# Patient Record
Sex: MALE | Race: WHITE | NOT HISPANIC OR LATINO | Employment: OTHER | URBAN - METROPOLITAN AREA
[De-identification: names, ages, dates, MRNs, and addresses within clinical notes are randomized per-mention and may not be internally consistent; named-entity substitution may affect disease eponyms.]

---

## 2017-01-03 ENCOUNTER — INFUSION (OUTPATIENT)
Dept: ONCOLOGY | Facility: HOSPITAL | Age: 67
End: 2017-01-03

## 2017-01-03 VITALS
HEART RATE: 70 BPM | SYSTOLIC BLOOD PRESSURE: 120 MMHG | DIASTOLIC BLOOD PRESSURE: 72 MMHG | BODY MASS INDEX: 25.18 KG/M2 | RESPIRATION RATE: 18 BRPM | TEMPERATURE: 98.6 F | WEIGHT: 177 LBS

## 2017-01-03 DIAGNOSIS — C18.7 MALIGNANT NEOPLASM OF SIGMOID COLON (HCC): Primary | ICD-10-CM

## 2017-01-03 LAB
ALBUMIN SERPL-MCNC: 4 G/DL (ref 3.2–4.8)
ALBUMIN/GLOB SERPL: 1.8 G/DL (ref 1.5–2.5)
ALP SERPL-CCNC: 55 U/L (ref 25–100)
ALT SERPL W P-5'-P-CCNC: 15 U/L (ref 7–40)
ANION GAP SERPL CALCULATED.3IONS-SCNC: 10 MMOL/L (ref 3–11)
AST SERPL-CCNC: 21 U/L (ref 0–33)
BILIRUB SERPL-MCNC: 1.7 MG/DL (ref 0.3–1.2)
BILIRUB UR QL STRIP: NEGATIVE
BUN BLD-MCNC: 15 MG/DL (ref 9–23)
BUN/CREAT SERPL: 18.8 (ref 7–25)
CALCIUM SPEC-SCNC: 9.8 MG/DL (ref 8.7–10.4)
CHLORIDE SERPL-SCNC: 105 MMOL/L (ref 99–109)
CLARITY UR: CLEAR
CO2 SERPL-SCNC: 24 MMOL/L (ref 20–31)
COLOR UR: YELLOW
CREAT BLD-MCNC: 0.8 MG/DL (ref 0.6–1.3)
CREAT BLDA-MCNC: 0.8 MG/DL (ref 0.6–1.3)
CREATINE SERPL-MCNC: 0.8 MG/DL
GFR SERPL CREATININE-BSD FRML MDRD: 97 ML/MIN/1.73
GLOBULIN UR ELPH-MCNC: 2.2 GM/DL
GLUCOSE BLD-MCNC: 104 MG/DL (ref 70–100)
GLUCOSE UR STRIP-MCNC: NEGATIVE MG/DL
HGB UR QL STRIP.AUTO: NEGATIVE
KETONES UR QL STRIP: NEGATIVE
LEUKOCYTE ESTERASE UR QL STRIP.AUTO: NEGATIVE
NITRITE UR QL STRIP: NEGATIVE
PH UR STRIP.AUTO: 5.5 [PH] (ref 5–8)
POTASSIUM BLD-SCNC: 4.2 MMOL/L (ref 3.5–5.5)
PROT SERPL-MCNC: 6.2 G/DL (ref 5.7–8.2)
PROT UR QL STRIP: NEGATIVE
SODIUM BLD-SCNC: 139 MMOL/L (ref 132–146)
SP GR UR STRIP: 1.02 (ref 1–1.03)
UROBILINOGEN UR QL STRIP: NORMAL

## 2017-01-03 PROCEDURE — 96374 THER/PROPH/DIAG INJ IV PUSH: CPT

## 2017-01-03 PROCEDURE — 96375 TX/PRO/DX INJ NEW DRUG ADDON: CPT

## 2017-01-03 PROCEDURE — 25010000002 BEVACIZUMAB PER 10 MG: Performed by: NURSE PRACTITIONER

## 2017-01-03 PROCEDURE — 25010000002 HEPARIN FLUSH (PORCINE) 100 UNIT/ML SOLUTION: Performed by: INTERNAL MEDICINE

## 2017-01-03 PROCEDURE — 36415 COLL VENOUS BLD VENIPUNCTURE: CPT

## 2017-01-03 PROCEDURE — 96413 CHEMO IV INFUSION 1 HR: CPT

## 2017-01-03 PROCEDURE — 25010000002 DEXAMETHASONE PER 1 MG: Performed by: NURSE PRACTITIONER

## 2017-01-03 RX ORDER — SODIUM CHLORIDE 0.9 % (FLUSH) 0.9 %
10 SYRINGE (ML) INJECTION AS NEEDED
Status: CANCELLED | OUTPATIENT
Start: 2017-01-03

## 2017-01-03 RX ORDER — DEXTROSE MONOHYDRATE 50 MG/ML
250 INJECTION, SOLUTION INTRAVENOUS ONCE
Status: DISCONTINUED | OUTPATIENT
Start: 2017-01-03 | End: 2017-01-03 | Stop reason: HOSPADM

## 2017-01-03 RX ORDER — SODIUM CHLORIDE 0.9 % (FLUSH) 0.9 %
10 SYRINGE (ML) INJECTION AS NEEDED
Status: DISCONTINUED | OUTPATIENT
Start: 2017-01-03 | End: 2017-01-03 | Stop reason: HOSPADM

## 2017-01-03 RX ORDER — HEPARIN SODIUM (PORCINE) LOCK FLUSH IV SOLN 100 UNIT/ML 100 UNIT/ML
300 SOLUTION INTRAVENOUS ONCE
Status: CANCELLED | OUTPATIENT
Start: 2017-01-03

## 2017-01-03 RX ORDER — SODIUM CHLORIDE 9 MG/ML
250 INJECTION, SOLUTION INTRAVENOUS ONCE
Status: DISCONTINUED | OUTPATIENT
Start: 2017-01-03 | End: 2017-01-03 | Stop reason: HOSPADM

## 2017-01-03 RX ADMIN — Medication 10 ML: at 10:11

## 2017-01-03 RX ADMIN — HEPARIN 500 UNITS: 100 SYRINGE at 10:11

## 2017-01-03 RX ADMIN — DEXAMETHASONE SODIUM PHOSPHATE 12 MG: 4 INJECTION, SOLUTION INTRA-ARTICULAR; INTRALESIONAL; INTRAMUSCULAR; INTRAVENOUS; SOFT TISSUE at 09:18

## 2017-01-03 RX ADMIN — BEVACIZUMAB 590 MG: 400 INJECTION, SOLUTION INTRAVENOUS at 09:35

## 2017-01-19 ENCOUNTER — OFFICE VISIT (OUTPATIENT)
Dept: ONCOLOGY | Facility: CLINIC | Age: 67
End: 2017-01-19

## 2017-01-19 VITALS
HEART RATE: 80 BPM | DIASTOLIC BLOOD PRESSURE: 70 MMHG | BODY MASS INDEX: 24.61 KG/M2 | TEMPERATURE: 98.6 F | SYSTOLIC BLOOD PRESSURE: 120 MMHG | WEIGHT: 173 LBS | RESPIRATION RATE: 18 BRPM

## 2017-01-19 DIAGNOSIS — C18.7 MALIGNANT NEOPLASM OF SIGMOID COLON (HCC): Primary | ICD-10-CM

## 2017-01-19 DIAGNOSIS — T45.1X5A PERIPHERAL NEUROPATHY DUE TO CHEMOTHERAPY (HCC): ICD-10-CM

## 2017-01-19 DIAGNOSIS — G62.0 PERIPHERAL NEUROPATHY DUE TO CHEMOTHERAPY (HCC): ICD-10-CM

## 2017-01-19 PROCEDURE — 99214 OFFICE O/P EST MOD 30 MIN: CPT | Performed by: INTERNAL MEDICINE

## 2017-01-19 RX ORDER — SODIUM CHLORIDE 9 MG/ML
250 INJECTION, SOLUTION INTRAVENOUS ONCE
Status: CANCELLED | OUTPATIENT
Start: 2017-01-24

## 2017-01-19 RX ORDER — DEXTROSE MONOHYDRATE 50 MG/ML
250 INJECTION, SOLUTION INTRAVENOUS ONCE
Status: CANCELLED | OUTPATIENT
Start: 2017-01-24 | End: 2017-01-24

## 2017-01-19 NOTE — PROGRESS NOTES
CHIEF COMPLAINT: Neuropathy    Problem List:  Oncology/Hematology History    1. Stage CARLOS, [id number] colon cancer with 2 out of 14 pericolonic lymph nodes  involved and a left lobe liver biopsy positive for metastasis, presenting with  a 25 pound weight loss and diarrhea with some perirectal soreness and had  colonoscopy with Dr. Mcclain in January that found this lesion that was  circumferential high-grade invading into the pericolonic fat, status post  sigmoid colectomy of a poorly differentiated adenocarcinoma.   a) Baseline CEA postop of 0.8 with alkaline phosphatase 111, with normal liver  enzymes and creatinine of 0.8. Baseline white count 9820 with a hemoglobin  13.8, platelets 339,000, and CT with contrast showing a right lobe liver dome  lesion as well as an anastomotic change in the bowel.   b) Began CapeOx 03/15/2016.  c) Added in Avastin to CapeOx 04/05/2016, second cycle. (This was 8 weeks out  from surgery).  d) KRAS mutation is negative.  E.) CAT scan in August 2016 shows resolution of disease in the liver and colon and a stable lung nodule.  Hence Avastin, Xeloda, and oxaliplatin continued.  F) oxaliplatin discontinued October 2016 due to worsening peripheral neuropathy.    2.  Peripheral neuropathy, chemotherapy induced        Malignant neoplasm of sigmoid colon    5/20/2016 Initial Diagnosis    Malignant neoplasm of sigmoid colon       HISTORY OF PRESENT ILLNESS:  The patient is a 66 y.o. male, here for follow up on management of metastatic colon cancer.  Off oxaliplatin since October due to peripheral neuropathy which has not improved though not worsened.  He has tried some topical salves which have helped somewhat along with soaking his feet and hands.  He also has some dryness and flaking of the palms and soles of his feet but no desquamation and no tenderness and no inflammation.      Past Medical History   Diagnosis Date   • Malignant neoplasm of colon      Past Surgical History   Procedure  Laterality Date   • Portacath placement     • Colon surgery       Sigmoid       No Known Allergies    Family History and Social History reviewed and changed as necessary      REVIEW OF SYSTEM:   Review of Systems   Constitutional: Negative for appetite change, chills, diaphoresis, fatigue, fever and unexpected weight change.   HENT:   Negative for mouth sores, sore throat and trouble swallowing.    Eyes: Negative for icterus.   Respiratory: Negative for cough, hemoptysis and shortness of breath.    Cardiovascular: Negative for chest pain, leg swelling and palpitations.   Gastrointestinal: Negative for abdominal distention, abdominal pain, blood in stool, constipation, diarrhea, nausea and vomiting.   Endocrine: Negative for hot flashes.   Genitourinary: Negative for bladder incontinence, difficulty urinating, dysuria, frequency and hematuria.    Musculoskeletal: Negative for gait problem, neck pain and neck stiffness.   Skin: Negative for rash.   Neurological: Negative for dizziness, gait problem, headaches, light-headedness and numbness.   Hematological: Negative for adenopathy. Does not bruise/bleed easily.   Psychiatric/Behavioral: Negative for depression. The patient is not nervous/anxious.    All other systems reviewed and are negative.       PHYSICAL EXAM    Vitals:    01/19/17 0852   BP: 120/70   Pulse: 80   Resp: 18   Temp: 98.6 °F (37 °C)   TempSrc: Temporal Artery    Weight: 173 lb (78.5 kg)     Constitutional: Appears well-developed and well-nourished. No distress.   ECOG: (1) Restricted in physically strenuous activity, ambulatory and able to do work of light nature  HENT:   Head: Normocephalic.   Mouth/Throat: Oropharynx is clear and moist.   Eyes: Conjunctivae are normal. Pupils are equal, round, and reactive to light. No scleral icterus.   Neck: Neck supple. No JVD present. No thyromegaly present.   Cardiovascular: Normal rate, regular rhythm and normal heart sounds.    Pulmonary/Chest: Breath sounds  normal. No respiratory distress.   Abdominal: Soft. Exhibits no distension and no mass. There is no hepatosplenomegaly. There is no tenderness. There is no rebound and no guarding.   Musculoskeletal:Exhibits no edema, tenderness or deformity.   Neurological: Alert and oriented to person, place, and time. Exhibits normal muscle tone.   Skin: No ecchymosis, no petechiae and no rash noted. Not diaphoretic. No cyanosis. Nails show no clubbing.  Palms and soles are slightly flaky but noninflamed but certainly dry   Psychiatric: Normal mood and affect.   Vitals reviewed.      Infusion on 01/03/2017   Component Date Value Ref Range Status   • Glucose 01/03/2017 104* 70 - 100 mg/dL Final   • BUN 01/03/2017 15  9 - 23 mg/dL Final   • Creatinine 01/03/2017 0.80  0.60 - 1.30 mg/dL Final   • Sodium 01/03/2017 139  132 - 146 mmol/L Final   • Potassium 01/03/2017 4.2  3.5 - 5.5 mmol/L Final   • Chloride 01/03/2017 105  99 - 109 mmol/L Final   • CO2 01/03/2017 24.0  20.0 - 31.0 mmol/L Final   • Calcium 01/03/2017 9.8  8.7 - 10.4 mg/dL Final   • Total Protein 01/03/2017 6.2  5.7 - 8.2 g/dL Final   • Albumin 01/03/2017 4.00  3.20 - 4.80 g/dL Final   • ALT (SGPT) 01/03/2017 15  7 - 40 U/L Final   • AST (SGOT) 01/03/2017 21  0 - 33 U/L Final   • Alkaline Phosphatase 01/03/2017 55  25 - 100 U/L Final   • Total Bilirubin 01/03/2017 1.7* 0.3 - 1.2 mg/dL Final   • eGFR Non African Amer 01/03/2017 97  >60 mL/min/1.73 Final   • Globulin 01/03/2017 2.2  gm/dL Final   • A/G Ratio 01/03/2017 1.8  1.5 - 2.5 g/dL Final   • BUN/Creatinine Ratio 01/03/2017 18.8  7.0 - 25.0 Final   • Anion Gap 01/03/2017 10.0  3.0 - 11.0 mmol/L Final   • Color, UA 01/03/2017 Yellow  Yellow, Straw Final   • Appearance, UA 01/03/2017 Clear  Clear Final   • pH, UA 01/03/2017 5.5  5.0 - 8.0 Final   • Specific Gravity, UA 01/03/2017 1.020  1.005 - 1.030 Final   • Glucose, UA 01/03/2017 Negative  Negative Final   • Ketones, UA 01/03/2017 Negative  Negative Final   •  Bilirubin, UA 01/03/2017 Negative  Negative Final   • Blood, UA 01/03/2017 Negative  Negative Final   • Protein, UA 01/03/2017 Negative  Negative Final   • Leuk Esterase, UA 01/03/2017 Negative  Negative Final   • Nitrite, UA 01/03/2017 Negative  Negative Final   • Urobilinogen, UA 01/03/2017 0.2 E.U./dL  0.2 - 1.0 E.U./dL Final   • Creatine, Serum 01/03/2017 0.8   Preliminary   • Creatinine 01/03/2017 0.80  0.60 - 1.30 mg/dL Final    Serial Number: [id number]    : [id number]   Infusion on 12/13/2016   Component Date Value Ref Range Status   • Glucose 12/13/2016 83  70 - 100 mg/dL Final   • BUN 12/13/2016 15  9 - 23 mg/dL Final   • Creatinine 12/13/2016 0.70  0.60 - 1.30 mg/dL Final   • Sodium 12/13/2016 137  132 - 146 mmol/L Final   • Potassium 12/13/2016 4.2  3.5 - 5.5 mmol/L Final   • Chloride 12/13/2016 105  99 - 109 mmol/L Final   • CO2 12/13/2016 23.0  20.0 - 31.0 mmol/L Final   • Calcium 12/13/2016 9.9  8.7 - 10.4 mg/dL Final   • Total Protein 12/13/2016 6.4  5.7 - 8.2 g/dL Final   • Albumin 12/13/2016 4.00  3.20 - 4.80 g/dL Final   • ALT (SGPT) 12/13/2016 18  7 - 40 U/L Final   • AST (SGOT) 12/13/2016 26  0 - 33 U/L Final   • Alkaline Phosphatase 12/13/2016 58  25 - 100 U/L Final   • Total Bilirubin 12/13/2016 1.5* 0.3 - 1.2 mg/dL Final   • eGFR Non African Amer 12/13/2016 113  >60 mL/min/1.73 Final   • Globulin 12/13/2016 2.4  gm/dL Final   • A/G Ratio 12/13/2016 1.7  g/dL Final   • BUN/Creatinine Ratio 12/13/2016 21.4  7.0 - 25.0 Final   • Anion Gap 12/13/2016 9.0  3.0 - 11.0 mmol/L Final   • Color, UA 12/13/2016 Yellow  Yellow, Straw Final   • Appearance, UA 12/13/2016 Clear  Clear Final   • pH, UA 12/13/2016 5.5  5.0 - 8.0 Final   • Specific Gravity, UA 12/13/2016 1.015  1.005 - 1.030 Final   • Glucose, UA 12/13/2016 Negative  Negative Final   • Ketones, UA 12/13/2016 Negative  Negative Final   • Bilirubin, UA 12/13/2016 Negative  Negative Final   • Blood, UA 12/13/2016 Negative  Negative Final   •  Protein, UA 12/13/2016 Negative  Negative Final   • Leuk Esterase, UA 12/13/2016 Negative  Negative Final   • Nitrite, UA 12/13/2016 Negative  Negative Final   • Urobilinogen, UA 12/13/2016 0.2 E.U./dL  0.2 - 1.0 E.U./dL Final   • WBC 12/13/2016 2.90* 3.50 - 10.80 10*3/mm3 Final   • RBC 12/13/2016 3.42* 4.20 - 5.76 10*6/mm3 Final   • Hemoglobin 12/13/2016 13.7  13.1 - 17.5 g/dL Final   • Hematocrit 12/13/2016 37.5* 38.9 - 50.9 % Final   • MCV 12/13/2016 109.6* 80.0 - 99.0 fL Final   • MCH 12/13/2016 40.1* 27.0 - 31.0 pg Final   • MCHC 12/13/2016 36.5* 32.0 - 36.0 g/dL Final   • RDW 12/13/2016 17.9* 11.3 - 14.5 % Final   • MPV 12/13/2016 8.9  6.0 - 12.0 fL Final   • Platelets 12/13/2016 110* 150 - 450 10*3/mm3 Final   • Neutrophil % 12/13/2016 45.4  41.0 - 71.0 % Final   • Lymphocyte % 12/13/2016 37.0  24.0 - 44.0 % Final   • Auto Mixed Cells % 12/13/2016 17.6* 0.0 - 16.0 % Final   • Neutrophils, Absolute 12/13/2016 1.30* 1.50 - 8.30 10*3/mm3 Final   • Lymphocytes, Absolute 12/13/2016 1.10  0.60 - 4.80 10*3/mm3 Final   • Auto Mixed Cells # 12/13/2016 0.50  0.10 - 1.50 10*3/uL Final   • Creatinine 12/13/2016 0.8  mg/dL Preliminary   • Creatinine 12/13/2016 0.80  0.60 - 1.30 mg/dL Final    Serial Number: [id number]    : [id number]   • WBC 01/03/2017 3.80  3.50 - 10.80 10*3/mm3 Final   • RBC 01/03/2017 3.44* 4.20 - 5.76 10*6/mm3 Final   • Hemoglobin 01/03/2017 13.7  13.1 - 17.5 g/dL Final   • Hematocrit 01/03/2017 38.1* 38.9 - 50.9 % Final   • MCV 01/03/2017 110.8* 80.0 - 99.0 fL Final   • MCH 01/03/2017 39.8* 27.0 - 31.0 pg Final   • MCHC 01/03/2017 36.0  32.0 - 36.0 g/dL Final   • RDW 01/03/2017 17.9* 11.3 - 14.5 % Final   • MPV 01/03/2017 8.9  6.0 - 12.0 fL Final   • Platelets 01/03/2017 102* 150 - 450 10*3/mm3 Final   • Neutrophil % 01/03/2017 58.6  41.0 - 71.0 % Final   • Lymphocyte % 01/03/2017 24.2  24.0 - 44.0 % Final   • Auto Mixed Cells % 01/03/2017 17.2* 0.0 - 16.0 % Final   • Neutrophils, Absolute 01/03/2017  2.20  1.50 - 8.30 10*3/mm3 Final   • Lymphocytes, Absolute 01/03/2017 0.90  0.60 - 4.80 10*3/mm3 Final   • Auto Mixed Cells # 01/03/2017 0.70  0.10 - 1.50 10*3/uL Final       Assessment/Plan     1.  Metastatic colon cancer  2. Neuropathy    Discussion: He is going to try alpha lipoic acid, menthol lotion, and continue his gabapentin for his neuropathy.  We will repeat CTs with contrast prior to return in February.  His next treatment is due next week.  He has had no hypertension nor any proteinuria nor any evidence of vascular events.  We will continue Avastin and Xeloda without oxaliplatin until progression.       Errors in dictation may reflect use of voice recognition software and not all errors in transcription may have been detected prior to signing.    Dov Haines MD    01/19/2017

## 2017-01-19 NOTE — LETTER
January 19, 2017     Maryse Gamble MD  [address] [facility] [address]    Patient: Gordon Avila   YOB: 1950   Date of Visit: 1/19/2017       Dear Dr. Tye MD:    Gordon Avila was in my office today. Below is a copy of my note.    If you have questions, please do not hesitate to call me. I look forward to following Gordon along with you.         Sincerely,        Dov Haines MD        CC: Justus Mcclain MD    CHIEF COMPLAINT: Neuropathy    Problem List:  Oncology/Hematology History    1. Stage CARLOS, [id number] colon cancer with 2 out of 14 pericolonic lymph nodes  involved and a left lobe liver biopsy positive for metastasis, presenting with  a 25 pound weight loss and diarrhea with some perirectal soreness and had  colonoscopy with Dr. Mcclain in January that found this lesion that was  circumferential high-grade invading into the pericolonic fat, status post  sigmoid colectomy of a poorly differentiated adenocarcinoma.   a) Baseline CEA postop of 0.8 with alkaline phosphatase 111, with normal liver  enzymes and creatinine of 0.8. Baseline white count 9820 with a hemoglobin  13.8, platelets 339,000, and CT with contrast showing a right lobe liver dome  lesion as well as an anastomotic change in the bowel.   b) Began CapeOx 03/15/2016.  c) Added in Avastin to CapeOx 04/05/2016, second cycle. (This was 8 weeks out  from surgery).  d) KRAS mutation is negative.  E.) CAT scan in August 2016 shows resolution of disease in the liver and colon and a stable lung nodule.  Hence Avastin, Xeloda, and oxaliplatin continued.  F) oxaliplatin discontinued October 2016 due to worsening peripheral neuropathy.    2.  Peripheral neuropathy, chemotherapy induced        Malignant neoplasm of sigmoid colon    5/20/2016 Initial Diagnosis    Malignant neoplasm of sigmoid colon       HISTORY OF PRESENT ILLNESS:  The patient is a 66 y.o. male, here for follow up on management of metastatic colon cancer.  Off  oxaliplatin since October due to peripheral neuropathy which has not improved though not worsened.  He has tried some topical salves which have helped somewhat along with soaking his feet and hands.  He also has some dryness and flaking of the palms and soles of his feet but no desquamation and no tenderness and no inflammation.      Past Medical History   Diagnosis Date   • Malignant neoplasm of colon      Past Surgical History   Procedure Laterality Date   • Portacath placement     • Colon surgery       Sigmoid       No Known Allergies    Family History and Social History reviewed and changed as necessary      REVIEW OF SYSTEM:   Review of Systems   Constitutional: Negative for appetite change, chills, diaphoresis, fatigue, fever and unexpected weight change.   HENT:   Negative for mouth sores, sore throat and trouble swallowing.    Eyes: Negative for icterus.   Respiratory: Negative for cough, hemoptysis and shortness of breath.    Cardiovascular: Negative for chest pain, leg swelling and palpitations.   Gastrointestinal: Negative for abdominal distention, abdominal pain, blood in stool, constipation, diarrhea, nausea and vomiting.   Endocrine: Negative for hot flashes.   Genitourinary: Negative for bladder incontinence, difficulty urinating, dysuria, frequency and hematuria.    Musculoskeletal: Negative for gait problem, neck pain and neck stiffness.   Skin: Negative for rash.   Neurological: Negative for dizziness, gait problem, headaches, light-headedness and numbness.   Hematological: Negative for adenopathy. Does not bruise/bleed easily.   Psychiatric/Behavioral: Negative for depression. The patient is not nervous/anxious.    All other systems reviewed and are negative.       PHYSICAL EXAM    Vitals:    01/19/17 0852   BP: 120/70   Pulse: 80   Resp: 18   Temp: 98.6 °F (37 °C)   TempSrc: Temporal Artery    Weight: 173 lb (78.5 kg)     Constitutional: Appears well-developed and well-nourished. No distress.    ECOG: (1) Restricted in physically strenuous activity, ambulatory and able to do work of light nature  HENT:   Head: Normocephalic.   Mouth/Throat: Oropharynx is clear and moist.   Eyes: Conjunctivae are normal. Pupils are equal, round, and reactive to light. No scleral icterus.   Neck: Neck supple. No JVD present. No thyromegaly present.   Cardiovascular: Normal rate, regular rhythm and normal heart sounds.    Pulmonary/Chest: Breath sounds normal. No respiratory distress.   Abdominal: Soft. Exhibits no distension and no mass. There is no hepatosplenomegaly. There is no tenderness. There is no rebound and no guarding.   Musculoskeletal:Exhibits no edema, tenderness or deformity.   Neurological: Alert and oriented to person, place, and time. Exhibits normal muscle tone.   Skin: No ecchymosis, no petechiae and no rash noted. Not diaphoretic. No cyanosis. Nails show no clubbing.  Palms and soles are slightly flaky but noninflamed but certainly dry   Psychiatric: Normal mood and affect.   Vitals reviewed.      Infusion on 01/03/2017   Component Date Value Ref Range Status   • Glucose 01/03/2017 104* 70 - 100 mg/dL Final   • BUN 01/03/2017 15  9 - 23 mg/dL Final   • Creatinine 01/03/2017 0.80  0.60 - 1.30 mg/dL Final   • Sodium 01/03/2017 139  132 - 146 mmol/L Final   • Potassium 01/03/2017 4.2  3.5 - 5.5 mmol/L Final   • Chloride 01/03/2017 105  99 - 109 mmol/L Final   • CO2 01/03/2017 24.0  20.0 - 31.0 mmol/L Final   • Calcium 01/03/2017 9.8  8.7 - 10.4 mg/dL Final   • Total Protein 01/03/2017 6.2  5.7 - 8.2 g/dL Final   • Albumin 01/03/2017 4.00  3.20 - 4.80 g/dL Final   • ALT (SGPT) 01/03/2017 15  7 - 40 U/L Final   • AST (SGOT) 01/03/2017 21  0 - 33 U/L Final   • Alkaline Phosphatase 01/03/2017 55  25 - 100 U/L Final   • Total Bilirubin 01/03/2017 1.7* 0.3 - 1.2 mg/dL Final   • eGFR Non African Amer 01/03/2017 97  >60 mL/min/1.73 Final   • Globulin 01/03/2017 2.2  gm/dL Final   • A/G Ratio 01/03/2017 1.8  1.5 -  2.5 g/dL Final   • BUN/Creatinine Ratio 01/03/2017 18.8  7.0 - 25.0 Final   • Anion Gap 01/03/2017 10.0  3.0 - 11.0 mmol/L Final   • Color, UA 01/03/2017 Yellow  Yellow, Straw Final   • Appearance, UA 01/03/2017 Clear  Clear Final   • pH, UA 01/03/2017 5.5  5.0 - 8.0 Final   • Specific Gravity, UA 01/03/2017 1.020  1.005 - 1.030 Final   • Glucose, UA 01/03/2017 Negative  Negative Final   • Ketones, UA 01/03/2017 Negative  Negative Final   • Bilirubin, UA 01/03/2017 Negative  Negative Final   • Blood, UA 01/03/2017 Negative  Negative Final   • Protein, UA 01/03/2017 Negative  Negative Final   • Leuk Esterase, UA 01/03/2017 Negative  Negative Final   • Nitrite, UA 01/03/2017 Negative  Negative Final   • Urobilinogen, UA 01/03/2017 0.2 E.U./dL  0.2 - 1.0 E.U./dL Final   • Creatine, Serum 01/03/2017 0.8   Preliminary   • Creatinine 01/03/2017 0.80  0.60 - 1.30 mg/dL Final    Serial Number: [id number]    : [id number]   Infusion on 12/13/2016   Component Date Value Ref Range Status   • Glucose 12/13/2016 83  70 - 100 mg/dL Final   • BUN 12/13/2016 15  9 - 23 mg/dL Final   • Creatinine 12/13/2016 0.70  0.60 - 1.30 mg/dL Final   • Sodium 12/13/2016 137  132 - 146 mmol/L Final   • Potassium 12/13/2016 4.2  3.5 - 5.5 mmol/L Final   • Chloride 12/13/2016 105  99 - 109 mmol/L Final   • CO2 12/13/2016 23.0  20.0 - 31.0 mmol/L Final   • Calcium 12/13/2016 9.9  8.7 - 10.4 mg/dL Final   • Total Protein 12/13/2016 6.4  5.7 - 8.2 g/dL Final   • Albumin 12/13/2016 4.00  3.20 - 4.80 g/dL Final   • ALT (SGPT) 12/13/2016 18  7 - 40 U/L Final   • AST (SGOT) 12/13/2016 26  0 - 33 U/L Final   • Alkaline Phosphatase 12/13/2016 58  25 - 100 U/L Final   • Total Bilirubin 12/13/2016 1.5* 0.3 - 1.2 mg/dL Final   • eGFR Non African Amer 12/13/2016 113  >60 mL/min/1.73 Final   • Globulin 12/13/2016 2.4  gm/dL Final   • A/G Ratio 12/13/2016 1.7  g/dL Final   • BUN/Creatinine Ratio 12/13/2016 21.4  7.0 - 25.0 Final   • Anion Gap 12/13/2016 9.0  3.0  - 11.0 mmol/L Final   • Color, UA 12/13/2016 Yellow  Yellow, Straw Final   • Appearance, UA 12/13/2016 Clear  Clear Final   • pH, UA 12/13/2016 5.5  5.0 - 8.0 Final   • Specific Gravity, UA 12/13/2016 1.015  1.005 - 1.030 Final   • Glucose, UA 12/13/2016 Negative  Negative Final   • Ketones, UA 12/13/2016 Negative  Negative Final   • Bilirubin, UA 12/13/2016 Negative  Negative Final   • Blood, UA 12/13/2016 Negative  Negative Final   • Protein, UA 12/13/2016 Negative  Negative Final   • Leuk Esterase, UA 12/13/2016 Negative  Negative Final   • Nitrite, UA 12/13/2016 Negative  Negative Final   • Urobilinogen, UA 12/13/2016 0.2 E.U./dL  0.2 - 1.0 E.U./dL Final   • WBC 12/13/2016 2.90* 3.50 - 10.80 10*3/mm3 Final   • RBC 12/13/2016 3.42* 4.20 - 5.76 10*6/mm3 Final   • Hemoglobin 12/13/2016 13.7  13.1 - 17.5 g/dL Final   • Hematocrit 12/13/2016 37.5* 38.9 - 50.9 % Final   • MCV 12/13/2016 109.6* 80.0 - 99.0 fL Final   • MCH 12/13/2016 40.1* 27.0 - 31.0 pg Final   • MCHC 12/13/2016 36.5* 32.0 - 36.0 g/dL Final   • RDW 12/13/2016 17.9* 11.3 - 14.5 % Final   • MPV 12/13/2016 8.9  6.0 - 12.0 fL Final   • Platelets 12/13/2016 110* 150 - 450 10*3/mm3 Final   • Neutrophil % 12/13/2016 45.4  41.0 - 71.0 % Final   • Lymphocyte % 12/13/2016 37.0  24.0 - 44.0 % Final   • Auto Mixed Cells % 12/13/2016 17.6* 0.0 - 16.0 % Final   • Neutrophils, Absolute 12/13/2016 1.30* 1.50 - 8.30 10*3/mm3 Final   • Lymphocytes, Absolute 12/13/2016 1.10  0.60 - 4.80 10*3/mm3 Final   • Auto Mixed Cells # 12/13/2016 0.50  0.10 - 1.50 10*3/uL Final   • Creatinine 12/13/2016 0.8  mg/dL Preliminary   • Creatinine 12/13/2016 0.80  0.60 - 1.30 mg/dL Final    Serial Number: [id number]    : [id number]   • WBC 01/03/2017 3.80  3.50 - 10.80 10*3/mm3 Final   • RBC 01/03/2017 3.44* 4.20 - 5.76 10*6/mm3 Final   • Hemoglobin 01/03/2017 13.7  13.1 - 17.5 g/dL Final   • Hematocrit 01/03/2017 38.1* 38.9 - 50.9 % Final   • MCV 01/03/2017 110.8* 80.0 - 99.0 fL Final    • MCH 01/03/2017 39.8* 27.0 - 31.0 pg Final   • MCHC 01/03/2017 36.0  32.0 - 36.0 g/dL Final   • RDW 01/03/2017 17.9* 11.3 - 14.5 % Final   • MPV 01/03/2017 8.9  6.0 - 12.0 fL Final   • Platelets 01/03/2017 102* 150 - 450 10*3/mm3 Final   • Neutrophil % 01/03/2017 58.6  41.0 - 71.0 % Final   • Lymphocyte % 01/03/2017 24.2  24.0 - 44.0 % Final   • Auto Mixed Cells % 01/03/2017 17.2* 0.0 - 16.0 % Final   • Neutrophils, Absolute 01/03/2017 2.20  1.50 - 8.30 10*3/mm3 Final   • Lymphocytes, Absolute 01/03/2017 0.90  0.60 - 4.80 10*3/mm3 Final   • Auto Mixed Cells # 01/03/2017 0.70  0.10 - 1.50 10*3/uL Final       Assessment/Plan     1.  Metastatic colon cancer  2. Neuropathy    Discussion: He is going to try alpha lipoic acid, menthol lotion, and continue his gabapentin for his neuropathy.  We will repeat CTs with contrast prior to return in February.  His next treatment is due next week.  He has had no hypertension nor any proteinuria nor any evidence of vascular events.  We will continue Avastin and Xeloda without oxaliplatin until progression.       Errors in dictation may reflect use of voice recognition software and not all errors in transcription may have been detected prior to signing.    Dov Haines MD    01/19/2017

## 2017-01-20 DIAGNOSIS — C18.7 MALIGNANT NEOPLASM OF SIGMOID COLON (HCC): Primary | ICD-10-CM

## 2017-01-20 RX ORDER — CAPECITABINE 500 MG/1
1500 TABLET, FILM COATED ORAL 2 TIMES DAILY
Qty: 84 TABLET | Refills: 5 | Status: SHIPPED | OUTPATIENT
Start: 2017-01-20 | End: 2017-05-16 | Stop reason: SDUPTHER

## 2017-01-24 ENCOUNTER — INFUSION (OUTPATIENT)
Dept: ONCOLOGY | Facility: HOSPITAL | Age: 67
End: 2017-01-24

## 2017-01-24 DIAGNOSIS — C18.7 MALIGNANT NEOPLASM OF SIGMOID COLON (HCC): Primary | ICD-10-CM

## 2017-01-24 LAB
ALBUMIN SERPL-MCNC: 4 G/DL (ref 3.2–4.8)
ALBUMIN/GLOB SERPL: 1.8 G/DL (ref 1.5–2.5)
ALP SERPL-CCNC: 51 U/L (ref 25–100)
ALT SERPL W P-5'-P-CCNC: 19 U/L (ref 7–40)
ANION GAP SERPL CALCULATED.3IONS-SCNC: 11 MMOL/L (ref 3–11)
AST SERPL-CCNC: 23 U/L (ref 0–33)
AUTO MIXED CELLS #: 0.5 10*3/UL (ref 0.1–1.5)
AUTO MIXED CELLS %: 14.9 % (ref 0–16)
BILIRUB SERPL-MCNC: 1.7 MG/DL (ref 0.3–1.2)
BILIRUB UR QL STRIP: NEGATIVE
BUN BLD-MCNC: 15 MG/DL (ref 9–23)
BUN/CREAT SERPL: 18.8 (ref 7–25)
CALCIUM SPEC-SCNC: 9.7 MG/DL (ref 8.7–10.4)
CHLORIDE SERPL-SCNC: 107 MMOL/L (ref 99–109)
CLARITY UR: CLEAR
CO2 SERPL-SCNC: 24 MMOL/L (ref 20–31)
COLOR UR: YELLOW
CREAT BLD-MCNC: 0.8 MG/DL (ref 0.6–1.3)
CREAT BLDA-MCNC: 0.8 MG/DL (ref 0.6–1.3)
CREATINE SERPL-MCNC: 0.8 MG/DL
ERYTHROCYTE [DISTWIDTH] IN BLOOD BY AUTOMATED COUNT: 18.3 % (ref 11.3–14.5)
GFR SERPL CREATININE-BSD FRML MDRD: 97 ML/MIN/1.73
GLOBULIN UR ELPH-MCNC: 2.2 GM/DL
GLUCOSE BLD-MCNC: 118 MG/DL (ref 70–100)
GLUCOSE UR STRIP-MCNC: NEGATIVE MG/DL
HCT VFR BLD AUTO: 38 % (ref 38.9–50.9)
HGB BLD-MCNC: 13.8 G/DL (ref 13.1–17.5)
HGB UR QL STRIP.AUTO: NEGATIVE
KETONES UR QL STRIP: NEGATIVE
LEUKOCYTE ESTERASE UR QL STRIP.AUTO: NEGATIVE
LYMPHOCYTES # BLD AUTO: 0.8 10*3/MM3 (ref 0.6–4.8)
LYMPHOCYTES NFR BLD AUTO: 23.8 % (ref 24–44)
MCH RBC QN AUTO: 40.4 PG (ref 27–31)
MCHC RBC AUTO-ENTMCNC: 36.3 G/DL (ref 32–36)
MCV RBC AUTO: 111.1 FL (ref 80–99)
NEUTROPHILS # BLD AUTO: 2.2 10*3/MM3 (ref 1.5–8.3)
NEUTROPHILS NFR BLD AUTO: 61.3 % (ref 41–71)
NITRITE UR QL STRIP: NEGATIVE
PH UR STRIP.AUTO: 5.5 [PH] (ref 5–8)
PLATELET # BLD AUTO: 114 10*3/MM3 (ref 150–450)
PMV BLD AUTO: 9.1 FL (ref 6–12)
POTASSIUM BLD-SCNC: 4.3 MMOL/L (ref 3.5–5.5)
PROT SERPL-MCNC: 6.2 G/DL (ref 5.7–8.2)
PROT UR QL STRIP: NEGATIVE
RBC # BLD AUTO: 3.42 10*6/MM3 (ref 4.2–5.76)
SODIUM BLD-SCNC: 142 MMOL/L (ref 132–146)
SP GR UR STRIP: 1.01 (ref 1–1.03)
UROBILINOGEN UR QL STRIP: NORMAL
WBC NRBC COR # BLD: 3.5 10*3/MM3 (ref 3.5–10.8)

## 2017-01-24 PROCEDURE — 25010000002 HEPARIN FLUSH (PORCINE) 100 UNIT/ML SOLUTION: Performed by: INTERNAL MEDICINE

## 2017-01-24 PROCEDURE — 25010000002 BEVACIZUMAB PER 10 MG: Performed by: INTERNAL MEDICINE

## 2017-01-24 PROCEDURE — 96413 CHEMO IV INFUSION 1 HR: CPT

## 2017-01-24 PROCEDURE — 25010000002 DEXAMETHASONE PER 1 MG: Performed by: INTERNAL MEDICINE

## 2017-01-24 PROCEDURE — 96374 THER/PROPH/DIAG INJ IV PUSH: CPT

## 2017-01-24 PROCEDURE — 96375 TX/PRO/DX INJ NEW DRUG ADDON: CPT

## 2017-01-24 RX ORDER — SODIUM CHLORIDE 0.9 % (FLUSH) 0.9 %
10 SYRINGE (ML) INJECTION AS NEEDED
Status: DISCONTINUED | OUTPATIENT
Start: 2017-01-24 | End: 2017-01-24 | Stop reason: HOSPADM

## 2017-01-24 RX ORDER — HEPARIN SODIUM (PORCINE) LOCK FLUSH IV SOLN 100 UNIT/ML 100 UNIT/ML
300 SOLUTION INTRAVENOUS ONCE
Status: CANCELLED | OUTPATIENT
Start: 2017-01-24

## 2017-01-24 RX ORDER — SODIUM CHLORIDE 9 MG/ML
250 INJECTION, SOLUTION INTRAVENOUS ONCE
Status: DISCONTINUED | OUTPATIENT
Start: 2017-01-24 | End: 2017-01-24 | Stop reason: HOSPADM

## 2017-01-24 RX ORDER — SODIUM CHLORIDE 0.9 % (FLUSH) 0.9 %
10 SYRINGE (ML) INJECTION AS NEEDED
Status: CANCELLED | OUTPATIENT
Start: 2017-01-24

## 2017-01-24 RX ORDER — DEXTROSE MONOHYDRATE 50 MG/ML
250 INJECTION, SOLUTION INTRAVENOUS ONCE
Status: DISCONTINUED | OUTPATIENT
Start: 2017-01-24 | End: 2017-01-24 | Stop reason: HOSPADM

## 2017-01-24 RX ADMIN — Medication 10 ML: at 10:14

## 2017-01-24 RX ADMIN — BEVACIZUMAB 590 MG: 400 INJECTION, SOLUTION INTRAVENOUS at 09:40

## 2017-01-24 RX ADMIN — DEXAMETHASONE SODIUM PHOSPHATE 12 MG: 4 INJECTION, SOLUTION INTRA-ARTICULAR; INTRALESIONAL; INTRAMUSCULAR; INTRAVENOUS; SOFT TISSUE at 09:24

## 2017-01-24 RX ADMIN — HEPARIN 500 UNITS: 100 SYRINGE at 10:14

## 2017-02-06 ENCOUNTER — HOSPITAL ENCOUNTER (OUTPATIENT)
Dept: CT IMAGING | Facility: HOSPITAL | Age: 67
Discharge: HOME | End: 2017-02-06
Attending: INTERNAL MEDICINE | Admitting: INTERNAL MEDICINE

## 2017-02-06 ENCOUNTER — APPOINTMENT (OUTPATIENT)
Dept: CT IMAGING | Facility: HOSPITAL | Age: 67
End: 2017-02-06
Attending: INTERNAL MEDICINE

## 2017-02-06 DIAGNOSIS — T45.1X5A PERIPHERAL NEUROPATHY DUE TO CHEMOTHERAPY (HCC): ICD-10-CM

## 2017-02-06 DIAGNOSIS — G62.0 PERIPHERAL NEUROPATHY DUE TO CHEMOTHERAPY (HCC): ICD-10-CM

## 2017-02-06 DIAGNOSIS — C18.7 MALIGNANT NEOPLASM OF SIGMOID COLON (HCC): ICD-10-CM

## 2017-02-06 PROCEDURE — 0 IOPAMIDOL 61 % SOLUTION: Performed by: INTERNAL MEDICINE

## 2017-02-06 PROCEDURE — 82378 CARCINOEMBRYONIC ANTIGEN: CPT | Performed by: INTERNAL MEDICINE

## 2017-02-06 PROCEDURE — 36415 COLL VENOUS BLD VENIPUNCTURE: CPT | Performed by: INTERNAL MEDICINE

## 2017-02-06 PROCEDURE — 74177 CT ABD & PELVIS W/CONTRAST: CPT

## 2017-02-06 PROCEDURE — 71260 CT THORAX DX C+: CPT

## 2017-02-06 RX ADMIN — BARIUM SULFATE 450 ML: 21 SUSPENSION ORAL at 08:20

## 2017-02-06 RX ADMIN — IOPAMIDOL 95 ML: 612 INJECTION, SOLUTION INTRAVENOUS at 09:35

## 2017-02-09 ENCOUNTER — OFFICE VISIT (OUTPATIENT)
Dept: ONCOLOGY | Facility: CLINIC | Age: 67
End: 2017-02-09

## 2017-02-09 VITALS
HEART RATE: 78 BPM | OXYGEN SATURATION: 97 % | SYSTOLIC BLOOD PRESSURE: 111 MMHG | BODY MASS INDEX: 23.06 KG/M2 | WEIGHT: 174 LBS | DIASTOLIC BLOOD PRESSURE: 68 MMHG | RESPIRATION RATE: 16 BRPM | TEMPERATURE: 97 F | HEIGHT: 73 IN

## 2017-02-09 DIAGNOSIS — C18.7 MALIGNANT NEOPLASM OF SIGMOID COLON (HCC): Primary | ICD-10-CM

## 2017-02-09 PROCEDURE — 99214 OFFICE O/P EST MOD 30 MIN: CPT | Performed by: INTERNAL MEDICINE

## 2017-02-09 RX ORDER — DEXTROSE MONOHYDRATE 50 MG/ML
250 INJECTION, SOLUTION INTRAVENOUS ONCE
Status: CANCELLED | OUTPATIENT
Start: 2017-03-07 | End: 2017-03-07

## 2017-02-09 RX ORDER — SODIUM CHLORIDE 9 MG/ML
250 INJECTION, SOLUTION INTRAVENOUS ONCE
Status: CANCELLED | OUTPATIENT
Start: 2017-02-14

## 2017-02-09 RX ORDER — SODIUM CHLORIDE 9 MG/ML
250 INJECTION, SOLUTION INTRAVENOUS ONCE
Status: CANCELLED | OUTPATIENT
Start: 2017-03-07

## 2017-02-09 RX ORDER — DEXTROSE MONOHYDRATE 50 MG/ML
250 INJECTION, SOLUTION INTRAVENOUS ONCE
Status: CANCELLED | OUTPATIENT
Start: 2017-02-14 | End: 2017-02-14

## 2017-02-09 NOTE — PROGRESS NOTES
CHIEF COMPLAINT: Follow-up stage IV a colon cancer    Problem List:  Oncology/Hematology History    1. Stage CARLOS, [id number] colon cancer with 2 out of 14 pericolonic lymph nodes  involved and a left lobe liver biopsy positive for metastasis, presenting with  a 25 pound weight loss and diarrhea with some perirectal soreness and had  colonoscopy with Dr. Mcclain in January that found this lesion that was  circumferential high-grade invading into the pericolonic fat, status post  sigmoid colectomy of a poorly differentiated adenocarcinoma.   a) Baseline CEA postop of 0.8 with alkaline phosphatase 111, with normal liver  enzymes and creatinine of 0.8. Baseline white count 9820 with a hemoglobin  13.8, platelets 339,000, and CT with contrast showing a right lobe liver dome  lesion as well as an anastomotic change in the bowel.   b) Began CapeOx 03/15/2016.  c) Added in Avastin to [facility]Ox 04/05/2016, second cycle. (This was 8 weeks out  from surgery).  d) KRAS mutation is negative.  E.) CAT scan in August 2016 shows resolution of disease in the liver and colon and a stable lung nodule.  Hence Avastin, Xeloda, and oxaliplatin continued.  F) oxaliplatin discontinued October 2016 due to worsening peripheral neuropathy.  G.) CTs of February 2017 showed no evidence of metastasis and stable bibasilar nodular scar.  Persistent neuropathy not worsening.  CEA 1.4.  Continuing Avastin and Xeloda without oxaliplatin.  Having some problems with irritation of his eyes on Xeloda.  2.  Peripheral neuropathy, chemotherapy induced        Malignant neoplasm of sigmoid colon    5/20/2016 Initial Diagnosis    Malignant neoplasm of sigmoid colon       HISTORY OF PRESENT ILLNESS:  The patient is a 66 y.o. male, here for follow up on management of stage IV colon cancer.  Please see my oncology history above for details of recent scans and tests.  He does complain of irritation of his conjunctiva with mild discomfort and occasional drainage but not  "a major issue and not affecting his visual acuity.  His neuropathy is stable.  He did not try the alpha lipoic acid or menthol lotion.  He is taking gabapentin on occasion but he says he really doesn't have much pain from the neuropathy.      Past Medical History   Diagnosis Date   • Hypertension    • Liver disease      mets from colon cancer   • Malignant neoplasm of colon      Past Surgical History   Procedure Laterality Date   • Portacath placement     • Colon surgery       Sigmoid   • Liver surgery         No Known Allergies    Family History and Social History reviewed and changed as necessary      REVIEW OF SYSTEM:   Review of Systems   Constitutional: Negative for appetite change, chills, diaphoresis, fatigue, fever and unexpected weight change.   HENT:   Negative for mouth sores, sore throat and trouble swallowing.    Eyes: Negative for icterus.   Respiratory: Negative for cough, hemoptysis and shortness of breath.    Cardiovascular: Negative for chest pain, leg swelling and palpitations.   Gastrointestinal: Negative for abdominal distention, abdominal pain, blood in stool, constipation, diarrhea, nausea and vomiting.   Endocrine: Negative for hot flashes.   Genitourinary: Negative for bladder incontinence, difficulty urinating, dysuria, frequency and hematuria.    Musculoskeletal: Negative for gait problem, neck pain and neck stiffness.   Skin: Negative for rash.   Neurological: Negative for dizziness, gait problem, headaches, light-headedness and numbness.   Hematological: Negative for adenopathy. Does not bruise/bleed easily.   Psychiatric/Behavioral: Negative for depression. The patient is not nervous/anxious.    All other systems reviewed and are negative.       PHYSICAL EXAM    Vitals:    02/09/17 0840   BP: 111/68   Pulse: 78   Resp: 16   Temp: 97 °F (36.1 °C)   TempSrc: Temporal Artery    SpO2: 97%   Weight: 174 lb (78.9 kg)   Height: 73\" (185.4 cm)     Constitutional: Appears well-developed and " well-nourished. No distress.   ECOG: (0) Fully active, able to carry on all predisease performance without restriction  HENT:   Head: Normocephalic.   Mouth/Throat: Oropharynx is clear and moist.   Eyes: Conjunctivae are somewhat injected. Pupils are equal, round, and reactive to light. No scleral icterus but are irritated and reddish slightly.   Neck: Neck supple. No JVD present. No thyromegaly present.   Cardiovascular: Normal rate, regular rhythm and normal heart sounds.    Pulmonary/Chest: Breath sounds normal. No respiratory distress.   Abdominal: Soft. Exhibits no distension and no mass. There is no hepatosplenomegaly. There is no tenderness. There is no rebound and no guarding.   Musculoskeletal:Exhibits no edema, tenderness or deformity.   Neurological: Alert and oriented to person, place, and time. Exhibits normal muscle tone.   Skin: No ecchymosis, no petechiae and no rash noted. Not diaphoretic. No cyanosis. Nails show no clubbing.   Psychiatric: Normal mood and affect.   Vitals reviewed.      Infusion on 01/24/2017   Component Date Value Ref Range Status   • Glucose 01/24/2017 118* 70 - 100 mg/dL Final   • BUN 01/24/2017 15  9 - 23 mg/dL Final   • Creatinine 01/24/2017 0.80  0.60 - 1.30 mg/dL Final   • Sodium 01/24/2017 142  132 - 146 mmol/L Final   • Potassium 01/24/2017 4.3  3.5 - 5.5 mmol/L Final   • Chloride 01/24/2017 107  99 - 109 mmol/L Final   • CO2 01/24/2017 24.0  20.0 - 31.0 mmol/L Final   • Calcium 01/24/2017 9.7  8.7 - 10.4 mg/dL Final   • Total Protein 01/24/2017 6.2  5.7 - 8.2 g/dL Final   • Albumin 01/24/2017 4.00  3.20 - 4.80 g/dL Final   • ALT (SGPT) 01/24/2017 19  7 - 40 U/L Final   • AST (SGOT) 01/24/2017 23  0 - 33 U/L Final   • Alkaline Phosphatase 01/24/2017 51  25 - 100 U/L Final   • Total Bilirubin 01/24/2017 1.7* 0.3 - 1.2 mg/dL Final   • eGFR Non  Amer 01/24/2017 97  >60 mL/min/1.73 Final   • Globulin 01/24/2017 2.2  gm/dL Final   • A/G Ratio 01/24/2017 1.8  1.5 - 2.5  g/dL Final   • BUN/Creatinine Ratio 01/24/2017 18.8  7.0 - 25.0 Final   • Anion Gap 01/24/2017 11.0  3.0 - 11.0 mmol/L Final   • Color, UA 01/24/2017 Yellow  Yellow, Straw Final   • Appearance, UA 01/24/2017 Clear  Clear Final   • pH, UA 01/24/2017 5.5  5.0 - 8.0 Final   • Specific Gravity, UA 01/24/2017 1.015  1.005 - 1.030 Final   • Glucose, UA 01/24/2017 Negative  Negative Final   • Ketones, UA 01/24/2017 Negative  Negative Final   • Bilirubin, UA 01/24/2017 Negative  Negative Final   • Blood, UA 01/24/2017 Negative  Negative Final   • Protein, UA 01/24/2017 Negative  Negative Final   • Leuk Esterase, UA 01/24/2017 Negative  Negative Final   • Nitrite, UA 01/24/2017 Negative  Negative Final   • Urobilinogen, UA 01/24/2017 0.2 E.U./dL  0.2 - 1.0 E.U./dL Final   • WBC 01/24/2017 3.50  3.50 - 10.80 10*3/mm3 Final   • RBC 01/24/2017 3.42* 4.20 - 5.76 10*6/mm3 Final   • Hemoglobin 01/24/2017 13.8  13.1 - 17.5 g/dL Final   • Hematocrit 01/24/2017 38.0* 38.9 - 50.9 % Final   • MCV 01/24/2017 111.1* 80.0 - 99.0 fL Final   • MCH 01/24/2017 40.4* 27.0 - 31.0 pg Final   • MCHC 01/24/2017 36.3* 32.0 - 36.0 g/dL Final   • RDW 01/24/2017 18.3* 11.3 - 14.5 % Final   • MPV 01/24/2017 9.1  6.0 - 12.0 fL Final   • Platelets 01/24/2017 114* 150 - 450 10*3/mm3 Final   • Neutrophil % 01/24/2017 61.3  41.0 - 71.0 % Final   • Lymphocyte % 01/24/2017 23.8* 24.0 - 44.0 % Final   • Auto Mixed Cells % 01/24/2017 14.9  0.0 - 16.0 % Final   • Neutrophils, Absolute 01/24/2017 2.20  1.50 - 8.30 10*3/mm3 Final   • Lymphocytes, Absolute 01/24/2017 0.80  0.60 - 4.80 10*3/mm3 Final   • Auto Mixed Cells # 01/24/2017 0.50  0.10 - 1.50 10*3/uL Final   • Creatine, Serum 01/24/2017 0.8   Preliminary   • Creatinine 01/24/2017 0.80  0.60 - 1.30 mg/dL Final    Serial Number: [id number]    : [id number]   Infusion on 01/03/2017   Component Date Value Ref Range Status   • Glucose 01/03/2017 104* 70 - 100 mg/dL Final   • BUN 01/03/2017 15  9 - 23 mg/dL Final    • Creatinine 01/03/2017 0.80  0.60 - 1.30 mg/dL Final   • Sodium 01/03/2017 139  132 - 146 mmol/L Final   • Potassium 01/03/2017 4.2  3.5 - 5.5 mmol/L Final   • Chloride 01/03/2017 105  99 - 109 mmol/L Final   • CO2 01/03/2017 24.0  20.0 - 31.0 mmol/L Final   • Calcium 01/03/2017 9.8  8.7 - 10.4 mg/dL Final   • Total Protein 01/03/2017 6.2  5.7 - 8.2 g/dL Final   • Albumin 01/03/2017 4.00  3.20 - 4.80 g/dL Final   • ALT (SGPT) 01/03/2017 15  7 - 40 U/L Final   • AST (SGOT) 01/03/2017 21  0 - 33 U/L Final   • Alkaline Phosphatase 01/03/2017 55  25 - 100 U/L Final   • Total Bilirubin 01/03/2017 1.7* 0.3 - 1.2 mg/dL Final   • eGFR Non African Amer 01/03/2017 97  >60 mL/min/1.73 Final   • Globulin 01/03/2017 2.2  gm/dL Final   • A/G Ratio 01/03/2017 1.8  1.5 - 2.5 g/dL Final   • BUN/Creatinine Ratio 01/03/2017 18.8  7.0 - 25.0 Final   • Anion Gap 01/03/2017 10.0  3.0 - 11.0 mmol/L Final   • Color, UA 01/03/2017 Yellow  Yellow, Straw Final   • Appearance, UA 01/03/2017 Clear  Clear Final   • pH, UA 01/03/2017 5.5  5.0 - 8.0 Final   • Specific Gravity, UA 01/03/2017 1.020  1.005 - 1.030 Final   • Glucose, UA 01/03/2017 Negative  Negative Final   • Ketones, UA 01/03/2017 Negative  Negative Final   • Bilirubin, UA 01/03/2017 Negative  Negative Final   • Blood, UA 01/03/2017 Negative  Negative Final   • Protein, UA 01/03/2017 Negative  Negative Final   • Leuk Esterase, UA 01/03/2017 Negative  Negative Final   • Nitrite, UA 01/03/2017 Negative  Negative Final   • Urobilinogen, UA 01/03/2017 0.2 E.U./dL  0.2 - 1.0 E.U./dL Final   • Creatine, Serum 01/03/2017 0.8   Preliminary   • Creatinine 01/03/2017 0.80  0.60 - 1.30 mg/dL Final    Serial Number: [id number]    : [id number]       Assessment/Plan     1.  Stage IV colon cancer  2. Chemotherapy-induced neuropathy  3. Chemotherapy-induced conjunctival irritation    Discussion: His labs from 1/24/17 were essentially normal save for bilirubin of 1.7, macrocytosis likely from  chemotherapy, and on 2/6/17 is CEA was 1.4.  CT chest abdomen pelvis 2/6/17 showed no pathology.  I reviewed these images remotely on 2/8/17 and myself.  There was a small punctate nodular areata of thickening in the right lower and left lower lobes but again unchanged.  Will try the alpha lipoic acid and menthol lotion to see if that helps his neuropathy which persists.  We will continue Avastin and Xeloda without oxaliplatin until progression.  We'll rescan in 3 months.   Errors in dictation may reflect use of voice recognition software and not all errors in transcription may have been detected prior to signing.    Dov Haines MD    02/09/2017

## 2017-02-14 ENCOUNTER — INFUSION (OUTPATIENT)
Dept: ONCOLOGY | Facility: HOSPITAL | Age: 67
End: 2017-02-14

## 2017-02-14 VITALS
TEMPERATURE: 98.6 F | HEART RATE: 72 BPM | SYSTOLIC BLOOD PRESSURE: 113 MMHG | DIASTOLIC BLOOD PRESSURE: 69 MMHG | BODY MASS INDEX: 23.09 KG/M2 | RESPIRATION RATE: 18 BRPM | WEIGHT: 175 LBS

## 2017-02-14 DIAGNOSIS — C18.7 MALIGNANT NEOPLASM OF SIGMOID COLON (HCC): Primary | ICD-10-CM

## 2017-02-14 LAB
ALBUMIN SERPL-MCNC: 4.2 G/DL (ref 3.5–5)
ALBUMIN/GLOB SERPL: 1.6 G/DL (ref 1–2)
ALP SERPL-CCNC: 52 U/L (ref 38–126)
ALT SERPL W P-5'-P-CCNC: 22 U/L (ref 13–69)
ANION GAP SERPL CALCULATED.3IONS-SCNC: 12.3 MMOL/L
ANISOCYTOSIS BLD QL: NORMAL
AST SERPL-CCNC: 23 U/L (ref 15–46)
BASOPHILS # BLD AUTO: 0.01 10*3/MM3 (ref 0–0.2)
BASOPHILS NFR BLD AUTO: 0.2 % (ref 0–2.5)
BILIRUB SERPL-MCNC: 2 MG/DL (ref 0.2–1.3)
BILIRUB UR QL STRIP: NEGATIVE
BUN BLD-MCNC: 17 MG/DL (ref 7–20)
BUN/CREAT SERPL: 21.3 (ref 6.3–21.9)
CALCIUM SPEC-SCNC: 9.2 MG/DL (ref 8.4–10.2)
CHLORIDE SERPL-SCNC: 106 MMOL/L (ref 98–107)
CLARITY UR: CLEAR
CO2 SERPL-SCNC: 26 MMOL/L (ref 26–30)
COLOR UR: YELLOW
CREAT BLD-MCNC: 0.8 MG/DL (ref 0.6–1.3)
DEPRECATED RDW RBC AUTO: 71.7 FL (ref 37–54)
EOSINOPHIL # BLD AUTO: 0.12 10*3/MM3 (ref 0–0.7)
EOSINOPHIL NFR BLD AUTO: 2.9 % (ref 0–7)
ERYTHROCYTE [DISTWIDTH] IN BLOOD BY AUTOMATED COUNT: 17 % (ref 11.5–14.5)
GFR SERPL CREATININE-BSD FRML MDRD: 97 ML/MIN/1.73
GLOBULIN UR ELPH-MCNC: 2.6 GM/DL
GLUCOSE BLD-MCNC: 96 MG/DL (ref 74–98)
GLUCOSE UR STRIP-MCNC: NEGATIVE MG/DL
HCT VFR BLD AUTO: 39.2 % (ref 42–52)
HGB BLD-MCNC: 13.6 G/DL (ref 14–18)
HGB UR QL STRIP.AUTO: NEGATIVE
IMM GRANULOCYTES # BLD: 0.02 10*3/MM3 (ref 0–0.06)
IMM GRANULOCYTES NFR BLD: 0.5 % (ref 0–0.6)
KETONES UR QL STRIP: NEGATIVE
LEUKOCYTE ESTERASE UR QL STRIP.AUTO: NEGATIVE
LYMPHOCYTES # BLD AUTO: 0.83 10*3/MM3 (ref 0.6–3.4)
LYMPHOCYTES NFR BLD AUTO: 20 % (ref 10–50)
MACROCYTES BLD QL SMEAR: NORMAL
MCH RBC QN AUTO: 39.3 PG (ref 27–31)
MCHC RBC AUTO-ENTMCNC: 34.7 G/DL (ref 30–37)
MCV RBC AUTO: 113.3 FL (ref 80–94)
MONOCYTES # BLD AUTO: 0.55 10*3/MM3 (ref 0–0.9)
MONOCYTES NFR BLD AUTO: 13.2 % (ref 0–12)
NEUTROPHILS # BLD AUTO: 2.63 10*3/MM3 (ref 2–6.9)
NEUTROPHILS NFR BLD AUTO: 63.2 % (ref 37–80)
NITRITE UR QL STRIP: NEGATIVE
NRBC BLD MANUAL-RTO: 0 /100 WBC (ref 0–0)
PH UR STRIP.AUTO: <=5 [PH] (ref 5–8)
PLAT MORPH BLD: NORMAL
PLATELET # BLD AUTO: 112 10*3/MM3 (ref 130–400)
PMV BLD AUTO: 9.8 FL (ref 6–12)
POTASSIUM BLD-SCNC: 4.3 MMOL/L (ref 3.5–5.1)
PROT SERPL-MCNC: 6.8 G/DL (ref 6.3–8.2)
PROT UR QL STRIP: NEGATIVE
RBC # BLD AUTO: 3.46 10*6/MM3 (ref 4.7–6.1)
SODIUM BLD-SCNC: 140 MMOL/L (ref 137–145)
SP GR UR STRIP: 1.02 (ref 1–1.03)
UROBILINOGEN UR QL STRIP: NORMAL
WBC MORPH BLD: NORMAL
WBC NRBC COR # BLD: 4.16 10*3/MM3 (ref 4.8–10.8)

## 2017-02-14 PROCEDURE — 36415 COLL VENOUS BLD VENIPUNCTURE: CPT

## 2017-02-14 PROCEDURE — 96374 THER/PROPH/DIAG INJ IV PUSH: CPT

## 2017-02-14 PROCEDURE — 25010000002 DEXAMETHASONE PER 1 MG: Performed by: INTERNAL MEDICINE

## 2017-02-14 PROCEDURE — 25010000002 BEVACIZUMAB PER 10 MG: Performed by: INTERNAL MEDICINE

## 2017-02-14 PROCEDURE — 96375 TX/PRO/DX INJ NEW DRUG ADDON: CPT

## 2017-02-14 PROCEDURE — 96413 CHEMO IV INFUSION 1 HR: CPT

## 2017-02-14 RX ORDER — SODIUM CHLORIDE 0.9 % (FLUSH) 0.9 %
10 SYRINGE (ML) INJECTION AS NEEDED
Status: DISCONTINUED | OUTPATIENT
Start: 2017-02-14 | End: 2017-02-14 | Stop reason: HOSPADM

## 2017-02-14 RX ORDER — SODIUM CHLORIDE 9 MG/ML
250 INJECTION, SOLUTION INTRAVENOUS ONCE
Status: DISCONTINUED | OUTPATIENT
Start: 2017-02-14 | End: 2017-02-14 | Stop reason: HOSPADM

## 2017-02-14 RX ORDER — DEXTROSE MONOHYDRATE 50 MG/ML
250 INJECTION, SOLUTION INTRAVENOUS ONCE
Status: DISCONTINUED | OUTPATIENT
Start: 2017-02-14 | End: 2017-02-14 | Stop reason: HOSPADM

## 2017-02-14 RX ORDER — SODIUM CHLORIDE 0.9 % (FLUSH) 0.9 %
10 SYRINGE (ML) INJECTION AS NEEDED
Status: CANCELLED | OUTPATIENT
Start: 2017-02-14

## 2017-02-14 RX ORDER — HEPARIN SODIUM (PORCINE) LOCK FLUSH IV SOLN 100 UNIT/ML 100 UNIT/ML
300 SOLUTION INTRAVENOUS ONCE
Status: CANCELLED | OUTPATIENT
Start: 2017-02-14

## 2017-02-14 RX ADMIN — BEVACIZUMAB 590 MG: 400 INJECTION, SOLUTION INTRAVENOUS at 09:48

## 2017-02-14 RX ADMIN — DEXAMETHASONE SODIUM PHOSPHATE 12 MG: 4 INJECTION, SOLUTION INTRA-ARTICULAR; INTRALESIONAL; INTRAMUSCULAR; INTRAVENOUS; SOFT TISSUE at 09:32

## 2017-03-07 ENCOUNTER — INFUSION (OUTPATIENT)
Dept: ONCOLOGY | Facility: HOSPITAL | Age: 67
End: 2017-03-07

## 2017-03-07 VITALS
RESPIRATION RATE: 16 BRPM | TEMPERATURE: 97.8 F | BODY MASS INDEX: 23.75 KG/M2 | HEART RATE: 68 BPM | SYSTOLIC BLOOD PRESSURE: 121 MMHG | DIASTOLIC BLOOD PRESSURE: 77 MMHG | WEIGHT: 180 LBS

## 2017-03-07 DIAGNOSIS — C18.7 MALIGNANT NEOPLASM OF SIGMOID COLON (HCC): Primary | ICD-10-CM

## 2017-03-07 LAB
ALBUMIN SERPL-MCNC: 3.7 G/DL (ref 3.5–5)
ALBUMIN/GLOB SERPL: 1.4 G/DL (ref 1–2)
ALP SERPL-CCNC: 52 U/L (ref 38–126)
ALT SERPL W P-5'-P-CCNC: 24 U/L (ref 13–69)
ANION GAP SERPL CALCULATED.3IONS-SCNC: 12.2 MMOL/L
AST SERPL-CCNC: 23 U/L (ref 15–46)
BASOPHILS # BLD AUTO: 0.02 10*3/MM3 (ref 0–0.2)
BASOPHILS NFR BLD AUTO: 0.7 % (ref 0–2.5)
BILIRUB SERPL-MCNC: 1.8 MG/DL (ref 0.2–1.3)
BILIRUB UR QL STRIP: NEGATIVE
BUN BLD-MCNC: 17 MG/DL (ref 7–20)
BUN/CREAT SERPL: 21.3 (ref 6.3–21.9)
CALCIUM SPEC-SCNC: 8.9 MG/DL (ref 8.4–10.2)
CHLORIDE SERPL-SCNC: 108 MMOL/L (ref 98–107)
CLARITY UR: CLEAR
CO2 SERPL-SCNC: 25 MMOL/L (ref 26–30)
COLOR UR: YELLOW
CREAT BLD-MCNC: 0.8 MG/DL (ref 0.6–1.3)
DEPRECATED RDW RBC AUTO: 71.7 FL (ref 37–54)
EOSINOPHIL # BLD AUTO: 0.07 10*3/MM3 (ref 0–0.7)
EOSINOPHIL NFR BLD AUTO: 2.3 % (ref 0–7)
ERYTHROCYTE [DISTWIDTH] IN BLOOD BY AUTOMATED COUNT: 17 % (ref 11.5–14.5)
GFR SERPL CREATININE-BSD FRML MDRD: 97 ML/MIN/1.73
GLOBULIN UR ELPH-MCNC: 2.7 GM/DL
GLUCOSE BLD-MCNC: 115 MG/DL (ref 74–98)
GLUCOSE UR STRIP-MCNC: NEGATIVE MG/DL
HCT VFR BLD AUTO: 38.7 % (ref 42–52)
HGB BLD-MCNC: 13.4 G/DL (ref 14–18)
HGB UR QL STRIP.AUTO: NEGATIVE
IMM GRANULOCYTES # BLD: 0.03 10*3/MM3 (ref 0–0.06)
IMM GRANULOCYTES NFR BLD: 1 % (ref 0–0.6)
KETONES UR QL STRIP: NEGATIVE
LEUKOCYTE ESTERASE UR QL STRIP.AUTO: NEGATIVE
LYMPHOCYTES # BLD AUTO: 0.88 10*3/MM3 (ref 0.6–3.4)
LYMPHOCYTES NFR BLD AUTO: 28.9 % (ref 10–50)
MACROCYTES BLD QL SMEAR: NORMAL
MCH RBC QN AUTO: 39.3 PG (ref 27–31)
MCHC RBC AUTO-ENTMCNC: 34.6 G/DL (ref 30–37)
MCV RBC AUTO: 113.5 FL (ref 80–94)
MONOCYTES # BLD AUTO: 0.48 10*3/MM3 (ref 0–0.9)
MONOCYTES NFR BLD AUTO: 15.8 % (ref 0–12)
NEUTROPHILS # BLD AUTO: 1.56 10*3/MM3 (ref 2–6.9)
NEUTROPHILS NFR BLD AUTO: 51.3 % (ref 37–80)
NITRITE UR QL STRIP: NEGATIVE
NRBC BLD MANUAL-RTO: 0 /100 WBC (ref 0–0)
PH UR STRIP.AUTO: <=5 [PH] (ref 5–8)
PLAT MORPH BLD: NORMAL
PLATELET # BLD AUTO: 98 10*3/MM3 (ref 130–400)
PMV BLD AUTO: 9.8 FL (ref 6–12)
POTASSIUM BLD-SCNC: 4.2 MMOL/L (ref 3.5–5.1)
PROT SERPL-MCNC: 6.4 G/DL (ref 6.3–8.2)
PROT UR QL STRIP: NEGATIVE
RBC # BLD AUTO: 3.41 10*6/MM3 (ref 4.7–6.1)
SODIUM BLD-SCNC: 141 MMOL/L (ref 137–145)
SP GR UR STRIP: 1.02 (ref 1–1.03)
UROBILINOGEN UR QL STRIP: NORMAL
WBC MORPH BLD: NORMAL
WBC NRBC COR # BLD: 3.04 10*3/MM3 (ref 4.8–10.8)

## 2017-03-07 PROCEDURE — 36415 COLL VENOUS BLD VENIPUNCTURE: CPT

## 2017-03-07 PROCEDURE — 96367 TX/PROPH/DG ADDL SEQ IV INF: CPT

## 2017-03-07 PROCEDURE — 25010000002 HEPARIN FLUSH (PORCINE) 100 UNIT/ML SOLUTION: Performed by: INTERNAL MEDICINE

## 2017-03-07 PROCEDURE — 96413 CHEMO IV INFUSION 1 HR: CPT

## 2017-03-07 PROCEDURE — 96374 THER/PROPH/DIAG INJ IV PUSH: CPT

## 2017-03-07 PROCEDURE — 25010000002 DEXAMETHASONE PER 1 MG: Performed by: INTERNAL MEDICINE

## 2017-03-07 PROCEDURE — 25010000002 BEVACIZUMAB PER 10 MG: Performed by: INTERNAL MEDICINE

## 2017-03-07 RX ORDER — HEPARIN SODIUM (PORCINE) LOCK FLUSH IV SOLN 100 UNIT/ML 100 UNIT/ML
300 SOLUTION INTRAVENOUS ONCE
Status: CANCELLED | OUTPATIENT
Start: 2017-03-07

## 2017-03-07 RX ORDER — SODIUM CHLORIDE 0.9 % (FLUSH) 0.9 %
10 SYRINGE (ML) INJECTION AS NEEDED
Status: CANCELLED | OUTPATIENT
Start: 2017-03-07

## 2017-03-07 RX ORDER — DEXTROSE MONOHYDRATE 50 MG/ML
250 INJECTION, SOLUTION INTRAVENOUS ONCE
Status: DISCONTINUED | OUTPATIENT
Start: 2017-03-07 | End: 2017-03-07 | Stop reason: HOSPADM

## 2017-03-07 RX ORDER — SODIUM CHLORIDE 0.9 % (FLUSH) 0.9 %
10 SYRINGE (ML) INJECTION AS NEEDED
Status: DISCONTINUED | OUTPATIENT
Start: 2017-03-07 | End: 2017-03-07 | Stop reason: HOSPADM

## 2017-03-07 RX ORDER — SODIUM CHLORIDE 9 MG/ML
250 INJECTION, SOLUTION INTRAVENOUS ONCE
Status: DISCONTINUED | OUTPATIENT
Start: 2017-03-07 | End: 2017-03-07 | Stop reason: HOSPADM

## 2017-03-07 RX ADMIN — DEXAMETHASONE SODIUM PHOSPHATE 12 MG: 4 INJECTION, SOLUTION INTRA-ARTICULAR; INTRALESIONAL; INTRAMUSCULAR; INTRAVENOUS; SOFT TISSUE at 10:05

## 2017-03-07 RX ADMIN — SODIUM CHLORIDE, PRESERVATIVE FREE 500 UNITS: 5 INJECTION INTRAVENOUS at 11:00

## 2017-03-07 RX ADMIN — Medication 10 ML: at 11:00

## 2017-03-07 RX ADMIN — BEVACIZUMAB 590 MG: 400 INJECTION, SOLUTION INTRAVENOUS at 10:24

## 2017-03-23 ENCOUNTER — OFFICE VISIT (OUTPATIENT)
Dept: ONCOLOGY | Facility: CLINIC | Age: 67
End: 2017-03-23

## 2017-03-23 VITALS
TEMPERATURE: 98.1 F | DIASTOLIC BLOOD PRESSURE: 70 MMHG | WEIGHT: 179 LBS | BODY MASS INDEX: 23.62 KG/M2 | HEART RATE: 73 BPM | SYSTOLIC BLOOD PRESSURE: 120 MMHG | RESPIRATION RATE: 15 BRPM

## 2017-03-23 DIAGNOSIS — C18.7 MALIGNANT NEOPLASM OF SIGMOID COLON (HCC): Primary | ICD-10-CM

## 2017-03-23 DIAGNOSIS — T45.1X5A PERIPHERAL NEUROPATHY DUE TO CHEMOTHERAPY (HCC): ICD-10-CM

## 2017-03-23 DIAGNOSIS — C18.7 MALIGNANT NEOPLASM OF SIGMOID COLON (HCC): ICD-10-CM

## 2017-03-23 DIAGNOSIS — G62.0 PERIPHERAL NEUROPATHY DUE TO CHEMOTHERAPY (HCC): ICD-10-CM

## 2017-03-23 PROCEDURE — 99214 OFFICE O/P EST MOD 30 MIN: CPT | Performed by: INTERNAL MEDICINE

## 2017-03-23 RX ORDER — SODIUM CHLORIDE 9 MG/ML
250 INJECTION, SOLUTION INTRAVENOUS ONCE
Status: CANCELLED | OUTPATIENT
Start: 2017-04-18

## 2017-03-23 RX ORDER — DEXTROSE MONOHYDRATE 50 MG/ML
250 INJECTION, SOLUTION INTRAVENOUS ONCE
Status: CANCELLED | OUTPATIENT
Start: 2017-03-28 | End: 2017-03-28

## 2017-03-23 RX ORDER — DEXTROSE MONOHYDRATE 50 MG/ML
250 INJECTION, SOLUTION INTRAVENOUS ONCE
Status: CANCELLED | OUTPATIENT
Start: 2017-04-18 | End: 2017-04-18

## 2017-03-23 RX ORDER — SODIUM CHLORIDE 9 MG/ML
250 INJECTION, SOLUTION INTRAVENOUS ONCE
Status: CANCELLED | OUTPATIENT
Start: 2017-03-28

## 2017-03-23 NOTE — PROGRESS NOTES
CHIEF COMPLAINT: Follow-up treatment of metastatic colon cancer    Problem List:  Oncology/Hematology History    1. Stage CARLOS, [id number] colon cancer with 2 out of 14 pericolonic lymph nodes  involved and a left lobe liver biopsy positive for metastasis, presenting with  a 25 pound weight loss and diarrhea with some perirectal soreness and had  colonoscopy with Dr. Mcclain in January that found this lesion that was  circumferential high-grade invading into the pericolonic fat, status post  sigmoid colectomy of a poorly differentiated adenocarcinoma.   a) Baseline CEA postop of 0.8 with alkaline phosphatase 111, with normal liver  enzymes and creatinine of 0.8. Baseline white count 9820 with a hemoglobin  13.8, platelets 339,000, and CT with contrast showing a right lobe liver dome  lesion as well as an anastomotic change in the bowel.   b) Began CapeOx 03/15/2016.  c) Added in Avastin to CapeOx 04/05/2016, second cycle. (This was 8 weeks out  from surgery).  d) KRAS mutation is negative.  E.) CAT scan in August 2016 shows resolution of disease in the liver and colon and a stable lung nodule.  Hence Avastin, Xeloda, and oxaliplatin continued.  F) oxaliplatin discontinued October 2016 due to worsening peripheral neuropathy.  G.) CTs of February 2017 showed no evidence of metastasis and stable bibasilar nodular scar.  Persistent neuropathy not worsening.  CEA 1.4.  Continuing Avastin and Xeloda without oxaliplatin.  Having some problems with irritation of his eyes on Xeloda.  2.  Peripheral neuropathy, chemotherapy induced        Malignant neoplasm of sigmoid colon    5/20/2016 Initial Diagnosis    Malignant neoplasm of sigmoid colon       HISTORY OF PRESENT ILLNESS:  The patient is a 66 y.o. male, here for follow up on management of metastatic colon cancer.  Having some neuropathy and some thickening of the hands and soles but not major really disabling at this junction and overall feeling fairly fit.  CEA February 6 was  1.4.  He continues to have some irritation of his eyes as well on the Xeloda.      Past Medical History:   Diagnosis Date   • Hypertension    • Liver disease     mets from colon cancer   • Malignant neoplasm of colon      Past Surgical History:   Procedure Laterality Date   • COLON SURGERY      Sigmoid   • LIVER SURGERY     • PORTACATH PLACEMENT         No Known Allergies    Family History and Social History reviewed and changed as necessary      REVIEW OF SYSTEM:   Review of Systems   Constitutional: Negative for appetite change, chills, diaphoresis, fatigue, fever and unexpected weight change.   HENT:   Negative for mouth sores, sore throat and trouble swallowing.    Eyes: Negative for icterus.   Respiratory: Negative for cough, hemoptysis and shortness of breath.    Cardiovascular: Negative for chest pain, leg swelling and palpitations.   Gastrointestinal: Negative for abdominal distention, abdominal pain, blood in stool, constipation, diarrhea, nausea and vomiting.   Endocrine: Negative for hot flashes.   Genitourinary: Negative for bladder incontinence, difficulty urinating, dysuria, frequency and hematuria.    Musculoskeletal: Negative for gait problem, neck pain and neck stiffness.   Skin: Negative for rash.   Neurological: Negative for dizziness, gait problem, headaches, light-headedness and numbness.   Hematological: Negative for adenopathy. Does not bruise/bleed easily.   Psychiatric/Behavioral: Negative for depression. The patient is not nervous/anxious.    All other systems reviewed and are negative.       PHYSICAL EXAM    Vitals:    03/23/17 0859   BP: 120/70   Pulse: 73   Resp: 15   Temp: 98.1 °F (36.7 °C)   TempSrc: Temporal Artery    Weight: 179 lb (81.2 kg)     Constitutional: Appears well-developed and well-nourished. No distress.   ECOG: (1) Restricted in physically strenuous activity, ambulatory and able to do work of light nature  HENT:   Head: Normocephalic.   Mouth/Throat: Oropharynx is clear  and moist.   Eyes: Conjunctivae are normal. Pupils are equal, round, and reactive to light. No scleral icterus.   Neck: Neck supple. No JVD present. No thyromegaly present.   Cardiovascular: Normal rate, regular rhythm and normal heart sounds.    Pulmonary/Chest: Breath sounds normal. No respiratory distress.   Abdominal: Soft. Exhibits no distension and no mass. There is no hepatosplenomegaly. There is no tenderness. There is no rebound and no guarding.   Musculoskeletal:Exhibits no edema, tenderness or deformity.   Neurological: Alert and oriented to person, place, and time. Exhibits normal muscle tone.   Skin: No ecchymosis, no petechiae and no rash noted. Not diaphoretic. No cyanosis. Nails show no clubbing.   Psychiatric: Normal mood and affect.   Vitals reviewed.      Infusion on 03/07/2017   Component Date Value Ref Range Status   • Glucose 03/07/2017 115* 74 - 98 mg/dL Final   • BUN 03/07/2017 17  7 - 20 mg/dL Final   • Creatinine 03/07/2017 0.80  0.60 - 1.30 mg/dL Final   • Sodium 03/07/2017 141  137 - 145 mmol/L Final   • Potassium 03/07/2017 4.2  3.5 - 5.1 mmol/L Final   • Chloride 03/07/2017 108* 98 - 107 mmol/L Final   • CO2 03/07/2017 25.0* 26.0 - 30.0 mmol/L Final   • Calcium 03/07/2017 8.9  8.4 - 10.2 mg/dL Final   • Total Protein 03/07/2017 6.4  6.3 - 8.2 g/dL Final   • Albumin 03/07/2017 3.70  3.50 - 5.00 g/dL Final   • ALT (SGPT) 03/07/2017 24  13 - 69 U/L Final   • AST (SGOT) 03/07/2017 23  15 - 46 U/L Final   • Alkaline Phosphatase 03/07/2017 52  38 - 126 U/L Final   • Total Bilirubin 03/07/2017 1.8* 0.2 - 1.3 mg/dL Final   • eGFR Non African Amer 03/07/2017 97  >60 mL/min/1.73 Final   • Globulin 03/07/2017 2.7  gm/dL Final   • A/G Ratio 03/07/2017 1.4  1.0 - 2.0 g/dL Final   • BUN/Creatinine Ratio 03/07/2017 21.3  6.3 - 21.9 Final   • Anion Gap 03/07/2017 12.2  mmol/L Final   • Color, UA 03/07/2017 Yellow  Yellow, Straw Final   • Appearance, UA 03/07/2017 Clear  Clear Final   • pH, UA  03/07/2017 <=5.0  5.0 - 8.0 Final   • Specific Gravity, UA 03/07/2017 1.025  1.005 - 1.030 Final   • Glucose, UA 03/07/2017 Negative  Negative Final   • Ketones, UA 03/07/2017 Negative  Negative Final   • Bilirubin, UA 03/07/2017 Negative  Negative Final   • Blood, UA 03/07/2017 Negative  Negative Final   • Protein, UA 03/07/2017 Negative  Negative Final   • Leuk Esterase, UA 03/07/2017 Negative  Negative Final   • Nitrite, UA 03/07/2017 Negative  Negative Final   • Urobilinogen, UA 03/07/2017 0.2 E.U./dL  0.2 - 1.0 E.U./dL Final   • WBC 03/07/2017 3.04* 4.80 - 10.80 10*3/mm3 Final   • RBC 03/07/2017 3.41* 4.70 - 6.10 10*6/mm3 Final   • Hemoglobin 03/07/2017 13.4* 14.0 - 18.0 g/dL Final   • Hematocrit 03/07/2017 38.7* 42.0 - 52.0 % Final   • MCV 03/07/2017 113.5* 80.0 - 94.0 fL Final   • MCH 03/07/2017 39.3* 27.0 - 31.0 pg Final   • MCHC 03/07/2017 34.6  30.0 - 37.0 g/dL Final   • RDW 03/07/2017 17.0* 11.5 - 14.5 % Final   • RDW-SD 03/07/2017 71.7* 37.0 - 54.0 fl Final   • MPV 03/07/2017 9.8  6.0 - 12.0 fL Final   • Platelets 03/07/2017 98* 130 - 400 10*3/mm3 Final   • Neutrophil % 03/07/2017 51.3  37.0 - 80.0 % Final   • Lymphocyte % 03/07/2017 28.9  10.0 - 50.0 % Final   • Monocyte % 03/07/2017 15.8* 0.0 - 12.0 % Final   • Eosinophil % 03/07/2017 2.3  0.0 - 7.0 % Final   • Basophil % 03/07/2017 0.7  0.0 - 2.5 % Final   • Immature Grans % 03/07/2017 1.0* 0.0 - 0.6 % Final   • Neutrophils, Absolute 03/07/2017 1.56* 2.00 - 6.90 10*3/mm3 Final   • Lymphocytes, Absolute 03/07/2017 0.88  0.60 - 3.40 10*3/mm3 Final   • Monocytes, Absolute 03/07/2017 0.48  0.00 - 0.90 10*3/mm3 Final   • Eosinophils, Absolute 03/07/2017 0.07  0.00 - 0.70 10*3/mm3 Final   • Basophils, Absolute 03/07/2017 0.02  0.00 - 0.20 10*3/mm3 Final   • Immature Grans, Absolute 03/07/2017 0.03  0.00 - 0.06 10*3/mm3 Final   • nRBC 03/07/2017 0.0  0.0 - 0.0 /100 WBC Final   • Macrocytes 03/07/2017 Slight/1+  None Seen Final   • WBC Morphology 03/07/2017  Normal  Normal Final   • Platelet Morphology 03/07/2017 Normal  Normal Final   Infusion on 02/14/2017   Component Date Value Ref Range Status   • Glucose 02/14/2017 96  74 - 98 mg/dL Final   • BUN 02/14/2017 17  7 - 20 mg/dL Final   • Creatinine 02/14/2017 0.80  0.60 - 1.30 mg/dL Final   • Sodium 02/14/2017 140  137 - 145 mmol/L Final   • Potassium 02/14/2017 4.3  3.5 - 5.1 mmol/L Final   • Chloride 02/14/2017 106  98 - 107 mmol/L Final   • CO2 02/14/2017 26.0  26.0 - 30.0 mmol/L Final   • Calcium 02/14/2017 9.2  8.4 - 10.2 mg/dL Final   • Total Protein 02/14/2017 6.8  6.3 - 8.2 g/dL Final   • Albumin 02/14/2017 4.20  3.50 - 5.00 g/dL Final   • ALT (SGPT) 02/14/2017 22  13 - 69 U/L Final   • AST (SGOT) 02/14/2017 23  15 - 46 U/L Final   • Alkaline Phosphatase 02/14/2017 52  38 - 126 U/L Final   • Total Bilirubin 02/14/2017 2.0* 0.2 - 1.3 mg/dL Final   • eGFR Non African Amer 02/14/2017 97  >60 mL/min/1.73 Final   • Globulin 02/14/2017 2.6  gm/dL Final   • A/G Ratio 02/14/2017 1.6  1.0 - 2.0 g/dL Final   • BUN/Creatinine Ratio 02/14/2017 21.3  6.3 - 21.9 Final   • Anion Gap 02/14/2017 12.3  mmol/L Final   • Color, UA 02/14/2017 Yellow  Yellow, Straw Final   • Appearance, UA 02/14/2017 Clear  Clear Final   • pH, UA 02/14/2017 <=5.0  5.0 - 8.0 Final   • Specific Gravity, UA 02/14/2017 1.020  1.005 - 1.030 Final   • Glucose, UA 02/14/2017 Negative  Negative Final   • Ketones, UA 02/14/2017 Negative  Negative Final   • Bilirubin, UA 02/14/2017 Negative  Negative Final   • Blood, UA 02/14/2017 Negative  Negative Final   • Protein, UA 02/14/2017 Negative  Negative Final   • Leuk Esterase, UA 02/14/2017 Negative  Negative Final   • Nitrite, UA 02/14/2017 Negative  Negative Final   • Urobilinogen, UA 02/14/2017 0.2 E.U./dL  0.2 - 1.0 E.U./dL Final   • WBC 02/14/2017 4.16* 4.80 - 10.80 10*3/mm3 Final   • RBC 02/14/2017 3.46* 4.70 - 6.10 10*6/mm3 Final   • Hemoglobin 02/14/2017 13.6* 14.0 - 18.0 g/dL Final   • Hematocrit  02/14/2017 39.2* 42.0 - 52.0 % Final   • MCV 02/14/2017 113.3* 80.0 - 94.0 fL Final   • MCH 02/14/2017 39.3* 27.0 - 31.0 pg Final   • MCHC 02/14/2017 34.7  30.0 - 37.0 g/dL Final   • RDW 02/14/2017 17.0* 11.5 - 14.5 % Final   • RDW-SD 02/14/2017 71.7* 37.0 - 54.0 fl Final   • MPV 02/14/2017 9.8  6.0 - 12.0 fL Final   • Platelets 02/14/2017 112* 130 - 400 10*3/mm3 Final   • Neutrophil % 02/14/2017 63.2  37.0 - 80.0 % Final   • Lymphocyte % 02/14/2017 20.0  10.0 - 50.0 % Final   • Monocyte % 02/14/2017 13.2* 0.0 - 12.0 % Final   • Eosinophil % 02/14/2017 2.9  0.0 - 7.0 % Final   • Basophil % 02/14/2017 0.2  0.0 - 2.5 % Final   • Immature Grans % 02/14/2017 0.5  0.0 - 0.6 % Final   • Neutrophils, Absolute 02/14/2017 2.63  2.00 - 6.90 10*3/mm3 Final   • Lymphocytes, Absolute 02/14/2017 0.83  0.60 - 3.40 10*3/mm3 Final   • Monocytes, Absolute 02/14/2017 0.55  0.00 - 0.90 10*3/mm3 Final   • Eosinophils, Absolute 02/14/2017 0.12  0.00 - 0.70 10*3/mm3 Final   • Basophils, Absolute 02/14/2017 0.01  0.00 - 0.20 10*3/mm3 Final   • Immature Grans, Absolute 02/14/2017 0.02  0.00 - 0.06 10*3/mm3 Final   • nRBC 02/14/2017 0.0  0.0 - 0.0 /100 WBC Final   • Anisocytosis 02/14/2017 Slight/1+  None Seen Final   • Macrocytes 02/14/2017 Mod/2+  None Seen Final   • WBC Morphology 02/14/2017 Normal  Normal Final   • Platelet Morphology 02/14/2017 Normal  Normal Final       Assessment/Plan     1.  Stage IV colon cancer  2. Palmar and plantar thickening  3. Mild neuropathy    Discussion: We will continue on with Avastin and Xeloda without the oxaliplatin of course.  We'll give him this course in the next and see him back in May 9 with CAT scans prior to return.       Errors in dictation may reflect use of voice recognition software and not all errors in transcription may have been detected prior to signing.    Dov Haines MD    03/23/2017

## 2017-03-28 ENCOUNTER — INFUSION (OUTPATIENT)
Dept: ONCOLOGY | Facility: HOSPITAL | Age: 67
End: 2017-03-28

## 2017-03-28 VITALS
DIASTOLIC BLOOD PRESSURE: 74 MMHG | RESPIRATION RATE: 18 BRPM | BODY MASS INDEX: 23.35 KG/M2 | WEIGHT: 177 LBS | SYSTOLIC BLOOD PRESSURE: 115 MMHG | HEART RATE: 72 BPM | TEMPERATURE: 98.1 F

## 2017-03-28 DIAGNOSIS — C18.7 MALIGNANT NEOPLASM OF SIGMOID COLON (HCC): Primary | ICD-10-CM

## 2017-03-28 LAB
ALBUMIN SERPL-MCNC: 4 G/DL (ref 3.5–5)
ALBUMIN/GLOB SERPL: 1.3 G/DL (ref 1–2)
ALP SERPL-CCNC: 53 U/L (ref 38–126)
ALT SERPL W P-5'-P-CCNC: 28 U/L (ref 13–69)
ANION GAP SERPL CALCULATED.3IONS-SCNC: 18.1 MMOL/L
ANISOCYTOSIS BLD QL: NORMAL
AST SERPL-CCNC: 24 U/L (ref 15–46)
BASOPHILS # BLD AUTO: 0 10*3/MM3 (ref 0–0.2)
BASOPHILS NFR BLD AUTO: 0 % (ref 0–2.5)
BILIRUB SERPL-MCNC: 2.2 MG/DL (ref 0.2–1.3)
BILIRUB UR QL STRIP: NEGATIVE
BUN BLD-MCNC: 20 MG/DL (ref 7–20)
BUN/CREAT SERPL: 25 (ref 6.3–21.9)
CALCIUM SPEC-SCNC: 9.3 MG/DL (ref 8.4–10.2)
CHLORIDE SERPL-SCNC: 105 MMOL/L (ref 98–107)
CLARITY UR: CLEAR
CO2 SERPL-SCNC: 24 MMOL/L (ref 26–30)
COLOR UR: YELLOW
CREAT BLD-MCNC: 0.8 MG/DL (ref 0.6–1.3)
DACRYOCYTES BLD QL SMEAR: NORMAL
DEPRECATED RDW RBC AUTO: 67.8 FL (ref 37–54)
ELLIPTOCYTES BLD QL SMEAR: NORMAL
EOSINOPHIL # BLD AUTO: 0.04 10*3/MM3 (ref 0–0.7)
EOSINOPHIL NFR BLD AUTO: 1.4 % (ref 0–7)
ERYTHROCYTE [DISTWIDTH] IN BLOOD BY AUTOMATED COUNT: 16.4 % (ref 11.5–14.5)
GFR SERPL CREATININE-BSD FRML MDRD: 97 ML/MIN/1.73
GLOBULIN UR ELPH-MCNC: 3.1 GM/DL
GLUCOSE BLD-MCNC: 111 MG/DL (ref 74–98)
GLUCOSE UR STRIP-MCNC: NEGATIVE MG/DL
HCT VFR BLD AUTO: 39.9 % (ref 42–52)
HGB BLD-MCNC: 13.8 G/DL (ref 14–18)
HGB UR QL STRIP.AUTO: NEGATIVE
IMM GRANULOCYTES # BLD: 0.02 10*3/MM3 (ref 0–0.06)
IMM GRANULOCYTES NFR BLD: 0.7 % (ref 0–0.6)
KETONES UR QL STRIP: NEGATIVE
LEUKOCYTE ESTERASE UR QL STRIP.AUTO: NEGATIVE
LYMPHOCYTES # BLD AUTO: 0.92 10*3/MM3 (ref 0.6–3.4)
LYMPHOCYTES NFR BLD AUTO: 32.1 % (ref 10–50)
MACROCYTES BLD QL SMEAR: NORMAL
MCH RBC QN AUTO: 39.1 PG (ref 27–31)
MCHC RBC AUTO-ENTMCNC: 34.6 G/DL (ref 30–37)
MCV RBC AUTO: 113 FL (ref 80–94)
MONOCYTES # BLD AUTO: 0.46 10*3/MM3 (ref 0–0.9)
MONOCYTES NFR BLD AUTO: 16 % (ref 0–12)
NEUTROPHILS # BLD AUTO: 1.43 10*3/MM3 (ref 2–6.9)
NEUTROPHILS NFR BLD AUTO: 49.8 % (ref 37–80)
NITRITE UR QL STRIP: NEGATIVE
NRBC BLD MANUAL-RTO: 0 /100 WBC (ref 0–0)
PH UR STRIP.AUTO: 5.5 [PH] (ref 5–8)
PLATELET # BLD AUTO: 104 10*3/MM3 (ref 130–400)
PMV BLD AUTO: 10.4 FL (ref 6–12)
POIKILOCYTOSIS BLD QL SMEAR: NORMAL
POTASSIUM BLD-SCNC: 4.1 MMOL/L (ref 3.5–5.1)
PROT SERPL-MCNC: 7.1 G/DL (ref 6.3–8.2)
PROT UR QL STRIP: NEGATIVE
RBC # BLD AUTO: 3.53 10*6/MM3 (ref 4.7–6.1)
SMALL PLATELETS BLD QL SMEAR: NORMAL
SODIUM BLD-SCNC: 143 MMOL/L (ref 137–145)
SP GR UR STRIP: 1.02 (ref 1–1.03)
UROBILINOGEN UR QL STRIP: NORMAL
WBC MORPH BLD: NORMAL
WBC NRBC COR # BLD: 2.87 10*3/MM3 (ref 4.8–10.8)

## 2017-03-28 PROCEDURE — 96374 THER/PROPH/DIAG INJ IV PUSH: CPT

## 2017-03-28 PROCEDURE — 25010000002 BEVACIZUMAB PER 10 MG: Performed by: INTERNAL MEDICINE

## 2017-03-28 PROCEDURE — 36591 DRAW BLOOD OFF VENOUS DEVICE: CPT

## 2017-03-28 PROCEDURE — 36415 COLL VENOUS BLD VENIPUNCTURE: CPT

## 2017-03-28 PROCEDURE — 96367 TX/PROPH/DG ADDL SEQ IV INF: CPT

## 2017-03-28 PROCEDURE — 96413 CHEMO IV INFUSION 1 HR: CPT

## 2017-03-28 PROCEDURE — 25010000002 DEXAMETHASONE PER 1 MG: Performed by: INTERNAL MEDICINE

## 2017-03-28 RX ORDER — SODIUM CHLORIDE 9 MG/ML
250 INJECTION, SOLUTION INTRAVENOUS ONCE
Status: DISCONTINUED | OUTPATIENT
Start: 2017-03-28 | End: 2017-03-28 | Stop reason: HOSPADM

## 2017-03-28 RX ORDER — DEXTROSE MONOHYDRATE 50 MG/ML
250 INJECTION, SOLUTION INTRAVENOUS ONCE
Status: DISCONTINUED | OUTPATIENT
Start: 2017-03-28 | End: 2017-03-28 | Stop reason: HOSPADM

## 2017-03-28 RX ADMIN — BEVACIZUMAB 590 MG: 400 INJECTION, SOLUTION INTRAVENOUS at 10:38

## 2017-03-28 RX ADMIN — DEXAMETHASONE SODIUM PHOSPHATE 12 MG: 4 INJECTION, SOLUTION INTRA-ARTICULAR; INTRALESIONAL; INTRAMUSCULAR; INTRAVENOUS; SOFT TISSUE at 10:17

## 2017-04-17 ENCOUNTER — TELEPHONE (OUTPATIENT)
Dept: ONCOLOGY | Facility: CLINIC | Age: 67
End: 2017-04-17

## 2017-04-17 NOTE — TELEPHONE ENCOUNTER
----- Message from Celine Andujar sent at 4/17/2017 11:28 AM EDT -----  Regarding: RAMIREZ- SINUS DRAINAGE AND COUGH  Contact: [phone]  Laila patient's wife called and he has a lot of sinus drainage and coughing a lot what can he take due to being on chemo?

## 2017-04-17 NOTE — TELEPHONE ENCOUNTER
Denies fever and secretion are clear.  Ok given for any OTC allergy medication, cough medication, and sudafed. She is making him cough medication with local honey and lemon juice.

## 2017-04-18 ENCOUNTER — INFUSION (OUTPATIENT)
Dept: ONCOLOGY | Facility: HOSPITAL | Age: 67
End: 2017-04-18

## 2017-04-18 VITALS
HEART RATE: 77 BPM | TEMPERATURE: 98.7 F | WEIGHT: 171 LBS | BODY MASS INDEX: 22.56 KG/M2 | RESPIRATION RATE: 16 BRPM | SYSTOLIC BLOOD PRESSURE: 101 MMHG | DIASTOLIC BLOOD PRESSURE: 64 MMHG

## 2017-04-18 DIAGNOSIS — C18.7 MALIGNANT NEOPLASM OF SIGMOID COLON (HCC): Primary | ICD-10-CM

## 2017-04-18 LAB
ALBUMIN SERPL-MCNC: 3.9 G/DL (ref 3.5–5)
ALBUMIN/GLOB SERPL: 1.3 G/DL (ref 1–2)
ALP SERPL-CCNC: 57 U/L (ref 38–126)
ALT SERPL W P-5'-P-CCNC: 22 U/L (ref 13–69)
ANION GAP SERPL CALCULATED.3IONS-SCNC: 11.3 MMOL/L
AST SERPL-CCNC: 18 U/L (ref 15–46)
BASOPHILS # BLD AUTO: 0.01 10*3/MM3 (ref 0–0.2)
BASOPHILS NFR BLD AUTO: 0.1 % (ref 0–2.5)
BILIRUB SERPL-MCNC: 2.5 MG/DL (ref 0.2–1.3)
BILIRUB UR QL STRIP: ABNORMAL
BUN BLD-MCNC: 15 MG/DL (ref 7–20)
BUN/CREAT SERPL: 18.8 (ref 6.3–21.9)
CALCIUM SPEC-SCNC: 8.9 MG/DL (ref 8.4–10.2)
CHLORIDE SERPL-SCNC: 107 MMOL/L (ref 98–107)
CLARITY UR: CLEAR
CO2 SERPL-SCNC: 28 MMOL/L (ref 26–30)
COLOR UR: YELLOW
CREAT BLD-MCNC: 0.8 MG/DL (ref 0.6–1.3)
DEPRECATED RDW RBC AUTO: 69.2 FL (ref 37–54)
EOSINOPHIL # BLD AUTO: 0.08 10*3/MM3 (ref 0–0.7)
EOSINOPHIL NFR BLD AUTO: 1.1 % (ref 0–7)
ERYTHROCYTE [DISTWIDTH] IN BLOOD BY AUTOMATED COUNT: 16.1 % (ref 11.5–14.5)
GFR SERPL CREATININE-BSD FRML MDRD: 97 ML/MIN/1.73
GLOBULIN UR ELPH-MCNC: 3 GM/DL
GLUCOSE BLD-MCNC: 111 MG/DL (ref 74–98)
GLUCOSE UR STRIP-MCNC: NEGATIVE MG/DL
HCT VFR BLD AUTO: 40.1 % (ref 42–52)
HGB BLD-MCNC: 13.7 G/DL (ref 14–18)
HGB UR QL STRIP.AUTO: NEGATIVE
IMM GRANULOCYTES # BLD: 0.03 10*3/MM3 (ref 0–0.06)
IMM GRANULOCYTES NFR BLD: 0.4 % (ref 0–0.6)
KETONES UR QL STRIP: NEGATIVE
LEUKOCYTE ESTERASE UR QL STRIP.AUTO: NEGATIVE
LYMPHOCYTES # BLD AUTO: 1.13 10*3/MM3 (ref 0.6–3.4)
LYMPHOCYTES NFR BLD AUTO: 15.2 % (ref 10–50)
MACROCYTES BLD QL SMEAR: NORMAL
MCH RBC QN AUTO: 39.4 PG (ref 27–31)
MCHC RBC AUTO-ENTMCNC: 34.2 G/DL (ref 30–37)
MCV RBC AUTO: 115.2 FL (ref 80–94)
MONOCYTES # BLD AUTO: 1.14 10*3/MM3 (ref 0–0.9)
MONOCYTES NFR BLD AUTO: 15.3 % (ref 0–12)
NEUTROPHILS # BLD AUTO: 5.04 10*3/MM3 (ref 2–6.9)
NEUTROPHILS NFR BLD AUTO: 67.9 % (ref 37–80)
NITRITE UR QL STRIP: NEGATIVE
NRBC BLD MANUAL-RTO: 0 /100 WBC (ref 0–0)
PH UR STRIP.AUTO: 5.5 [PH] (ref 5–8)
PLAT MORPH BLD: NORMAL
PLATELET # BLD AUTO: 155 10*3/MM3 (ref 130–400)
PMV BLD AUTO: 10.1 FL (ref 6–12)
POTASSIUM BLD-SCNC: 4.3 MMOL/L (ref 3.5–5.1)
PROT SERPL-MCNC: 6.9 G/DL (ref 6.3–8.2)
PROT UR QL STRIP: ABNORMAL
RBC # BLD AUTO: 3.48 10*6/MM3 (ref 4.7–6.1)
SODIUM BLD-SCNC: 142 MMOL/L (ref 137–145)
SP GR UR STRIP: 1.01 (ref 1–1.03)
UROBILINOGEN UR QL STRIP: ABNORMAL
WBC MORPH BLD: NORMAL
WBC NRBC COR # BLD: 7.43 10*3/MM3 (ref 4.8–10.8)

## 2017-04-18 PROCEDURE — 96413 CHEMO IV INFUSION 1 HR: CPT

## 2017-04-18 PROCEDURE — 25010000002 BEVACIZUMAB PER 10 MG: Performed by: INTERNAL MEDICINE

## 2017-04-18 PROCEDURE — 25010000002 DEXAMETHASONE PER 1 MG: Performed by: INTERNAL MEDICINE

## 2017-04-18 PROCEDURE — 85025 COMPLETE CBC W/AUTO DIFF WBC: CPT

## 2017-04-18 PROCEDURE — 96375 TX/PRO/DX INJ NEW DRUG ADDON: CPT

## 2017-04-18 PROCEDURE — 96374 THER/PROPH/DIAG INJ IV PUSH: CPT

## 2017-04-18 PROCEDURE — 81003 URINALYSIS AUTO W/O SCOPE: CPT

## 2017-04-18 PROCEDURE — 80053 COMPREHEN METABOLIC PANEL: CPT

## 2017-04-18 PROCEDURE — 85007 BL SMEAR W/DIFF WBC COUNT: CPT

## 2017-04-18 PROCEDURE — 36415 COLL VENOUS BLD VENIPUNCTURE: CPT

## 2017-04-18 RX ORDER — HEPARIN SODIUM (PORCINE) LOCK FLUSH IV SOLN 100 UNIT/ML 100 UNIT/ML
300 SOLUTION INTRAVENOUS ONCE
Status: CANCELLED | OUTPATIENT
Start: 2017-04-18

## 2017-04-18 RX ORDER — DEXTROSE MONOHYDRATE 50 MG/ML
250 INJECTION, SOLUTION INTRAVENOUS ONCE
Status: DISCONTINUED | OUTPATIENT
Start: 2017-04-18 | End: 2017-04-18 | Stop reason: HOSPADM

## 2017-04-18 RX ORDER — SODIUM CHLORIDE 0.9 % (FLUSH) 0.9 %
10 SYRINGE (ML) INJECTION AS NEEDED
Status: CANCELLED | OUTPATIENT
Start: 2017-04-18

## 2017-04-18 RX ORDER — SODIUM CHLORIDE 0.9 % (FLUSH) 0.9 %
10 SYRINGE (ML) INJECTION AS NEEDED
Status: DISCONTINUED | OUTPATIENT
Start: 2017-04-18 | End: 2017-04-18 | Stop reason: HOSPADM

## 2017-04-18 RX ORDER — SODIUM CHLORIDE 9 MG/ML
250 INJECTION, SOLUTION INTRAVENOUS ONCE
Status: DISCONTINUED | OUTPATIENT
Start: 2017-04-18 | End: 2017-04-18 | Stop reason: HOSPADM

## 2017-04-18 RX ADMIN — DEXAMETHASONE SODIUM PHOSPHATE 12 MG: 4 INJECTION, SOLUTION INTRA-ARTICULAR; INTRALESIONAL; INTRAMUSCULAR; INTRAVENOUS; SOFT TISSUE at 10:15

## 2017-04-18 RX ADMIN — BEVACIZUMAB 590 MG: 400 INJECTION, SOLUTION INTRAVENOUS at 10:38

## 2017-05-02 ENCOUNTER — HOSPITAL ENCOUNTER (OUTPATIENT)
Dept: CT IMAGING | Facility: HOSPITAL | Age: 67
Discharge: HOME | End: 2017-05-02
Attending: INTERNAL MEDICINE | Admitting: INTERNAL MEDICINE

## 2017-05-02 DIAGNOSIS — T45.1X5A PERIPHERAL NEUROPATHY DUE TO CHEMOTHERAPY (HCC): ICD-10-CM

## 2017-05-02 DIAGNOSIS — C18.7 MALIGNANT NEOPLASM OF SIGMOID COLON (HCC): ICD-10-CM

## 2017-05-02 DIAGNOSIS — G62.0 PERIPHERAL NEUROPATHY DUE TO CHEMOTHERAPY (HCC): ICD-10-CM

## 2017-05-02 PROCEDURE — 74177 CT ABD & PELVIS W/CONTRAST: CPT

## 2017-05-02 PROCEDURE — 71260 CT THORAX DX C+: CPT

## 2017-05-02 PROCEDURE — 0 IOPAMIDOL 61 % SOLUTION: Performed by: INTERNAL MEDICINE

## 2017-05-02 RX ADMIN — BARIUM SULFATE 450 ML: 21 SUSPENSION ORAL at 09:00

## 2017-05-02 RX ADMIN — IOPAMIDOL 95 ML: 612 INJECTION, SOLUTION INTRAVENOUS at 10:10

## 2017-05-04 ENCOUNTER — OFFICE VISIT (OUTPATIENT)
Dept: ONCOLOGY | Facility: CLINIC | Age: 67
End: 2017-05-04

## 2017-05-04 VITALS
BODY MASS INDEX: 23.09 KG/M2 | SYSTOLIC BLOOD PRESSURE: 120 MMHG | RESPIRATION RATE: 16 BRPM | TEMPERATURE: 98.2 F | DIASTOLIC BLOOD PRESSURE: 72 MMHG | WEIGHT: 175 LBS | HEART RATE: 72 BPM

## 2017-05-04 DIAGNOSIS — C18.7 MALIGNANT NEOPLASM OF SIGMOID COLON (HCC): ICD-10-CM

## 2017-05-04 PROCEDURE — 99214 OFFICE O/P EST MOD 30 MIN: CPT | Performed by: NURSE PRACTITIONER

## 2017-05-04 RX ORDER — DEXTROSE MONOHYDRATE 50 MG/ML
250 INJECTION, SOLUTION INTRAVENOUS ONCE
Status: CANCELLED | OUTPATIENT
Start: 2017-06-20 | End: 2017-06-20

## 2017-05-04 RX ORDER — SODIUM CHLORIDE 9 MG/ML
250 INJECTION, SOLUTION INTRAVENOUS ONCE
Status: CANCELLED | OUTPATIENT
Start: 2017-05-30

## 2017-05-04 RX ORDER — DEXTROSE MONOHYDRATE 50 MG/ML
250 INJECTION, SOLUTION INTRAVENOUS ONCE
Status: CANCELLED | OUTPATIENT
Start: 2017-05-09 | End: 2017-05-09

## 2017-05-04 RX ORDER — DEXTROSE MONOHYDRATE 50 MG/ML
250 INJECTION, SOLUTION INTRAVENOUS ONCE
Status: CANCELLED | OUTPATIENT
Start: 2017-05-30 | End: 2017-05-30

## 2017-05-04 RX ORDER — SODIUM CHLORIDE 9 MG/ML
250 INJECTION, SOLUTION INTRAVENOUS ONCE
Status: CANCELLED | OUTPATIENT
Start: 2017-05-09

## 2017-05-04 RX ORDER — SODIUM CHLORIDE 9 MG/ML
250 INJECTION, SOLUTION INTRAVENOUS ONCE
Status: CANCELLED | OUTPATIENT
Start: 2017-06-20

## 2017-05-09 ENCOUNTER — INFUSION (OUTPATIENT)
Dept: ONCOLOGY | Facility: HOSPITAL | Age: 67
End: 2017-05-09

## 2017-05-09 VITALS
RESPIRATION RATE: 18 BRPM | DIASTOLIC BLOOD PRESSURE: 75 MMHG | WEIGHT: 176 LBS | SYSTOLIC BLOOD PRESSURE: 128 MMHG | BODY MASS INDEX: 23.22 KG/M2 | HEART RATE: 74 BPM | TEMPERATURE: 98.6 F

## 2017-05-09 DIAGNOSIS — C18.7 MALIGNANT NEOPLASM OF SIGMOID COLON (HCC): Primary | ICD-10-CM

## 2017-05-09 DIAGNOSIS — G62.0 PERIPHERAL NEUROPATHY DUE TO CHEMOTHERAPY (HCC): ICD-10-CM

## 2017-05-09 DIAGNOSIS — T45.1X5A PERIPHERAL NEUROPATHY DUE TO CHEMOTHERAPY (HCC): ICD-10-CM

## 2017-05-09 LAB
ALBUMIN SERPL-MCNC: 3.8 G/DL (ref 3.5–5)
ALBUMIN/GLOB SERPL: 1.2 G/DL (ref 1–2)
ALP SERPL-CCNC: 53 U/L (ref 38–126)
ALT SERPL W P-5'-P-CCNC: 23 U/L (ref 13–69)
ANION GAP SERPL CALCULATED.3IONS-SCNC: 15.6 MMOL/L
AST SERPL-CCNC: 24 U/L (ref 15–46)
BASOPHILS # BLD AUTO: 0.02 10*3/MM3 (ref 0–0.2)
BASOPHILS NFR BLD AUTO: 0.6 % (ref 0–2.5)
BILIRUB SERPL-MCNC: 1.5 MG/DL (ref 0.2–1.3)
BILIRUB UR QL STRIP: NEGATIVE
BUN BLD-MCNC: 20 MG/DL (ref 7–20)
BUN/CREAT SERPL: 25 (ref 6.3–21.9)
CALCIUM SPEC-SCNC: 8.8 MG/DL (ref 8.4–10.2)
CEA SERPL-MCNC: 2.21 NG/ML
CHLORIDE SERPL-SCNC: 107 MMOL/L (ref 98–107)
CLARITY UR: CLEAR
CO2 SERPL-SCNC: 23 MMOL/L (ref 26–30)
COLOR UR: YELLOW
CREAT BLD-MCNC: 0.8 MG/DL (ref 0.6–1.3)
DEPRECATED RDW RBC AUTO: 67.8 FL (ref 37–54)
EOSINOPHIL # BLD AUTO: 0.16 10*3/MM3 (ref 0–0.7)
EOSINOPHIL NFR BLD AUTO: 5 % (ref 0–7)
ERYTHROCYTE [DISTWIDTH] IN BLOOD BY AUTOMATED COUNT: 16.2 % (ref 11.5–14.5)
GFR SERPL CREATININE-BSD FRML MDRD: 97 ML/MIN/1.73
GLOBULIN UR ELPH-MCNC: 3.1 GM/DL
GLUCOSE BLD-MCNC: 102 MG/DL (ref 74–98)
GLUCOSE UR STRIP-MCNC: NEGATIVE MG/DL
HCT VFR BLD AUTO: 40 % (ref 42–52)
HGB BLD-MCNC: 13.7 G/DL (ref 14–18)
HGB UR QL STRIP.AUTO: NEGATIVE
IMM GRANULOCYTES # BLD: 0.02 10*3/MM3 (ref 0–0.06)
IMM GRANULOCYTES NFR BLD: 0.6 % (ref 0–0.6)
KETONES UR QL STRIP: NEGATIVE
LEUKOCYTE ESTERASE UR QL STRIP.AUTO: NEGATIVE
LYMPHOCYTES # BLD AUTO: 0.98 10*3/MM3 (ref 0.6–3.4)
LYMPHOCYTES NFR BLD AUTO: 30.3 % (ref 10–50)
MACROCYTES BLD QL SMEAR: NORMAL
MCH RBC QN AUTO: 38.7 PG (ref 27–31)
MCHC RBC AUTO-ENTMCNC: 34.3 G/DL (ref 30–37)
MCV RBC AUTO: 113 FL (ref 80–94)
MONOCYTES # BLD AUTO: 0.38 10*3/MM3 (ref 0–0.9)
MONOCYTES NFR BLD AUTO: 11.8 % (ref 0–12)
NEUTROPHILS # BLD AUTO: 1.67 10*3/MM3 (ref 2–6.9)
NEUTROPHILS NFR BLD AUTO: 51.7 % (ref 37–80)
NITRITE UR QL STRIP: NEGATIVE
NRBC BLD MANUAL-RTO: 0 /100 WBC (ref 0–0)
PH UR STRIP.AUTO: 5.5 [PH] (ref 5–8)
PLAT MORPH BLD: NORMAL
PLATELET # BLD AUTO: 99 10*3/MM3 (ref 130–400)
PMV BLD AUTO: 10.1 FL (ref 6–12)
POTASSIUM BLD-SCNC: 4.6 MMOL/L (ref 3.5–5.1)
PROT SERPL-MCNC: 6.9 G/DL (ref 6.3–8.2)
PROT UR QL STRIP: NEGATIVE
RBC # BLD AUTO: 3.54 10*6/MM3 (ref 4.7–6.1)
SODIUM BLD-SCNC: 141 MMOL/L (ref 137–145)
SP GR UR STRIP: 1.02 (ref 1–1.03)
UROBILINOGEN UR QL STRIP: NORMAL
WBC MORPH BLD: NORMAL
WBC NRBC COR # BLD: 3.23 10*3/MM3 (ref 4.8–10.8)

## 2017-05-09 PROCEDURE — 36415 COLL VENOUS BLD VENIPUNCTURE: CPT

## 2017-05-09 PROCEDURE — 85007 BL SMEAR W/DIFF WBC COUNT: CPT

## 2017-05-09 PROCEDURE — 96375 TX/PRO/DX INJ NEW DRUG ADDON: CPT

## 2017-05-09 PROCEDURE — 25010000002 DEXAMETHASONE PER 1 MG: Performed by: NURSE PRACTITIONER

## 2017-05-09 PROCEDURE — 96413 CHEMO IV INFUSION 1 HR: CPT

## 2017-05-09 PROCEDURE — 25010000002 BEVACIZUMAB PER 10 MG: Performed by: NURSE PRACTITIONER

## 2017-05-09 PROCEDURE — 82378 CARCINOEMBRYONIC ANTIGEN: CPT

## 2017-05-09 PROCEDURE — 81003 URINALYSIS AUTO W/O SCOPE: CPT

## 2017-05-09 PROCEDURE — 80053 COMPREHEN METABOLIC PANEL: CPT

## 2017-05-09 PROCEDURE — 96374 THER/PROPH/DIAG INJ IV PUSH: CPT

## 2017-05-09 PROCEDURE — 25010000002 HEPARIN FLUSH (PORCINE) 100 UNIT/ML SOLUTION: Performed by: INTERNAL MEDICINE

## 2017-05-09 PROCEDURE — 85025 COMPLETE CBC W/AUTO DIFF WBC: CPT

## 2017-05-09 RX ORDER — SODIUM CHLORIDE 9 MG/ML
250 INJECTION, SOLUTION INTRAVENOUS ONCE
Status: DISCONTINUED | OUTPATIENT
Start: 2017-05-09 | End: 2017-05-09 | Stop reason: HOSPADM

## 2017-05-09 RX ORDER — HEPARIN SODIUM (PORCINE) LOCK FLUSH IV SOLN 100 UNIT/ML 100 UNIT/ML
300 SOLUTION INTRAVENOUS ONCE
Status: CANCELLED | OUTPATIENT
Start: 2017-05-09

## 2017-05-09 RX ORDER — DEXTROSE MONOHYDRATE 50 MG/ML
250 INJECTION, SOLUTION INTRAVENOUS ONCE
Status: DISCONTINUED | OUTPATIENT
Start: 2017-05-09 | End: 2017-05-09 | Stop reason: HOSPADM

## 2017-05-09 RX ORDER — SODIUM CHLORIDE 0.9 % (FLUSH) 0.9 %
10 SYRINGE (ML) INJECTION AS NEEDED
Status: DISCONTINUED | OUTPATIENT
Start: 2017-05-09 | End: 2017-05-09 | Stop reason: HOSPADM

## 2017-05-09 RX ORDER — SODIUM CHLORIDE 0.9 % (FLUSH) 0.9 %
10 SYRINGE (ML) INJECTION AS NEEDED
Status: CANCELLED | OUTPATIENT
Start: 2017-05-09

## 2017-05-09 RX ADMIN — Medication 10 ML: at 11:51

## 2017-05-09 RX ADMIN — SODIUM CHLORIDE, PRESERVATIVE FREE 500 UNITS: 5 INJECTION INTRAVENOUS at 11:51

## 2017-05-09 RX ADMIN — DEXAMETHASONE SODIUM PHOSPHATE 12 MG: 4 INJECTION, SOLUTION INTRA-ARTICULAR; INTRALESIONAL; INTRAMUSCULAR; INTRAVENOUS; SOFT TISSUE at 10:45

## 2017-05-09 RX ADMIN — BEVACIZUMAB 590 MG: 400 INJECTION, SOLUTION INTRAVENOUS at 11:13

## 2017-05-16 DIAGNOSIS — C18.7 MALIGNANT NEOPLASM OF SIGMOID COLON (HCC): ICD-10-CM

## 2017-05-16 RX ORDER — CAPECITABINE 500 MG/1
1500 TABLET, FILM COATED ORAL 2 TIMES DAILY
Qty: 84 TABLET | Refills: 5 | Status: SHIPPED | OUTPATIENT
Start: 2017-05-16 | End: 2017-08-18 | Stop reason: SDUPTHER

## 2017-05-30 ENCOUNTER — INFUSION (OUTPATIENT)
Dept: ONCOLOGY | Facility: HOSPITAL | Age: 67
End: 2017-05-30

## 2017-05-30 VITALS
RESPIRATION RATE: 18 BRPM | HEART RATE: 60 BPM | TEMPERATURE: 98 F | SYSTOLIC BLOOD PRESSURE: 135 MMHG | WEIGHT: 178 LBS | BODY MASS INDEX: 23.48 KG/M2 | DIASTOLIC BLOOD PRESSURE: 80 MMHG

## 2017-05-30 DIAGNOSIS — C18.7 MALIGNANT NEOPLASM OF SIGMOID COLON (HCC): Primary | ICD-10-CM

## 2017-05-30 LAB
ALBUMIN SERPL-MCNC: 4 G/DL (ref 3.5–5)
ALBUMIN/GLOB SERPL: 1.6 G/DL (ref 1–2)
ALP SERPL-CCNC: 57 U/L (ref 38–126)
ALT SERPL W P-5'-P-CCNC: 26 U/L (ref 13–69)
ANION GAP SERPL CALCULATED.3IONS-SCNC: 15.4 MMOL/L
ANISOCYTOSIS BLD QL: ABNORMAL
AST SERPL-CCNC: 25 U/L (ref 15–46)
BILIRUB SERPL-MCNC: 1.8 MG/DL (ref 0.2–1.3)
BILIRUB UR QL STRIP: NEGATIVE
BUN BLD-MCNC: 18 MG/DL (ref 7–20)
BUN/CREAT SERPL: 22.5 (ref 6.3–21.9)
CALCIUM SPEC-SCNC: 9 MG/DL (ref 8.4–10.2)
CHLORIDE SERPL-SCNC: 104 MMOL/L (ref 98–107)
CLARITY UR: CLEAR
CO2 SERPL-SCNC: 24 MMOL/L (ref 26–30)
COLOR UR: YELLOW
CREAT BLD-MCNC: 0.8 MG/DL (ref 0.6–1.3)
DEPRECATED RDW RBC AUTO: 69.3 FL (ref 37–54)
ERYTHROCYTE [DISTWIDTH] IN BLOOD BY AUTOMATED COUNT: 16.6 % (ref 11.5–14.5)
GFR SERPL CREATININE-BSD FRML MDRD: 97 ML/MIN/1.73
GLOBULIN UR ELPH-MCNC: 2.5 GM/DL
GLUCOSE BLD-MCNC: 99 MG/DL (ref 74–98)
GLUCOSE UR STRIP-MCNC: NEGATIVE MG/DL
HCT VFR BLD AUTO: 39.4 % (ref 42–52)
HGB BLD-MCNC: 13.5 G/DL (ref 14–18)
HGB UR QL STRIP.AUTO: NEGATIVE
KETONES UR QL STRIP: NEGATIVE
LEUKOCYTE ESTERASE UR QL STRIP.AUTO: NEGATIVE
LYMPHOCYTES # BLD MANUAL: 0.89 10*3/MM3 (ref 0.6–3.4)
LYMPHOCYTES NFR BLD MANUAL: 11 % (ref 0–12)
LYMPHOCYTES NFR BLD MANUAL: 28 % (ref 10–50)
MACROCYTES BLD QL SMEAR: ABNORMAL
MCH RBC QN AUTO: 38.5 PG (ref 27–31)
MCHC RBC AUTO-ENTMCNC: 34.3 G/DL (ref 30–37)
MCV RBC AUTO: 112.3 FL (ref 80–94)
MONOCYTES # BLD AUTO: 0.35 10*3/MM3 (ref 0–0.9)
NEUTROPHILS # BLD AUTO: 1.93 10*3/MM3 (ref 2–6.9)
NEUTROPHILS NFR BLD MANUAL: 57 % (ref 37–80)
NEUTS BAND NFR BLD MANUAL: 4 % (ref 0–6)
NITRITE UR QL STRIP: NEGATIVE
PH UR STRIP.AUTO: <=5 [PH] (ref 5–8)
PLATELET # BLD AUTO: 96 10*3/MM3 (ref 130–400)
PMV BLD AUTO: 9.9 FL (ref 6–12)
POIKILOCYTOSIS BLD QL SMEAR: ABNORMAL
POTASSIUM BLD-SCNC: 4.4 MMOL/L (ref 3.5–5.1)
PROT SERPL-MCNC: 6.5 G/DL (ref 6.3–8.2)
PROT UR QL STRIP: NEGATIVE
RBC # BLD AUTO: 3.51 10*6/MM3 (ref 4.7–6.1)
SCAN SLIDE: NORMAL
SMALL PLATELETS BLD QL SMEAR: ABNORMAL
SODIUM BLD-SCNC: 139 MMOL/L (ref 137–145)
SP GR UR STRIP: 1.02 (ref 1–1.03)
UROBILINOGEN UR QL STRIP: NORMAL
WBC MORPH BLD: NORMAL
WBC NRBC COR # BLD: 3.17 10*3/MM3 (ref 4.8–10.8)

## 2017-05-30 PROCEDURE — 25010000002 DEXAMETHASONE PER 1 MG: Performed by: NURSE PRACTITIONER

## 2017-05-30 PROCEDURE — 96367 TX/PROPH/DG ADDL SEQ IV INF: CPT

## 2017-05-30 PROCEDURE — 85025 COMPLETE CBC W/AUTO DIFF WBC: CPT

## 2017-05-30 PROCEDURE — 96523 IRRIG DRUG DELIVERY DEVICE: CPT

## 2017-05-30 PROCEDURE — 85007 BL SMEAR W/DIFF WBC COUNT: CPT

## 2017-05-30 PROCEDURE — 96413 CHEMO IV INFUSION 1 HR: CPT

## 2017-05-30 PROCEDURE — 25010000002 HEPARIN FLUSH (PORCINE) 100 UNIT/ML SOLUTION: Performed by: INTERNAL MEDICINE

## 2017-05-30 PROCEDURE — 25010000002 BEVACIZUMAB PER 10 MG: Performed by: NURSE PRACTITIONER

## 2017-05-30 PROCEDURE — 36415 COLL VENOUS BLD VENIPUNCTURE: CPT

## 2017-05-30 PROCEDURE — 36591 DRAW BLOOD OFF VENOUS DEVICE: CPT

## 2017-05-30 PROCEDURE — 80053 COMPREHEN METABOLIC PANEL: CPT

## 2017-05-30 PROCEDURE — 81003 URINALYSIS AUTO W/O SCOPE: CPT

## 2017-05-30 RX ORDER — SODIUM CHLORIDE 0.9 % (FLUSH) 0.9 %
10 SYRINGE (ML) INJECTION AS NEEDED
Status: CANCELLED | OUTPATIENT
Start: 2017-05-30

## 2017-05-30 RX ORDER — SODIUM CHLORIDE 9 MG/ML
250 INJECTION, SOLUTION INTRAVENOUS ONCE
Status: DISCONTINUED | OUTPATIENT
Start: 2017-05-30 | End: 2017-05-30 | Stop reason: HOSPADM

## 2017-05-30 RX ORDER — HEPARIN SODIUM (PORCINE) LOCK FLUSH IV SOLN 100 UNIT/ML 100 UNIT/ML
300 SOLUTION INTRAVENOUS ONCE
Status: CANCELLED | OUTPATIENT
Start: 2017-05-30

## 2017-05-30 RX ORDER — SODIUM CHLORIDE 0.9 % (FLUSH) 0.9 %
10 SYRINGE (ML) INJECTION AS NEEDED
Status: DISCONTINUED | OUTPATIENT
Start: 2017-05-30 | End: 2017-05-30 | Stop reason: HOSPADM

## 2017-05-30 RX ORDER — LIDOCAINE AND PRILOCAINE 25; 25 MG/G; MG/G
CREAM TOPICAL AS NEEDED
Qty: 30 G | Refills: 3 | Status: SHIPPED | OUTPATIENT
Start: 2017-05-30 | End: 2019-07-08

## 2017-05-30 RX ADMIN — BEVACIZUMAB 590 MG: 400 INJECTION, SOLUTION INTRAVENOUS at 11:57

## 2017-05-30 RX ADMIN — DEXAMETHASONE SODIUM PHOSPHATE 12 MG: 4 INJECTION, SOLUTION INTRA-ARTICULAR; INTRALESIONAL; INTRAMUSCULAR; INTRAVENOUS; SOFT TISSUE at 11:34

## 2017-05-30 RX ADMIN — Medication 10 ML: at 12:29

## 2017-05-30 RX ADMIN — SODIUM CHLORIDE, PRESERVATIVE FREE 500 UNITS: 5 INJECTION INTRAVENOUS at 12:29

## 2017-06-20 ENCOUNTER — INFUSION (OUTPATIENT)
Dept: ONCOLOGY | Facility: HOSPITAL | Age: 67
End: 2017-06-20

## 2017-06-20 VITALS
HEART RATE: 58 BPM | BODY MASS INDEX: 24.01 KG/M2 | TEMPERATURE: 98.4 F | DIASTOLIC BLOOD PRESSURE: 87 MMHG | SYSTOLIC BLOOD PRESSURE: 151 MMHG | WEIGHT: 182 LBS

## 2017-06-20 DIAGNOSIS — C18.7 MALIGNANT NEOPLASM OF SIGMOID COLON (HCC): Primary | ICD-10-CM

## 2017-06-20 LAB
ALBUMIN SERPL-MCNC: 3.7 G/DL (ref 3.5–5)
ALBUMIN/GLOB SERPL: 1.5 G/DL (ref 1–2)
ALP SERPL-CCNC: 47 U/L (ref 38–126)
ALT SERPL W P-5'-P-CCNC: 23 U/L (ref 13–69)
ANION GAP SERPL CALCULATED.3IONS-SCNC: 13.4 MMOL/L
ANISOCYTOSIS BLD QL: NORMAL
AST SERPL-CCNC: 23 U/L (ref 15–46)
BASOPHILS # BLD AUTO: 0.01 10*3/MM3 (ref 0–0.2)
BASOPHILS NFR BLD AUTO: 0.3 % (ref 0–2.5)
BILIRUB SERPL-MCNC: 1.8 MG/DL (ref 0.2–1.3)
BILIRUB UR QL STRIP: NEGATIVE
BUN BLD-MCNC: 14 MG/DL (ref 7–20)
BUN/CREAT SERPL: 17.5 (ref 6.3–21.9)
CALCIUM SPEC-SCNC: 8.9 MG/DL (ref 8.4–10.2)
CHLORIDE SERPL-SCNC: 104 MMOL/L (ref 98–107)
CLARITY UR: CLEAR
CO2 SERPL-SCNC: 26 MMOL/L (ref 26–30)
COLOR UR: YELLOW
CREAT BLD-MCNC: 0.8 MG/DL (ref 0.6–1.3)
DEPRECATED RDW RBC AUTO: 67.8 FL (ref 37–54)
EOSINOPHIL # BLD AUTO: 0.06 10*3/MM3 (ref 0–0.7)
EOSINOPHIL NFR BLD AUTO: 1.8 % (ref 0–7)
ERYTHROCYTE [DISTWIDTH] IN BLOOD BY AUTOMATED COUNT: 16.5 % (ref 11.5–14.5)
GFR SERPL CREATININE-BSD FRML MDRD: 97 ML/MIN/1.73
GLOBULIN UR ELPH-MCNC: 2.5 GM/DL
GLUCOSE BLD-MCNC: 100 MG/DL (ref 74–98)
GLUCOSE UR STRIP-MCNC: NEGATIVE MG/DL
HCT VFR BLD AUTO: 37.9 % (ref 42–52)
HGB BLD-MCNC: 13.3 G/DL (ref 14–18)
HGB UR QL STRIP.AUTO: NEGATIVE
IMM GRANULOCYTES # BLD: 0.02 10*3/MM3 (ref 0–0.06)
IMM GRANULOCYTES NFR BLD: 0.6 % (ref 0–0.6)
KETONES UR QL STRIP: NEGATIVE
LEUKOCYTE ESTERASE UR QL STRIP.AUTO: NEGATIVE
LYMPHOCYTES # BLD AUTO: 0.98 10*3/MM3 (ref 0.6–3.4)
LYMPHOCYTES NFR BLD AUTO: 28.7 % (ref 10–50)
MACROCYTES BLD QL SMEAR: NORMAL
MCH RBC QN AUTO: 38.8 PG (ref 27–31)
MCHC RBC AUTO-ENTMCNC: 35.1 G/DL (ref 30–37)
MCV RBC AUTO: 110.5 FL (ref 80–94)
MONOCYTES # BLD AUTO: 0.5 10*3/MM3 (ref 0–0.9)
MONOCYTES NFR BLD AUTO: 14.7 % (ref 0–12)
NEUTROPHILS # BLD AUTO: 1.84 10*3/MM3 (ref 2–6.9)
NEUTROPHILS NFR BLD AUTO: 53.9 % (ref 37–80)
NITRITE UR QL STRIP: NEGATIVE
NRBC BLD MANUAL-RTO: 0 /100 WBC (ref 0–0)
PH UR STRIP.AUTO: 5.5 [PH] (ref 5–8)
PLATELET # BLD AUTO: 95 10*3/MM3 (ref 130–400)
PMV BLD AUTO: 9.9 FL (ref 6–12)
POIKILOCYTOSIS BLD QL SMEAR: NORMAL
POTASSIUM BLD-SCNC: 4.4 MMOL/L (ref 3.5–5.1)
PROT SERPL-MCNC: 6.2 G/DL (ref 6.3–8.2)
PROT UR QL STRIP: NEGATIVE
RBC # BLD AUTO: 3.43 10*6/MM3 (ref 4.7–6.1)
SMALL PLATELETS BLD QL SMEAR: NORMAL
SODIUM BLD-SCNC: 139 MMOL/L (ref 137–145)
SP GR UR STRIP: 1.01 (ref 1–1.03)
UROBILINOGEN UR QL STRIP: NORMAL
WBC MORPH BLD: NORMAL
WBC NRBC COR # BLD: 3.41 10*3/MM3 (ref 4.8–10.8)

## 2017-06-20 PROCEDURE — 96413 CHEMO IV INFUSION 1 HR: CPT

## 2017-06-20 PROCEDURE — 96374 THER/PROPH/DIAG INJ IV PUSH: CPT

## 2017-06-20 PROCEDURE — 25010000002 DEXAMETHASONE PER 1 MG: Performed by: NURSE PRACTITIONER

## 2017-06-20 PROCEDURE — 25010000002 HEPARIN FLUSH (PORCINE) 100 UNIT/ML SOLUTION: Performed by: INTERNAL MEDICINE

## 2017-06-20 PROCEDURE — 85007 BL SMEAR W/DIFF WBC COUNT: CPT

## 2017-06-20 PROCEDURE — 25010000002 BEVACIZUMAB PER 10 MG: Performed by: NURSE PRACTITIONER

## 2017-06-20 PROCEDURE — 96523 IRRIG DRUG DELIVERY DEVICE: CPT

## 2017-06-20 PROCEDURE — 36591 DRAW BLOOD OFF VENOUS DEVICE: CPT

## 2017-06-20 PROCEDURE — 36415 COLL VENOUS BLD VENIPUNCTURE: CPT

## 2017-06-20 PROCEDURE — 85025 COMPLETE CBC W/AUTO DIFF WBC: CPT

## 2017-06-20 PROCEDURE — 80053 COMPREHEN METABOLIC PANEL: CPT

## 2017-06-20 PROCEDURE — 81003 URINALYSIS AUTO W/O SCOPE: CPT

## 2017-06-20 PROCEDURE — 96375 TX/PRO/DX INJ NEW DRUG ADDON: CPT

## 2017-06-20 RX ORDER — SODIUM CHLORIDE 0.9 % (FLUSH) 0.9 %
10 SYRINGE (ML) INJECTION AS NEEDED
Status: DISCONTINUED | OUTPATIENT
Start: 2017-06-20 | End: 2017-06-20 | Stop reason: HOSPADM

## 2017-06-20 RX ORDER — DEXTROSE MONOHYDRATE 50 MG/ML
250 INJECTION, SOLUTION INTRAVENOUS ONCE
Status: DISCONTINUED | OUTPATIENT
Start: 2017-06-20 | End: 2017-06-20 | Stop reason: HOSPADM

## 2017-06-20 RX ORDER — SODIUM CHLORIDE 9 MG/ML
250 INJECTION, SOLUTION INTRAVENOUS ONCE
Status: DISCONTINUED | OUTPATIENT
Start: 2017-06-20 | End: 2017-06-20 | Stop reason: HOSPADM

## 2017-06-20 RX ORDER — HEPARIN SODIUM (PORCINE) LOCK FLUSH IV SOLN 100 UNIT/ML 100 UNIT/ML
300 SOLUTION INTRAVENOUS ONCE
Status: CANCELLED | OUTPATIENT
Start: 2017-06-20

## 2017-06-20 RX ORDER — SODIUM CHLORIDE 0.9 % (FLUSH) 0.9 %
10 SYRINGE (ML) INJECTION AS NEEDED
Status: CANCELLED | OUTPATIENT
Start: 2017-06-20

## 2017-06-20 RX ADMIN — SODIUM CHLORIDE, PRESERVATIVE FREE 500 UNITS: 5 INJECTION INTRAVENOUS at 14:06

## 2017-06-20 RX ADMIN — BEVACIZUMAB 590 MG: 400 INJECTION, SOLUTION INTRAVENOUS at 13:34

## 2017-06-20 RX ADMIN — DEXAMETHASONE SODIUM PHOSPHATE 12 MG: 4 INJECTION, SOLUTION INTRA-ARTICULAR; INTRALESIONAL; INTRAMUSCULAR; INTRAVENOUS; SOFT TISSUE at 13:06

## 2017-06-20 RX ADMIN — Medication 10 ML: at 14:06

## 2017-06-22 ENCOUNTER — OFFICE VISIT (OUTPATIENT)
Dept: ONCOLOGY | Facility: CLINIC | Age: 67
End: 2017-06-22

## 2017-06-22 VITALS
SYSTOLIC BLOOD PRESSURE: 130 MMHG | BODY MASS INDEX: 23.88 KG/M2 | HEART RATE: 61 BPM | DIASTOLIC BLOOD PRESSURE: 71 MMHG | WEIGHT: 181 LBS | RESPIRATION RATE: 15 BRPM | TEMPERATURE: 98.1 F

## 2017-06-22 DIAGNOSIS — G62.0 PERIPHERAL NEUROPATHY DUE TO CHEMOTHERAPY (HCC): ICD-10-CM

## 2017-06-22 DIAGNOSIS — T45.1X5A PERIPHERAL NEUROPATHY DUE TO CHEMOTHERAPY (HCC): ICD-10-CM

## 2017-06-22 DIAGNOSIS — C18.7 MALIGNANT NEOPLASM OF SIGMOID COLON (HCC): ICD-10-CM

## 2017-06-22 DIAGNOSIS — C18.7 MALIGNANT NEOPLASM OF SIGMOID COLON (HCC): Primary | ICD-10-CM

## 2017-06-22 PROCEDURE — 99214 OFFICE O/P EST MOD 30 MIN: CPT | Performed by: INTERNAL MEDICINE

## 2017-06-22 RX ORDER — SODIUM CHLORIDE 9 MG/ML
250 INJECTION, SOLUTION INTRAVENOUS ONCE
Status: CANCELLED | OUTPATIENT
Start: 2017-07-11

## 2017-06-22 RX ORDER — DEXTROSE MONOHYDRATE 50 MG/ML
250 INJECTION, SOLUTION INTRAVENOUS ONCE
Status: CANCELLED | OUTPATIENT
Start: 2017-07-11 | End: 2017-07-11

## 2017-06-22 NOTE — PROGRESS NOTES
CHIEF COMPLAINT: Management of metastatic colon cancer.    Problem List:  Oncology/Hematology History    1. Stage CARLOS, [id number] colon cancer with 2 out of 14 pericolonic lymph nodes  involved and a left lobe liver biopsy positive for metastasis, presenting with  a 25 pound weight loss and diarrhea with some perirectal soreness and had  colonoscopy with Dr. Mcclain in January that found this lesion that was  circumferential high-grade invading into the pericolonic fat, status post  sigmoid colectomy of a poorly differentiated adenocarcinoma.   a) Baseline CEA postop of 0.8 with alkaline phosphatase 111, with normal liver  enzymes and creatinine of 0.8. Baseline white count 9820 with a hemoglobin  13.8, platelets 339,000, and CT with contrast showing a right lobe liver dome  lesion as well as an anastomotic change in the bowel.   b) Began CapeOx 03/15/2016.  c) Added in Avastin to CapeOx 04/05/2016, second cycle. (This was 8 weeks out  from surgery).  d) KRAS mutation is negative.  E.) CAT scan in August 2016 shows resolution of disease in the liver and colon and a stable lung nodule.  Hence Avastin, Xeloda, and oxaliplatin continued.  F) oxaliplatin discontinued October 2016 due to worsening peripheral neuropathy.  G.) CTs of February 2017 showed no evidence of metastasis and stable bibasilar nodular scar.  Persistent neuropathy not worsening.  CEA 1.4.  Continuing Avastin and Xeloda without oxaliplatin.  Having some problems with irritation of his eyes on Xeloda.  2.  Peripheral neuropathy, chemotherapy induced        Malignant neoplasm of sigmoid colon    5/20/2016 Initial Diagnosis    Malignant neoplasm of sigmoid colon    5/2/2017 Imaging    CT chest, abdomen, pelvis IMPRESSION:  1. No disease recurrence.  2. Minimal areas of nodular thickening in the right lower lobe, stable.         HISTORY OF PRESENT ILLNESS:  The patient is a 66 y.o. male, here for follow up on management of Colon cancer metastasis.  His blood  pressure than running a little bit high with systolic in the 150s.  Creatinine running in the 90,000 with no bleeding problems.  Overall tolerating therapy.  Neuropathy from chemotherapy unchanged and is persistent nagging but not debilitated.      Past Medical History:   Diagnosis Date   • Hypertension    • Liver disease     mets from colon cancer   • Malignant neoplasm of colon      Past Surgical History:   Procedure Laterality Date   • COLON SURGERY      Sigmoid   • LIVER SURGERY     • PORTACATH PLACEMENT         No Known Allergies    Family History and Social History reviewed and changed as necessary      REVIEW OF SYSTEM:   Review of Systems   Constitutional: Negative for appetite change, chills, diaphoresis, fatigue, fever and unexpected weight change.   HENT:   Negative for mouth sores, sore throat and trouble swallowing.    Eyes: Negative for icterus.   Respiratory: Negative for cough, hemoptysis and shortness of breath.    Cardiovascular: Negative for chest pain, leg swelling and palpitations.   Gastrointestinal: Negative for abdominal distention, abdominal pain, blood in stool, constipation, diarrhea, nausea and vomiting.   Endocrine: Negative for hot flashes.   Genitourinary: Negative for bladder incontinence, difficulty urinating, dysuria, frequency and hematuria.    Musculoskeletal: Negative for gait problem, neck pain and neck stiffness.   Skin: Negative for rash.   Neurological: Negative for dizziness, gait problem, headaches, light-headedness and numbness.   Hematological: Negative for adenopathy. Does not bruise/bleed easily.   Psychiatric/Behavioral: Negative for depression. The patient is not nervous/anxious.    All other systems reviewed and are negative.       PHYSICAL EXAM    Vitals:    06/22/17 0957   BP: 130/71   Pulse: 61   Resp: 15   Temp: 98.1 °F (36.7 °C)   TempSrc: Temporal Artery    Weight: 181 lb (82.1 kg)     Constitutional: Appears well-developed and well-nourished. No distress.    ECOG: (0) Fully active, able to carry on all predisease performance without restriction  HENT:   Head: Normocephalic.   Mouth/Throat: Oropharynx is clear and moist.   Eyes: Conjunctivae are normal. Pupils are equal, round, and reactive to light. No scleral icterus.   Neck: Neck supple. No JVD present. No thyromegaly present.   Cardiovascular: Normal rate, regular rhythm and normal heart sounds.    Pulmonary/Chest: Breath sounds normal. No respiratory distress.   Abdominal: Soft. Exhibits no distension and no mass. There is no hepatosplenomegaly. There is no tenderness. There is no rebound and no guarding.   Musculoskeletal:Exhibits no edema, tenderness or deformity.   Neurological: Alert and oriented to person, place, and time. Exhibits normal muscle tone.   Skin: No ecchymosis, no petechiae and no rash noted. Not diaphoretic. No cyanosis. Nails show no clubbing.   Psychiatric: Normal mood and affect.   Vitals reviewed.      Infusion on 06/20/2017   Component Date Value Ref Range Status   • Glucose 06/20/2017 100* 74 - 98 mg/dL Final   • BUN 06/20/2017 14  7 - 20 mg/dL Final   • Creatinine 06/20/2017 0.80  0.60 - 1.30 mg/dL Final   • Sodium 06/20/2017 139  137 - 145 mmol/L Final   • Potassium 06/20/2017 4.4  3.5 - 5.1 mmol/L Final   • Chloride 06/20/2017 104  98 - 107 mmol/L Final   • CO2 06/20/2017 26.0  26.0 - 30.0 mmol/L Final   • Calcium 06/20/2017 8.9  8.4 - 10.2 mg/dL Final   • Total Protein 06/20/2017 6.2* 6.3 - 8.2 g/dL Final   • Albumin 06/20/2017 3.70  3.50 - 5.00 g/dL Final   • ALT (SGPT) 06/20/2017 23  13 - 69 U/L Final   • AST (SGOT) 06/20/2017 23  15 - 46 U/L Final   • Alkaline Phosphatase 06/20/2017 47  38 - 126 U/L Final   • Total Bilirubin 06/20/2017 1.8* 0.2 - 1.3 mg/dL Final   • eGFR Non  Amer 06/20/2017 97  >60 mL/min/1.73 Final   • Globulin 06/20/2017 2.5  gm/dL Final   • A/G Ratio 06/20/2017 1.5  1.0 - 2.0 g/dL Final   • BUN/Creatinine Ratio 06/20/2017 17.5  6.3 - 21.9 Final   • Anion  Gap 06/20/2017 13.4  mmol/L Final   • Color, UA 06/20/2017 Yellow  Yellow, Straw Final   • Appearance, UA 06/20/2017 Clear  Clear Final   • pH, UA 06/20/2017 5.5  5.0 - 8.0 Final   • Specific Gravity, UA 06/20/2017 1.010  1.005 - 1.030 Final   • Glucose, UA 06/20/2017 Negative  Negative Final   • Ketones, UA 06/20/2017 Negative  Negative Final   • Bilirubin, UA 06/20/2017 Negative  Negative Final   • Blood, UA 06/20/2017 Negative  Negative Final   • Protein, UA 06/20/2017 Negative  Negative Final   • Leuk Esterase, UA 06/20/2017 Negative  Negative Final   • Nitrite, UA 06/20/2017 Negative  Negative Final   • Urobilinogen, UA 06/20/2017 0.2 E.U./dL  0.2 - 1.0 E.U./dL Final   • WBC 06/20/2017 3.41* 4.80 - 10.80 10*3/mm3 Final   • RBC 06/20/2017 3.43* 4.70 - 6.10 10*6/mm3 Final   • Hemoglobin 06/20/2017 13.3* 14.0 - 18.0 g/dL Final   • Hematocrit 06/20/2017 37.9* 42.0 - 52.0 % Final   • MCV 06/20/2017 110.5* 80.0 - 94.0 fL Final   • MCH 06/20/2017 38.8* 27.0 - 31.0 pg Final   • MCHC 06/20/2017 35.1  30.0 - 37.0 g/dL Final   • RDW 06/20/2017 16.5* 11.5 - 14.5 % Final   • RDW-SD 06/20/2017 67.8* 37.0 - 54.0 fl Final   • MPV 06/20/2017 9.9  6.0 - 12.0 fL Final   • Platelets 06/20/2017 95* 130 - 400 10*3/mm3 Final   • Neutrophil % 06/20/2017 53.9  37.0 - 80.0 % Final   • Lymphocyte % 06/20/2017 28.7  10.0 - 50.0 % Final   • Monocyte % 06/20/2017 14.7* 0.0 - 12.0 % Final   • Eosinophil % 06/20/2017 1.8  0.0 - 7.0 % Final   • Basophil % 06/20/2017 0.3  0.0 - 2.5 % Final   • Immature Grans % 06/20/2017 0.6  0.0 - 0.6 % Final   • Neutrophils, Absolute 06/20/2017 1.84* 2.00 - 6.90 10*3/mm3 Final   • Lymphocytes, Absolute 06/20/2017 0.98  0.60 - 3.40 10*3/mm3 Final   • Monocytes, Absolute 06/20/2017 0.50  0.00 - 0.90 10*3/mm3 Final   • Eosinophils, Absolute 06/20/2017 0.06  0.00 - 0.70 10*3/mm3 Final   • Basophils, Absolute 06/20/2017 0.01  0.00 - 0.20 10*3/mm3 Final   • Immature Grans, Absolute 06/20/2017 0.02  0.00 - 0.06  10*3/mm3 Final   • nRBC 06/20/2017 0.0  0.0 - 0.0 /100 WBC Final   • Anisocytosis 06/20/2017 Slight/1+  None Seen Final   • Macrocytes 06/20/2017 Slight/1+  None Seen Final   • Poikilocytes 06/20/2017 Slight/1+  None Seen Final   • WBC Morphology 06/20/2017 Normal  Normal Final   • Platelet Estimate 06/20/2017 Decreased  Normal Final   Infusion on 05/30/2017   Component Date Value Ref Range Status   • Glucose 05/30/2017 99* 74 - 98 mg/dL Final   • BUN 05/30/2017 18  7 - 20 mg/dL Final   • Creatinine 05/30/2017 0.80  0.60 - 1.30 mg/dL Final   • Sodium 05/30/2017 139  137 - 145 mmol/L Final   • Potassium 05/30/2017 4.4  3.5 - 5.1 mmol/L Final   • Chloride 05/30/2017 104  98 - 107 mmol/L Final   • CO2 05/30/2017 24.0* 26.0 - 30.0 mmol/L Final   • Calcium 05/30/2017 9.0  8.4 - 10.2 mg/dL Final   • Total Protein 05/30/2017 6.5  6.3 - 8.2 g/dL Final   • Albumin 05/30/2017 4.00  3.50 - 5.00 g/dL Final   • ALT (SGPT) 05/30/2017 26  13 - 69 U/L Final   • AST (SGOT) 05/30/2017 25  15 - 46 U/L Final   • Alkaline Phosphatase 05/30/2017 57  38 - 126 U/L Final   • Total Bilirubin 05/30/2017 1.8* 0.2 - 1.3 mg/dL Final   • eGFR Non African Amer 05/30/2017 97  >60 mL/min/1.73 Final   • Globulin 05/30/2017 2.5  gm/dL Final   • A/G Ratio 05/30/2017 1.6  1.0 - 2.0 g/dL Final   • BUN/Creatinine Ratio 05/30/2017 22.5* 6.3 - 21.9 Final   • Anion Gap 05/30/2017 15.4  mmol/L Final   • Color, UA 05/30/2017 Yellow  Yellow, Straw Final   • Appearance, UA 05/30/2017 Clear  Clear Final   • pH, UA 05/30/2017 <=5.0  5.0 - 8.0 Final   • Specific Gravity, UA 05/30/2017 1.020  1.005 - 1.030 Final   • Glucose, UA 05/30/2017 Negative  Negative Final   • Ketones, UA 05/30/2017 Negative  Negative Final   • Bilirubin, UA 05/30/2017 Negative  Negative Final   • Blood, UA 05/30/2017 Negative  Negative Final   • Protein, UA 05/30/2017 Negative  Negative Final   • Leuk Esterase, UA 05/30/2017 Negative  Negative Final   • Nitrite, UA 05/30/2017 Negative   Negative Final   • Urobilinogen, UA 05/30/2017 0.2 E.U./dL  0.2 - 1.0 E.U./dL Final   • WBC 05/30/2017 3.17* 4.80 - 10.80 10*3/mm3 Final   • RBC 05/30/2017 3.51* 4.70 - 6.10 10*6/mm3 Final   • Hemoglobin 05/30/2017 13.5* 14.0 - 18.0 g/dL Final   • Hematocrit 05/30/2017 39.4* 42.0 - 52.0 % Final   • MCV 05/30/2017 112.3* 80.0 - 94.0 fL Final   • MCH 05/30/2017 38.5* 27.0 - 31.0 pg Final   • MCHC 05/30/2017 34.3  30.0 - 37.0 g/dL Final   • RDW 05/30/2017 16.6* 11.5 - 14.5 % Final   • RDW-SD 05/30/2017 69.3* 37.0 - 54.0 fl Final   • MPV 05/30/2017 9.9  6.0 - 12.0 fL Final   • Platelets 05/30/2017 96* 130 - 400 10*3/mm3 Final   • Scan Slide 05/30/2017    Final    See Manual Differential Results   • Neutrophil % 05/30/2017 57.0  37.0 - 80.0 % Final   • Lymphocyte % 05/30/2017 28.0  10.0 - 50.0 % Final   • Monocyte % 05/30/2017 11.0  0.0 - 12.0 % Final   • Bands %  05/30/2017 4.0  0.0 - 6.0 % Final   • Neutrophils Absolute 05/30/2017 1.93* 2.00 - 6.90 10*3/mm3 Final   • Lymphocytes Absolute 05/30/2017 0.89  0.60 - 3.40 10*3/mm3 Final   • Monocytes Absolute 05/30/2017 0.35  0.00 - 0.90 10*3/mm3 Final   • Anisocytosis 05/30/2017 Slight/1+  None Seen Final   • Macrocytes 05/30/2017 Mod/2+  None Seen Final   • Poikilocytes 05/30/2017 Slight/1+  None Seen Final   • WBC Morphology 05/30/2017 Normal  Normal Final   • Platelet Estimate 05/30/2017 Decreased  Normal Final       Assessment/Plan     1.  Metastatic colon cancer  2. Chemotherapy-induced neuropathy    Discussion: He's tolerating Avastin and Xeloda well.  We'll continue that Avastin now and again on July 11 and just before we see him back on August 1 we will repeat CT of chest abdomen pelvis with contrast to assess status of disease.  He has done excellently with his treatment.  He is off of oxaliplatin yet the neuropathy has not particularly recovered but it is not debilitating.  Will not reintroduce oxaliplatin unless we run out of other options in the  future.        Dov Haines MD    06/22/2017

## 2017-07-11 ENCOUNTER — INFUSION (OUTPATIENT)
Dept: ONCOLOGY | Facility: HOSPITAL | Age: 67
End: 2017-07-11

## 2017-07-11 VITALS
RESPIRATION RATE: 18 BRPM | HEART RATE: 60 BPM | TEMPERATURE: 98 F | BODY MASS INDEX: 23.88 KG/M2 | WEIGHT: 181 LBS | SYSTOLIC BLOOD PRESSURE: 143 MMHG | DIASTOLIC BLOOD PRESSURE: 84 MMHG

## 2017-07-11 DIAGNOSIS — C18.7 MALIGNANT NEOPLASM OF SIGMOID COLON (HCC): Primary | ICD-10-CM

## 2017-07-11 LAB
ALBUMIN SERPL-MCNC: 3.9 G/DL (ref 3.5–5)
ALBUMIN/GLOB SERPL: 1.5 G/DL (ref 1–2)
ALP SERPL-CCNC: 54 U/L (ref 38–126)
ALT SERPL W P-5'-P-CCNC: 25 U/L (ref 13–69)
ANION GAP SERPL CALCULATED.3IONS-SCNC: 14.1 MMOL/L
ANISOCYTOSIS BLD QL: NORMAL
AST SERPL-CCNC: 24 U/L (ref 15–46)
BASOPHILS # BLD AUTO: 0.01 10*3/MM3 (ref 0–0.2)
BASOPHILS NFR BLD AUTO: 0.3 % (ref 0–2.5)
BILIRUB SERPL-MCNC: 2.5 MG/DL (ref 0.2–1.3)
BILIRUB UR QL STRIP: NEGATIVE
BUN BLD-MCNC: 16 MG/DL (ref 7–20)
BUN/CREAT SERPL: 17.8 (ref 6.3–21.9)
CALCIUM SPEC-SCNC: 9.1 MG/DL (ref 8.4–10.2)
CEA SERPL-MCNC: 3.26 NG/ML
CHLORIDE SERPL-SCNC: 106 MMOL/L (ref 98–107)
CLARITY UR: CLEAR
CO2 SERPL-SCNC: 25 MMOL/L (ref 26–30)
COLOR UR: YELLOW
CREAT BLD-MCNC: 0.9 MG/DL (ref 0.6–1.3)
DEPRECATED RDW RBC AUTO: 69 FL (ref 37–54)
EOSINOPHIL # BLD AUTO: 0.06 10*3/MM3 (ref 0–0.7)
EOSINOPHIL NFR BLD AUTO: 1.8 % (ref 0–7)
ERYTHROCYTE [DISTWIDTH] IN BLOOD BY AUTOMATED COUNT: 16.7 % (ref 11.5–14.5)
GFR SERPL CREATININE-BSD FRML MDRD: 85 ML/MIN/1.73
GLOBULIN UR ELPH-MCNC: 2.6 GM/DL
GLUCOSE BLD-MCNC: 119 MG/DL (ref 74–98)
GLUCOSE UR STRIP-MCNC: NEGATIVE MG/DL
HCT VFR BLD AUTO: 40.6 % (ref 42–52)
HGB BLD-MCNC: 14.2 G/DL (ref 14–18)
HGB UR QL STRIP.AUTO: NEGATIVE
IMM GRANULOCYTES # BLD: 0.02 10*3/MM3 (ref 0–0.06)
IMM GRANULOCYTES NFR BLD: 0.6 % (ref 0–0.6)
KETONES UR QL STRIP: NEGATIVE
LEUKOCYTE ESTERASE UR QL STRIP.AUTO: NEGATIVE
LYMPHOCYTES # BLD AUTO: 1.02 10*3/MM3 (ref 0.6–3.4)
LYMPHOCYTES NFR BLD AUTO: 30.1 % (ref 10–50)
MACROCYTES BLD QL SMEAR: NORMAL
MCH RBC QN AUTO: 38.7 PG (ref 27–31)
MCHC RBC AUTO-ENTMCNC: 35 G/DL (ref 30–37)
MCV RBC AUTO: 110.6 FL (ref 80–94)
MONOCYTES # BLD AUTO: 0.5 10*3/MM3 (ref 0–0.9)
MONOCYTES NFR BLD AUTO: 14.7 % (ref 0–12)
NEUTROPHILS # BLD AUTO: 1.78 10*3/MM3 (ref 2–6.9)
NEUTROPHILS NFR BLD AUTO: 52.5 % (ref 37–80)
NITRITE UR QL STRIP: NEGATIVE
NRBC BLD MANUAL-RTO: 0 /100 WBC (ref 0–0)
PH UR STRIP.AUTO: <=5 [PH] (ref 5–8)
PLATELET # BLD AUTO: 101 10*3/MM3 (ref 130–400)
PMV BLD AUTO: 9.6 FL (ref 6–12)
POTASSIUM BLD-SCNC: 4.1 MMOL/L (ref 3.5–5.1)
PROT SERPL-MCNC: 6.5 G/DL (ref 6.3–8.2)
PROT UR QL STRIP: NEGATIVE
RBC # BLD AUTO: 3.67 10*6/MM3 (ref 4.7–6.1)
SMALL PLATELETS BLD QL SMEAR: NORMAL
SODIUM BLD-SCNC: 141 MMOL/L (ref 137–145)
SP GR UR STRIP: 1.02 (ref 1–1.03)
UROBILINOGEN UR QL STRIP: NORMAL
WBC MORPH BLD: NORMAL
WBC NRBC COR # BLD: 3.39 10*3/MM3 (ref 4.8–10.8)

## 2017-07-11 PROCEDURE — 36415 COLL VENOUS BLD VENIPUNCTURE: CPT

## 2017-07-11 PROCEDURE — 25010000002 HEPARIN FLUSH (PORCINE) 100 UNIT/ML SOLUTION: Performed by: INTERNAL MEDICINE

## 2017-07-11 PROCEDURE — 25010000002 BEVACIZUMAB PER 10 MG: Performed by: INTERNAL MEDICINE

## 2017-07-11 PROCEDURE — 96374 THER/PROPH/DIAG INJ IV PUSH: CPT

## 2017-07-11 PROCEDURE — 96375 TX/PRO/DX INJ NEW DRUG ADDON: CPT

## 2017-07-11 PROCEDURE — 25010000002 DEXAMETHASONE PER 1 MG: Performed by: INTERNAL MEDICINE

## 2017-07-11 PROCEDURE — 85025 COMPLETE CBC W/AUTO DIFF WBC: CPT

## 2017-07-11 PROCEDURE — 85007 BL SMEAR W/DIFF WBC COUNT: CPT

## 2017-07-11 PROCEDURE — 80053 COMPREHEN METABOLIC PANEL: CPT

## 2017-07-11 PROCEDURE — 82378 CARCINOEMBRYONIC ANTIGEN: CPT

## 2017-07-11 PROCEDURE — 81003 URINALYSIS AUTO W/O SCOPE: CPT

## 2017-07-11 PROCEDURE — 96413 CHEMO IV INFUSION 1 HR: CPT

## 2017-07-11 RX ORDER — SODIUM CHLORIDE 0.9 % (FLUSH) 0.9 %
10 SYRINGE (ML) INJECTION AS NEEDED
Status: CANCELLED | OUTPATIENT
Start: 2017-07-11

## 2017-07-11 RX ORDER — SODIUM CHLORIDE 9 MG/ML
250 INJECTION, SOLUTION INTRAVENOUS ONCE
Status: DISCONTINUED | OUTPATIENT
Start: 2017-07-11 | End: 2017-07-11 | Stop reason: HOSPADM

## 2017-07-11 RX ORDER — SODIUM CHLORIDE 0.9 % (FLUSH) 0.9 %
10 SYRINGE (ML) INJECTION AS NEEDED
Status: DISCONTINUED | OUTPATIENT
Start: 2017-07-11 | End: 2017-07-11 | Stop reason: HOSPADM

## 2017-07-11 RX ORDER — DEXTROSE MONOHYDRATE 50 MG/ML
250 INJECTION, SOLUTION INTRAVENOUS ONCE
Status: DISCONTINUED | OUTPATIENT
Start: 2017-07-11 | End: 2017-07-11 | Stop reason: HOSPADM

## 2017-07-11 RX ADMIN — SODIUM CHLORIDE, PRESERVATIVE FREE 500 UNITS: 5 INJECTION INTRAVENOUS at 10:45

## 2017-07-11 RX ADMIN — Medication 10 ML: at 10:45

## 2017-07-11 RX ADMIN — DEXAMETHASONE SODIUM PHOSPHATE 12 MG: 4 INJECTION, SOLUTION INTRA-ARTICULAR; INTRALESIONAL; INTRAMUSCULAR; INTRAVENOUS; SOFT TISSUE at 09:52

## 2017-07-11 RX ADMIN — BEVACIZUMAB 590 MG: 400 INJECTION, SOLUTION INTRAVENOUS at 10:07

## 2017-08-01 ENCOUNTER — APPOINTMENT (OUTPATIENT)
Dept: ONCOLOGY | Facility: HOSPITAL | Age: 67
End: 2017-08-01

## 2017-08-02 ENCOUNTER — APPOINTMENT (OUTPATIENT)
Dept: LAB | Facility: HOSPITAL | Age: 67
End: 2017-08-02

## 2017-08-02 ENCOUNTER — HOSPITAL ENCOUNTER (OUTPATIENT)
Dept: CT IMAGING | Facility: HOSPITAL | Age: 67
Discharge: HOME | End: 2017-08-02
Attending: INTERNAL MEDICINE | Admitting: INTERNAL MEDICINE

## 2017-08-02 DIAGNOSIS — G62.0 PERIPHERAL NEUROPATHY DUE TO CHEMOTHERAPY (HCC): ICD-10-CM

## 2017-08-02 DIAGNOSIS — T45.1X5A PERIPHERAL NEUROPATHY DUE TO CHEMOTHERAPY (HCC): ICD-10-CM

## 2017-08-02 DIAGNOSIS — C18.7 MALIGNANT NEOPLASM OF SIGMOID COLON (HCC): ICD-10-CM

## 2017-08-02 LAB — CREAT BLDA-MCNC: 0.8 MG/DL (ref 0.6–1.3)

## 2017-08-02 PROCEDURE — 71260 CT THORAX DX C+: CPT

## 2017-08-02 PROCEDURE — 0 IOPAMIDOL 61 % SOLUTION: Performed by: INTERNAL MEDICINE

## 2017-08-02 PROCEDURE — 82565 ASSAY OF CREATININE: CPT

## 2017-08-02 PROCEDURE — 74177 CT ABD & PELVIS W/CONTRAST: CPT

## 2017-08-02 RX ADMIN — IOPAMIDOL 95 ML: 612 INJECTION, SOLUTION INTRAVENOUS at 10:45

## 2017-08-02 RX ADMIN — BARIUM SULFATE 450 ML: 21 SUSPENSION ORAL at 09:30

## 2017-08-03 ENCOUNTER — OFFICE VISIT (OUTPATIENT)
Dept: ONCOLOGY | Facility: CLINIC | Age: 67
End: 2017-08-03

## 2017-08-03 VITALS
SYSTOLIC BLOOD PRESSURE: 137 MMHG | BODY MASS INDEX: 23.75 KG/M2 | DIASTOLIC BLOOD PRESSURE: 74 MMHG | TEMPERATURE: 98.7 F | RESPIRATION RATE: 19 BRPM | HEART RATE: 73 BPM | WEIGHT: 180 LBS

## 2017-08-03 DIAGNOSIS — L98.9 SKIN LESION OF SCALP: ICD-10-CM

## 2017-08-03 DIAGNOSIS — C18.7 MALIGNANT NEOPLASM OF SIGMOID COLON (HCC): ICD-10-CM

## 2017-08-03 PROCEDURE — 99214 OFFICE O/P EST MOD 30 MIN: CPT | Performed by: NURSE PRACTITIONER

## 2017-08-03 RX ORDER — SODIUM CHLORIDE 9 MG/ML
250 INJECTION, SOLUTION INTRAVENOUS ONCE
Status: CANCELLED | OUTPATIENT
Start: 2017-08-03

## 2017-08-03 RX ORDER — DEXTROSE MONOHYDRATE 50 MG/ML
250 INJECTION, SOLUTION INTRAVENOUS ONCE
Status: CANCELLED | OUTPATIENT
Start: 2017-08-03 | End: 2017-08-03

## 2017-08-03 RX ORDER — SODIUM CHLORIDE 9 MG/ML
250 INJECTION, SOLUTION INTRAVENOUS ONCE
Status: CANCELLED | OUTPATIENT
Start: 2017-10-31

## 2017-08-03 RX ORDER — SODIUM CHLORIDE 9 MG/ML
250 INJECTION, SOLUTION INTRAVENOUS ONCE
Status: CANCELLED | OUTPATIENT
Start: 2017-10-10

## 2017-08-03 RX ORDER — DEXTROSE MONOHYDRATE 50 MG/ML
250 INJECTION, SOLUTION INTRAVENOUS ONCE
Status: CANCELLED | OUTPATIENT
Start: 2017-10-10 | End: 2017-09-12

## 2017-08-03 RX ORDER — DEXTROSE MONOHYDRATE 50 MG/ML
250 INJECTION, SOLUTION INTRAVENOUS ONCE
Status: CANCELLED | OUTPATIENT
Start: 2017-09-19 | End: 2017-08-22

## 2017-08-03 RX ORDER — SODIUM CHLORIDE 9 MG/ML
250 INJECTION, SOLUTION INTRAVENOUS ONCE
Status: CANCELLED | OUTPATIENT
Start: 2017-09-19

## 2017-08-03 RX ORDER — DEXTROSE MONOHYDRATE 50 MG/ML
250 INJECTION, SOLUTION INTRAVENOUS ONCE
Status: CANCELLED | OUTPATIENT
Start: 2017-10-31 | End: 2017-10-03

## 2017-08-03 NOTE — PROGRESS NOTES
CHIEF COMPLAINT:    1.  Peeling of the skin on the hands and feet                                          2.  Peripheral neuropathy                                          3.  Management of metastatic colon cancer                                          4.  Sore place on his scalp    Problem List:  Oncology/Hematology History    1. Stage CARLOS, [id number] colon cancer with 2 out of 14 pericolonic lymph nodes  involved and a left lobe liver biopsy positive for metastasis, presenting with  a 25 pound weight loss and diarrhea with some perirectal soreness and had  colonoscopy with Dr. Mcclain in January that found this lesion that was  circumferential high-grade invading into the pericolonic fat, status post  sigmoid colectomy of a poorly differentiated adenocarcinoma.   a) Baseline CEA postop of 0.8 with alkaline phosphatase 111, with normal liver  enzymes and creatinine of 0.8. Baseline white count 9820 with a hemoglobin  13.8, platelets 339,000, and CT with contrast showing a right lobe liver dome  lesion as well as an anastomotic change in the bowel.   b) Began CapeOx 03/15/2016.  c) Added in Avastin to CapeOx 04/05/2016, second cycle. (This was 8 weeks out  from surgery).  d) KRAS mutation is negative.  E.) CAT scan in August 2016 shows resolution of disease in the liver and colon and a stable lung nodule.  Hence Avastin, Xeloda, and oxaliplatin continued.  F) oxaliplatin discontinued October 2016 due to worsening peripheral neuropathy.  G.) CTs of February 2017 showed no evidence of metastasis and stable bibasilar nodular scar.  Persistent neuropathy not worsening.  CEA 1.4.  Continuing Avastin and Xeloda without oxaliplatin.  Having some problems with irritation of his eyes on Xeloda.  2.  Peripheral neuropathy, chemotherapy induced        Malignant neoplasm of sigmoid colon    5/20/2016 Initial Diagnosis     Malignant neoplasm of sigmoid colon       5/2/2017 Imaging     CT chest, abdomen, pelvis IMPRESSION:  1. No  disease recurrence.  2. Minimal areas of nodular thickening in the right lower lobe, stable.         8/2/2017 Imaging     CT chest/abdomen/pelvis IMPRESSION:  Stable examination with no evidence of acute intrathoracic,  intra-abdominal or pelvic abnormality. There is no evidence of  progression of disease. No metastatic disease.             HISTORY OF PRESENT ILLNESS:  The patient is a 66 y.o. male, here for follow up on management of colon cancer metastasis.  He states that his peripheral neuropathy continues to be bothersome, he states that his feet in the morning or so tender that it is difficult to walk on anything but his heels.  He is also having peeling of the skin on his hands and feet, peeling of his feet last week was bad enough that he did have some bleeding associated with it.  Otherwise, continues to remain active.  He golfs quite a bit and is outdoors a lot in the sun.  He states that there is a lesion on the top of the scalp that has been sore lately.    Past Medical History:   Diagnosis Date   • Hypertension    • Liver disease     mets from colon cancer   • Malignant neoplasm of colon      Past Surgical History:   Procedure Laterality Date   • COLON SURGERY      Sigmoid   • LIVER SURGERY     • PORTACATH PLACEMENT         No Known Allergies    Family History and Social History reviewed and changed as necessary      REVIEW OF SYSTEM:   Review of Systems   Constitutional: Negative for appetite change, chills, diaphoresis, fatigue, fever and unexpected weight change.   HENT:   Negative for mouth sores, sore throat and trouble swallowing.    Eyes: Negative for icterus.   Respiratory: Negative for cough, hemoptysis and shortness of breath.    Cardiovascular: Negative for chest pain, leg swelling and palpitations.   Gastrointestinal: Negative for abdominal distention, abdominal pain, blood in stool, constipation, diarrhea, nausea and vomiting.   Endocrine: Negative for hot flashes.   Genitourinary: Negative  for bladder incontinence, difficulty urinating, dysuria, frequency and hematuria.    Musculoskeletal: Negative for gait problem, neck pain and neck stiffness.   Skin: Negative for rash.   Neurological: Negative for dizziness, gait problem, headaches, light-headedness and numbness.   Hematological: Negative for adenopathy. Does not bruise/bleed easily.   Psychiatric/Behavioral: Negative for depression. The patient is not nervous/anxious.    All other systems reviewed and are negative except as stated above in the history of present illness.       PHYSICAL EXAM    Vitals:    08/03/17 1125   BP: 137/74   Pulse: 73   Resp: 19   Temp: 98.7 °F (37.1 °C)   TempSrc: Temporal Artery    Weight: 180 lb (81.6 kg)     Constitutional: Appears well-developed and well-nourished. No distress.   ECOG: (0) Fully active, able to carry on all predisease performance without restriction  HENT:   Head: Normocephalic.   Mouth/Throat: Oropharynx is clear and moist.   Eyes: Conjunctivae are normal. Pupils are equal, round, and reactive to light. No scleral icterus.   Neck: Neck supple. No JVD present. No thyromegaly present.   Cardiovascular: Normal rate, regular rhythm and normal heart sounds.    Pulmonary/Chest: Breath sounds normal. No respiratory distress.   Abdominal: Soft. Exhibits no distension and no mass. There is no hepatosplenomegaly. There is no tenderness. There is no rebound and no guarding.   Musculoskeletal:Exhibits no edema, tenderness or deformity.   Neurological: Alert and oriented to person, place, and time. Exhibits normal muscle tone.   Skin: Skin on the hands is thickened and rough, there is some fissures in the inner PIP joint area.  Skin on the feet show erythema and some edema with mild peeling and one area of blistering on the sole of the left foot.  Scalp with areas of actinic keratosis and dry plaque areas.    Psychiatric: Normal mood and affect.   Vitals reviewed.      Hospital Outpatient Visit on 08/02/2017    Component Date Value Ref Range Status   • Creatinine 08/02/2017 0.80  0.60 - 1.30 mg/dL Final    Serial Number: [id number]:  [id number]   Infusion on 07/11/2017   Component Date Value Ref Range Status   • Glucose 07/11/2017 119* 74 - 98 mg/dL Final   • BUN 07/11/2017 16  7 - 20 mg/dL Final   • Creatinine 07/11/2017 0.90  0.60 - 1.30 mg/dL Final   • Sodium 07/11/2017 141  137 - 145 mmol/L Final   • Potassium 07/11/2017 4.1  3.5 - 5.1 mmol/L Final   • Chloride 07/11/2017 106  98 - 107 mmol/L Final   • CO2 07/11/2017 25.0* 26.0 - 30.0 mmol/L Final   • Calcium 07/11/2017 9.1  8.4 - 10.2 mg/dL Final   • Total Protein 07/11/2017 6.5  6.3 - 8.2 g/dL Final   • Albumin 07/11/2017 3.90  3.50 - 5.00 g/dL Final   • ALT (SGPT) 07/11/2017 25  13 - 69 U/L Final   • AST (SGOT) 07/11/2017 24  15 - 46 U/L Final   • Alkaline Phosphatase 07/11/2017 54  38 - 126 U/L Final   • Total Bilirubin 07/11/2017 2.5* 0.2 - 1.3 mg/dL Final   • eGFR Non African Amer 07/11/2017 85  >60 mL/min/1.73 Final   • Globulin 07/11/2017 2.6  gm/dL Final   • A/G Ratio 07/11/2017 1.5  1.0 - 2.0 g/dL Final   • BUN/Creatinine Ratio 07/11/2017 17.8  6.3 - 21.9 Final   • Anion Gap 07/11/2017 14.1  mmol/L Final   • Color, UA 07/11/2017 Yellow  Yellow, Straw Final   • Appearance, UA 07/11/2017 Clear  Clear Final   • pH, UA 07/11/2017 <=5.0  5.0 - 8.0 Final   • Specific Gravity, UA 07/11/2017 1.020  1.005 - 1.030 Final   • Glucose, UA 07/11/2017 Negative  Negative Final   • Ketones, UA 07/11/2017 Negative  Negative Final   • Bilirubin, UA 07/11/2017 Negative  Negative Final   • Blood, UA 07/11/2017 Negative  Negative Final   • Protein, UA 07/11/2017 Negative  Negative Final   • Leuk Esterase, UA 07/11/2017 Negative  Negative Final   • Nitrite, UA 07/11/2017 Negative  Negative Final   • Urobilinogen, UA 07/11/2017 0.2 E.U./dL  0.2 - 1.0 E.U./dL Final   • CEA 07/11/2017 3.26  ng/mL Final   • WBC 07/11/2017 3.39* 4.80 - 10.80 10*3/mm3 Final   • RBC 07/11/2017 3.67*  4.70 - 6.10 10*6/mm3 Final   • Hemoglobin 07/11/2017 14.2  14.0 - 18.0 g/dL Final   • Hematocrit 07/11/2017 40.6* 42.0 - 52.0 % Final   • MCV 07/11/2017 110.6* 80.0 - 94.0 fL Final   • MCH 07/11/2017 38.7* 27.0 - 31.0 pg Final   • MCHC 07/11/2017 35.0  30.0 - 37.0 g/dL Final   • RDW 07/11/2017 16.7* 11.5 - 14.5 % Final   • RDW-SD 07/11/2017 69.0* 37.0 - 54.0 fl Final   • MPV 07/11/2017 9.6  6.0 - 12.0 fL Final   • Platelets 07/11/2017 101* 130 - 400 10*3/mm3 Final   • Neutrophil % 07/11/2017 52.5  37.0 - 80.0 % Final   • Lymphocyte % 07/11/2017 30.1  10.0 - 50.0 % Final   • Monocyte % 07/11/2017 14.7* 0.0 - 12.0 % Final   • Eosinophil % 07/11/2017 1.8  0.0 - 7.0 % Final   • Basophil % 07/11/2017 0.3  0.0 - 2.5 % Final   • Immature Grans % 07/11/2017 0.6  0.0 - 0.6 % Final   • Neutrophils, Absolute 07/11/2017 1.78* 2.00 - 6.90 10*3/mm3 Final   • Lymphocytes, Absolute 07/11/2017 1.02  0.60 - 3.40 10*3/mm3 Final   • Monocytes, Absolute 07/11/2017 0.50  0.00 - 0.90 10*3/mm3 Final   • Eosinophils, Absolute 07/11/2017 0.06  0.00 - 0.70 10*3/mm3 Final   • Basophils, Absolute 07/11/2017 0.01  0.00 - 0.20 10*3/mm3 Final   • Immature Grans, Absolute 07/11/2017 0.02  0.00 - 0.06 10*3/mm3 Final   • nRBC 07/11/2017 0.0  0.0 - 0.0 /100 WBC Final   • Anisocytosis 07/11/2017 Slight/1+  None Seen Final   • Macrocytes 07/11/2017 Slight/1+  None Seen Final   • WBC Morphology 07/11/2017 Normal  Normal Final   • Platelet Estimate 07/11/2017 Decreased  Normal Final     Diagnostic data: All previous office notes, radiology reports as outlined above in the oncology history and laboratory data were reviewed at time of visit.  More recent labs on 8/2/2017 CEA was 0.90 (reference range 0.00-2.50), CBC with a WBC of 3680, hemoglobin 14.3, hematocrit 42.9%, platelets 96,000.  CMP was unremarkable and within normal limits other than for slightly elevated bilirubin of 2.0 with normal transaminases and a normal alkaline phosphatase.  Urinalysis  negative for proteinuria.    Assessment/Plan     1. Metastatic colon cancer  2. Chemotherapy-induced neuropathy  3. Desquamation of the skin on the hands and soles of feet.  4. Skin lesion on scalp    Discussion:  Due to the patient's significant neuropathy and desquamation of the skin on his hands and feet I will hold treatment for the next few weeks.  We will see him back in 3 weeks for follow-up and if he is feeling better at that time we can resume therapy but would consider dose reduction of his Xeloda to 2 tablets twice daily 14 out of 21 days (he is currently on 3 tablets twice daily 14 out of 21 days) along with Avastin.  He has had no proteinuria, no hypertension.  Current CT scans were negative with a normal CEA of 0.9.  Overall he has done remarkably well.  I will refer him to dermatology for evaluation of the scalp lesion.    Rosa Pradhan, DEISY    08/03/2017

## 2017-08-18 ENCOUNTER — TELEPHONE (OUTPATIENT)
Dept: ONCOLOGY | Facility: CLINIC | Age: 67
End: 2017-08-18

## 2017-08-18 DIAGNOSIS — C18.7 MALIGNANT NEOPLASM OF SIGMOID COLON (HCC): ICD-10-CM

## 2017-08-18 RX ORDER — CAPECITABINE 500 MG/1
1000 TABLET, FILM COATED ORAL 2 TIMES DAILY
Qty: 56 TABLET | Refills: 5 | Status: SHIPPED | OUTPATIENT
Start: 2017-08-18 | End: 2017-12-08 | Stop reason: SDUPTHER

## 2017-08-18 NOTE — TELEPHONE ENCOUNTER
I received a fax for clarification of xeloda dosing for Gordon Avila [birth year] from [facility].  Discussed with Dr. Haines.  We currently have him on hold until 8-29-17.   The plan will be to resume xeloda 500 mg tabs 2 tabs PO BID 14 days on/7 days off every 21 day cycle.  Rx sent.  Email sent to Tiffanie Awad with [facility] with updated plan.

## 2017-08-24 ENCOUNTER — OFFICE VISIT (OUTPATIENT)
Dept: ONCOLOGY | Facility: CLINIC | Age: 67
End: 2017-08-24

## 2017-08-24 VITALS
SYSTOLIC BLOOD PRESSURE: 152 MMHG | WEIGHT: 176 LBS | DIASTOLIC BLOOD PRESSURE: 83 MMHG | TEMPERATURE: 98.1 F | HEART RATE: 60 BPM | RESPIRATION RATE: 14 BRPM | BODY MASS INDEX: 23.22 KG/M2

## 2017-08-24 DIAGNOSIS — C18.7 MALIGNANT NEOPLASM OF SIGMOID COLON (HCC): Primary | ICD-10-CM

## 2017-08-24 PROCEDURE — 99214 OFFICE O/P EST MOD 30 MIN: CPT | Performed by: INTERNAL MEDICINE

## 2017-08-24 RX ORDER — DEXTROSE MONOHYDRATE 50 MG/ML
250 INJECTION, SOLUTION INTRAVENOUS ONCE
Status: CANCELLED | OUTPATIENT
Start: 2017-08-29 | End: 2017-08-29

## 2017-08-24 RX ORDER — SODIUM CHLORIDE 9 MG/ML
250 INJECTION, SOLUTION INTRAVENOUS ONCE
Status: CANCELLED | OUTPATIENT
Start: 2017-08-29

## 2017-08-24 NOTE — PROGRESS NOTES
CHIEF COMPLAINT: Management of stage IV colon cancer Problem List:  Oncology/Hematology History    1. Stage CARLOS, [id number] colon cancer with 2 out of 14 pericolonic lymph nodes  involved and a left lobe liver biopsy positive for metastasis, presenting with  a 25 pound weight loss and diarrhea with some perirectal soreness and had  colonoscopy with Dr. Mcclain in January that found this lesion that was  circumferential high-grade invading into the pericolonic fat, status post  sigmoid colectomy of a poorly differentiated adenocarcinoma.   a) Baseline CEA postop of 0.8 with alkaline phosphatase 111, with normal liver  enzymes and creatinine of 0.8. Baseline white count 9820 with a hemoglobin  13.8, platelets 339,000, and CT with contrast showing a right lobe liver dome  lesion as well as an anastomotic change in the bowel.   b) Began CapeOx 03/15/2016.  c) Added in Avastin to CapeOx 04/05/2016, second cycle. (This was 8 weeks out  from surgery).  d) KRAS mutation is negative.  E.) CAT scan in August 2016 shows resolution of disease in the liver and colon and a stable lung nodule.  Hence Avastin, Xeloda, and oxaliplatin continued.  F) oxaliplatin discontinued October 2016 due to worsening peripheral neuropathy.  G.) CTs of February 2017 showed no evidence of metastasis and stable bibasilar nodular scar.  Persistent neuropathy not worsening.  CEA 1.4.  Continuing Avastin and Xeloda without oxaliplatin.  Having some problems with irritation of his eyes on Xeloda.  2.  Peripheral neuropathy, chemotherapy induced        Malignant neoplasm of sigmoid colon    5/20/2016 Initial Diagnosis     Malignant neoplasm of sigmoid colon       5/2/2017 Imaging     CT chest, abdomen, pelvis IMPRESSION:  1. No disease recurrence.  2. Minimal areas of nodular thickening in the right lower lobe, stable.         8/2/2017 Imaging     CT chest/abdomen/pelvis IMPRESSION:  Stable examination with no evidence of acute  intrathoracic,  intra-abdominal or pelvic abnormality. There is no evidence of  progression of disease. No metastatic disease.             HISTORY OF PRESENT ILLNESS:  The patient is a 66 y.o. male, here for follow up on management of stage IV colon cancer.  He is an excellent response but with significant palmar and plantar desquamation and fatigue and irritated conjunctiva.  This is improved over the last few weeks off of therapy.  No new complaints      Past Medical History:   Diagnosis Date   • Hypertension    • Liver disease     mets from colon cancer   • Malignant neoplasm of colon      Past Surgical History:   Procedure Laterality Date   • COLON SURGERY      Sigmoid   • LIVER SURGERY     • PORTACATH PLACEMENT         No Known Allergies    Family History and Social History reviewed and changed as necessary      REVIEW OF SYSTEM:   Review of Systems   Constitutional: Negative for appetite change, chills, diaphoresis, fatigue, fever and unexpected weight change.   HENT:   Negative for mouth sores, sore throat and trouble swallowing.    Eyes: Negative for icterus.   Respiratory: Negative for cough, hemoptysis and shortness of breath.    Cardiovascular: Negative for chest pain, leg swelling and palpitations.   Gastrointestinal: Negative for abdominal distention, abdominal pain, blood in stool, constipation, diarrhea, nausea and vomiting.   Endocrine: Negative for hot flashes.   Genitourinary: Negative for bladder incontinence, difficulty urinating, dysuria, frequency and hematuria.    Musculoskeletal: Negative for gait problem, neck pain and neck stiffness.   Skin: Negative for rash.   Neurological: Negative for dizziness, gait problem, headaches, light-headedness and numbness.   Hematological: Negative for adenopathy. Does not bruise/bleed easily.   Psychiatric/Behavioral: Negative for depression. The patient is not nervous/anxious.    All other systems reviewed and are negative.       PHYSICAL EXAM    Vitals:     08/24/17 1357   BP: 152/83   Pulse: 60   Resp: 14   Temp: 98.1 °F (36.7 °C)   TempSrc: Temporal Artery    Weight: 176 lb (79.8 kg)     Constitutional: Appears well-developed and well-nourished. No distress.   ECOG: (1) Restricted in physically strenuous activity, ambulatory and able to do work of light nature  HENT:   Head: Normocephalic.   Mouth/Throat: Oropharynx is clear and moist.   Eyes: Conjunctivae are mildly injected. Pupils are equal, round, and reactive to light. No scleral icterus.   Neck: Neck supple. No JVD present. No thyromegaly present.   Cardiovascular: Normal rate, regular rhythm and normal heart sounds.    Pulmonary/Chest: Breath sounds normal. No respiratory distress.   Abdominal: Soft. Exhibits no distension and no mass. There is no hepatosplenomegaly. There is no tenderness. There is no rebound and no guarding.   Musculoskeletal:Exhibits no edema, tenderness or deformity.   Neurological: Alert and oriented to person, place, and time. Exhibits normal muscle tone.   Skin: No ecchymosis, no petechiae and no rash noted. Not diaphoretic. No cyanosis. Nails show no clubbing.  still some thickening of the palms and soles but no erythema.    Psychiatric: Normal mood and affect.   Vitals reviewed.      Hospital Outpatient Visit on 08/02/2017   Component Date Value Ref Range Status   • Creatinine 08/02/2017 0.80  0.60 - 1.30 mg/dL Final    Serial Number: [id number]:  [id number]       Assessment/Plan     1. Colon cancer metastases  2. Palmar and plantar desquamation due to treatment with Xeloda    Discussion: he's had an excellent response on his prior scans with no obvious discernible metastases.  However, I would not stop his chemotherapy indefinitely but am putting him on 2 tablets twice a day of Xeloda 14 out of 21 days along with the oxaliplatin and the Avastin.  We will get back on track with that lower dose of Xeloda starting on the 29th and we will see him back 3 weeks.  We will rescan in the  first to middle part of November with CT chest abdomen pelvis with contrast.      Dov Haines MD    08/24/2017

## 2017-08-29 ENCOUNTER — INFUSION (OUTPATIENT)
Dept: ONCOLOGY | Facility: HOSPITAL | Age: 67
End: 2017-08-29

## 2017-08-29 VITALS
TEMPERATURE: 98.9 F | HEART RATE: 74 BPM | RESPIRATION RATE: 18 BRPM | SYSTOLIC BLOOD PRESSURE: 121 MMHG | BODY MASS INDEX: 22.69 KG/M2 | DIASTOLIC BLOOD PRESSURE: 77 MMHG | WEIGHT: 172 LBS

## 2017-08-29 DIAGNOSIS — C18.7 MALIGNANT NEOPLASM OF SIGMOID COLON (HCC): Primary | ICD-10-CM

## 2017-08-29 LAB
ALBUMIN SERPL-MCNC: 4.4 G/DL (ref 3.5–5)
ALBUMIN/GLOB SERPL: 1.6 G/DL (ref 1–2)
ALP SERPL-CCNC: 59 U/L (ref 38–126)
ALT SERPL W P-5'-P-CCNC: 32 U/L (ref 13–69)
ANION GAP SERPL CALCULATED.3IONS-SCNC: 18.4 MMOL/L
ANISOCYTOSIS BLD QL: NORMAL
AST SERPL-CCNC: 32 U/L (ref 15–46)
BASOPHILS # BLD AUTO: 0.02 10*3/MM3 (ref 0–0.2)
BASOPHILS NFR BLD AUTO: 0.3 % (ref 0–2.5)
BILIRUB SERPL-MCNC: 1.2 MG/DL (ref 0.2–1.3)
BILIRUB UR QL STRIP: NEGATIVE
BUN BLD-MCNC: 18 MG/DL (ref 7–20)
BUN/CREAT SERPL: 22.5 (ref 6.3–21.9)
CALCIUM SPEC-SCNC: 9.3 MG/DL (ref 8.4–10.2)
CEA SERPL-MCNC: 1.82 NG/ML
CHLORIDE SERPL-SCNC: 104 MMOL/L (ref 98–107)
CLARITY UR: CLEAR
CO2 SERPL-SCNC: 24 MMOL/L (ref 26–30)
COLOR UR: YELLOW
CREAT BLD-MCNC: 0.8 MG/DL (ref 0.6–1.3)
DEPRECATED RDW RBC AUTO: 55.8 FL (ref 37–54)
EOSINOPHIL # BLD AUTO: 0.18 10*3/MM3 (ref 0–0.7)
EOSINOPHIL NFR BLD AUTO: 2.7 % (ref 0–7)
ERYTHROCYTE [DISTWIDTH] IN BLOOD BY AUTOMATED COUNT: 14.3 % (ref 11.5–14.5)
GFR SERPL CREATININE-BSD FRML MDRD: 97 ML/MIN/1.73
GLOBULIN UR ELPH-MCNC: 2.8 GM/DL
GLUCOSE BLD-MCNC: 98 MG/DL (ref 74–98)
GLUCOSE UR STRIP-MCNC: NEGATIVE MG/DL
HCT VFR BLD AUTO: 42.7 % (ref 42–52)
HGB BLD-MCNC: 14.7 G/DL (ref 14–18)
HGB UR QL STRIP.AUTO: NEGATIVE
IMM GRANULOCYTES # BLD: 0.03 10*3/MM3 (ref 0–0.06)
IMM GRANULOCYTES NFR BLD: 0.5 % (ref 0–0.6)
KETONES UR QL STRIP: NEGATIVE
LEUKOCYTE ESTERASE UR QL STRIP.AUTO: NEGATIVE
LYMPHOCYTES # BLD AUTO: 1.29 10*3/MM3 (ref 0.6–3.4)
LYMPHOCYTES NFR BLD AUTO: 19.7 % (ref 10–50)
MACROCYTES BLD QL SMEAR: NORMAL
MCH RBC QN AUTO: 36.5 PG (ref 27–31)
MCHC RBC AUTO-ENTMCNC: 34.4 G/DL (ref 30–37)
MCV RBC AUTO: 106 FL (ref 80–94)
MONOCYTES # BLD AUTO: 0.65 10*3/MM3 (ref 0–0.9)
MONOCYTES NFR BLD AUTO: 9.9 % (ref 0–12)
NEUTROPHILS # BLD AUTO: 4.39 10*3/MM3 (ref 2–6.9)
NEUTROPHILS NFR BLD AUTO: 66.9 % (ref 37–80)
NITRITE UR QL STRIP: NEGATIVE
NRBC BLD MANUAL-RTO: 0 /100 WBC (ref 0–0)
PH UR STRIP.AUTO: 5.5 [PH] (ref 5–8)
PLATELET # BLD AUTO: 110 10*3/MM3 (ref 130–400)
PMV BLD AUTO: 10.1 FL (ref 6–12)
POTASSIUM BLD-SCNC: 4.4 MMOL/L (ref 3.5–5.1)
PROT SERPL-MCNC: 7.2 G/DL (ref 6.3–8.2)
PROT UR QL STRIP: NEGATIVE
RBC # BLD AUTO: 4.03 10*6/MM3 (ref 4.7–6.1)
SMALL PLATELETS BLD QL SMEAR: ADEQUATE
SODIUM BLD-SCNC: 142 MMOL/L (ref 137–145)
SP GR UR STRIP: 1.01 (ref 1–1.03)
UROBILINOGEN UR QL STRIP: NORMAL
WBC MORPH BLD: NORMAL
WBC NRBC COR # BLD: 6.56 10*3/MM3 (ref 4.8–10.8)

## 2017-08-29 PROCEDURE — 82378 CARCINOEMBRYONIC ANTIGEN: CPT

## 2017-08-29 PROCEDURE — 36415 COLL VENOUS BLD VENIPUNCTURE: CPT

## 2017-08-29 PROCEDURE — 85025 COMPLETE CBC W/AUTO DIFF WBC: CPT

## 2017-08-29 PROCEDURE — 96413 CHEMO IV INFUSION 1 HR: CPT

## 2017-08-29 PROCEDURE — 96367 TX/PROPH/DG ADDL SEQ IV INF: CPT

## 2017-08-29 PROCEDURE — 25010000002 HEPARIN FLUSH (PORCINE) 100 UNIT/ML SOLUTION: Performed by: INTERNAL MEDICINE

## 2017-08-29 PROCEDURE — 81003 URINALYSIS AUTO W/O SCOPE: CPT

## 2017-08-29 PROCEDURE — 25010000002 DEXAMETHASONE PER 1 MG: Performed by: INTERNAL MEDICINE

## 2017-08-29 PROCEDURE — 80053 COMPREHEN METABOLIC PANEL: CPT

## 2017-08-29 PROCEDURE — 85007 BL SMEAR W/DIFF WBC COUNT: CPT

## 2017-08-29 PROCEDURE — 96374 THER/PROPH/DIAG INJ IV PUSH: CPT

## 2017-08-29 PROCEDURE — 25010000002 BEVACIZUMAB PER 10 MG: Performed by: INTERNAL MEDICINE

## 2017-08-29 RX ORDER — SODIUM CHLORIDE 0.9 % (FLUSH) 0.9 %
10 SYRINGE (ML) INJECTION AS NEEDED
Status: DISCONTINUED | OUTPATIENT
Start: 2017-08-29 | End: 2017-08-29 | Stop reason: HOSPADM

## 2017-08-29 RX ORDER — SODIUM CHLORIDE 0.9 % (FLUSH) 0.9 %
10 SYRINGE (ML) INJECTION AS NEEDED
Status: CANCELLED | OUTPATIENT
Start: 2017-08-29

## 2017-08-29 RX ORDER — SODIUM CHLORIDE 9 MG/ML
250 INJECTION, SOLUTION INTRAVENOUS ONCE
Status: DISCONTINUED | OUTPATIENT
Start: 2017-08-29 | End: 2017-08-29 | Stop reason: HOSPADM

## 2017-08-29 RX ORDER — DEXTROSE MONOHYDRATE 50 MG/ML
250 INJECTION, SOLUTION INTRAVENOUS ONCE
Status: DISCONTINUED | OUTPATIENT
Start: 2017-08-29 | End: 2017-08-29 | Stop reason: HOSPADM

## 2017-08-29 RX ADMIN — DEXAMETHASONE SODIUM PHOSPHATE 12 MG: 4 INJECTION, SOLUTION INTRA-ARTICULAR; INTRALESIONAL; INTRAMUSCULAR; INTRAVENOUS; SOFT TISSUE at 09:55

## 2017-08-29 RX ADMIN — SODIUM CHLORIDE, PRESERVATIVE FREE 500 UNITS: 5 INJECTION INTRAVENOUS at 10:59

## 2017-08-29 RX ADMIN — BEVACIZUMAB 590 MG: 400 INJECTION, SOLUTION INTRAVENOUS at 10:18

## 2017-08-29 RX ADMIN — Medication 10 ML: at 10:59

## 2017-09-14 ENCOUNTER — OFFICE VISIT (OUTPATIENT)
Dept: ONCOLOGY | Facility: CLINIC | Age: 67
End: 2017-09-14

## 2017-09-14 VITALS
TEMPERATURE: 97.8 F | DIASTOLIC BLOOD PRESSURE: 75 MMHG | WEIGHT: 179 LBS | SYSTOLIC BLOOD PRESSURE: 143 MMHG | HEART RATE: 62 BPM | BODY MASS INDEX: 23.62 KG/M2 | RESPIRATION RATE: 16 BRPM

## 2017-09-14 DIAGNOSIS — C18.7 MALIGNANT NEOPLASM OF SIGMOID COLON (HCC): Primary | ICD-10-CM

## 2017-09-14 PROCEDURE — 99214 OFFICE O/P EST MOD 30 MIN: CPT | Performed by: INTERNAL MEDICINE

## 2017-09-14 NOTE — PROGRESS NOTES
CHIEF COMPLAINT: Stage IV colon cancer management    Problem List:  Oncology/Hematology History    1. Stage CARLOS, [id number] colon cancer with 2 out of 14 pericolonic lymph nodes  involved and a left lobe liver biopsy positive for metastasis, presenting with  a 25 pound weight loss and diarrhea with some perirectal soreness and had  colonoscopy with Dr. Mcclain in January that found this lesion that was  circumferential high-grade invading into the pericolonic fat, status post  sigmoid colectomy of a poorly differentiated adenocarcinoma.   a) Baseline CEA postop of 0.8 with alkaline phosphatase 111, with normal liver  enzymes and creatinine of 0.8. Baseline white count 9820 with a hemoglobin  13.8, platelets 339,000, and CT with contrast showing a right lobe liver dome  lesion as well as an anastomotic change in the bowel.   b) Began CapeOx 03/15/2016.  c) Added in Avastin to CapeOx 04/05/2016, second cycle. (This was 8 weeks out  from surgery).  d) KRAS mutation is negative.  E.) CAT scan in August 2016 shows resolution of disease in the liver and colon and a stable lung nodule.  Hence Avastin, Xeloda, and oxaliplatin continued.  F) oxaliplatin discontinued October 2016 due to worsening peripheral neuropathy.  G.) CTs of February 2017 showed no evidence of metastasis and stable bibasilar nodular scar.  Persistent neuropathy not worsening.  CEA 1.4.  Continuing Avastin and Xeloda without oxaliplatin.  Having some problems with irritation of his eyes on Xeloda.  2.  Peripheral neuropathy, chemotherapy induced        Malignant neoplasm of sigmoid colon    5/20/2016 Initial Diagnosis     Malignant neoplasm of sigmoid colon       5/2/2017 Imaging     CT chest, abdomen, pelvis IMPRESSION:  1. No disease recurrence.  2. Minimal areas of nodular thickening in the right lower lobe, stable.         8/2/2017 Imaging     CT chest/abdomen/pelvis IMPRESSION:  Stable examination with no evidence of acute  intrathoracic,  intra-abdominal or pelvic abnormality. There is no evidence of  progression of disease. No metastatic disease.             HISTORY OF PRESENT ILLNESS:  The patient is a 66 y.o. male, here for follow up on management of stage IV colon cancer management.  His mucositis and palmar and plantar thickening and desquamation and dramatically improved on the lower doses of Xeloda 2 tablets twice a day 14 out of 21 days.  No proteinuria and his blood pressure is doing well.  No signs or symptoms of vascular events from Avastin.  No chest pain from the Xeloda.      Past Medical History:   Diagnosis Date   • Hypertension    • Liver disease     mets from colon cancer   • Malignant neoplasm of colon      Past Surgical History:   Procedure Laterality Date   • COLON SURGERY      Sigmoid   • LIVER SURGERY     • PORTACATH PLACEMENT         No Known Allergies    Family History and Social History reviewed and changed as necessary      REVIEW OF SYSTEM:   Review of Systems   Constitutional: Negative for appetite change, chills, diaphoresis, fatigue, fever and unexpected weight change.   HENT:   Negative for mouth sores, sore throat and trouble swallowing.    Eyes: Negative for icterus.   Respiratory: Negative for cough, hemoptysis and shortness of breath.    Cardiovascular: Negative for chest pain, leg swelling and palpitations.   Gastrointestinal: Negative for abdominal distention, abdominal pain, blood in stool, constipation, diarrhea, nausea and vomiting.   Endocrine: Negative for hot flashes.   Genitourinary: Negative for bladder incontinence, difficulty urinating, dysuria, frequency and hematuria.    Musculoskeletal: Negative for gait problem, neck pain and neck stiffness.   Skin: Negative for rash.   Neurological: Negative for dizziness, gait problem, headaches, light-headedness and numbness.   Hematological: Negative for adenopathy. Does not bruise/bleed easily.   Psychiatric/Behavioral: Negative for depression.  The patient is not nervous/anxious.    All other systems reviewed and are negative.       PHYSICAL EXAM    Vitals:    09/14/17 0928   BP: 143/75   Pulse: 62   Resp: 16   Temp: 97.8 °F (36.6 °C)   TempSrc: Temporal Artery    Weight: 179 lb (81.2 kg)     Constitutional: Appears well-developed and well-nourished. No distress.   ECOG: (0) Fully active, able to carry on all predisease performance without restriction  HENT:   Head: Normocephalic.   Mouth/Throat: Oropharynx is clear and moist.   Eyes: Conjunctivae are normal. Pupils are equal, round, and reactive to light. No scleral icterus.   Neck: Neck supple. No JVD present. No thyromegaly present.   Cardiovascular: Normal rate, regular rhythm and normal heart sounds.    Pulmonary/Chest: Breath sounds normal. No respiratory distress.   Abdominal: Soft. Exhibits no distension and no mass. There is no hepatosplenomegaly. There is no tenderness. There is no rebound and no guarding.   Musculoskeletal:Exhibits no edema, tenderness or deformity.   Neurological: Alert and oriented to person, place, and time. Exhibits normal muscle tone.   Skin: No ecchymosis, no petechiae and no rash noted. Not diaphoretic. No cyanosis. Nails show no clubbing.   Psychiatric: Normal mood and affect.   Vitals reviewed.      Infusion on 08/29/2017   Component Date Value Ref Range Status   • CEA 08/29/2017 1.82  ng/mL Final   • Glucose 08/29/2017 98  74 - 98 mg/dL Final   • BUN 08/29/2017 18  7 - 20 mg/dL Final   • Creatinine 08/29/2017 0.80  0.60 - 1.30 mg/dL Final   • Sodium 08/29/2017 142  137 - 145 mmol/L Final   • Potassium 08/29/2017 4.4  3.5 - 5.1 mmol/L Final   • Chloride 08/29/2017 104  98 - 107 mmol/L Final   • CO2 08/29/2017 24.0* 26.0 - 30.0 mmol/L Final   • Calcium 08/29/2017 9.3  8.4 - 10.2 mg/dL Final   • Total Protein 08/29/2017 7.2  6.3 - 8.2 g/dL Final   • Albumin 08/29/2017 4.40  3.50 - 5.00 g/dL Final   • ALT (SGPT) 08/29/2017 32  13 - 69 U/L Final   • AST (SGOT) 08/29/2017 32   15 - 46 U/L Final   • Alkaline Phosphatase 08/29/2017 59  38 - 126 U/L Final   • Total Bilirubin 08/29/2017 1.2  0.2 - 1.3 mg/dL Final   • eGFR Non African Amer 08/29/2017 97  >60 mL/min/1.73 Final   • Globulin 08/29/2017 2.8  gm/dL Final   • A/G Ratio 08/29/2017 1.6  1.0 - 2.0 g/dL Final   • BUN/Creatinine Ratio 08/29/2017 22.5* 6.3 - 21.9 Final   • Anion Gap 08/29/2017 18.4  mmol/L Final   • Color, UA 08/29/2017 Yellow  Yellow, Straw Final   • Appearance, UA 08/29/2017 Clear  Clear Final   • pH, UA 08/29/2017 5.5  5.0 - 8.0 Final   • Specific Gravity, UA 08/29/2017 1.015  1.005 - 1.030 Final   • Glucose, UA 08/29/2017 Negative  Negative Final   • Ketones, UA 08/29/2017 Negative  Negative Final   • Bilirubin, UA 08/29/2017 Negative  Negative Final   • Blood, UA 08/29/2017 Negative  Negative Final   • Protein, UA 08/29/2017 Negative  Negative Final   • Leuk Esterase, UA 08/29/2017 Negative  Negative Final   • Nitrite, UA 08/29/2017 Negative  Negative Final   • Urobilinogen, UA 08/29/2017 0.2 E.U./dL  0.2 - 1.0 E.U./dL Final   • WBC 08/29/2017 6.56  4.80 - 10.80 10*3/mm3 Final   • RBC 08/29/2017 4.03* 4.70 - 6.10 10*6/mm3 Final   • Hemoglobin 08/29/2017 14.7  14.0 - 18.0 g/dL Final   • Hematocrit 08/29/2017 42.7  42.0 - 52.0 % Final   • MCV 08/29/2017 106.0* 80.0 - 94.0 fL Final   • MCH 08/29/2017 36.5* 27.0 - 31.0 pg Final   • MCHC 08/29/2017 34.4  30.0 - 37.0 g/dL Final   • RDW 08/29/2017 14.3  11.5 - 14.5 % Final   • RDW-SD 08/29/2017 55.8* 37.0 - 54.0 fl Final   • MPV 08/29/2017 10.1  6.0 - 12.0 fL Final   • Platelets 08/29/2017 110* 130 - 400 10*3/mm3 Final   • Neutrophil % 08/29/2017 66.9  37.0 - 80.0 % Final   • Lymphocyte % 08/29/2017 19.7  10.0 - 50.0 % Final   • Monocyte % 08/29/2017 9.9  0.0 - 12.0 % Final   • Eosinophil % 08/29/2017 2.7  0.0 - 7.0 % Final   • Basophil % 08/29/2017 0.3  0.0 - 2.5 % Final   • Immature Grans % 08/29/2017 0.5  0.0 - 0.6 % Final   • Neutrophils, Absolute 08/29/2017 4.39  2.00  - 6.90 10*3/mm3 Final   • Lymphocytes, Absolute 08/29/2017 1.29  0.60 - 3.40 10*3/mm3 Final   • Monocytes, Absolute 08/29/2017 0.65  0.00 - 0.90 10*3/mm3 Final   • Eosinophils, Absolute 08/29/2017 0.18  0.00 - 0.70 10*3/mm3 Final   • Basophils, Absolute 08/29/2017 0.02  0.00 - 0.20 10*3/mm3 Final   • Immature Grans, Absolute 08/29/2017 0.03  0.00 - 0.06 10*3/mm3 Final   • nRBC 08/29/2017 0.0  0.0 - 0.0 /100 WBC Final   • Anisocytosis 08/29/2017 Slight/1+  None Seen Final   • Macrocytes 08/29/2017 Slight/1+  None Seen Final   • WBC Morphology 08/29/2017 Normal  Normal Final   • Platelet Estimate 08/29/2017 Adequate  Normal Final       Assessment/Plan     1.  Stage IV colon cancer  2. Palmar and plantar thickening  3. Mucositis    Discussion: his mucositis and palmar and plantar desquamation markedly improved with time on the lower doses of Xeloda.  He is tolerating the Avastin as well.  We will continue to monitor and adjust doses as he tolerates.  We'll see him back the end of October with plans for rescanning CT chest abdomen and pelvis with contrast in mid-November      Dov Haines MD    09/14/2017

## 2017-09-19 ENCOUNTER — INFUSION (OUTPATIENT)
Dept: ONCOLOGY | Facility: HOSPITAL | Age: 67
End: 2017-09-19

## 2017-09-19 VITALS
HEART RATE: 55 BPM | SYSTOLIC BLOOD PRESSURE: 139 MMHG | TEMPERATURE: 98 F | WEIGHT: 177 LBS | RESPIRATION RATE: 18 BRPM | DIASTOLIC BLOOD PRESSURE: 84 MMHG | BODY MASS INDEX: 23.35 KG/M2

## 2017-09-19 DIAGNOSIS — C18.7 MALIGNANT NEOPLASM OF SIGMOID COLON (HCC): Primary | ICD-10-CM

## 2017-09-19 LAB
ALBUMIN SERPL-MCNC: 4.1 G/DL (ref 3.5–5)
ALBUMIN/GLOB SERPL: 1.6 G/DL (ref 1–2)
ALP SERPL-CCNC: 56 U/L (ref 38–126)
ALT SERPL W P-5'-P-CCNC: 35 U/L (ref 13–69)
ANION GAP SERPL CALCULATED.3IONS-SCNC: 12.3 MMOL/L
AST SERPL-CCNC: 27 U/L (ref 15–46)
BASOPHILS # BLD AUTO: 0.01 10*3/MM3 (ref 0–0.2)
BASOPHILS NFR BLD AUTO: 0.2 % (ref 0–2.5)
BILIRUB SERPL-MCNC: 1.2 MG/DL (ref 0.2–1.3)
BILIRUB UR QL STRIP: NEGATIVE
BUN BLD-MCNC: 11 MG/DL (ref 7–20)
BUN/CREAT SERPL: 13.8 (ref 6.3–21.9)
CALCIUM SPEC-SCNC: 9.1 MG/DL (ref 8.4–10.2)
CHLORIDE SERPL-SCNC: 103 MMOL/L (ref 98–107)
CLARITY UR: CLEAR
CO2 SERPL-SCNC: 26 MMOL/L (ref 26–30)
COLOR UR: YELLOW
CREAT BLD-MCNC: 0.8 MG/DL (ref 0.6–1.3)
DEPRECATED RDW RBC AUTO: 58.4 FL (ref 37–54)
EOSINOPHIL # BLD AUTO: 0.09 10*3/MM3 (ref 0–0.7)
EOSINOPHIL NFR BLD AUTO: 2 % (ref 0–7)
ERYTHROCYTE [DISTWIDTH] IN BLOOD BY AUTOMATED COUNT: 15 % (ref 11.5–14.5)
GFR SERPL CREATININE-BSD FRML MDRD: 97 ML/MIN/1.73
GLOBULIN UR ELPH-MCNC: 2.5 GM/DL
GLUCOSE BLD-MCNC: 84 MG/DL (ref 74–98)
GLUCOSE UR STRIP-MCNC: NEGATIVE MG/DL
HCT VFR BLD AUTO: 41.4 % (ref 42–52)
HGB BLD-MCNC: 14 G/DL (ref 14–18)
HGB UR QL STRIP.AUTO: NEGATIVE
IMM GRANULOCYTES # BLD: 0.03 10*3/MM3 (ref 0–0.06)
IMM GRANULOCYTES NFR BLD: 0.7 % (ref 0–0.6)
KETONES UR QL STRIP: NEGATIVE
LEUKOCYTE ESTERASE UR QL STRIP.AUTO: NEGATIVE
LYMPHOCYTES # BLD AUTO: 1.38 10*3/MM3 (ref 0.6–3.4)
LYMPHOCYTES NFR BLD AUTO: 31.3 % (ref 10–50)
MACROCYTES BLD QL SMEAR: NORMAL
MCH RBC QN AUTO: 35.7 PG (ref 27–31)
MCHC RBC AUTO-ENTMCNC: 33.8 G/DL (ref 30–37)
MCV RBC AUTO: 105.6 FL (ref 80–94)
MONOCYTES # BLD AUTO: 0.56 10*3/MM3 (ref 0–0.9)
MONOCYTES NFR BLD AUTO: 12.7 % (ref 0–12)
NEUTROPHILS # BLD AUTO: 2.34 10*3/MM3 (ref 2–6.9)
NEUTROPHILS NFR BLD AUTO: 53.1 % (ref 37–80)
NITRITE UR QL STRIP: NEGATIVE
NRBC BLD MANUAL-RTO: 0 /100 WBC (ref 0–0)
PH UR STRIP.AUTO: 6.5 [PH] (ref 5–8)
PLAT MORPH BLD: NORMAL
PLATELET # BLD AUTO: 113 10*3/MM3 (ref 130–400)
PMV BLD AUTO: 10.1 FL (ref 6–12)
POTASSIUM BLD-SCNC: 4.3 MMOL/L (ref 3.5–5.1)
PROT SERPL-MCNC: 6.6 G/DL (ref 6.3–8.2)
PROT UR QL STRIP: NEGATIVE
RBC # BLD AUTO: 3.92 10*6/MM3 (ref 4.7–6.1)
SODIUM BLD-SCNC: 137 MMOL/L (ref 137–145)
SP GR UR STRIP: 1.01 (ref 1–1.03)
UROBILINOGEN UR QL STRIP: NORMAL
WBC MORPH BLD: NORMAL
WBC NRBC COR # BLD: 4.41 10*3/MM3 (ref 4.8–10.8)

## 2017-09-19 PROCEDURE — 96413 CHEMO IV INFUSION 1 HR: CPT

## 2017-09-19 PROCEDURE — 36415 COLL VENOUS BLD VENIPUNCTURE: CPT

## 2017-09-19 PROCEDURE — 85007 BL SMEAR W/DIFF WBC COUNT: CPT

## 2017-09-19 PROCEDURE — 80053 COMPREHEN METABOLIC PANEL: CPT

## 2017-09-19 PROCEDURE — 25010000002 BEVACIZUMAB PER 10 MG: Performed by: NURSE PRACTITIONER

## 2017-09-19 PROCEDURE — 85025 COMPLETE CBC W/AUTO DIFF WBC: CPT

## 2017-09-19 PROCEDURE — 81003 URINALYSIS AUTO W/O SCOPE: CPT

## 2017-09-19 PROCEDURE — 96374 THER/PROPH/DIAG INJ IV PUSH: CPT

## 2017-09-19 PROCEDURE — 25010000002 DEXAMETHASONE PER 1 MG: Performed by: NURSE PRACTITIONER

## 2017-09-19 PROCEDURE — 25010000002 HEPARIN FLUSH (PORCINE) 100 UNIT/ML SOLUTION: Performed by: INTERNAL MEDICINE

## 2017-09-19 PROCEDURE — 96375 TX/PRO/DX INJ NEW DRUG ADDON: CPT

## 2017-09-19 RX ORDER — DEXTROSE MONOHYDRATE 50 MG/ML
250 INJECTION, SOLUTION INTRAVENOUS ONCE
Status: DISCONTINUED | OUTPATIENT
Start: 2017-09-19 | End: 2017-09-19 | Stop reason: HOSPADM

## 2017-09-19 RX ORDER — SODIUM CHLORIDE 0.9 % (FLUSH) 0.9 %
10 SYRINGE (ML) INJECTION AS NEEDED
Status: CANCELLED | OUTPATIENT
Start: 2017-09-19

## 2017-09-19 RX ORDER — SODIUM CHLORIDE 0.9 % (FLUSH) 0.9 %
10 SYRINGE (ML) INJECTION AS NEEDED
Status: DISCONTINUED | OUTPATIENT
Start: 2017-09-19 | End: 2017-09-19 | Stop reason: HOSPADM

## 2017-09-19 RX ORDER — SODIUM CHLORIDE 9 MG/ML
250 INJECTION, SOLUTION INTRAVENOUS ONCE
Status: DISCONTINUED | OUTPATIENT
Start: 2017-09-19 | End: 2017-09-19 | Stop reason: HOSPADM

## 2017-09-19 RX ADMIN — DEXAMETHASONE SODIUM PHOSPHATE 12 MG: 4 INJECTION, SOLUTION INTRA-ARTICULAR; INTRALESIONAL; INTRAMUSCULAR; INTRAVENOUS; SOFT TISSUE at 14:43

## 2017-09-19 RX ADMIN — Medication 10 ML: at 15:31

## 2017-09-19 RX ADMIN — BEVACIZUMAB 590 MG: 400 INJECTION, SOLUTION INTRAVENOUS at 14:57

## 2017-09-19 RX ADMIN — SODIUM CHLORIDE, PRESERVATIVE FREE 500 UNITS: 5 INJECTION INTRAVENOUS at 15:31

## 2017-10-10 ENCOUNTER — INFUSION (OUTPATIENT)
Dept: ONCOLOGY | Facility: HOSPITAL | Age: 67
End: 2017-10-10

## 2017-10-10 VITALS
SYSTOLIC BLOOD PRESSURE: 140 MMHG | BODY MASS INDEX: 23.62 KG/M2 | WEIGHT: 179 LBS | HEART RATE: 66 BPM | RESPIRATION RATE: 18 BRPM | TEMPERATURE: 97.9 F | DIASTOLIC BLOOD PRESSURE: 80 MMHG

## 2017-10-10 DIAGNOSIS — C18.7 MALIGNANT NEOPLASM OF SIGMOID COLON (HCC): Primary | ICD-10-CM

## 2017-10-10 LAB
ALBUMIN SERPL-MCNC: 4 G/DL (ref 3.5–5)
ALBUMIN/GLOB SERPL: 1.5 G/DL (ref 1–2)
ALP SERPL-CCNC: 56 U/L (ref 38–126)
ALT SERPL W P-5'-P-CCNC: 32 U/L (ref 13–69)
ANION GAP SERPL CALCULATED.3IONS-SCNC: 14.3 MMOL/L
AST SERPL-CCNC: 26 U/L (ref 15–46)
BASOPHILS # BLD AUTO: 0.01 10*3/MM3 (ref 0–0.2)
BASOPHILS NFR BLD AUTO: 0.3 % (ref 0–2.5)
BILIRUB SERPL-MCNC: 1 MG/DL (ref 0.2–1.3)
BILIRUB UR QL STRIP: NEGATIVE
BUN BLD-MCNC: 17 MG/DL (ref 7–20)
BUN/CREAT SERPL: 18.9 (ref 6.3–21.9)
CALCIUM SPEC-SCNC: 9.2 MG/DL (ref 8.4–10.2)
CEA SERPL-MCNC: 3.05 NG/ML
CHLORIDE SERPL-SCNC: 109 MMOL/L (ref 98–107)
CLARITY UR: CLEAR
CO2 SERPL-SCNC: 25 MMOL/L (ref 26–30)
COLOR UR: YELLOW
CREAT BLD-MCNC: 0.9 MG/DL (ref 0.6–1.3)
DEPRECATED RDW RBC AUTO: 59.2 FL (ref 37–54)
EOSINOPHIL # BLD AUTO: 0.07 10*3/MM3 (ref 0–0.7)
EOSINOPHIL NFR BLD AUTO: 2.2 % (ref 0–7)
ERYTHROCYTE [DISTWIDTH] IN BLOOD BY AUTOMATED COUNT: 15.3 % (ref 11.5–14.5)
GFR SERPL CREATININE-BSD FRML MDRD: 85 ML/MIN/1.73
GLOBULIN UR ELPH-MCNC: 2.6 GM/DL
GLUCOSE BLD-MCNC: 129 MG/DL (ref 74–98)
GLUCOSE UR STRIP-MCNC: NEGATIVE MG/DL
HCT VFR BLD AUTO: 42.3 % (ref 42–52)
HGB BLD-MCNC: 14.3 G/DL (ref 14–18)
HGB UR QL STRIP.AUTO: NEGATIVE
IMM GRANULOCYTES # BLD: 0.01 10*3/MM3 (ref 0–0.06)
IMM GRANULOCYTES NFR BLD: 0.3 % (ref 0–0.6)
KETONES UR QL STRIP: NEGATIVE
LEUKOCYTE ESTERASE UR QL STRIP.AUTO: NEGATIVE
LYMPHOCYTES # BLD AUTO: 0.8 10*3/MM3 (ref 0.6–3.4)
LYMPHOCYTES NFR BLD AUTO: 25.6 % (ref 10–50)
MCH RBC QN AUTO: 35.4 PG (ref 27–31)
MCHC RBC AUTO-ENTMCNC: 33.8 G/DL (ref 30–37)
MCV RBC AUTO: 104.7 FL (ref 80–94)
MONOCYTES # BLD AUTO: 0.34 10*3/MM3 (ref 0–0.9)
MONOCYTES NFR BLD AUTO: 10.9 % (ref 0–12)
NEUTROPHILS # BLD AUTO: 1.9 10*3/MM3 (ref 2–6.9)
NEUTROPHILS NFR BLD AUTO: 60.7 % (ref 37–80)
NITRITE UR QL STRIP: NEGATIVE
NRBC BLD MANUAL-RTO: 0 /100 WBC (ref 0–0)
PH UR STRIP.AUTO: 5.5 [PH] (ref 5–8)
PLATELET # BLD AUTO: 121 10*3/MM3 (ref 130–400)
PMV BLD AUTO: 10 FL (ref 6–12)
POTASSIUM BLD-SCNC: 4.3 MMOL/L (ref 3.5–5.1)
PROT SERPL-MCNC: 6.6 G/DL (ref 6.3–8.2)
PROT UR QL STRIP: NEGATIVE
RBC # BLD AUTO: 4.04 10*6/MM3 (ref 4.7–6.1)
SODIUM BLD-SCNC: 144 MMOL/L (ref 137–145)
SP GR UR STRIP: 1.02 (ref 1–1.03)
UROBILINOGEN UR QL STRIP: NORMAL
WBC NRBC COR # BLD: 3.13 10*3/MM3 (ref 4.8–10.8)

## 2017-10-10 PROCEDURE — 81003 URINALYSIS AUTO W/O SCOPE: CPT

## 2017-10-10 PROCEDURE — 82378 CARCINOEMBRYONIC ANTIGEN: CPT

## 2017-10-10 PROCEDURE — 25010000002 HEPARIN FLUSH (PORCINE) 100 UNIT/ML SOLUTION: Performed by: INTERNAL MEDICINE

## 2017-10-10 PROCEDURE — 36415 COLL VENOUS BLD VENIPUNCTURE: CPT

## 2017-10-10 PROCEDURE — 25010000002 BEVACIZUMAB PER 10 MG: Performed by: NURSE PRACTITIONER

## 2017-10-10 PROCEDURE — 96374 THER/PROPH/DIAG INJ IV PUSH: CPT

## 2017-10-10 PROCEDURE — 85025 COMPLETE CBC W/AUTO DIFF WBC: CPT

## 2017-10-10 PROCEDURE — 80053 COMPREHEN METABOLIC PANEL: CPT

## 2017-10-10 PROCEDURE — 96413 CHEMO IV INFUSION 1 HR: CPT

## 2017-10-10 PROCEDURE — 25010000002 DEXAMETHASONE PER 1 MG: Performed by: NURSE PRACTITIONER

## 2017-10-10 PROCEDURE — 96375 TX/PRO/DX INJ NEW DRUG ADDON: CPT

## 2017-10-10 RX ORDER — DEXTROSE MONOHYDRATE 50 MG/ML
250 INJECTION, SOLUTION INTRAVENOUS ONCE
Status: DISCONTINUED | OUTPATIENT
Start: 2017-10-10 | End: 2017-10-10 | Stop reason: HOSPADM

## 2017-10-10 RX ORDER — SODIUM CHLORIDE 9 MG/ML
250 INJECTION, SOLUTION INTRAVENOUS ONCE
Status: DISCONTINUED | OUTPATIENT
Start: 2017-10-10 | End: 2017-10-10 | Stop reason: HOSPADM

## 2017-10-10 RX ORDER — SODIUM CHLORIDE 0.9 % (FLUSH) 0.9 %
10 SYRINGE (ML) INJECTION AS NEEDED
Status: CANCELLED | OUTPATIENT
Start: 2017-10-10

## 2017-10-10 RX ORDER — SODIUM CHLORIDE 0.9 % (FLUSH) 0.9 %
10 SYRINGE (ML) INJECTION AS NEEDED
Status: DISCONTINUED | OUTPATIENT
Start: 2017-10-10 | End: 2017-10-10 | Stop reason: HOSPADM

## 2017-10-10 RX ADMIN — BEVACIZUMAB 590 MG: 400 INJECTION, SOLUTION INTRAVENOUS at 10:03

## 2017-10-10 RX ADMIN — SODIUM CHLORIDE, PRESERVATIVE FREE 500 UNITS: 5 INJECTION INTRAVENOUS at 10:39

## 2017-10-10 RX ADMIN — Medication 10 ML: at 10:39

## 2017-10-10 RX ADMIN — DEXAMETHASONE SODIUM PHOSPHATE 12 MG: 4 INJECTION, SOLUTION INTRA-ARTICULAR; INTRALESIONAL; INTRAMUSCULAR; INTRAVENOUS; SOFT TISSUE at 09:46

## 2017-10-26 ENCOUNTER — OFFICE VISIT (OUTPATIENT)
Dept: ONCOLOGY | Facility: CLINIC | Age: 67
End: 2017-10-26

## 2017-10-26 VITALS
DIASTOLIC BLOOD PRESSURE: 77 MMHG | RESPIRATION RATE: 19 BRPM | WEIGHT: 181 LBS | BODY MASS INDEX: 23.88 KG/M2 | SYSTOLIC BLOOD PRESSURE: 143 MMHG | HEART RATE: 65 BPM | TEMPERATURE: 98.1 F

## 2017-10-26 DIAGNOSIS — C18.7 MALIGNANT NEOPLASM OF SIGMOID COLON (HCC): Primary | ICD-10-CM

## 2017-10-26 PROCEDURE — 99214 OFFICE O/P EST MOD 30 MIN: CPT | Performed by: INTERNAL MEDICINE

## 2017-10-26 RX ORDER — DEXTROSE MONOHYDRATE 50 MG/ML
250 INJECTION, SOLUTION INTRAVENOUS ONCE
Status: CANCELLED | OUTPATIENT
Start: 2017-11-21 | End: 2017-11-21

## 2017-10-26 RX ORDER — SODIUM CHLORIDE 9 MG/ML
250 INJECTION, SOLUTION INTRAVENOUS ONCE
Status: CANCELLED | OUTPATIENT
Start: 2017-11-21

## 2017-10-26 NOTE — PROGRESS NOTES
CHIEF COMPLAINT: Management of metastatic colon cancer    Problem List:  Oncology/Hematology History    1. Stage CARLOS, [id number] colon cancer with 2 out of 14 pericolonic lymph nodes  involved and a left lobe liver biopsy positive for metastasis, presenting with  a 25 pound weight loss and diarrhea with some perirectal soreness and had  colonoscopy with Dr. Mcclain in January that found this lesion that was  circumferential high-grade invading into the pericolonic fat, status post  sigmoid colectomy of a poorly differentiated adenocarcinoma.   a) Baseline CEA postop of 0.8 with alkaline phosphatase 111, with normal liver  enzymes and creatinine of 0.8. Baseline white count 9820 with a hemoglobin  13.8, platelets 339,000, and CT with contrast showing a right lobe liver dome  lesion as well as an anastomotic change in the bowel.   b) Began CapeOx 03/15/2016.  c) Added in Avastin to CapeOx 04/05/2016, second cycle. (This was 8 weeks out  from surgery).  d) KRAS mutation is negative.  E.) CAT scan in August 2016 shows resolution of disease in the liver and colon and a stable lung nodule.  Hence Avastin, Xeloda, and oxaliplatin continued.  F) oxaliplatin discontinued October 2016 due to worsening peripheral neuropathy.  G.) CTs of February 2017 showed no evidence of metastasis and stable bibasilar nodular scar.  Persistent neuropathy not worsening.  CEA 1.4.  Continuing Avastin and Xeloda without oxaliplatin.  Having some problems with irritation of his eyes on Xeloda.  2.  Peripheral neuropathy, chemotherapy induced        Malignant neoplasm of sigmoid colon    5/20/2016 Initial Diagnosis     Malignant neoplasm of sigmoid colon       5/2/2017 Imaging     CT chest, abdomen, pelvis IMPRESSION:  1. No disease recurrence.  2. Minimal areas of nodular thickening in the right lower lobe, stable.         8/2/2017 Imaging     CT chest/abdomen/pelvis IMPRESSION:  Stable examination with no evidence of acute  intrathoracic,  intra-abdominal or pelvic abnormality. There is no evidence of  progression of disease. No metastatic disease.             HISTORY OF PRESENT ILLNESS:  The patient is a 66 y.o. male, here for follow up on management of metastatic colon cancer.  No proteinuria.  CEA 3.  Creatinine 0.9.  White count 3130 with platelets 121,000 hemoglobin 14.3 on 10/10/17.  Some fatigue but not worsening over time.  Palmar and plantar thickening better with dose adjustments.      Past Medical History:   Diagnosis Date   • Hypertension    • Liver disease     mets from colon cancer   • Malignant neoplasm of colon      Past Surgical History:   Procedure Laterality Date   • COLON SURGERY      Sigmoid   • LIVER SURGERY     • PORTACATH PLACEMENT         No Known Allergies    Family History and Social History reviewed and changed as necessary      REVIEW OF SYSTEM:   Review of Systems   Constitutional: Negative for appetite change, chills, diaphoresis, fatigue, fever and unexpected weight change.   HENT:   Negative for mouth sores, sore throat and trouble swallowing.    Eyes: Negative for icterus.   Respiratory: Negative for cough, hemoptysis and shortness of breath.    Cardiovascular: Negative for chest pain, leg swelling and palpitations.   Gastrointestinal: Negative for abdominal distention, abdominal pain, blood in stool, constipation, diarrhea, nausea and vomiting.   Endocrine: Negative for hot flashes.   Genitourinary: Negative for bladder incontinence, difficulty urinating, dysuria, frequency and hematuria.    Musculoskeletal: Negative for gait problem, neck pain and neck stiffness.   Skin: Negative for rash.   Neurological: Negative for dizziness, gait problem, headaches, light-headedness and numbness.   Hematological: Negative for adenopathy. Does not bruise/bleed easily.   Psychiatric/Behavioral: Negative for depression. The patient is not nervous/anxious.    All other systems reviewed and are negative.        PHYSICAL EXAM    Vitals:    10/26/17 0957   BP: 143/77   Pulse: 65   Resp: 19   Temp: 98.1 °F (36.7 °C)   TempSrc: Temporal Artery    Weight: 181 lb (82.1 kg)     Constitutional: Appears well-developed and well-nourished. No distress.   ECOG: (0) Fully active, able to carry on all predisease performance without restriction  HENT:   Head: Normocephalic.   Mouth/Throat: Oropharynx is clear and moist.   Eyes: Conjunctivae are normal. Pupils are equal, round, and reactive to light. No scleral icterus.   Neck: Neck supple. No JVD present. No thyromegaly present.   Cardiovascular: Normal rate, regular rhythm and normal heart sounds.    Pulmonary/Chest: Breath sounds normal. No respiratory distress.   Abdominal: Soft. Exhibits no distension and no mass. There is no hepatosplenomegaly. There is no tenderness. There is no rebound and no guarding.   Musculoskeletal:Exhibits no edema, tenderness or deformity.   Neurological: Alert and oriented to person, place, and time. Exhibits normal muscle tone.   Skin: No ecchymosis, no petechiae and no rash noted. Not diaphoretic. No cyanosis. Nails show no clubbing.   Psychiatric: Normal mood and affect.   Vitals reviewed.      Infusion on 10/10/2017   Component Date Value Ref Range Status   • CEA 10/10/2017 3.05  ng/mL Final   • Glucose 10/10/2017 129* 74 - 98 mg/dL Final   • BUN 10/10/2017 17  7 - 20 mg/dL Final   • Creatinine 10/10/2017 0.90  0.60 - 1.30 mg/dL Final   • Sodium 10/10/2017 144  137 - 145 mmol/L Final   • Potassium 10/10/2017 4.3  3.5 - 5.1 mmol/L Final   • Chloride 10/10/2017 109* 98 - 107 mmol/L Final   • CO2 10/10/2017 25.0* 26.0 - 30.0 mmol/L Final   • Calcium 10/10/2017 9.2  8.4 - 10.2 mg/dL Final   • Total Protein 10/10/2017 6.6  6.3 - 8.2 g/dL Final   • Albumin 10/10/2017 4.00  3.50 - 5.00 g/dL Final   • ALT (SGPT) 10/10/2017 32  13 - 69 U/L Final   • AST (SGOT) 10/10/2017 26  15 - 46 U/L Final   • Alkaline Phosphatase 10/10/2017 56  38 - 126 U/L Final   •  Total Bilirubin 10/10/2017 1.0  0.2 - 1.3 mg/dL Final   • eGFR Non African Amer 10/10/2017 85  >60 mL/min/1.73 Final   • Globulin 10/10/2017 2.6  gm/dL Final   • A/G Ratio 10/10/2017 1.5  1.0 - 2.0 g/dL Final   • BUN/Creatinine Ratio 10/10/2017 18.9  6.3 - 21.9 Final   • Anion Gap 10/10/2017 14.3  mmol/L Final   • Color, UA 10/10/2017 Yellow  Yellow, Straw Final   • Appearance, UA 10/10/2017 Clear  Clear Final   • pH, UA 10/10/2017 5.5  5.0 - 8.0 Final   • Specific Gravity, UA 10/10/2017 1.025  1.005 - 1.030 Final   • Glucose, UA 10/10/2017 Negative  Negative Final   • Ketones, UA 10/10/2017 Negative  Negative Final   • Bilirubin, UA 10/10/2017 Negative  Negative Final   • Blood, UA 10/10/2017 Negative  Negative Final   • Protein, UA 10/10/2017 Negative  Negative Final   • Leuk Esterase, UA 10/10/2017 Negative  Negative Final   • Nitrite, UA 10/10/2017 Negative  Negative Final   • Urobilinogen, UA 10/10/2017 0.2 E.U./dL  0.2 - 1.0 E.U./dL Final   • WBC 10/10/2017 3.13* 4.80 - 10.80 10*3/mm3 Final   • RBC 10/10/2017 4.04* 4.70 - 6.10 10*6/mm3 Final   • Hemoglobin 10/10/2017 14.3  14.0 - 18.0 g/dL Final   • Hematocrit 10/10/2017 42.3  42.0 - 52.0 % Final   • MCV 10/10/2017 104.7* 80.0 - 94.0 fL Final   • MCH 10/10/2017 35.4* 27.0 - 31.0 pg Final   • MCHC 10/10/2017 33.8  30.0 - 37.0 g/dL Final   • RDW 10/10/2017 15.3* 11.5 - 14.5 % Final   • RDW-SD 10/10/2017 59.2* 37.0 - 54.0 fl Final   • MPV 10/10/2017 10.0  6.0 - 12.0 fL Final   • Platelets 10/10/2017 121* 130 - 400 10*3/mm3 Final   • Neutrophil % 10/10/2017 60.7  37.0 - 80.0 % Final   • Lymphocyte % 10/10/2017 25.6  10.0 - 50.0 % Final   • Monocyte % 10/10/2017 10.9  0.0 - 12.0 % Final   • Eosinophil % 10/10/2017 2.2  0.0 - 7.0 % Final   • Basophil % 10/10/2017 0.3  0.0 - 2.5 % Final   • Immature Grans % 10/10/2017 0.3  0.0 - 0.6 % Final   • Neutrophils, Absolute 10/10/2017 1.90* 2.00 - 6.90 10*3/mm3 Final   • Lymphocytes, Absolute 10/10/2017 0.80  0.60 - 3.40  10*3/mm3 Final   • Monocytes, Absolute 10/10/2017 0.34  0.00 - 0.90 10*3/mm3 Final   • Eosinophils, Absolute 10/10/2017 0.07  0.00 - 0.70 10*3/mm3 Final   • Basophils, Absolute 10/10/2017 0.01  0.00 - 0.20 10*3/mm3 Final   • Immature Grans, Absolute 10/10/2017 0.01  0.00 - 0.06 10*3/mm3 Final   • nRBC 10/10/2017 0.0  0.0 - 0.0 /100 WBC Final   Infusion on 09/19/2017   Component Date Value Ref Range Status   • Glucose 09/19/2017 84  74 - 98 mg/dL Final   • BUN 09/19/2017 11  7 - 20 mg/dL Final   • Creatinine 09/19/2017 0.80  0.60 - 1.30 mg/dL Final   • Sodium 09/19/2017 137  137 - 145 mmol/L Final   • Potassium 09/19/2017 4.3  3.5 - 5.1 mmol/L Final   • Chloride 09/19/2017 103  98 - 107 mmol/L Final   • CO2 09/19/2017 26.0  26.0 - 30.0 mmol/L Final   • Calcium 09/19/2017 9.1  8.4 - 10.2 mg/dL Final   • Total Protein 09/19/2017 6.6  6.3 - 8.2 g/dL Final   • Albumin 09/19/2017 4.10  3.50 - 5.00 g/dL Final   • ALT (SGPT) 09/19/2017 35  13 - 69 U/L Final   • AST (SGOT) 09/19/2017 27  15 - 46 U/L Final   • Alkaline Phosphatase 09/19/2017 56  38 - 126 U/L Final   • Total Bilirubin 09/19/2017 1.2  0.2 - 1.3 mg/dL Final   • eGFR Non African Amer 09/19/2017 97  >60 mL/min/1.73 Final   • Globulin 09/19/2017 2.5  gm/dL Final   • A/G Ratio 09/19/2017 1.6  1.0 - 2.0 g/dL Final   • BUN/Creatinine Ratio 09/19/2017 13.8  6.3 - 21.9 Final   • Anion Gap 09/19/2017 12.3  mmol/L Final   • Color, UA 09/19/2017 Yellow  Yellow, Straw Final   • Appearance, UA 09/19/2017 Clear  Clear Final   • pH, UA 09/19/2017 6.5  5.0 - 8.0 Final   • Specific Gravity, UA 09/19/2017 1.010  1.005 - 1.030 Final   • Glucose, UA 09/19/2017 Negative  Negative Final   • Ketones, UA 09/19/2017 Negative  Negative Final   • Bilirubin, UA 09/19/2017 Negative  Negative Final   • Blood, UA 09/19/2017 Negative  Negative Final   • Protein, UA 09/19/2017 Negative  Negative Final   • Leuk Esterase, UA 09/19/2017 Negative  Negative Final   • Nitrite, UA 09/19/2017 Negative   Negative Final   • Urobilinogen, UA 09/19/2017 0.2 E.U./dL  0.2 - 1.0 E.U./dL Final   • WBC 09/19/2017 4.41* 4.80 - 10.80 10*3/mm3 Final   • RBC 09/19/2017 3.92* 4.70 - 6.10 10*6/mm3 Final   • Hemoglobin 09/19/2017 14.0  14.0 - 18.0 g/dL Final   • Hematocrit 09/19/2017 41.4* 42.0 - 52.0 % Final   • MCV 09/19/2017 105.6* 80.0 - 94.0 fL Final   • MCH 09/19/2017 35.7* 27.0 - 31.0 pg Final   • MCHC 09/19/2017 33.8  30.0 - 37.0 g/dL Final   • RDW 09/19/2017 15.0* 11.5 - 14.5 % Final   • RDW-SD 09/19/2017 58.4* 37.0 - 54.0 fl Final   • MPV 09/19/2017 10.1  6.0 - 12.0 fL Final   • Platelets 09/19/2017 113* 130 - 400 10*3/mm3 Final   • Neutrophil % 09/19/2017 53.1  37.0 - 80.0 % Final   • Lymphocyte % 09/19/2017 31.3  10.0 - 50.0 % Final   • Monocyte % 09/19/2017 12.7* 0.0 - 12.0 % Final   • Eosinophil % 09/19/2017 2.0  0.0 - 7.0 % Final   • Basophil % 09/19/2017 0.2  0.0 - 2.5 % Final   • Immature Grans % 09/19/2017 0.7* 0.0 - 0.6 % Final   • Neutrophils, Absolute 09/19/2017 2.34  2.00 - 6.90 10*3/mm3 Final   • Lymphocytes, Absolute 09/19/2017 1.38  0.60 - 3.40 10*3/mm3 Final   • Monocytes, Absolute 09/19/2017 0.56  0.00 - 0.90 10*3/mm3 Final   • Eosinophils, Absolute 09/19/2017 0.09  0.00 - 0.70 10*3/mm3 Final   • Basophils, Absolute 09/19/2017 0.01  0.00 - 0.20 10*3/mm3 Final   • Immature Grans, Absolute 09/19/2017 0.03  0.00 - 0.06 10*3/mm3 Final   • nRBC 09/19/2017 0.0  0.0 - 0.0 /100 WBC Final   • Macrocytes 09/19/2017 Slight/1+  None Seen Final   • WBC Morphology 09/19/2017 Normal  Normal Final   • Platelet Morphology 09/19/2017 Normal  Normal Final       Assessment/Plan     1.  Metastatic colon cancer  2. Palmar plantar thickening on chemo.    Discussion: we'll not make any dose adjustments for now as he seems to be tolerating the current Avastin XELOX dosing.  I'll repeat his CAT scans prior to return on the 16th see my nurse practitioner for treatment of 11/21/17.  Would continue this treatment until progression.   After he sees my nurse practitioner back in a month we will turn his care over to Dr. Mercado.  He is at peace with that and agrees with transfer of care to Dr. Mercado starting in December as I'm turning my [place] practice over to him.      Dov Haines MD    10/26/2017

## 2017-10-31 ENCOUNTER — INFUSION (OUTPATIENT)
Dept: ONCOLOGY | Facility: HOSPITAL | Age: 67
End: 2017-10-31

## 2017-10-31 VITALS
HEART RATE: 61 BPM | BODY MASS INDEX: 23.75 KG/M2 | TEMPERATURE: 98.3 F | DIASTOLIC BLOOD PRESSURE: 78 MMHG | RESPIRATION RATE: 16 BRPM | SYSTOLIC BLOOD PRESSURE: 128 MMHG | WEIGHT: 180 LBS

## 2017-10-31 DIAGNOSIS — C18.7 MALIGNANT NEOPLASM OF SIGMOID COLON (HCC): Primary | ICD-10-CM

## 2017-10-31 LAB
ALBUMIN SERPL-MCNC: 4 G/DL (ref 3.5–5)
ALBUMIN/GLOB SERPL: 1.7 G/DL (ref 1–2)
ALP SERPL-CCNC: 47 U/L (ref 38–126)
ALT SERPL W P-5'-P-CCNC: 35 U/L (ref 13–69)
ANION GAP SERPL CALCULATED.3IONS-SCNC: 14.5 MMOL/L
AST SERPL-CCNC: 22 U/L (ref 15–46)
BASOPHILS # BLD AUTO: 0.01 10*3/MM3 (ref 0–0.2)
BASOPHILS NFR BLD AUTO: 0.3 % (ref 0–2.5)
BILIRUB SERPL-MCNC: 1.1 MG/DL (ref 0.2–1.3)
BILIRUB UR QL STRIP: NEGATIVE
BUN BLD-MCNC: 17 MG/DL (ref 7–20)
BUN/CREAT SERPL: 21.3 (ref 6.3–21.9)
CALCIUM SPEC-SCNC: 9.3 MG/DL (ref 8.4–10.2)
CHLORIDE SERPL-SCNC: 106 MMOL/L (ref 98–107)
CLARITY UR: CLEAR
CO2 SERPL-SCNC: 25 MMOL/L (ref 26–30)
COLOR UR: YELLOW
CREAT BLD-MCNC: 0.8 MG/DL (ref 0.6–1.3)
DEPRECATED RDW RBC AUTO: 59.5 FL (ref 37–54)
EOSINOPHIL # BLD AUTO: 0.11 10*3/MM3 (ref 0–0.7)
EOSINOPHIL NFR BLD AUTO: 2.9 % (ref 0–7)
ERYTHROCYTE [DISTWIDTH] IN BLOOD BY AUTOMATED COUNT: 15.7 % (ref 11.5–14.5)
GFR SERPL CREATININE-BSD FRML MDRD: 97 ML/MIN/1.73
GLOBULIN UR ELPH-MCNC: 2.4 GM/DL
GLUCOSE BLD-MCNC: 94 MG/DL (ref 74–98)
GLUCOSE UR STRIP-MCNC: NEGATIVE MG/DL
HCT VFR BLD AUTO: 41.3 % (ref 42–52)
HGB BLD-MCNC: 13.8 G/DL (ref 14–18)
HGB UR QL STRIP.AUTO: NEGATIVE
IMM GRANULOCYTES # BLD: 0.01 10*3/MM3 (ref 0–0.06)
IMM GRANULOCYTES NFR BLD: 0.3 % (ref 0–0.6)
KETONES UR QL STRIP: NEGATIVE
LEUKOCYTE ESTERASE UR QL STRIP.AUTO: NEGATIVE
LYMPHOCYTES # BLD AUTO: 0.87 10*3/MM3 (ref 0.6–3.4)
LYMPHOCYTES NFR BLD AUTO: 22.9 % (ref 10–50)
MCH RBC QN AUTO: 34.7 PG (ref 27–31)
MCHC RBC AUTO-ENTMCNC: 33.4 G/DL (ref 30–37)
MCV RBC AUTO: 103.8 FL (ref 80–94)
MONOCYTES # BLD AUTO: 0.47 10*3/MM3 (ref 0–0.9)
MONOCYTES NFR BLD AUTO: 12.4 % (ref 0–12)
NEUTROPHILS # BLD AUTO: 2.33 10*3/MM3 (ref 2–6.9)
NEUTROPHILS NFR BLD AUTO: 61.2 % (ref 37–80)
NITRITE UR QL STRIP: NEGATIVE
NRBC BLD MANUAL-RTO: 0 /100 WBC (ref 0–0)
PH UR STRIP.AUTO: <=5 [PH] (ref 5–8)
PLATELET # BLD AUTO: 110 10*3/MM3 (ref 130–400)
PMV BLD AUTO: 10.5 FL (ref 6–12)
POTASSIUM BLD-SCNC: 4.5 MMOL/L (ref 3.5–5.1)
PROT SERPL-MCNC: 6.4 G/DL (ref 6.3–8.2)
PROT UR QL STRIP: NEGATIVE
RBC # BLD AUTO: 3.98 10*6/MM3 (ref 4.7–6.1)
SODIUM BLD-SCNC: 141 MMOL/L (ref 137–145)
SP GR UR STRIP: 1.02 (ref 1–1.03)
UROBILINOGEN UR QL STRIP: NORMAL
WBC NRBC COR # BLD: 3.8 10*3/MM3 (ref 4.8–10.8)

## 2017-10-31 PROCEDURE — 81003 URINALYSIS AUTO W/O SCOPE: CPT

## 2017-10-31 PROCEDURE — 25010000002 BEVACIZUMAB PER 10 MG: Performed by: NURSE PRACTITIONER

## 2017-10-31 PROCEDURE — 96413 CHEMO IV INFUSION 1 HR: CPT

## 2017-10-31 PROCEDURE — 85025 COMPLETE CBC W/AUTO DIFF WBC: CPT

## 2017-10-31 PROCEDURE — 25010000002 HEPARIN FLUSH (PORCINE) 100 UNIT/ML SOLUTION: Performed by: INTERNAL MEDICINE

## 2017-10-31 PROCEDURE — 80053 COMPREHEN METABOLIC PANEL: CPT

## 2017-10-31 PROCEDURE — 96375 TX/PRO/DX INJ NEW DRUG ADDON: CPT

## 2017-10-31 PROCEDURE — 36415 COLL VENOUS BLD VENIPUNCTURE: CPT

## 2017-10-31 PROCEDURE — 25010000002 DEXAMETHASONE PER 1 MG: Performed by: NURSE PRACTITIONER

## 2017-10-31 RX ORDER — SODIUM CHLORIDE 9 MG/ML
250 INJECTION, SOLUTION INTRAVENOUS ONCE
Status: DISCONTINUED | OUTPATIENT
Start: 2017-10-31 | End: 2017-10-31 | Stop reason: HOSPADM

## 2017-10-31 RX ORDER — SODIUM CHLORIDE 0.9 % (FLUSH) 0.9 %
10 SYRINGE (ML) INJECTION AS NEEDED
Status: CANCELLED | OUTPATIENT
Start: 2017-10-31

## 2017-10-31 RX ORDER — DEXTROSE MONOHYDRATE 50 MG/ML
250 INJECTION, SOLUTION INTRAVENOUS ONCE
Status: DISCONTINUED | OUTPATIENT
Start: 2017-10-31 | End: 2017-10-31 | Stop reason: HOSPADM

## 2017-10-31 RX ORDER — SODIUM CHLORIDE 0.9 % (FLUSH) 0.9 %
10 SYRINGE (ML) INJECTION AS NEEDED
Status: DISCONTINUED | OUTPATIENT
Start: 2017-10-31 | End: 2017-10-31 | Stop reason: HOSPADM

## 2017-10-31 RX ADMIN — DEXAMETHASONE SODIUM PHOSPHATE 12 MG: 4 INJECTION, SOLUTION INTRA-ARTICULAR; INTRALESIONAL; INTRAMUSCULAR; INTRAVENOUS; SOFT TISSUE at 09:42

## 2017-10-31 RX ADMIN — BEVACIZUMAB 590 MG: 400 INJECTION, SOLUTION INTRAVENOUS at 09:58

## 2017-10-31 RX ADMIN — Medication 10 ML: at 10:40

## 2017-10-31 RX ADMIN — SODIUM CHLORIDE, PRESERVATIVE FREE 500 UNITS: 5 INJECTION INTRAVENOUS at 10:40

## 2017-11-13 ENCOUNTER — HOSPITAL ENCOUNTER (OUTPATIENT)
Dept: CT IMAGING | Facility: HOSPITAL | Age: 67
Discharge: HOME | End: 2017-11-13
Attending: INTERNAL MEDICINE | Admitting: INTERNAL MEDICINE

## 2017-11-13 ENCOUNTER — LAB (OUTPATIENT)
Dept: LAB | Facility: HOSPITAL | Age: 67
End: 2017-11-13

## 2017-11-13 DIAGNOSIS — C18.7 MALIGNANT NEOPLASM OF SIGMOID COLON (HCC): ICD-10-CM

## 2017-11-13 LAB
ALBUMIN SERPL-MCNC: 4.2 G/DL (ref 3.2–4.8)
ALBUMIN/GLOB SERPL: 1.8 G/DL (ref 1.5–2.5)
ALP SERPL-CCNC: 55 U/L (ref 25–100)
ALT SERPL W P-5'-P-CCNC: 20 U/L (ref 7–40)
ANION GAP SERPL CALCULATED.3IONS-SCNC: 5 MMOL/L (ref 3–11)
AST SERPL-CCNC: 26 U/L (ref 0–33)
BASOPHILS # BLD AUTO: 0.01 10*3/MM3 (ref 0–0.2)
BASOPHILS NFR BLD AUTO: 0.3 % (ref 0–1)
BILIRUB SERPL-MCNC: 1.2 MG/DL (ref 0.3–1.2)
BILIRUB UR QL STRIP: NEGATIVE
BUN BLD-MCNC: 12 MG/DL (ref 9–23)
BUN/CREAT SERPL: 13.3 (ref 7–25)
CALCIUM SPEC-SCNC: 9.3 MG/DL (ref 8.7–10.4)
CEA SERPL-MCNC: 1.3 NG/ML (ref 0–2.5)
CHLORIDE SERPL-SCNC: 107 MMOL/L (ref 99–109)
CLARITY UR: CLEAR
CO2 SERPL-SCNC: 30 MMOL/L (ref 20–31)
COLOR UR: YELLOW
CREAT BLD-MCNC: 0.9 MG/DL (ref 0.6–1.3)
DEPRECATED RDW RBC AUTO: 63.6 FL (ref 37–54)
EOSINOPHIL # BLD AUTO: 0.09 10*3/MM3 (ref 0–0.3)
EOSINOPHIL NFR BLD AUTO: 2.3 % (ref 0–3)
ERYTHROCYTE [DISTWIDTH] IN BLOOD BY AUTOMATED COUNT: 16.4 % (ref 11.3–14.5)
GFR SERPL CREATININE-BSD FRML MDRD: 85 ML/MIN/1.73
GLOBULIN UR ELPH-MCNC: 2.3 GM/DL
GLUCOSE BLD-MCNC: 89 MG/DL (ref 70–100)
GLUCOSE UR STRIP-MCNC: NEGATIVE MG/DL
HCT VFR BLD AUTO: 44.8 % (ref 38.9–50.9)
HGB BLD-MCNC: 14.6 G/DL (ref 13.1–17.5)
HGB UR QL STRIP.AUTO: NEGATIVE
IMM GRANULOCYTES # BLD: 0.01 10*3/MM3 (ref 0–0.03)
IMM GRANULOCYTES NFR BLD: 0.3 % (ref 0–0.6)
KETONES UR QL STRIP: NEGATIVE
LEUKOCYTE ESTERASE UR QL STRIP.AUTO: NEGATIVE
LYMPHOCYTES # BLD AUTO: 1.19 10*3/MM3 (ref 0.6–4.8)
LYMPHOCYTES NFR BLD AUTO: 30.4 % (ref 24–44)
MCH RBC QN AUTO: 34.4 PG (ref 27–31)
MCHC RBC AUTO-ENTMCNC: 32.6 G/DL (ref 32–36)
MCV RBC AUTO: 105.7 FL (ref 80–99)
MONOCYTES # BLD AUTO: 0.39 10*3/MM3 (ref 0–1)
MONOCYTES NFR BLD AUTO: 10 % (ref 0–12)
NEUTROPHILS # BLD AUTO: 2.22 10*3/MM3 (ref 1.5–8.3)
NEUTROPHILS NFR BLD AUTO: 56.7 % (ref 41–71)
NITRITE UR QL STRIP: NEGATIVE
PH UR STRIP.AUTO: 6 [PH] (ref 5–8)
PLATELET # BLD AUTO: 102 10*3/MM3 (ref 150–450)
PMV BLD AUTO: 10.9 FL (ref 6–12)
POTASSIUM BLD-SCNC: 4.7 MMOL/L (ref 3.5–5.5)
PROT SERPL-MCNC: 6.5 G/DL (ref 5.7–8.2)
PROT UR QL STRIP: NEGATIVE
RBC # BLD AUTO: 4.24 10*6/MM3 (ref 4.2–5.76)
SODIUM BLD-SCNC: 142 MMOL/L (ref 132–146)
SP GR UR STRIP: 1.01 (ref 1–1.03)
UROBILINOGEN UR QL STRIP: NORMAL
WBC NRBC COR # BLD: 3.91 10*3/MM3 (ref 3.5–10.8)

## 2017-11-13 PROCEDURE — 82378 CARCINOEMBRYONIC ANTIGEN: CPT | Performed by: INTERNAL MEDICINE

## 2017-11-13 PROCEDURE — 81003 URINALYSIS AUTO W/O SCOPE: CPT | Performed by: INTERNAL MEDICINE

## 2017-11-13 PROCEDURE — 71260 CT THORAX DX C+: CPT

## 2017-11-13 PROCEDURE — 74177 CT ABD & PELVIS W/CONTRAST: CPT

## 2017-11-13 PROCEDURE — 0 IOPAMIDOL 61 % SOLUTION: Performed by: INTERNAL MEDICINE

## 2017-11-13 PROCEDURE — 36415 COLL VENOUS BLD VENIPUNCTURE: CPT

## 2017-11-13 PROCEDURE — 80053 COMPREHEN METABOLIC PANEL: CPT | Performed by: INTERNAL MEDICINE

## 2017-11-13 PROCEDURE — 85025 COMPLETE CBC W/AUTO DIFF WBC: CPT | Performed by: INTERNAL MEDICINE

## 2017-11-13 RX ADMIN — BARIUM SULFATE 450 ML: 21 SUSPENSION ORAL at 09:30

## 2017-11-13 RX ADMIN — IOPAMIDOL 95 ML: 612 INJECTION, SOLUTION INTRAVENOUS at 10:55

## 2017-11-16 ENCOUNTER — OFFICE VISIT (OUTPATIENT)
Dept: ONCOLOGY | Facility: CLINIC | Age: 67
End: 2017-11-16

## 2017-11-16 VITALS
BODY MASS INDEX: 24.14 KG/M2 | WEIGHT: 183 LBS | TEMPERATURE: 97.8 F | DIASTOLIC BLOOD PRESSURE: 82 MMHG | RESPIRATION RATE: 18 BRPM | SYSTOLIC BLOOD PRESSURE: 164 MMHG | HEART RATE: 60 BPM

## 2017-11-16 DIAGNOSIS — G62.0 PERIPHERAL NEUROPATHY DUE TO CHEMOTHERAPY (HCC): ICD-10-CM

## 2017-11-16 DIAGNOSIS — Z85.89 HISTORY OF KNOWN METASTASIS TO LIVER: Primary | ICD-10-CM

## 2017-11-16 DIAGNOSIS — T45.1X5A PERIPHERAL NEUROPATHY DUE TO CHEMOTHERAPY (HCC): ICD-10-CM

## 2017-11-16 DIAGNOSIS — C18.7 MALIGNANT NEOPLASM OF SIGMOID COLON (HCC): ICD-10-CM

## 2017-11-16 PROCEDURE — 99213 OFFICE O/P EST LOW 20 MIN: CPT | Performed by: NURSE PRACTITIONER

## 2017-11-16 NOTE — PROGRESS NOTES
CHIEF COMPLAINT: 1.  Peripheral neuropathy of the hands and feet, worse in the feet                                       2.  Follow-up for colon cancer    Problem List:  Oncology/Hematology History    1. Stage CARLOS, [id number] colon cancer with 2 out of 14 pericolonic lymph nodes  involved and a left lobe liver biopsy positive for metastasis, presenting with  a 25 pound weight loss and diarrhea with some perirectal soreness and had  colonoscopy with Dr. Mcclain in January that found this lesion that was  circumferential high-grade invading into the pericolonic fat, status post  sigmoid colectomy of a poorly differentiated adenocarcinoma.   a) Baseline CEA postop of 0.8 with alkaline phosphatase 111, with normal liver  enzymes and creatinine of 0.8. Baseline white count 9820 with a hemoglobin  13.8, platelets 339,000, and CT with contrast showing a right lobe liver dome  lesion as well as an anastomotic change in the bowel.   b) Began CapeOx 03/15/2016.  c) Added in Avastin to CapeOx 04/05/2016, second cycle. (This was 8 weeks out  from surgery).  d) KRAS mutation is negative.  E.) CAT scan in August 2016 shows resolution of disease in the liver and colon and a stable lung nodule.  Hence Avastin, Xeloda, and oxaliplatin continued.  F) oxaliplatin discontinued October 2016 due to worsening peripheral neuropathy.  G.) CTs of February 2017 showed no evidence of metastasis and stable bibasilar nodular scar.  Persistent neuropathy not worsening.  CEA 1.4.  Continuing Avastin and Xeloda without oxaliplatin.  Having some problems with irritation of his eyes on Xeloda.  2.  Peripheral neuropathy, chemotherapy induced        Malignant neoplasm of sigmoid colon    5/20/2016 Initial Diagnosis     Malignant neoplasm of sigmoid colon       5/2/2017 Imaging     CT chest, abdomen, pelvis IMPRESSION:  1. No disease recurrence.  2. Minimal areas of nodular thickening in the right lower lobe, stable.         8/2/2017 Imaging     CT  chest/abdomen/pelvis IMPRESSION:  Stable examination with no evidence of acute intrathoracic,  intra-abdominal or pelvic abnormality. There is no evidence of  progression of disease. No metastatic disease.          11/13/2017 Imaging     CT chest/abdomen/pelvis IMPRESSION:  Stable examination without evidence of acute intrathoracic  intra-abdominal or intrapelvic abnormality. No evidence of progression  of disease.   CEA 1.3.            HISTORY OF PRESENT ILLNESS:  The patient is a 66 y.o. male, here for follow up on management of Stage IV a colon cancer.  The patient continues to do well and is tolerating therapy with Xeloda and Avastin without any unusual side effects.  He does have persistent neuropathy in his feet, he feels that the neuropathy in his hands may have improved slightly.  He has had no fevers or chills, he has not had any mouth sores.  Denies any diarrhea, hematochezia or melena.  Remains active.      Past Medical History:   Diagnosis Date   • Hypertension    • Liver disease     mets from colon cancer   • Malignant neoplasm of colon      Past Surgical History:   Procedure Laterality Date   • COLON SURGERY      Sigmoid   • LIVER SURGERY     • PORTACATH PLACEMENT         No Known Allergies    Family History and Social History reviewed and changed as necessary      REVIEW OF SYSTEM:   Review of Systems   Constitutional: Negative for appetite change, chills, diaphoresis, fatigue, fever and unexpected weight change.   HENT:   Negative for mouth sores, sore throat and trouble swallowing.    Eyes: Negative for icterus.   Respiratory: Negative for cough, hemoptysis and shortness of breath.    Cardiovascular: Negative for chest pain, leg swelling and palpitations.   Gastrointestinal: Negative for abdominal distention, abdominal pain, blood in stool, constipation, diarrhea, nausea and vomiting.   Endocrine: Negative for hot flashes.   Genitourinary: Negative for bladder incontinence, difficulty urinating,  dysuria, frequency and hematuria.    Musculoskeletal: Negative for gait problem, neck pain and neck stiffness.   Skin: Negative for rash.  Positive for dry skin.  Neurological: Positive for peripheral neuropathy in the hands and feet.  Hematological: Negative for adenopathy. Does not bruise/bleed easily.   Psychiatric/Behavioral: Negative for depression. The patient is not nervous/anxious.    All other systems reviewed and are negative.       PHYSICAL EXAM    Vitals:    11/16/17 0900   BP: 164/82   Pulse: 60   Resp: 18   Temp: 97.8 °F (36.6 °C)   TempSrc: Temporal Artery    Weight: 183 lb (83 kg)     Constitutional: Appears well-developed and well-nourished. No distress.   ECOG: (1) Restricted in physically strenuous activity, ambulatory and able to do work of light nature  HENT:   Head: Normocephalic.   Mouth/Throat: Oropharynx is clear and moist.   Eyes: Conjunctivae are normal. Pupils are equal, round, and reactive to light. No scleral icterus.   Neck: Neck supple. No JVD present. No thyromegaly present.   Cardiovascular: Normal rate, regular rhythm and normal heart sounds.    Pulmonary/Chest: Breath sounds normal. No respiratory distress.   Nodes: No cervical, supraclavicular or axillary nodes palpable on exam.  Abdominal: Soft. Exhibits no distension and no mass. There is no hepatosplenomegaly. There is no tenderness. There is no rebound and no guarding.   Musculoskeletal:Exhibits no edema, tenderness or deformity.   Neurological: Alert and oriented to person, place, and time. Exhibits normal muscle tone.   Skin: Dry.  No ecchymosis, no petechiae and no rash noted. Not diaphoretic. No cyanosis.   Psychiatric: Normal mood and affect.   Vitals reviewed.  Laboratory data reviewed along with CT chest abdomen and pelvis and images thereof as outlined above in the oncology history were reviewed at time of visit.    Recent Results (from the past 168 hour(s))   CEA    Collection Time: 11/13/17 12:00 PM   Result Value  Ref Range    CEA 1.30 0.00 - 2.50 ng/mL   Comprehensive metabolic panel    Collection Time: 11/13/17 12:00 PM   Result Value Ref Range    Glucose 89 70 - 100 mg/dL    BUN 12 9 - 23 mg/dL    Creatinine 0.90 0.60 - 1.30 mg/dL    Sodium 142 132 - 146 mmol/L    Potassium 4.7 3.5 - 5.5 mmol/L    Chloride 107 99 - 109 mmol/L    CO2 30.0 20.0 - 31.0 mmol/L    Calcium 9.3 8.7 - 10.4 mg/dL    Total Protein 6.5 5.7 - 8.2 g/dL    Albumin 4.20 3.20 - 4.80 g/dL    ALT (SGPT) 20 7 - 40 U/L    AST (SGOT) 26 0 - 33 U/L    Alkaline Phosphatase 55 25 - 100 U/L    Total Bilirubin 1.2 0.3 - 1.2 mg/dL    eGFR Non African Amer 85 >60 mL/min/1.73    Globulin 2.3 gm/dL    A/G Ratio 1.8 1.5 - 2.5 g/dL    BUN/Creatinine Ratio 13.3 7.0 - 25.0    Anion Gap 5.0 3.0 - 11.0 mmol/L   Urinalysis - Urine, Clean Catch    Collection Time: 11/13/17 12:00 PM   Result Value Ref Range    Color, UA Yellow Yellow, Straw    Appearance, UA Clear Clear    pH, UA 6.0 5.0 - 8.0    Specific Gravity, UA 1.013 1.001 - 1.030    Glucose, UA Negative Negative    Ketones, UA Negative Negative    Bilirubin, UA Negative Negative    Blood, UA Negative Negative    Protein, UA Negative Negative    Leuk Esterase, UA Negative Negative    Nitrite, UA Negative Negative    Urobilinogen, UA 0.2 E.U./dL 0.2 - 1.0 E.U./dL   CBC Auto Differential    Collection Time: 11/13/17 12:00 PM   Result Value Ref Range    WBC 3.91 3.50 - 10.80 10*3/mm3    RBC 4.24 4.20 - 5.76 10*6/mm3    Hemoglobin 14.6 13.1 - 17.5 g/dL    Hematocrit 44.8 38.9 - 50.9 %    .7 (H) 80.0 - 99.0 fL    MCH 34.4 (H) 27.0 - 31.0 pg    MCHC 32.6 32.0 - 36.0 g/dL    RDW 16.4 (H) 11.3 - 14.5 %    RDW-SD 63.6 (H) 37.0 - 54.0 fl    MPV 10.9 6.0 - 12.0 fL    Platelets 102 (L) 150 - 450 10*3/mm3    Neutrophil % 56.7 41.0 - 71.0 %    Lymphocyte % 30.4 24.0 - 44.0 %    Monocyte % 10.0 0.0 - 12.0 %    Eosinophil % 2.3 0.0 - 3.0 %    Basophil % 0.3 0.0 - 1.0 %    Immature Grans % 0.3 0.0 - 0.6 %    Neutrophils, Absolute  2.22 1.50 - 8.30 10*3/mm3    Lymphocytes, Absolute 1.19 0.60 - 4.80 10*3/mm3    Monocytes, Absolute 0.39 0.00 - 1.00 10*3/mm3    Eosinophils, Absolute 0.09 0.00 - 0.30 10*3/mm3    Basophils, Absolute 0.01 0.00 - 0.20 10*3/mm3    Immature Grans, Absolute 0.01 0.00 - 0.03 10*3/mm3         Assessment/Plan     1.  Metastatic colon cancer  2. Peripheral neuropathy secondary to chemotherapy    Discussion: Current CT scans whose images and reports I have reviewed are negative and show no evidence of progression of disease.  He feels great, he remains quite active and golfs several times a week.  He is tolerating therapy with Xeloda and Avastin, on reduced dose of Xeloda of 2 tablets twice daily 14 out of 21 days he is tolerating much better.  He has been off of oxaliplatin due to peripheral neuropathy since October 2016.  He has had no proteinuria on Avastin, no significant hypertension.  Neuropathy is stable, he states it may be slightly better and his hands but persistent in his feet, he continues on gabapentin.  We will continue on with therapy unchanged.  He will return in 1 month for follow-up at which time he will be seeing Dr. Mercado as Dr. Haines is no longer coming to [place].      Rosa Pradhan, APRN    11/16/2017

## 2017-11-20 ENCOUNTER — INFUSION (OUTPATIENT)
Dept: ONCOLOGY | Facility: HOSPITAL | Age: 67
End: 2017-11-20

## 2017-11-20 VITALS
HEART RATE: 67 BPM | SYSTOLIC BLOOD PRESSURE: 140 MMHG | BODY MASS INDEX: 23.88 KG/M2 | TEMPERATURE: 98.1 F | WEIGHT: 181 LBS | DIASTOLIC BLOOD PRESSURE: 79 MMHG | RESPIRATION RATE: 16 BRPM

## 2017-11-20 DIAGNOSIS — C18.7 MALIGNANT NEOPLASM OF SIGMOID COLON (HCC): Primary | ICD-10-CM

## 2017-11-20 PROCEDURE — 96375 TX/PRO/DX INJ NEW DRUG ADDON: CPT

## 2017-11-20 PROCEDURE — 96413 CHEMO IV INFUSION 1 HR: CPT

## 2017-11-20 PROCEDURE — 25010000002 BEVACIZUMAB PER 10 MG: Performed by: INTERNAL MEDICINE

## 2017-11-20 PROCEDURE — 25010000002 HEPARIN FLUSH (PORCINE) 100 UNIT/ML SOLUTION: Performed by: INTERNAL MEDICINE

## 2017-11-20 PROCEDURE — 25010000002 DEXAMETHASONE PER 1 MG: Performed by: INTERNAL MEDICINE

## 2017-11-20 RX ORDER — SODIUM CHLORIDE 0.9 % (FLUSH) 0.9 %
10 SYRINGE (ML) INJECTION AS NEEDED
Status: DISCONTINUED | OUTPATIENT
Start: 2017-11-20 | End: 2017-11-20 | Stop reason: HOSPADM

## 2017-11-20 RX ORDER — SODIUM CHLORIDE 0.9 % (FLUSH) 0.9 %
10 SYRINGE (ML) INJECTION AS NEEDED
Status: CANCELLED | OUTPATIENT
Start: 2017-11-20

## 2017-11-20 RX ORDER — DEXTROSE MONOHYDRATE 50 MG/ML
250 INJECTION, SOLUTION INTRAVENOUS ONCE
Status: DISCONTINUED | OUTPATIENT
Start: 2017-11-20 | End: 2017-11-20 | Stop reason: HOSPADM

## 2017-11-20 RX ORDER — SODIUM CHLORIDE 9 MG/ML
250 INJECTION, SOLUTION INTRAVENOUS ONCE
Status: DISCONTINUED | OUTPATIENT
Start: 2017-11-20 | End: 2017-11-20 | Stop reason: HOSPADM

## 2017-11-20 RX ADMIN — SODIUM CHLORIDE, PRESERVATIVE FREE 500 UNITS: 5 INJECTION INTRAVENOUS at 10:48

## 2017-11-20 RX ADMIN — BEVACIZUMAB 590 MG: 400 INJECTION, SOLUTION INTRAVENOUS at 10:14

## 2017-11-20 RX ADMIN — DEXAMETHASONE SODIUM PHOSPHATE 12 MG: 4 INJECTION, SOLUTION INTRA-ARTICULAR; INTRALESIONAL; INTRAMUSCULAR; INTRAVENOUS; SOFT TISSUE at 10:00

## 2017-11-20 RX ADMIN — Medication 10 ML: at 10:48

## 2017-12-08 DIAGNOSIS — C18.7 MALIGNANT NEOPLASM OF SIGMOID COLON (HCC): ICD-10-CM

## 2017-12-08 RX ORDER — CAPECITABINE 500 MG/1
TABLET, FILM COATED ORAL
Qty: 56 TABLET | Refills: 5 | Status: SHIPPED | OUTPATIENT
Start: 2017-12-08 | End: 2018-05-23 | Stop reason: SDUPTHER

## 2017-12-08 RX ORDER — DEXTROSE MONOHYDRATE 50 MG/ML
250 INJECTION, SOLUTION INTRAVENOUS ONCE
Status: CANCELLED | OUTPATIENT
Start: 2017-12-12 | End: 2017-12-12

## 2017-12-08 RX ORDER — SODIUM CHLORIDE 9 MG/ML
250 INJECTION, SOLUTION INTRAVENOUS ONCE
Status: CANCELLED | OUTPATIENT
Start: 2017-12-12

## 2017-12-12 ENCOUNTER — INFUSION (OUTPATIENT)
Dept: ONCOLOGY | Facility: HOSPITAL | Age: 67
End: 2017-12-12

## 2017-12-12 VITALS
WEIGHT: 178 LBS | HEART RATE: 55 BPM | TEMPERATURE: 98.5 F | RESPIRATION RATE: 18 BRPM | BODY MASS INDEX: 23.48 KG/M2 | SYSTOLIC BLOOD PRESSURE: 132 MMHG | DIASTOLIC BLOOD PRESSURE: 78 MMHG

## 2017-12-12 DIAGNOSIS — C18.7 MALIGNANT NEOPLASM OF SIGMOID COLON (HCC): Primary | ICD-10-CM

## 2017-12-12 LAB
ALBUMIN SERPL-MCNC: 4.1 G/DL (ref 3.5–5)
ALBUMIN/GLOB SERPL: 1.6 G/DL (ref 1–2)
ALP SERPL-CCNC: 53 U/L (ref 38–126)
ALT SERPL W P-5'-P-CCNC: 36 U/L (ref 13–69)
ANION GAP SERPL CALCULATED.3IONS-SCNC: 12.2 MMOL/L
AST SERPL-CCNC: 26 U/L (ref 15–46)
BASOPHILS # BLD AUTO: 0 10*3/MM3 (ref 0–0.2)
BASOPHILS NFR BLD AUTO: 0 % (ref 0–2.5)
BILIRUB SERPL-MCNC: 1 MG/DL (ref 0.2–1.3)
BILIRUB UR QL STRIP: NEGATIVE
BUN BLD-MCNC: 14 MG/DL (ref 7–20)
BUN/CREAT SERPL: 20 (ref 6.3–21.9)
CALCIUM SPEC-SCNC: 9.1 MG/DL (ref 8.4–10.2)
CHLORIDE SERPL-SCNC: 105 MMOL/L (ref 98–107)
CLARITY UR: CLEAR
CO2 SERPL-SCNC: 27 MMOL/L (ref 26–30)
COLOR UR: YELLOW
CREAT BLD-MCNC: 0.7 MG/DL (ref 0.6–1.3)
DEPRECATED RDW RBC AUTO: 62.6 FL (ref 37–54)
EOSINOPHIL # BLD AUTO: 0.06 10*3/MM3 (ref 0–0.7)
EOSINOPHIL NFR BLD AUTO: 1.6 % (ref 0–7)
ERYTHROCYTE [DISTWIDTH] IN BLOOD BY AUTOMATED COUNT: 16.1 % (ref 11.5–14.5)
GFR SERPL CREATININE-BSD FRML MDRD: 113 ML/MIN/1.73
GLOBULIN UR ELPH-MCNC: 2.6 GM/DL
GLUCOSE BLD-MCNC: 91 MG/DL (ref 74–98)
GLUCOSE UR STRIP-MCNC: NEGATIVE MG/DL
HCT VFR BLD AUTO: 42.8 % (ref 42–52)
HGB BLD-MCNC: 14.3 G/DL (ref 14–18)
HGB UR QL STRIP.AUTO: NEGATIVE
IMM GRANULOCYTES # BLD: 0.01 10*3/MM3 (ref 0–0.06)
IMM GRANULOCYTES NFR BLD: 0.3 % (ref 0–0.6)
KETONES UR QL STRIP: NEGATIVE
LEUKOCYTE ESTERASE UR QL STRIP.AUTO: NEGATIVE
LYMPHOCYTES # BLD AUTO: 0.98 10*3/MM3 (ref 0.6–3.4)
LYMPHOCYTES NFR BLD AUTO: 26.2 % (ref 10–50)
MCH RBC QN AUTO: 34.8 PG (ref 27–31)
MCHC RBC AUTO-ENTMCNC: 33.4 G/DL (ref 30–37)
MCV RBC AUTO: 104.1 FL (ref 80–94)
MONOCYTES # BLD AUTO: 0.41 10*3/MM3 (ref 0–0.9)
MONOCYTES NFR BLD AUTO: 11 % (ref 0–12)
NEUTROPHILS # BLD AUTO: 2.28 10*3/MM3 (ref 2–6.9)
NEUTROPHILS NFR BLD AUTO: 60.9 % (ref 37–80)
NITRITE UR QL STRIP: NEGATIVE
NRBC BLD MANUAL-RTO: 0 /100 WBC (ref 0–0)
PH UR STRIP.AUTO: 5.5 [PH] (ref 5–8)
PLATELET # BLD AUTO: 109 10*3/MM3 (ref 130–400)
PMV BLD AUTO: 10.2 FL (ref 6–12)
POTASSIUM BLD-SCNC: 4.2 MMOL/L (ref 3.5–5.1)
PROT SERPL-MCNC: 6.7 G/DL (ref 6.3–8.2)
PROT UR QL STRIP: NEGATIVE
RBC # BLD AUTO: 4.11 10*6/MM3 (ref 4.7–6.1)
SODIUM BLD-SCNC: 140 MMOL/L (ref 137–145)
SP GR UR STRIP: 1.01 (ref 1–1.03)
UROBILINOGEN UR QL STRIP: NORMAL
WBC NRBC COR # BLD: 3.74 10*3/MM3 (ref 4.8–10.8)

## 2017-12-12 PROCEDURE — 96375 TX/PRO/DX INJ NEW DRUG ADDON: CPT

## 2017-12-12 PROCEDURE — 25010000002 DEXAMETHASONE PER 1 MG: Performed by: NURSE PRACTITIONER

## 2017-12-12 PROCEDURE — 80053 COMPREHEN METABOLIC PANEL: CPT

## 2017-12-12 PROCEDURE — 85025 COMPLETE CBC W/AUTO DIFF WBC: CPT

## 2017-12-12 PROCEDURE — 25010000002 HEPARIN FLUSH (PORCINE) 100 UNIT/ML SOLUTION: Performed by: INTERNAL MEDICINE

## 2017-12-12 PROCEDURE — 81003 URINALYSIS AUTO W/O SCOPE: CPT

## 2017-12-12 PROCEDURE — 25010000002 BEVACIZUMAB PER 10 MG: Performed by: NURSE PRACTITIONER

## 2017-12-12 PROCEDURE — 96413 CHEMO IV INFUSION 1 HR: CPT

## 2017-12-12 PROCEDURE — 36415 COLL VENOUS BLD VENIPUNCTURE: CPT

## 2017-12-12 RX ORDER — SODIUM CHLORIDE 0.9 % (FLUSH) 0.9 %
10 SYRINGE (ML) INJECTION AS NEEDED
Status: DISCONTINUED | OUTPATIENT
Start: 2017-12-12 | End: 2017-12-12 | Stop reason: HOSPADM

## 2017-12-12 RX ORDER — SODIUM CHLORIDE 0.9 % (FLUSH) 0.9 %
10 SYRINGE (ML) INJECTION AS NEEDED
Status: CANCELLED | OUTPATIENT
Start: 2017-12-12

## 2017-12-12 RX ORDER — SODIUM CHLORIDE 9 MG/ML
250 INJECTION, SOLUTION INTRAVENOUS ONCE
Status: DISCONTINUED | OUTPATIENT
Start: 2017-12-12 | End: 2017-12-12 | Stop reason: HOSPADM

## 2017-12-12 RX ADMIN — DEXAMETHASONE SODIUM PHOSPHATE 12 MG: 4 INJECTION, SOLUTION INTRA-ARTICULAR; INTRALESIONAL; INTRAMUSCULAR; INTRAVENOUS; SOFT TISSUE at 10:04

## 2017-12-12 RX ADMIN — SODIUM CHLORIDE, PRESERVATIVE FREE 500 UNITS: 5 INJECTION INTRAVENOUS at 10:53

## 2017-12-12 RX ADMIN — Medication 10 ML: at 10:52

## 2017-12-12 RX ADMIN — BEVACIZUMAB 590 MG: 400 INJECTION, SOLUTION INTRAVENOUS at 10:20

## 2017-12-21 ENCOUNTER — OFFICE VISIT (OUTPATIENT)
Dept: ONCOLOGY | Facility: CLINIC | Age: 67
End: 2017-12-21

## 2017-12-21 VITALS
HEART RATE: 63 BPM | SYSTOLIC BLOOD PRESSURE: 127 MMHG | DIASTOLIC BLOOD PRESSURE: 78 MMHG | BODY MASS INDEX: 23.22 KG/M2 | RESPIRATION RATE: 20 BRPM | TEMPERATURE: 97.9 F | WEIGHT: 176 LBS

## 2017-12-21 DIAGNOSIS — C18.7 MALIGNANT NEOPLASM OF SIGMOID COLON (HCC): ICD-10-CM

## 2017-12-21 PROCEDURE — 99214 OFFICE O/P EST MOD 30 MIN: CPT | Performed by: INTERNAL MEDICINE

## 2017-12-21 RX ORDER — DEXTROSE MONOHYDRATE 50 MG/ML
250 INJECTION, SOLUTION INTRAVENOUS ONCE
Status: CANCELLED | OUTPATIENT
Start: 2018-01-02 | End: 2018-01-02

## 2017-12-21 RX ORDER — SODIUM CHLORIDE 9 MG/ML
250 INJECTION, SOLUTION INTRAVENOUS ONCE
Status: CANCELLED | OUTPATIENT
Start: 2018-01-02

## 2017-12-21 RX ORDER — SODIUM CHLORIDE 9 MG/ML
250 INJECTION, SOLUTION INTRAVENOUS ONCE
Status: CANCELLED | OUTPATIENT
Start: 2018-01-23

## 2017-12-21 RX ORDER — DEXTROSE MONOHYDRATE 50 MG/ML
250 INJECTION, SOLUTION INTRAVENOUS ONCE
Status: CANCELLED | OUTPATIENT
Start: 2018-01-23 | End: 2018-01-23

## 2017-12-21 NOTE — PROGRESS NOTES
CHIEF COMPLAINT: 1.  Peripheral neuropathy of the hands and feet, worse in the feet                                       2.  Follow-up for colon cancer    Problem List:  Oncology/Hematology History    1. Stage CARLOS, [id number] colon cancer with 2 out of 14 pericolonic lymph nodes  involved and a left lobe liver biopsy positive for metastasis, presenting with  a 25 pound weight loss and diarrhea with some perirectal soreness and had  colonoscopy with Dr. Mcclain in January that found this lesion that was  circumferential high-grade invading into the pericolonic fat, status post  sigmoid colectomy of a poorly differentiated adenocarcinoma.   a) Baseline CEA postop of 0.8 with alkaline phosphatase 111, with normal liver  enzymes and creatinine of 0.8. Baseline white count 9820 with a hemoglobin  13.8, platelets 339,000, and CT with contrast showing a right lobe liver dome  lesion as well as an anastomotic change in the bowel.   b) Began CapeOx 03/15/2016.  c) Added in Avastin to CapeOx 04/05/2016, second cycle. (This was 8 weeks out  from surgery).  d) KRAS mutation is negative.  E.) CAT scan in August 2016 shows resolution of disease in the liver and colon and a stable lung nodule.  Hence Avastin, Xeloda, and oxaliplatin continued.  F) oxaliplatin discontinued October 2016 due to worsening peripheral neuropathy.  G.) CTs of February 2017 showed no evidence of metastasis and stable bibasilar nodular scar.  Persistent neuropathy not worsening.  CEA 1.4.  Continuing Avastin and Xeloda without oxaliplatin.  Having some problems with irritation of his eyes on Xeloda.  2.  Peripheral neuropathy, chemotherapy induced        Malignant neoplasm of sigmoid colon    5/20/2016 Initial Diagnosis     Malignant neoplasm of sigmoid colon       5/2/2017 Imaging     CT chest, abdomen, pelvis IMPRESSION:  1. No disease recurrence.  2. Minimal areas of nodular thickening in the right lower lobe, stable.         8/2/2017 Imaging     CT  chest/abdomen/pelvis IMPRESSION:  Stable examination with no evidence of acute intrathoracic,  intra-abdominal or pelvic abnormality. There is no evidence of  progression of disease. No metastatic disease.          11/13/2017 Imaging     CT chest/abdomen/pelvis IMPRESSION:  Stable examination without evidence of acute intrathoracic  intra-abdominal or intrapelvic abnormality. No evidence of progression  of disease.   CEA 1.3.            HISTORY OF PRESENT ILLNESS:  The patient is a 67 y.o. male, here for follow up on management of Stage IV a colon cancer.  The patient continues to do well and is tolerating therapy with Xeloda and Avastin without any unusual side effects. His functional status is pretty good.  Clinically no issues with occasional abdominal discomfort.  No constipation or diarrhea.  No hand-foot syndrome.    Past Medical History:   Diagnosis Date   • Hypertension    • Liver disease     mets from colon cancer   • Malignant neoplasm of colon      Past Surgical History:   Procedure Laterality Date   • COLON SURGERY      Sigmoid   • LIVER SURGERY     • PORTACATH PLACEMENT         No Known Allergies    Family History and Social History reviewed and changed as necessary      REVIEW OF SYSTEM:   Review of Systems   Constitutional: Negative for appetite change, chills, diaphoresis, fatigue, fever and unexpected weight change.   HENT:   Negative for mouth sores, sore throat and trouble swallowing.    Eyes: Negative for icterus.   Respiratory: Negative for cough, hemoptysis and shortness of breath.    Cardiovascular: Negative for chest pain, leg swelling and palpitations.   Gastrointestinal: Negative for abdominal distention, abdominal pain, blood in stool, constipation, diarrhea, nausea and vomiting.   Endocrine: Negative for hot flashes.   Genitourinary: Negative for bladder incontinence, difficulty urinating, dysuria, frequency and hematuria.    Musculoskeletal: Negative for gait problem, neck pain and neck  stiffness.   Skin: Negative for rash.  Positive for dry skin.  Neurological: Positive for peripheral neuropathy in the hands and feet.  Hematological: Negative for adenopathy. Does not bruise/bleed easily.   Psychiatric/Behavioral: Negative for depression. The patient is not nervous/anxious.    All other systems reviewed and are negative.       PHYSICAL EXAM    Vitals:    12/21/17 0919   BP: 127/78   Pulse: 63   Resp: 20   Temp: 97.9 °F (36.6 °C)   TempSrc: Temporal Artery    Weight: 79.8 kg (176 lb)     Constitutional: Appears well-developed and well-nourished. No distress.   ECOG: (1) Restricted in physically strenuous activity, ambulatory and able to do work of light nature  HENT:   Head: Normocephalic.   Mouth/Throat: Oropharynx is clear and moist.   Eyes: Conjunctivae are normal. Pupils are equal, round, and reactive to light. No scleral icterus.   Neck: Neck supple. No JVD present. No thyromegaly present.   Cardiovascular: Normal rate, regular rhythm and normal heart sounds.    Pulmonary/Chest: Breath sounds normal. No respiratory distress.   Nodes: No cervical, supraclavicular or axillary nodes palpable on exam.  Abdominal: Soft. Exhibits no distension and no mass. There is no hepatosplenomegaly. There is no tenderness. There is no rebound and no guarding.   Musculoskeletal:Exhibits no edema, tenderness or deformity.   Neurological: Alert and oriented to person, place, and time. Exhibits normal muscle tone.   Skin: Dry.  No ecchymosis, no petechiae and no rash noted. Not diaphoretic. No cyanosis.   Psychiatric: Normal mood and affect.   Vitals reviewed.  Laboratory data reviewed along with CT chest abdomen and pelvis and images thereof as outlined above in the oncology history were reviewed at time of visit.    Lab Results   Component Value Date    WBC 3.74 (L) 12/12/2017    HGB 14.3 12/12/2017    HCT 42.8 12/12/2017    .1 (H) 12/12/2017     (L) 12/12/2017       Lab Results   Component Value  Date    GLUCOSE 91 12/12/2017    BUN 14 12/12/2017    CREATININE 0.70 12/12/2017    EGFRIFNONA 113 12/12/2017    BCR 20.0 12/12/2017    K 4.2 12/12/2017    CO2 27.0 12/12/2017    CALCIUM 9.1 12/12/2017    ALBUMIN 4.10 12/12/2017    LABIL2 1.6 12/12/2017    AST 26 12/12/2017    ALT 36 12/12/2017       Lab Results   Component Value Date    CEA 1.30 11/13/2017    CEA 3.05 10/10/2017    CEA 1.82 08/29/2017    CEA 3.26 07/11/2017   ]    Assessment/Plan     1. Metastatic colon cancer with Solitary Liver metastasis initially diagnosed 2-  2. Peripheral neuropathy secondary to chemotherapy    Discussion: At this time he has no measurable disease.  Continue Xeloda and Avastin only.  No other changes for now.  I like to see him back in my clinic in 6 weeks' make sure he is tolerating his treatment.  Plan to rescan him in about 3 months.    Santi Abad MD    11/16/2017

## 2018-01-02 ENCOUNTER — INFUSION (OUTPATIENT)
Dept: ONCOLOGY | Facility: HOSPITAL | Age: 68
End: 2018-01-02

## 2018-01-02 VITALS
SYSTOLIC BLOOD PRESSURE: 150 MMHG | TEMPERATURE: 98.6 F | RESPIRATION RATE: 16 BRPM | HEART RATE: 62 BPM | DIASTOLIC BLOOD PRESSURE: 88 MMHG

## 2018-01-02 DIAGNOSIS — C18.7 MALIGNANT NEOPLASM OF SIGMOID COLON (HCC): Primary | ICD-10-CM

## 2018-01-02 LAB
ALBUMIN SERPL-MCNC: 4.1 G/DL (ref 3.5–5)
ALBUMIN/GLOB SERPL: 1.6 G/DL (ref 1–2)
ALP SERPL-CCNC: 52 U/L (ref 38–126)
ALT SERPL W P-5'-P-CCNC: 37 U/L (ref 13–69)
ANION GAP SERPL CALCULATED.3IONS-SCNC: 12 MMOL/L
AST SERPL-CCNC: 28 U/L (ref 15–46)
BASOPHILS # BLD AUTO: 0.01 10*3/MM3 (ref 0–0.2)
BASOPHILS NFR BLD AUTO: 0.3 % (ref 0–2.5)
BILIRUB SERPL-MCNC: 2 MG/DL (ref 0.2–1.3)
BILIRUB UR QL STRIP: NEGATIVE
BUN BLD-MCNC: 15 MG/DL (ref 7–20)
BUN/CREAT SERPL: 18.8 (ref 6.3–21.9)
CALCIUM SPEC-SCNC: 9.3 MG/DL (ref 8.4–10.2)
CEA SERPL-MCNC: 2.38 NG/ML
CHLORIDE SERPL-SCNC: 105 MMOL/L (ref 98–107)
CLARITY UR: CLEAR
CO2 SERPL-SCNC: 27 MMOL/L (ref 26–30)
COLOR UR: YELLOW
CREAT BLD-MCNC: 0.8 MG/DL (ref 0.6–1.3)
DEPRECATED RDW RBC AUTO: 59.4 FL (ref 37–54)
EOSINOPHIL # BLD AUTO: 0.05 10*3/MM3 (ref 0–0.7)
EOSINOPHIL NFR BLD AUTO: 1.3 % (ref 0–7)
ERYTHROCYTE [DISTWIDTH] IN BLOOD BY AUTOMATED COUNT: 15.4 % (ref 11.5–14.5)
GFR SERPL CREATININE-BSD FRML MDRD: 97 ML/MIN/1.73
GLOBULIN UR ELPH-MCNC: 2.6 GM/DL
GLUCOSE BLD-MCNC: 94 MG/DL (ref 74–98)
GLUCOSE UR STRIP-MCNC: NEGATIVE MG/DL
HCT VFR BLD AUTO: 41 % (ref 42–52)
HGB BLD-MCNC: 13.8 G/DL (ref 14–18)
HGB UR QL STRIP.AUTO: NEGATIVE
IMM GRANULOCYTES # BLD: 0.01 10*3/MM3 (ref 0–0.06)
IMM GRANULOCYTES NFR BLD: 0.3 % (ref 0–0.6)
KETONES UR QL STRIP: NEGATIVE
LEUKOCYTE ESTERASE UR QL STRIP.AUTO: NEGATIVE
LYMPHOCYTES # BLD AUTO: 0.98 10*3/MM3 (ref 0.6–3.4)
LYMPHOCYTES NFR BLD AUTO: 25.7 % (ref 10–50)
MCH RBC QN AUTO: 34.9 PG (ref 27–31)
MCHC RBC AUTO-ENTMCNC: 33.7 G/DL (ref 30–37)
MCV RBC AUTO: 103.8 FL (ref 80–94)
MONOCYTES # BLD AUTO: 0.43 10*3/MM3 (ref 0–0.9)
MONOCYTES NFR BLD AUTO: 11.3 % (ref 0–12)
NEUTROPHILS # BLD AUTO: 2.33 10*3/MM3 (ref 2–6.9)
NEUTROPHILS NFR BLD AUTO: 61.1 % (ref 37–80)
NITRITE UR QL STRIP: NEGATIVE
NRBC BLD MANUAL-RTO: 0 /100 WBC (ref 0–0)
PH UR STRIP.AUTO: 5.5 [PH] (ref 5–8)
PLATELET # BLD AUTO: 93 10*3/MM3 (ref 130–400)
PMV BLD AUTO: 10.5 FL (ref 6–12)
POTASSIUM BLD-SCNC: 4 MMOL/L (ref 3.5–5.1)
PROT SERPL-MCNC: 6.7 G/DL (ref 6.3–8.2)
PROT UR QL STRIP: NEGATIVE
RBC # BLD AUTO: 3.95 10*6/MM3 (ref 4.7–6.1)
SODIUM BLD-SCNC: 140 MMOL/L (ref 137–145)
SP GR UR STRIP: 1.01 (ref 1–1.03)
UROBILINOGEN UR QL STRIP: NORMAL
WBC NRBC COR # BLD: 3.81 10*3/MM3 (ref 4.8–10.8)

## 2018-01-02 PROCEDURE — 85025 COMPLETE CBC W/AUTO DIFF WBC: CPT

## 2018-01-02 PROCEDURE — 81003 URINALYSIS AUTO W/O SCOPE: CPT

## 2018-01-02 PROCEDURE — 36415 COLL VENOUS BLD VENIPUNCTURE: CPT

## 2018-01-02 PROCEDURE — 25010000002 HEPARIN FLUSH (PORCINE) 100 UNIT/ML SOLUTION: Performed by: INTERNAL MEDICINE

## 2018-01-02 PROCEDURE — 96413 CHEMO IV INFUSION 1 HR: CPT

## 2018-01-02 PROCEDURE — 82378 CARCINOEMBRYONIC ANTIGEN: CPT

## 2018-01-02 PROCEDURE — 25010000002 BEVACIZUMAB PER 10 MG: Performed by: INTERNAL MEDICINE

## 2018-01-02 PROCEDURE — 80053 COMPREHEN METABOLIC PANEL: CPT

## 2018-01-02 RX ORDER — DEXTROSE MONOHYDRATE 50 MG/ML
250 INJECTION, SOLUTION INTRAVENOUS ONCE
Status: DISCONTINUED | OUTPATIENT
Start: 2018-01-02 | End: 2018-01-02 | Stop reason: HOSPADM

## 2018-01-02 RX ORDER — SODIUM CHLORIDE 0.9 % (FLUSH) 0.9 %
10 SYRINGE (ML) INJECTION AS NEEDED
Status: DISCONTINUED | OUTPATIENT
Start: 2018-01-02 | End: 2018-01-02 | Stop reason: HOSPADM

## 2018-01-02 RX ORDER — SODIUM CHLORIDE 0.9 % (FLUSH) 0.9 %
10 SYRINGE (ML) INJECTION AS NEEDED
Status: CANCELLED | OUTPATIENT
Start: 2018-01-02

## 2018-01-02 RX ORDER — SODIUM CHLORIDE 9 MG/ML
250 INJECTION, SOLUTION INTRAVENOUS ONCE
Status: DISCONTINUED | OUTPATIENT
Start: 2018-01-02 | End: 2018-01-02 | Stop reason: HOSPADM

## 2018-01-02 RX ADMIN — Medication 10 ML: at 10:44

## 2018-01-02 RX ADMIN — BEVACIZUMAB 590 MG: 400 INJECTION, SOLUTION INTRAVENOUS at 10:05

## 2018-01-02 RX ADMIN — SODIUM CHLORIDE, PRESERVATIVE FREE 500 UNITS: 5 INJECTION INTRAVENOUS at 10:44

## 2018-01-05 ENCOUNTER — TELEPHONE (OUTPATIENT)
Dept: ONCOLOGY | Facility: CLINIC | Age: 68
End: 2018-01-05

## 2018-01-05 DIAGNOSIS — C18.7 MALIGNANT NEOPLASM OF SIGMOID COLON (HCC): Primary | ICD-10-CM

## 2018-01-05 RX ORDER — LEVOFLOXACIN 500 MG/1
500 TABLET, FILM COATED ORAL DAILY
Qty: 5 TABLET | Refills: 0 | Status: SHIPPED | OUTPATIENT
Start: 2018-01-05 | End: 2019-07-08

## 2018-01-05 RX ORDER — OSELTAMIVIR PHOSPHATE 75 MG/1
75 CAPSULE ORAL 2 TIMES DAILY
Qty: 10 CAPSULE | Refills: 0 | Status: SHIPPED | OUTPATIENT
Start: 2018-01-05 | End: 2018-02-15 | Stop reason: HOSPADM

## 2018-01-05 NOTE — TELEPHONE ENCOUNTER
Returned patient call, patient reports fever yesterday, very weak today, head tight, coughing.  Dr Mercado ordered tamiflu and levaquin.  Educated patient on medication patient verbalized understanding.

## 2018-01-05 NOTE — TELEPHONE ENCOUNTER
----- Message from Lisha Jackson MA sent at 1/5/2018  9:30 AM EST -----  Regarding: APRIL- dizziness,fatigue  Contact: [phone]  Pt c.o fatigue and dizziness that started yesterday. Pt states he did not get out of bed all day yesterday. He had a lowgrade fever yesterday but denies a fever today. He has also had a cough, but denies any other cold symptoms. Pt also c.o neuropathy in B hands and B feet.     Please advise.

## 2018-01-25 ENCOUNTER — INFUSION (OUTPATIENT)
Dept: ONCOLOGY | Facility: HOSPITAL | Age: 68
End: 2018-01-25

## 2018-01-25 VITALS
RESPIRATION RATE: 16 BRPM | HEART RATE: 61 BPM | TEMPERATURE: 98.2 F | WEIGHT: 176 LBS | SYSTOLIC BLOOD PRESSURE: 142 MMHG | DIASTOLIC BLOOD PRESSURE: 85 MMHG | BODY MASS INDEX: 23.22 KG/M2

## 2018-01-25 DIAGNOSIS — C18.7 MALIGNANT NEOPLASM OF SIGMOID COLON (HCC): Primary | ICD-10-CM

## 2018-01-25 LAB
ALBUMIN SERPL-MCNC: 4.1 G/DL (ref 3.5–5)
ALBUMIN/GLOB SERPL: 1.5 G/DL (ref 1–2)
ALP SERPL-CCNC: 52 U/L (ref 38–126)
ALT SERPL W P-5'-P-CCNC: 34 U/L (ref 13–69)
ANION GAP SERPL CALCULATED.3IONS-SCNC: 10.2 MMOL/L
AST SERPL-CCNC: 28 U/L (ref 15–46)
BASOPHILS # BLD AUTO: 0.01 10*3/MM3 (ref 0–0.2)
BASOPHILS NFR BLD AUTO: 0.2 % (ref 0–2.5)
BILIRUB SERPL-MCNC: 1.6 MG/DL (ref 0.2–1.3)
BILIRUB UR QL STRIP: NEGATIVE
BUN BLD-MCNC: 14 MG/DL (ref 7–20)
BUN/CREAT SERPL: 17.5 (ref 6.3–21.9)
CALCIUM SPEC-SCNC: 9.3 MG/DL (ref 8.4–10.2)
CEA SERPL-MCNC: 1.99 NG/ML
CHLORIDE SERPL-SCNC: 106 MMOL/L (ref 98–107)
CLARITY UR: CLEAR
CO2 SERPL-SCNC: 28 MMOL/L (ref 26–30)
COLOR UR: ABNORMAL
CREAT BLD-MCNC: 0.8 MG/DL (ref 0.6–1.3)
DEPRECATED RDW RBC AUTO: 57.3 FL (ref 37–54)
EOSINOPHIL # BLD AUTO: 0.04 10*3/MM3 (ref 0–0.7)
EOSINOPHIL NFR BLD AUTO: 0.9 % (ref 0–7)
ERYTHROCYTE [DISTWIDTH] IN BLOOD BY AUTOMATED COUNT: 14.8 % (ref 11.5–14.5)
GFR SERPL CREATININE-BSD FRML MDRD: 97 ML/MIN/1.73
GLOBULIN UR ELPH-MCNC: 2.8 GM/DL
GLUCOSE BLD-MCNC: 98 MG/DL (ref 74–98)
GLUCOSE UR STRIP-MCNC: NEGATIVE MG/DL
HCT VFR BLD AUTO: 41.3 % (ref 42–52)
HGB BLD-MCNC: 13.9 G/DL (ref 14–18)
HGB UR QL STRIP.AUTO: NEGATIVE
IMM GRANULOCYTES # BLD: 0.02 10*3/MM3 (ref 0–0.06)
IMM GRANULOCYTES NFR BLD: 0.5 % (ref 0–0.6)
KETONES UR QL STRIP: NEGATIVE
LEUKOCYTE ESTERASE UR QL STRIP.AUTO: NEGATIVE
LYMPHOCYTES # BLD AUTO: 1.01 10*3/MM3 (ref 0.6–3.4)
LYMPHOCYTES NFR BLD AUTO: 23.4 % (ref 10–50)
MCH RBC QN AUTO: 35.1 PG (ref 27–31)
MCHC RBC AUTO-ENTMCNC: 33.7 G/DL (ref 30–37)
MCV RBC AUTO: 104.3 FL (ref 80–94)
MONOCYTES # BLD AUTO: 0.43 10*3/MM3 (ref 0–0.9)
MONOCYTES NFR BLD AUTO: 10 % (ref 0–12)
NEUTROPHILS # BLD AUTO: 2.81 10*3/MM3 (ref 2–6.9)
NEUTROPHILS NFR BLD AUTO: 65 % (ref 37–80)
NITRITE UR QL STRIP: NEGATIVE
NRBC BLD MANUAL-RTO: 0 /100 WBC (ref 0–0)
PH UR STRIP.AUTO: 6 [PH] (ref 5–8)
PLATELET # BLD AUTO: 99 10*3/MM3 (ref 130–400)
PMV BLD AUTO: 10.4 FL (ref 6–12)
POTASSIUM BLD-SCNC: 4.2 MMOL/L (ref 3.5–5.1)
PROT SERPL-MCNC: 6.9 G/DL (ref 6.3–8.2)
PROT UR QL STRIP: NEGATIVE
RBC # BLD AUTO: 3.96 10*6/MM3 (ref 4.7–6.1)
SODIUM BLD-SCNC: 140 MMOL/L (ref 137–145)
SP GR UR STRIP: 1.02 (ref 1–1.03)
UROBILINOGEN UR QL STRIP: ABNORMAL
WBC NRBC COR # BLD: 4.32 10*3/MM3 (ref 4.8–10.8)

## 2018-01-25 PROCEDURE — 25010000002 HEPARIN FLUSH (PORCINE) 100 UNIT/ML SOLUTION: Performed by: INTERNAL MEDICINE

## 2018-01-25 PROCEDURE — 82378 CARCINOEMBRYONIC ANTIGEN: CPT

## 2018-01-25 PROCEDURE — 81003 URINALYSIS AUTO W/O SCOPE: CPT

## 2018-01-25 PROCEDURE — 25010000002 BEVACIZUMAB PER 10 MG: Performed by: INTERNAL MEDICINE

## 2018-01-25 PROCEDURE — 85025 COMPLETE CBC W/AUTO DIFF WBC: CPT

## 2018-01-25 PROCEDURE — 36415 COLL VENOUS BLD VENIPUNCTURE: CPT

## 2018-01-25 PROCEDURE — 96413 CHEMO IV INFUSION 1 HR: CPT

## 2018-01-25 PROCEDURE — 80053 COMPREHEN METABOLIC PANEL: CPT

## 2018-01-25 RX ORDER — SODIUM CHLORIDE 0.9 % (FLUSH) 0.9 %
10 SYRINGE (ML) INJECTION AS NEEDED
Status: CANCELLED | OUTPATIENT
Start: 2018-01-25

## 2018-01-25 RX ORDER — DEXTROSE MONOHYDRATE 50 MG/ML
250 INJECTION, SOLUTION INTRAVENOUS ONCE
Status: DISCONTINUED | OUTPATIENT
Start: 2018-01-25 | End: 2018-01-25 | Stop reason: HOSPADM

## 2018-01-25 RX ORDER — SODIUM CHLORIDE 0.9 % (FLUSH) 0.9 %
10 SYRINGE (ML) INJECTION AS NEEDED
Status: DISCONTINUED | OUTPATIENT
Start: 2018-01-25 | End: 2018-01-25 | Stop reason: HOSPADM

## 2018-01-25 RX ORDER — SODIUM CHLORIDE 9 MG/ML
250 INJECTION, SOLUTION INTRAVENOUS ONCE
Status: DISCONTINUED | OUTPATIENT
Start: 2018-01-25 | End: 2018-01-25 | Stop reason: HOSPADM

## 2018-01-25 RX ADMIN — BEVACIZUMAB 590 MG: 400 INJECTION, SOLUTION INTRAVENOUS at 10:03

## 2018-01-25 RX ADMIN — Medication 10 ML: at 10:35

## 2018-01-25 RX ADMIN — SODIUM CHLORIDE, PRESERVATIVE FREE 500 UNITS: 5 INJECTION INTRAVENOUS at 10:35

## 2018-02-15 ENCOUNTER — OFFICE VISIT (OUTPATIENT)
Dept: ONCOLOGY | Facility: CLINIC | Age: 68
End: 2018-02-15

## 2018-02-15 ENCOUNTER — INFUSION (OUTPATIENT)
Dept: ONCOLOGY | Facility: HOSPITAL | Age: 68
End: 2018-02-15

## 2018-02-15 VITALS
BODY MASS INDEX: 23.62 KG/M2 | HEART RATE: 73 BPM | SYSTOLIC BLOOD PRESSURE: 145 MMHG | TEMPERATURE: 98 F | DIASTOLIC BLOOD PRESSURE: 86 MMHG | RESPIRATION RATE: 13 BRPM | WEIGHT: 179 LBS

## 2018-02-15 DIAGNOSIS — C18.7 MALIGNANT NEOPLASM OF SIGMOID COLON (HCC): Primary | ICD-10-CM

## 2018-02-15 PROCEDURE — 25010000002 HEPARIN FLUSH (PORCINE) 100 UNIT/ML SOLUTION: Performed by: INTERNAL MEDICINE

## 2018-02-15 PROCEDURE — 99214 OFFICE O/P EST MOD 30 MIN: CPT | Performed by: INTERNAL MEDICINE

## 2018-02-15 PROCEDURE — 96523 IRRIG DRUG DELIVERY DEVICE: CPT

## 2018-02-15 RX ORDER — SODIUM CHLORIDE 0.9 % (FLUSH) 0.9 %
10 SYRINGE (ML) INJECTION AS NEEDED
Status: CANCELLED | OUTPATIENT
Start: 2018-02-15

## 2018-02-15 RX ORDER — AMOXICILLIN AND CLAVULANATE POTASSIUM 875; 125 MG/1; MG/1
TABLET, FILM COATED ORAL
Refills: 0 | COMMUNITY
Start: 2018-02-12 | End: 2018-02-23 | Stop reason: SDUPTHER

## 2018-02-15 RX ORDER — SODIUM CHLORIDE 0.9 % (FLUSH) 0.9 %
10 SYRINGE (ML) INJECTION AS NEEDED
Status: DISCONTINUED | OUTPATIENT
Start: 2018-02-15 | End: 2018-02-15 | Stop reason: HOSPADM

## 2018-02-15 RX ADMIN — Medication 10 ML: at 09:53

## 2018-02-15 RX ADMIN — SODIUM CHLORIDE, PRESERVATIVE FREE 500 UNITS: 5 INJECTION INTRAVENOUS at 09:53

## 2018-02-15 NOTE — PROGRESS NOTES
CHIEF COMPLAINT: 1.  Peripheral neuropathy of the hands and feet, worse in the feet                                       2.  Follow-up for colon cancer    Problem List:  Oncology/Hematology History    1. Stage CARLOS, [id number] colon cancer with 2 out of 14 pericolonic lymph nodes  involved and a left lobe liver biopsy positive for metastasis, presenting with  a 25 pound weight loss and diarrhea with some perirectal soreness and had  colonoscopy with Dr. Mcclain in January that found this lesion that was  circumferential high-grade invading into the pericolonic fat, status post  sigmoid colectomy of a poorly differentiated adenocarcinoma.   a) Baseline CEA postop of 0.8 with alkaline phosphatase 111, with normal liver  enzymes and creatinine of 0.8. Baseline white count 9820 with a hemoglobin  13.8, platelets 339,000, and CT with contrast showing a right lobe liver dome  lesion as well as an anastomotic change in the bowel.   b) Began CapeOx 03/15/2016.  c) Added in Avastin to CapeOx 04/05/2016, second cycle. (This was 8 weeks out  from surgery).  d) KRAS mutation is negative.  E.) CAT scan in August 2016 shows resolution of disease in the liver and colon and a stable lung nodule.  Hence Avastin, Xeloda, and oxaliplatin continued.  F) oxaliplatin discontinued October 2016 due to worsening peripheral neuropathy.  G.) CTs of February 2017 showed no evidence of metastasis and stable bibasilar nodular scar.  Persistent neuropathy not worsening.  CEA 1.4.  Continuing Avastin and Xeloda without oxaliplatin.  Having some problems with irritation of his eyes on Xeloda.  2.  Peripheral neuropathy, chemotherapy induced        Malignant neoplasm of sigmoid colon    5/20/2016 Initial Diagnosis     Malignant neoplasm of sigmoid colon       5/2/2017 Imaging     CT chest, abdomen, pelvis IMPRESSION:  1. No disease recurrence.  2. Minimal areas of nodular thickening in the right lower lobe, stable.         8/2/2017 Imaging     CT  chest/abdomen/pelvis IMPRESSION:  Stable examination with no evidence of acute intrathoracic,  intra-abdominal or pelvic abnormality. There is no evidence of  progression of disease. No metastatic disease.          11/13/2017 Imaging     CT chest/abdomen/pelvis IMPRESSION:  Stable examination without evidence of acute intrathoracic  intra-abdominal or intrapelvic abnormality. No evidence of progression  of disease.   CEA 1.3.            HISTORY OF PRESENT ILLNESS:  The patient is a 67 y.o. male, here for follow up on management of Stage IV a colon cancer.  The patient continues to do well and is tolerating therapy with Xeloda and Avastin without any unusual side effects. His functional status is pretty good.  Clinically no issues with occasional abdominal discomfort.  No constipation or diarrhea.  No hand-foot syndrome.    Past Medical History:   Diagnosis Date   • Hypertension    • Liver disease     mets from colon cancer   • Malignant neoplasm of colon      Past Surgical History:   Procedure Laterality Date   • COLON SURGERY      Sigmoid   • LIVER SURGERY     • PORTACATH PLACEMENT         No Known Allergies    Family History and Social History reviewed and changed as necessary      REVIEW OF SYSTEM:   Review of Systems   Constitutional: Negative for appetite change, chills, diaphoresis, fatigue, fever and unexpected weight change.   HENT:   Negative for mouth sores, sore throat and trouble swallowing.    Eyes: Negative for icterus.   Respiratory: Negative for cough, hemoptysis and shortness of breath.    Cardiovascular: Negative for chest pain, leg swelling and palpitations.   Gastrointestinal: Negative for abdominal distention, abdominal pain, blood in stool, constipation, diarrhea, nausea and vomiting.   Endocrine: Negative for hot flashes.   Genitourinary: Negative for bladder incontinence, difficulty urinating, dysuria, frequency and hematuria.    Musculoskeletal: Negative for gait problem, neck pain and neck  stiffness.   Skin: Negative for rash.  Positive for dry skin.  Neurological: Positive for peripheral neuropathy in the hands and feet.  Hematological: Negative for adenopathy. Does not bruise/bleed easily.   Psychiatric/Behavioral: Negative for depression. The patient is not nervous/anxious.    All other systems reviewed and are negative.       PHYSICAL EXAM    Vitals:    02/15/18 0911   BP: 145/86   Pulse: 73   Resp: 13   Temp: 98 °F (36.7 °C)   TempSrc: Temporal Artery    Weight: 81.2 kg (179 lb)     Constitutional: Appears well-developed and well-nourished. No distress.   ECOG: (1) Restricted in physically strenuous activity, ambulatory and able to do work of light nature  HENT:   Head: Normocephalic.   Mouth/Throat: Oropharynx is clear and moist.   Eyes: Conjunctivae are normal. Pupils are equal, round, and reactive to light. No scleral icterus.   Neck: Neck supple. No JVD present. No thyromegaly present.   Cardiovascular: Normal rate, regular rhythm and normal heart sounds.    Pulmonary/Chest: Breath sounds normal. No respiratory distress.   Nodes: No cervical, supraclavicular or axillary nodes palpable on exam.  Abdominal: Soft. Exhibits no distension and no mass. There is no hepatosplenomegaly. There is no tenderness. There is no rebound and no guarding.   Musculoskeletal:Exhibits no edema, tenderness or deformity.   Neurological: Alert and oriented to person, place, and time. Exhibits normal muscle tone.   Skin: Dry.  No ecchymosis, no petechiae and no rash noted. Not diaphoretic. No cyanosis.   Psychiatric: Normal mood and affect.   Vitals reviewed.  Laboratory data reviewed along with CT chest abdomen and pelvis and images thereof as outlined above in the oncology history were reviewed at time of visit.    Lab Results   Component Value Date    WBC 4.32 (L) 01/25/2018    HGB 13.9 (L) 01/25/2018    HCT 41.3 (L) 01/25/2018    .3 (H) 01/25/2018    PLT 99 (L) 01/25/2018       Lab Results   Component  Value Date    GLUCOSE 98 01/25/2018    BUN 14 01/25/2018    CREATININE 0.80 01/25/2018    EGFRIFNONA 97 01/25/2018    BCR 17.5 01/25/2018    K 4.2 01/25/2018    CO2 28.0 01/25/2018    CALCIUM 9.3 01/25/2018    ALBUMIN 4.10 01/25/2018    LABIL2 1.5 01/25/2018    AST 28 01/25/2018    ALT 34 01/25/2018       Lab Results   Component Value Date    CEA 1.99 01/25/2018    CEA 2.38 01/02/2018    CEA 1.30 11/13/2017    CEA 3.05 10/10/2017       Assessment/Plan     1. 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  2. Peripheral neuropathy secondary to chemotherapy  3. Dental caries: Patient is scheduled for complete dental extraction at the end of March    Discussion: At this time he has no measurable disease.  I'm going to continue him on Xeloda.  Hold Avastin until completion of dental extraction.  We'll restarted 1 month afterwards.  Plan for MRI of the abdomen to see if there is any measurable disease in his abdomen.  See me after that in early April.  Hold Xeloda 2 weeks prior to initiating dental procedure.    Santi Abad MD    11/16/2017

## 2018-02-23 ENCOUNTER — TELEPHONE (OUTPATIENT)
Dept: ONCOLOGY | Facility: CLINIC | Age: 68
End: 2018-02-23

## 2018-02-23 RX ORDER — AMOXICILLIN AND CLAVULANATE POTASSIUM 875; 125 MG/1; MG/1
1 TABLET, FILM COATED ORAL EVERY 12 HOURS SCHEDULED
Qty: 20 TABLET | Refills: 1 | Status: SHIPPED | OUTPATIENT
Start: 2018-02-23 | End: 2019-01-24 | Stop reason: HOSPADM

## 2018-02-23 NOTE — TELEPHONE ENCOUNTER
Returned patient call educated that Dr. Mercado will refill ABX for abcess tooth.  Patient verbalized understanding.

## 2018-02-23 NOTE — TELEPHONE ENCOUNTER
----- Message from Celine Andujar sent at 2/23/2018 11:42 AM EST -----  Regarding: APRIL-ANTIBIOTICS  Contact: [phone]  Patient called he has an abcess tooth he needs another round of antibiotics his primary doctor is out town it is Dr. Reed with [facility] he wants to know if Dr. Mercado can write for another round Amox-clav 875-125 mg? Please call  Into [facility] at [facility] let him know if this has been done.

## 2018-03-07 ENCOUNTER — TELEPHONE (OUTPATIENT)
Dept: ONCOLOGY | Facility: CLINIC | Age: 68
End: 2018-03-07

## 2018-03-07 NOTE — TELEPHONE ENCOUNTER
Returned patient call, patient to restart today xeloda today.  Medication was being held for patient to complete dental work.  Patient verbalized understanding.

## 2018-03-07 NOTE — TELEPHONE ENCOUNTER
----- Message from Jenna Fisher sent at 3/7/2018 10:38 AM EST -----  Regarding: DR CHEN ([facility])  CHEMO  PT called to tell us he is done with dental work. He is wanting to get back on Chemo at home.    Pt phone is [phone]

## 2018-03-26 ENCOUNTER — HOSPITAL ENCOUNTER (OUTPATIENT)
Dept: MRI IMAGING | Facility: HOSPITAL | Age: 68
Discharge: HOME | End: 2018-03-26
Admitting: INTERNAL MEDICINE

## 2018-03-26 DIAGNOSIS — C18.7 MALIGNANT NEOPLASM OF SIGMOID COLON (HCC): ICD-10-CM

## 2018-03-26 PROCEDURE — 25010000003 HEPARIN LOCK FLUCH PER 10 UNITS: Performed by: RADIOLOGY

## 2018-03-26 PROCEDURE — 0 GADOBENATE DIMEGLUMINE 529 MG/ML SOLUTION: Performed by: INTERNAL MEDICINE

## 2018-03-26 PROCEDURE — 74183 MRI ABD W/O CNTR FLWD CNTR: CPT

## 2018-03-26 PROCEDURE — A9577 INJ MULTIHANCE: HCPCS | Performed by: INTERNAL MEDICINE

## 2018-03-26 RX ORDER — HEPARIN SODIUM (PORCINE) LOCK FLUSH IV SOLN 100 UNIT/ML 100 UNIT/ML
500 SOLUTION INTRAVENOUS AS NEEDED
Status: DISCONTINUED | OUTPATIENT
Start: 2018-03-26 | End: 2018-03-27 | Stop reason: HOSPADM

## 2018-03-26 RX ADMIN — GADOBENATE DIMEGLUMINE 15 ML: 529 INJECTION, SOLUTION INTRAVENOUS at 14:03

## 2018-03-26 RX ADMIN — Medication 500 UNITS: at 14:15

## 2018-04-18 ENCOUNTER — TELEPHONE (OUTPATIENT)
Dept: ONCOLOGY | Facility: CLINIC | Age: 68
End: 2018-04-18

## 2018-04-18 NOTE — TELEPHONE ENCOUNTER
Reviewed xeloda cycle with patient.  He just completed 14 days, so he will hold for 7 days and them begin the next cycle. Per Rogelio, there's no need to try to line up xeloda and avastin cycles.  Patient verbalized understanding.

## 2018-04-18 NOTE — TELEPHONE ENCOUNTER
----- Message from Jenna Fisher sent at 4/18/2018  9:55 AM EDT -----  Regarding: DR CHEN ([facility]) DOES HE TAKE CHEMO PILL  PT CALLED TODAY . HE IS CONFUSED ABOUT HIS CHEMO PILLS. HE IS WANTING TO KNOW IF HE TAKES THEM TODAY. HE SAID HE TOOK TWO WEEKS WORTH. PLEASE CALL HIM     PT PHONE -[phone]

## 2018-04-19 ENCOUNTER — INFUSION (OUTPATIENT)
Dept: ONCOLOGY | Facility: HOSPITAL | Age: 68
End: 2018-04-19

## 2018-04-19 ENCOUNTER — OFFICE VISIT (OUTPATIENT)
Dept: ONCOLOGY | Facility: CLINIC | Age: 68
End: 2018-04-19

## 2018-04-19 VITALS
WEIGHT: 180 LBS | HEIGHT: 73 IN | DIASTOLIC BLOOD PRESSURE: 78 MMHG | HEART RATE: 60 BPM | SYSTOLIC BLOOD PRESSURE: 139 MMHG | BODY MASS INDEX: 23.86 KG/M2 | TEMPERATURE: 97.9 F | RESPIRATION RATE: 13 BRPM

## 2018-04-19 DIAGNOSIS — C18.7 MALIGNANT NEOPLASM OF SIGMOID COLON (HCC): ICD-10-CM

## 2018-04-19 DIAGNOSIS — C18.7 MALIGNANT NEOPLASM OF SIGMOID COLON (HCC): Primary | ICD-10-CM

## 2018-04-19 LAB
ALBUMIN SERPL-MCNC: 4.2 G/DL (ref 3.5–5)
ALBUMIN/GLOB SERPL: 1.6 G/DL (ref 1–2)
ALP SERPL-CCNC: 52 U/L (ref 38–126)
ALT SERPL W P-5'-P-CCNC: 31 U/L (ref 13–69)
ANION GAP SERPL CALCULATED.3IONS-SCNC: 15.4 MMOL/L (ref 10–20)
AST SERPL-CCNC: 31 U/L (ref 15–46)
BASOPHILS # BLD AUTO: 0.01 10*3/MM3 (ref 0–0.2)
BASOPHILS NFR BLD AUTO: 0.3 % (ref 0–2.5)
BILIRUB SERPL-MCNC: 1.4 MG/DL (ref 0.2–1.3)
BILIRUB UR QL STRIP: NEGATIVE
BUN BLD-MCNC: 22 MG/DL (ref 7–20)
BUN/CREAT SERPL: 27.5 (ref 6.3–21.9)
CALCIUM SPEC-SCNC: 9.3 MG/DL (ref 8.4–10.2)
CEA SERPL-MCNC: 1.79 NG/ML
CHLORIDE SERPL-SCNC: 104 MMOL/L (ref 98–107)
CLARITY UR: CLEAR
CO2 SERPL-SCNC: 27 MMOL/L (ref 26–30)
COLOR UR: YELLOW
CREAT BLD-MCNC: 0.8 MG/DL (ref 0.6–1.3)
DEPRECATED RDW RBC AUTO: 54.5 FL (ref 37–54)
EOSINOPHIL # BLD AUTO: 0.03 10*3/MM3 (ref 0–0.7)
EOSINOPHIL NFR BLD AUTO: 0.8 % (ref 0–7)
ERYTHROCYTE [DISTWIDTH] IN BLOOD BY AUTOMATED COUNT: 14.4 % (ref 11.5–14.5)
GFR SERPL CREATININE-BSD FRML MDRD: 97 ML/MIN/1.73
GLOBULIN UR ELPH-MCNC: 2.7 GM/DL
GLUCOSE BLD-MCNC: 91 MG/DL (ref 74–98)
GLUCOSE UR STRIP-MCNC: NEGATIVE MG/DL
HCT VFR BLD AUTO: 41.8 % (ref 42–52)
HGB BLD-MCNC: 14.3 G/DL (ref 14–18)
HGB UR QL STRIP.AUTO: NEGATIVE
IMM GRANULOCYTES # BLD: 0.01 10*3/MM3 (ref 0–0.06)
IMM GRANULOCYTES NFR BLD: 0.3 % (ref 0–0.6)
KETONES UR QL STRIP: NEGATIVE
LEUKOCYTE ESTERASE UR QL STRIP.AUTO: NEGATIVE
LYMPHOCYTES # BLD AUTO: 1.15 10*3/MM3 (ref 0.6–3.4)
LYMPHOCYTES NFR BLD AUTO: 31.5 % (ref 10–50)
MCH RBC QN AUTO: 35.2 PG (ref 27–31)
MCHC RBC AUTO-ENTMCNC: 34.2 G/DL (ref 30–37)
MCV RBC AUTO: 103 FL (ref 80–94)
MONOCYTES # BLD AUTO: 0.37 10*3/MM3 (ref 0–0.9)
MONOCYTES NFR BLD AUTO: 10.1 % (ref 0–12)
NEUTROPHILS # BLD AUTO: 2.08 10*3/MM3 (ref 2–6.9)
NEUTROPHILS NFR BLD AUTO: 57 % (ref 37–80)
NITRITE UR QL STRIP: NEGATIVE
NRBC BLD MANUAL-RTO: 0 /100 WBC (ref 0–0)
PH UR STRIP.AUTO: 7 [PH] (ref 5–8)
PLATELET # BLD AUTO: 122 10*3/MM3 (ref 130–400)
PMV BLD AUTO: 9.9 FL (ref 6–12)
POTASSIUM BLD-SCNC: 4.4 MMOL/L (ref 3.5–5.1)
PROT SERPL-MCNC: 6.9 G/DL (ref 6.3–8.2)
PROT UR QL STRIP: NEGATIVE
RBC # BLD AUTO: 4.06 10*6/MM3 (ref 4.7–6.1)
SODIUM BLD-SCNC: 142 MMOL/L (ref 137–145)
SP GR UR STRIP: 1.02 (ref 1–1.03)
UROBILINOGEN UR QL STRIP: NORMAL
WBC NRBC COR # BLD: 3.65 10*3/MM3 (ref 4.8–10.8)

## 2018-04-19 PROCEDURE — 36415 COLL VENOUS BLD VENIPUNCTURE: CPT

## 2018-04-19 PROCEDURE — 25010000002 BEVACIZUMAB PER 10 MG: Performed by: INTERNAL MEDICINE

## 2018-04-19 PROCEDURE — 81003 URINALYSIS AUTO W/O SCOPE: CPT

## 2018-04-19 PROCEDURE — 82378 CARCINOEMBRYONIC ANTIGEN: CPT

## 2018-04-19 PROCEDURE — 96413 CHEMO IV INFUSION 1 HR: CPT

## 2018-04-19 PROCEDURE — 85025 COMPLETE CBC W/AUTO DIFF WBC: CPT

## 2018-04-19 PROCEDURE — 80053 COMPREHEN METABOLIC PANEL: CPT

## 2018-04-19 PROCEDURE — 25010000002 HEPARIN FLUSH (PORCINE) 100 UNIT/ML SOLUTION: Performed by: INTERNAL MEDICINE

## 2018-04-19 PROCEDURE — 99214 OFFICE O/P EST MOD 30 MIN: CPT | Performed by: INTERNAL MEDICINE

## 2018-04-19 RX ORDER — SODIUM CHLORIDE 0.9 % (FLUSH) 0.9 %
10 SYRINGE (ML) INJECTION AS NEEDED
Status: CANCELLED | OUTPATIENT
Start: 2018-04-19

## 2018-04-19 RX ORDER — DEXTROSE MONOHYDRATE 50 MG/ML
250 INJECTION, SOLUTION INTRAVENOUS ONCE
Status: CANCELLED | OUTPATIENT
Start: 2018-04-19 | End: 2018-04-19

## 2018-04-19 RX ORDER — DEXTROSE MONOHYDRATE 50 MG/ML
250 INJECTION, SOLUTION INTRAVENOUS ONCE
Status: CANCELLED | OUTPATIENT
Start: 2018-05-10 | End: 2018-05-10

## 2018-04-19 RX ORDER — SODIUM CHLORIDE 0.9 % (FLUSH) 0.9 %
10 SYRINGE (ML) INJECTION AS NEEDED
Status: DISCONTINUED | OUTPATIENT
Start: 2018-04-19 | End: 2018-04-19 | Stop reason: HOSPADM

## 2018-04-19 RX ORDER — SODIUM CHLORIDE 9 MG/ML
250 INJECTION, SOLUTION INTRAVENOUS ONCE
Status: CANCELLED | OUTPATIENT
Start: 2018-05-10

## 2018-04-19 RX ORDER — SODIUM CHLORIDE 9 MG/ML
250 INJECTION, SOLUTION INTRAVENOUS ONCE
Status: DISCONTINUED | OUTPATIENT
Start: 2018-04-19 | End: 2018-04-19 | Stop reason: HOSPADM

## 2018-04-19 RX ORDER — SODIUM CHLORIDE 9 MG/ML
250 INJECTION, SOLUTION INTRAVENOUS ONCE
Status: CANCELLED | OUTPATIENT
Start: 2018-04-19

## 2018-04-19 RX ADMIN — SODIUM CHLORIDE, PRESERVATIVE FREE 500 UNITS: 5 INJECTION INTRAVENOUS at 10:15

## 2018-04-19 RX ADMIN — BEVACIZUMAB 590 MG: 400 INJECTION, SOLUTION INTRAVENOUS at 09:39

## 2018-04-19 RX ADMIN — Medication 10 ML: at 10:15

## 2018-04-19 NOTE — PROGRESS NOTES
CHIEF COMPLAINT: 1.  Peripheral neuropathy of the hands and feet, worse in the feet                                       2.  Follow-up for colon cancer    Problem List:  Oncology/Hematology History    1. Stage CARLOS, [id number] colon cancer with 2 out of 14 pericolonic lymph nodes  involved and a left lobe liver biopsy positive for metastasis, presenting with  a 25 pound weight loss and diarrhea with some perirectal soreness and had  colonoscopy with Dr. Mcclain in January that found this lesion that was  circumferential high-grade invading into the pericolonic fat, status post  sigmoid colectomy of a poorly differentiated adenocarcinoma.   a) Baseline CEA postop of 0.8 with alkaline phosphatase 111, with normal liver  enzymes and creatinine of 0.8. Baseline white count 9820 with a hemoglobin  13.8, platelets 339,000, and CT with contrast showing a right lobe liver dome  lesion as well as an anastomotic change in the bowel.   b) Began CapeOx 03/15/2016.  c) Added in Avastin to CapeOx 04/05/2016, second cycle. (This was 8 weeks out  from surgery).  d) KRAS mutation is negative.  E.) CAT scan in August 2016 shows resolution of disease in the liver and colon and a stable lung nodule.  Hence Avastin, Xeloda, and oxaliplatin continued.  F) oxaliplatin discontinued October 2016 due to worsening peripheral neuropathy.  G.) CTs of February 2017 showed no evidence of metastasis and stable bibasilar nodular scar.  Persistent neuropathy not worsening.  CEA 1.4.  Continuing Avastin and Xeloda without oxaliplatin.  Having some problems with irritation of his eyes on Xeloda.  2.  Peripheral neuropathy, chemotherapy induced        Malignant neoplasm of sigmoid colon    5/20/2016 Initial Diagnosis     Malignant neoplasm of sigmoid colon       5/2/2017 Imaging     CT chest, abdomen, pelvis IMPRESSION:  1. No disease recurrence.  2. Minimal areas of nodular thickening in the right lower lobe, stable.         8/2/2017 Imaging     CT  chest/abdomen/pelvis IMPRESSION:  Stable examination with no evidence of acute intrathoracic,  intra-abdominal or pelvic abnormality. There is no evidence of  progression of disease. No metastatic disease.          11/13/2017 Imaging     CT chest/abdomen/pelvis IMPRESSION:  Stable examination without evidence of acute intrathoracic  intra-abdominal or intrapelvic abnormality. No evidence of progression  of disease.   CEA 1.3.            HISTORY OF PRESENT ILLNESS:  The patient is a 67 y.o. male, here for follow up on management of Stage IV a colon cancer.  The patient continues to do well and is tolerating therapy with Xeloda and Avastin without any unusual side effects. His functional status is pretty good.  Clinically no issues with occasional abdominal discomfort.  No constipation or diarrhea.  No hand-foot syndrome.    Past Medical History:   Diagnosis Date   • Hypertension    • Liver disease     mets from colon cancer   • Malignant neoplasm of colon      Past Surgical History:   Procedure Laterality Date   • COLON SURGERY      Sigmoid   • LIVER SURGERY     • PORTACATH PLACEMENT         No Known Allergies    Family History and Social History reviewed and changed as necessary      REVIEW OF SYSTEM:   Review of Systems   Constitutional: Negative for appetite change, chills, diaphoresis, fatigue, fever and unexpected weight change.   HENT:   Negative for mouth sores, sore throat and trouble swallowing.    Eyes: Negative for icterus.   Respiratory: Negative for cough, hemoptysis and shortness of breath.    Cardiovascular: Negative for chest pain, leg swelling and palpitations.   Gastrointestinal: Negative for abdominal distention, abdominal pain, blood in stool, constipation, diarrhea, nausea and vomiting.   Endocrine: Negative for hot flashes.   Genitourinary: Negative for bladder incontinence, difficulty urinating, dysuria, frequency and hematuria.    Musculoskeletal: Negative for gait problem, neck pain and neck  stiffness.   Skin: Negative for rash.  Positive for dry skin.  Neurological: Positive for peripheral neuropathy in the hands and feet.  Hematological: Negative for adenopathy. Does not bruise/bleed easily.   Psychiatric/Behavioral: Negative for depression. The patient is not nervous/anxious.    All other systems reviewed and are negative.       PHYSICAL EXAM    There were no vitals filed for this visit.  Constitutional: Appears well-developed and well-nourished. No distress.   ECOG: (1) Restricted in physically strenuous activity, ambulatory and able to do work of light nature  HENT:   Head: Normocephalic.   Mouth/Throat: Oropharynx is clear and moist.   Eyes: Conjunctivae are normal. Pupils are equal, round, and reactive to light. No scleral icterus.   Neck: Neck supple. No JVD present. No thyromegaly present.   Cardiovascular: Normal rate, regular rhythm and normal heart sounds.    Pulmonary/Chest: Breath sounds normal. No respiratory distress.   Nodes: No cervical, supraclavicular or axillary nodes palpable on exam.  Abdominal: Soft. Exhibits no distension and no mass. There is no hepatosplenomegaly. There is no tenderness. There is no rebound and no guarding.   Musculoskeletal:Exhibits no edema, tenderness or deformity.   Neurological: Alert and oriented to person, place, and time. Exhibits normal muscle tone.   Skin: Dry.  No ecchymosis, no petechiae and no rash noted. Not diaphoretic. No cyanosis.   Psychiatric: Normal mood and affect.   Vitals reviewed.  Laboratory data reviewed along with CT chest abdomen and pelvis and images thereof as outlined above in the oncology history were reviewed at time of visit.    Lab Results   Component Value Date    WBC 4.32 (L) 01/25/2018    HGB 13.9 (L) 01/25/2018    HCT 41.3 (L) 01/25/2018    .3 (H) 01/25/2018    PLT 99 (L) 01/25/2018       Lab Results   Component Value Date    GLUCOSE 98 01/25/2018    BUN 14 01/25/2018    CREATININE 0.80 01/25/2018    EGFRIFNONA 97  01/25/2018    BCR 17.5 01/25/2018    K 4.2 01/25/2018    CO2 28.0 01/25/2018    CALCIUM 9.3 01/25/2018    ALBUMIN 4.10 01/25/2018    LABIL2 1.5 01/25/2018    AST 28 01/25/2018    ALT 34 01/25/2018       Lab Results   Component Value Date    CEA 1.99 01/25/2018    CEA 2.38 01/02/2018    CEA 1.30 11/13/2017    CEA 3.05 10/10/2017       PROCEDURE: MRI ABDOMEN W WO CONTRAST-     HISTORY: Metastatic Colon cancer  with liver mets; C18.7-Malignant  neoplasm of sigmoid colon     TECHNIQUE: Multiplanar multisequence imaging of the abdomen was  performed both before and following the administration of 15 mL  MultiHance intravenous contrast.     COMPARISON: Outside CT dated November 13, 2017.     FINDINGS: There is a T2 hyperintense lesion in the inferior right lobe  of the liver measuring 2.7 cm which does not demonstrate any  postcontrast enhancement consistent with a cyst. No other focal liver  lesions are identified. There our small cyst within both kidneys. The  adrenals, spleen and pancreas are unremarkable. The abdominal portions  of the GI tract are within normal limits. There is no ascites. Bone  marrow signal is homogeneous.     IMPRESSION:  Findings consistent with a 2.7 cm cyst in the right lobe of  the liver. There is no evidence of metastatic disease within the abdomen     This report was finalized on 3/26/2018 3:46 PM by David Ingram M.D..    Assessment/Plan     1. 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 Reviewed his scans with him  2. Peripheral neuropathy secondary to chemotherapy  3. Dental caries: Patient is scheduled for complete dental extraction at the end of March    Discussion: At this time he has no measurable disease.  I'm going to continue him on Xeloda and avastin.  Plan to see him back in 6 weeks.      Spent 25 minutes on the patient's plan and care with more than 50% spent on counseling the patient regarding the  plan going forward and reviewing his scans with him.    Santi Abad MD    11/16/2017

## 2018-05-10 ENCOUNTER — INFUSION (OUTPATIENT)
Dept: ONCOLOGY | Facility: HOSPITAL | Age: 68
End: 2018-05-10

## 2018-05-10 VITALS
HEART RATE: 56 BPM | RESPIRATION RATE: 16 BRPM | SYSTOLIC BLOOD PRESSURE: 136 MMHG | TEMPERATURE: 98.2 F | BODY MASS INDEX: 24.28 KG/M2 | DIASTOLIC BLOOD PRESSURE: 78 MMHG | WEIGHT: 184 LBS

## 2018-05-10 DIAGNOSIS — C18.7 MALIGNANT NEOPLASM OF SIGMOID COLON (HCC): Primary | ICD-10-CM

## 2018-05-10 LAB
ALBUMIN SERPL-MCNC: 4.1 G/DL (ref 3.5–5)
ALBUMIN/GLOB SERPL: 1.5 G/DL (ref 1–2)
ALP SERPL-CCNC: 49 U/L (ref 38–126)
ALT SERPL W P-5'-P-CCNC: 27 U/L (ref 13–69)
ANION GAP SERPL CALCULATED.3IONS-SCNC: 15.4 MMOL/L (ref 10–20)
AST SERPL-CCNC: 27 U/L (ref 15–46)
BASOPHILS # BLD AUTO: 0.02 10*3/MM3 (ref 0–0.2)
BASOPHILS NFR BLD AUTO: 0.6 % (ref 0–2.5)
BILIRUB SERPL-MCNC: 1.2 MG/DL (ref 0.2–1.3)
BILIRUB UR QL STRIP: NEGATIVE
BUN BLD-MCNC: 16 MG/DL (ref 7–20)
BUN/CREAT SERPL: 20 (ref 6.3–21.9)
CALCIUM SPEC-SCNC: 9 MG/DL (ref 8.4–10.2)
CEA SERPL-MCNC: 2 NG/ML
CHLORIDE SERPL-SCNC: 105 MMOL/L (ref 98–107)
CLARITY UR: CLEAR
CO2 SERPL-SCNC: 26 MMOL/L (ref 26–30)
COLOR UR: YELLOW
CREAT BLD-MCNC: 0.8 MG/DL (ref 0.6–1.3)
DEPRECATED RDW RBC AUTO: 58.9 FL (ref 37–54)
EOSINOPHIL # BLD AUTO: 0.05 10*3/MM3 (ref 0–0.7)
EOSINOPHIL NFR BLD AUTO: 1.4 % (ref 0–7)
ERYTHROCYTE [DISTWIDTH] IN BLOOD BY AUTOMATED COUNT: 15.3 % (ref 11.5–14.5)
GFR SERPL CREATININE-BSD FRML MDRD: 97 ML/MIN/1.73
GLOBULIN UR ELPH-MCNC: 2.7 GM/DL
GLUCOSE BLD-MCNC: 91 MG/DL (ref 74–98)
GLUCOSE UR STRIP-MCNC: NEGATIVE MG/DL
HCT VFR BLD AUTO: 41.4 % (ref 42–52)
HGB BLD-MCNC: 14.1 G/DL (ref 14–18)
HGB UR QL STRIP.AUTO: NEGATIVE
IMM GRANULOCYTES # BLD: 0.02 10*3/MM3 (ref 0–0.06)
IMM GRANULOCYTES NFR BLD: 0.6 % (ref 0–0.6)
KETONES UR QL STRIP: NEGATIVE
LEUKOCYTE ESTERASE UR QL STRIP.AUTO: NEGATIVE
LYMPHOCYTES # BLD AUTO: 1.15 10*3/MM3 (ref 0.6–3.4)
LYMPHOCYTES NFR BLD AUTO: 32.4 % (ref 10–50)
MCH RBC QN AUTO: 35.6 PG (ref 27–31)
MCHC RBC AUTO-ENTMCNC: 34.1 G/DL (ref 30–37)
MCV RBC AUTO: 104.5 FL (ref 80–94)
MONOCYTES # BLD AUTO: 0.41 10*3/MM3 (ref 0–0.9)
MONOCYTES NFR BLD AUTO: 11.5 % (ref 0–12)
NEUTROPHILS # BLD AUTO: 1.9 10*3/MM3 (ref 2–6.9)
NEUTROPHILS NFR BLD AUTO: 53.5 % (ref 37–80)
NITRITE UR QL STRIP: NEGATIVE
NRBC BLD MANUAL-RTO: 0 /100 WBC (ref 0–0)
PH UR STRIP.AUTO: 6 [PH] (ref 5–8)
PLATELET # BLD AUTO: 108 10*3/MM3 (ref 130–400)
PMV BLD AUTO: 10.1 FL (ref 6–12)
POTASSIUM BLD-SCNC: 4.4 MMOL/L (ref 3.5–5.1)
PROT SERPL-MCNC: 6.8 G/DL (ref 6.3–8.2)
PROT UR QL STRIP: NEGATIVE
RBC # BLD AUTO: 3.96 10*6/MM3 (ref 4.7–6.1)
SODIUM BLD-SCNC: 142 MMOL/L (ref 137–145)
SP GR UR STRIP: 1.01 (ref 1–1.03)
UROBILINOGEN UR QL STRIP: NORMAL
WBC NRBC COR # BLD: 3.55 10*3/MM3 (ref 4.8–10.8)

## 2018-05-10 PROCEDURE — 80053 COMPREHEN METABOLIC PANEL: CPT

## 2018-05-10 PROCEDURE — 82378 CARCINOEMBRYONIC ANTIGEN: CPT

## 2018-05-10 PROCEDURE — 25010000002 HEPARIN FLUSH (PORCINE) 100 UNIT/ML SOLUTION: Performed by: INTERNAL MEDICINE

## 2018-05-10 PROCEDURE — 85025 COMPLETE CBC W/AUTO DIFF WBC: CPT

## 2018-05-10 PROCEDURE — 36415 COLL VENOUS BLD VENIPUNCTURE: CPT

## 2018-05-10 PROCEDURE — 81003 URINALYSIS AUTO W/O SCOPE: CPT

## 2018-05-10 PROCEDURE — 25010000002 BEVACIZUMAB PER 10 MG: Performed by: INTERNAL MEDICINE

## 2018-05-10 PROCEDURE — 96413 CHEMO IV INFUSION 1 HR: CPT

## 2018-05-10 RX ORDER — SODIUM CHLORIDE 0.9 % (FLUSH) 0.9 %
10 SYRINGE (ML) INJECTION AS NEEDED
Status: DISCONTINUED | OUTPATIENT
Start: 2018-05-10 | End: 2018-05-10 | Stop reason: HOSPADM

## 2018-05-10 RX ORDER — SODIUM CHLORIDE 0.9 % (FLUSH) 0.9 %
10 SYRINGE (ML) INJECTION AS NEEDED
Status: CANCELLED | OUTPATIENT
Start: 2018-05-10

## 2018-05-10 RX ORDER — SODIUM CHLORIDE 9 MG/ML
250 INJECTION, SOLUTION INTRAVENOUS ONCE
Status: DISCONTINUED | OUTPATIENT
Start: 2018-05-10 | End: 2018-05-10 | Stop reason: HOSPADM

## 2018-05-10 RX ORDER — DEXTROSE MONOHYDRATE 50 MG/ML
250 INJECTION, SOLUTION INTRAVENOUS ONCE
Status: DISCONTINUED | OUTPATIENT
Start: 2018-05-10 | End: 2018-05-10 | Stop reason: HOSPADM

## 2018-05-10 RX ADMIN — SODIUM CHLORIDE, PRESERVATIVE FREE 500 UNITS: 5 INJECTION INTRAVENOUS at 10:26

## 2018-05-10 RX ADMIN — Medication 10 ML: at 10:26

## 2018-05-10 RX ADMIN — BEVACIZUMAB 590 MG: 400 INJECTION, SOLUTION INTRAVENOUS at 09:50

## 2018-05-13 DIAGNOSIS — C18.7 MALIGNANT NEOPLASM OF SIGMOID COLON (HCC): ICD-10-CM

## 2018-05-13 RX ORDER — CAPECITABINE 500 MG/1
TABLET, FILM COATED ORAL
Qty: 56 TABLET | Refills: 5 | Status: CANCELLED | OUTPATIENT
Start: 2018-05-13

## 2018-05-23 ENCOUNTER — TELEPHONE (OUTPATIENT)
Dept: ONCOLOGY | Facility: CLINIC | Age: 68
End: 2018-05-23

## 2018-05-23 DIAGNOSIS — C18.7 MALIGNANT NEOPLASM OF SIGMOID COLON (HCC): ICD-10-CM

## 2018-05-23 RX ORDER — CAPECITABINE 500 MG/1
1000 TABLET, FILM COATED ORAL 2 TIMES DAILY
Qty: 56 TABLET | Refills: 11 | Status: SHIPPED | OUTPATIENT
Start: 2018-05-23 | End: 2019-01-02 | Stop reason: SDUPTHER

## 2018-05-23 NOTE — TELEPHONE ENCOUNTER
----- Message from Jenna Fisher sent at 5/23/2018 10:08 AM EDT -----  Regarding: DR CHEN ([facility], 1928–1983)  Called today to let us know that [facility] pharmacy had called him about his chemo drug (capecitabine) need to be called in so  pharmacy could fill for him. Pt stated that the medication was getting low he had enough until the end of month. thanks    Please call pt back [phone].

## 2018-05-23 NOTE — TELEPHONE ENCOUNTER
Returned call to patient to inform that a refill for xeloda has been sent to [facility] pharmacy.

## 2018-05-31 ENCOUNTER — OFFICE VISIT (OUTPATIENT)
Dept: ONCOLOGY | Facility: CLINIC | Age: 68
End: 2018-05-31

## 2018-05-31 ENCOUNTER — INFUSION (OUTPATIENT)
Dept: ONCOLOGY | Facility: HOSPITAL | Age: 68
End: 2018-05-31

## 2018-05-31 VITALS
HEART RATE: 56 BPM | TEMPERATURE: 97.6 F | WEIGHT: 185 LBS | DIASTOLIC BLOOD PRESSURE: 75 MMHG | SYSTOLIC BLOOD PRESSURE: 143 MMHG | HEIGHT: 73 IN | BODY MASS INDEX: 24.52 KG/M2

## 2018-05-31 VITALS — SYSTOLIC BLOOD PRESSURE: 127 MMHG | DIASTOLIC BLOOD PRESSURE: 79 MMHG

## 2018-05-31 DIAGNOSIS — C18.7 MALIGNANT NEOPLASM OF SIGMOID COLON (HCC): Primary | ICD-10-CM

## 2018-05-31 LAB
ALBUMIN SERPL-MCNC: 4.2 G/DL (ref 3.5–5)
ALBUMIN/GLOB SERPL: 1.6 G/DL (ref 1–2)
ALP SERPL-CCNC: 53 U/L (ref 38–126)
ALT SERPL W P-5'-P-CCNC: 29 U/L (ref 13–69)
ANION GAP SERPL CALCULATED.3IONS-SCNC: 16.3 MMOL/L (ref 10–20)
AST SERPL-CCNC: 29 U/L (ref 15–46)
BASOPHILS # BLD AUTO: 0.01 10*3/MM3 (ref 0–0.2)
BASOPHILS NFR BLD AUTO: 0.2 % (ref 0–2.5)
BILIRUB SERPL-MCNC: 1.5 MG/DL (ref 0.2–1.3)
BILIRUB UR QL STRIP: NEGATIVE
BUN BLD-MCNC: 17 MG/DL (ref 7–20)
BUN/CREAT SERPL: 21.3 (ref 6.3–21.9)
CALCIUM SPEC-SCNC: 9 MG/DL (ref 8.4–10.2)
CEA SERPL-MCNC: 1.94 NG/ML
CHLORIDE SERPL-SCNC: 102 MMOL/L (ref 98–107)
CLARITY UR: CLEAR
CO2 SERPL-SCNC: 26 MMOL/L (ref 26–30)
COLOR UR: YELLOW
CREAT BLD-MCNC: 0.8 MG/DL (ref 0.6–1.3)
DEPRECATED RDW RBC AUTO: 59.8 FL (ref 37–54)
EOSINOPHIL # BLD AUTO: 0.05 10*3/MM3 (ref 0–0.7)
EOSINOPHIL NFR BLD AUTO: 1.2 % (ref 0–7)
ERYTHROCYTE [DISTWIDTH] IN BLOOD BY AUTOMATED COUNT: 15.8 % (ref 11.5–14.5)
GFR SERPL CREATININE-BSD FRML MDRD: 97 ML/MIN/1.73
GLOBULIN UR ELPH-MCNC: 2.7 GM/DL
GLUCOSE BLD-MCNC: 90 MG/DL (ref 74–98)
GLUCOSE UR STRIP-MCNC: NEGATIVE MG/DL
HCT VFR BLD AUTO: 41.8 % (ref 42–52)
HGB BLD-MCNC: 14.6 G/DL (ref 14–18)
HGB UR QL STRIP.AUTO: NEGATIVE
IMM GRANULOCYTES # BLD: 0.01 10*3/MM3 (ref 0–0.06)
IMM GRANULOCYTES NFR BLD: 0.2 % (ref 0–0.6)
KETONES UR QL STRIP: NEGATIVE
LEUKOCYTE ESTERASE UR QL STRIP.AUTO: NEGATIVE
LYMPHOCYTES # BLD AUTO: 1.2 10*3/MM3 (ref 0.6–3.4)
LYMPHOCYTES NFR BLD AUTO: 28.4 % (ref 10–50)
MCH RBC QN AUTO: 36 PG (ref 27–31)
MCHC RBC AUTO-ENTMCNC: 34.9 G/DL (ref 30–37)
MCV RBC AUTO: 103 FL (ref 80–94)
MONOCYTES # BLD AUTO: 0.36 10*3/MM3 (ref 0–0.9)
MONOCYTES NFR BLD AUTO: 8.5 % (ref 0–12)
NEUTROPHILS # BLD AUTO: 2.59 10*3/MM3 (ref 2–6.9)
NEUTROPHILS NFR BLD AUTO: 61.5 % (ref 37–80)
NITRITE UR QL STRIP: NEGATIVE
NRBC BLD MANUAL-RTO: 0 /100 WBC (ref 0–0)
PH UR STRIP.AUTO: 6 [PH] (ref 5–8)
PLATELET # BLD AUTO: 109 10*3/MM3 (ref 130–400)
PMV BLD AUTO: 10.2 FL (ref 6–12)
POTASSIUM BLD-SCNC: 4.3 MMOL/L (ref 3.5–5.1)
PROT SERPL-MCNC: 6.9 G/DL (ref 6.3–8.2)
PROT UR QL STRIP: NEGATIVE
RBC # BLD AUTO: 4.06 10*6/MM3 (ref 4.7–6.1)
SODIUM BLD-SCNC: 140 MMOL/L (ref 137–145)
SP GR UR STRIP: 1.01 (ref 1–1.03)
UROBILINOGEN UR QL STRIP: NORMAL
WBC NRBC COR # BLD: 4.22 10*3/MM3 (ref 4.8–10.8)

## 2018-05-31 PROCEDURE — 80053 COMPREHEN METABOLIC PANEL: CPT

## 2018-05-31 PROCEDURE — 25010000002 HEPARIN FLUSH (PORCINE) 100 UNIT/ML SOLUTION: Performed by: INTERNAL MEDICINE

## 2018-05-31 PROCEDURE — 85025 COMPLETE CBC W/AUTO DIFF WBC: CPT

## 2018-05-31 PROCEDURE — 36415 COLL VENOUS BLD VENIPUNCTURE: CPT

## 2018-05-31 PROCEDURE — 99214 OFFICE O/P EST MOD 30 MIN: CPT | Performed by: INTERNAL MEDICINE

## 2018-05-31 PROCEDURE — 96413 CHEMO IV INFUSION 1 HR: CPT

## 2018-05-31 PROCEDURE — 25010000002 BEVACIZUMAB PER 10 MG: Performed by: INTERNAL MEDICINE

## 2018-05-31 PROCEDURE — 82378 CARCINOEMBRYONIC ANTIGEN: CPT

## 2018-05-31 PROCEDURE — 81003 URINALYSIS AUTO W/O SCOPE: CPT

## 2018-05-31 RX ORDER — SODIUM CHLORIDE 9 MG/ML
250 INJECTION, SOLUTION INTRAVENOUS ONCE
Status: CANCELLED | OUTPATIENT
Start: 2018-05-31

## 2018-05-31 RX ORDER — SODIUM CHLORIDE 9 MG/ML
250 INJECTION, SOLUTION INTRAVENOUS ONCE
Status: CANCELLED | OUTPATIENT
Start: 2018-06-21

## 2018-05-31 RX ORDER — SODIUM CHLORIDE 0.9 % (FLUSH) 0.9 %
10 SYRINGE (ML) INJECTION AS NEEDED
Status: CANCELLED | OUTPATIENT
Start: 2018-05-31

## 2018-05-31 RX ORDER — SODIUM CHLORIDE 0.9 % (FLUSH) 0.9 %
10 SYRINGE (ML) INJECTION AS NEEDED
Status: DISCONTINUED | OUTPATIENT
Start: 2018-05-31 | End: 2018-05-31 | Stop reason: HOSPADM

## 2018-05-31 RX ORDER — LISINOPRIL 20 MG/1
20 TABLET ORAL DAILY
Qty: 30 TABLET | Refills: 11 | OUTPATIENT
Start: 2018-05-31 | End: 2019-05-24 | Stop reason: SDUPTHER

## 2018-05-31 RX ORDER — DEXTROSE MONOHYDRATE 50 MG/ML
250 INJECTION, SOLUTION INTRAVENOUS ONCE
Status: CANCELLED | OUTPATIENT
Start: 2018-06-21 | End: 2018-06-21

## 2018-05-31 RX ORDER — DEXTROSE MONOHYDRATE 50 MG/ML
250 INJECTION, SOLUTION INTRAVENOUS ONCE
Status: CANCELLED | OUTPATIENT
Start: 2018-05-31 | End: 2018-05-31

## 2018-05-31 RX ORDER — SODIUM CHLORIDE 9 MG/ML
250 INJECTION, SOLUTION INTRAVENOUS ONCE
Status: DISCONTINUED | OUTPATIENT
Start: 2018-05-31 | End: 2018-05-31 | Stop reason: HOSPADM

## 2018-05-31 RX ORDER — DEXTROSE MONOHYDRATE 50 MG/ML
250 INJECTION, SOLUTION INTRAVENOUS ONCE
Status: DISCONTINUED | OUTPATIENT
Start: 2018-05-31 | End: 2018-05-31 | Stop reason: HOSPADM

## 2018-05-31 RX ADMIN — SODIUM CHLORIDE, PRESERVATIVE FREE 500 UNITS: 5 INJECTION INTRAVENOUS at 10:46

## 2018-05-31 RX ADMIN — BEVACIZUMAB 590 MG: 400 INJECTION, SOLUTION INTRAVENOUS at 10:09

## 2018-05-31 RX ADMIN — Medication 10 ML: at 10:46

## 2018-05-31 NOTE — PROGRESS NOTES
CHIEF COMPLAINT: 1.  Peripheral neuropathy of the hands and feet, worse in the feet                                       2.  Follow-up for colon cancer    Problem List:  Oncology/Hematology History    1. Stage CARLOS, [id number] colon cancer with 2 out of 14 pericolonic lymph nodes  involved and a left lobe liver biopsy positive for metastasis, presenting with  a 25 pound weight loss and diarrhea with some perirectal soreness and had  colonoscopy with Dr. Mcclain in January that found this lesion that was  circumferential high-grade invading into the pericolonic fat, status post  sigmoid colectomy of a poorly differentiated adenocarcinoma.   a) Baseline CEA postop of 0.8 with alkaline phosphatase 111, with normal liver  enzymes and creatinine of 0.8. Baseline white count 9820 with a hemoglobin  13.8, platelets 339,000, and CT with contrast showing a right lobe liver dome  lesion as well as an anastomotic change in the bowel.   b) Began CapeOx 03/15/2016.  c) Added in Avastin to CapeOx 04/05/2016, second cycle. (This was 8 weeks out  from surgery).  d) KRAS mutation is negative.  E.) CAT scan in August 2016 shows resolution of disease in the liver and colon and a stable lung nodule.  Hence Avastin, Xeloda, and oxaliplatin continued.  F) oxaliplatin discontinued October 2016 due to worsening peripheral neuropathy.  G.) CTs of February 2017 showed no evidence of metastasis and stable bibasilar nodular scar.  Persistent neuropathy not worsening.  CEA 1.4.  Continuing Avastin and Xeloda without oxaliplatin.  Having some problems with irritation of his eyes on Xeloda.  2.  Peripheral neuropathy, chemotherapy induced        Malignant neoplasm of sigmoid colon    5/20/2016 Initial Diagnosis     Malignant neoplasm of sigmoid colon       5/2/2017 Imaging     CT chest, abdomen, pelvis IMPRESSION:  1. No disease recurrence.  2. Minimal areas of nodular thickening in the right lower lobe, stable.         8/2/2017 Imaging     CT  chest/abdomen/pelvis IMPRESSION:  Stable examination with no evidence of acute intrathoracic,  intra-abdominal or pelvic abnormality. There is no evidence of  progression of disease. No metastatic disease.          11/13/2017 Imaging     CT chest/abdomen/pelvis IMPRESSION:  Stable examination without evidence of acute intrathoracic  intra-abdominal or intrapelvic abnormality. No evidence of progression  of disease.   CEA 1.3.            HISTORY OF PRESENT ILLNESS:  The patient is a 67 y.o. male, here for follow up on management of Stage IV a colon cancer.  The patient continues to do well and is tolerating therapy with Xeloda and Avastin without any unusual side effects. His functional status is pretty good.  Clinically no issues with occasional abdominal discomfort.  No constipation or diarrhea.  No hand-foot syndrome. He is starting to increase his activity level for the summer.    Past Medical History:   Diagnosis Date   • Hypertension    • Liver disease     mets from colon cancer   • Malignant neoplasm of colon      Past Surgical History:   Procedure Laterality Date   • COLON SURGERY      Sigmoid   • LIVER SURGERY     • PORTACATH PLACEMENT         No Known Allergies    Family History and Social History reviewed and changed as necessary      REVIEW OF SYSTEM:   Review of Systems   Constitutional: Negative for appetite change, chills, diaphoresis, fatigue, fever and unexpected weight change.   HENT:   Negative for mouth sores, sore throat and trouble swallowing.    Eyes: Negative for icterus.   Respiratory: Negative for cough, hemoptysis and shortness of breath.    Cardiovascular: Negative for chest pain, leg swelling and palpitations.   Gastrointestinal: Negative for abdominal distention, abdominal pain, blood in stool, constipation, diarrhea, nausea and vomiting.   Endocrine: Negative for hot flashes.   Genitourinary: Negative for bladder incontinence, difficulty urinating, dysuria, frequency and hematuria.   "  Musculoskeletal: Negative for gait problem, neck pain and neck stiffness.   Skin: Negative for rash.  Positive for dry skin.  Neurological: Positive for peripheral neuropathy in the hands and feet.  Hematological: Negative for adenopathy. Does not bruise/bleed easily.   Psychiatric/Behavioral: Negative for depression. The patient is not nervous/anxious.    All other systems reviewed and are negative.       PHYSICAL EXAM    Vitals:    05/31/18 0823   BP: 143/75   Pulse: 56   Temp: 97.6 °F (36.4 °C)   TempSrc: Temporal Artery    Weight: 83.9 kg (185 lb)   Height: 185.4 cm (73\")     Constitutional: Appears well-developed and well-nourished. No distress.   ECOG: (1) Restricted in physically strenuous activity, ambulatory and able to do work of light nature  HENT:   Head: Normocephalic.   Mouth/Throat: Oropharynx is clear and moist.   Eyes: Conjunctivae are normal. Pupils are equal, round, and reactive to light. No scleral icterus.   Neck: Neck supple. No JVD present. No thyromegaly present.   Cardiovascular: Normal rate, regular rhythm and normal heart sounds.    Pulmonary/Chest: Breath sounds normal. No respiratory distress.   Nodes: No cervical, supraclavicular or axillary nodes palpable on exam.  Abdominal: Soft. Exhibits no distension and no mass. There is no hepatosplenomegaly. There is no tenderness. There is no rebound and no guarding.   Musculoskeletal:Exhibits no edema, tenderness or deformity.   Neurological: Alert and oriented to person, place, and time. Exhibits normal muscle tone.   Skin: Dry.  No ecchymosis, no petechiae and no rash noted. Not diaphoretic. No cyanosis.   Psychiatric: Normal mood and affect.   Vitals reviewed.  Laboratory data reviewed along with CT chest abdomen and pelvis and images thereof as outlined above in the oncology history were reviewed at time of visit.    Lab Results   Component Value Date    WBC 3.55 (L) 05/10/2018    HGB 14.1 05/10/2018    HCT 41.4 (L) 05/10/2018    MCV " 104.5 (H) 05/10/2018     (L) 05/10/2018       Lab Results   Component Value Date    GLUCOSE 91 05/10/2018    BUN 16 05/10/2018    CREATININE 0.80 05/10/2018    EGFRIFNONA 97 05/10/2018    BCR 20.0 05/10/2018    K 4.4 05/10/2018    CO2 26.0 05/10/2018    CALCIUM 9.0 05/10/2018    ALBUMIN 4.10 05/10/2018    LABIL2 1.5 05/10/2018    AST 27 05/10/2018    ALT 27 05/10/2018       Lab Results   Component Value Date    CEA 2.00 05/10/2018    CEA 1.79 04/19/2018    CEA 1.99 01/25/2018    CEA 2.38 01/02/2018       PROCEDURE: MRI ABDOMEN W WO CONTRAST-     HISTORY: Metastatic Colon cancer  with liver mets; C18.7-Malignant  neoplasm of sigmoid colon     TECHNIQUE: Multiplanar multisequence imaging of the abdomen was  performed both before and following the administration of 15 mL  MultiHance intravenous contrast.     COMPARISON: Outside CT dated November 13, 2017.     FINDINGS: There is a T2 hyperintense lesion in the inferior right lobe  of the liver measuring 2.7 cm which does not demonstrate any  postcontrast enhancement consistent with a cyst. No other focal liver  lesions are identified. There our small cyst within both kidneys. The  adrenals, spleen and pancreas are unremarkable. The abdominal portions  of the GI tract are within normal limits. There is no ascites. Bone  marrow signal is homogeneous.     IMPRESSION:  Findings consistent with a 2.7 cm cyst in the right lobe of  the liver. There is no evidence of metastatic disease within the abdomen     This report was finalized on 3/26/2018 3:46 PM by David Ingram M.D..    Assessment/Plan     1. 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 Reviewed his scans with him  2. Peripheral neuropathy secondary to chemotherapy  3. Dental caries: Patient is scheduled for complete dental extraction at the end of March    Discussion: At this time he has no measurable disease.  I'm going to  continue him on Xeloda and avastin.  I will repeat his MRI of his abdomen in 2 months and see him after that.    Spent 25 minutes on the patient's plan and care with more than 50% spent on counseling the patient regarding the plan going forward and reviewing his scans with him.    Santi Abad MD    11/16/2017

## 2018-06-06 ENCOUNTER — APPOINTMENT (OUTPATIENT)
Dept: MRI IMAGING | Facility: HOSPITAL | Age: 68
End: 2018-06-06

## 2018-06-25 ENCOUNTER — INFUSION (OUTPATIENT)
Dept: ONCOLOGY | Facility: HOSPITAL | Age: 68
End: 2018-06-25

## 2018-06-25 VITALS
BODY MASS INDEX: 23.88 KG/M2 | RESPIRATION RATE: 18 BRPM | DIASTOLIC BLOOD PRESSURE: 75 MMHG | HEART RATE: 54 BPM | SYSTOLIC BLOOD PRESSURE: 133 MMHG | WEIGHT: 181 LBS | TEMPERATURE: 98.7 F

## 2018-06-25 DIAGNOSIS — C18.7 MALIGNANT NEOPLASM OF SIGMOID COLON (HCC): Primary | ICD-10-CM

## 2018-06-25 LAB
ALBUMIN SERPL-MCNC: 4.2 G/DL (ref 3.5–5)
ALBUMIN/GLOB SERPL: 1.6 G/DL (ref 1–2)
ALP SERPL-CCNC: 61 U/L (ref 38–126)
ALT SERPL W P-5'-P-CCNC: 25 U/L (ref 13–69)
ANION GAP SERPL CALCULATED.3IONS-SCNC: 12.2 MMOL/L (ref 10–20)
AST SERPL-CCNC: 29 U/L (ref 15–46)
BASOPHILS # BLD AUTO: 0.02 10*3/MM3 (ref 0–0.2)
BASOPHILS NFR BLD AUTO: 0.5 % (ref 0–2.5)
BILIRUB SERPL-MCNC: 1.1 MG/DL (ref 0.2–1.3)
BILIRUB UR QL STRIP: NEGATIVE
BUN BLD-MCNC: 11 MG/DL (ref 7–20)
BUN/CREAT SERPL: 13.8 (ref 6.3–21.9)
CALCIUM SPEC-SCNC: 8.8 MG/DL (ref 8.4–10.2)
CEA SERPL-MCNC: 2.04 NG/ML
CHLORIDE SERPL-SCNC: 105 MMOL/L (ref 98–107)
CLARITY UR: CLEAR
CO2 SERPL-SCNC: 27 MMOL/L (ref 26–30)
COLOR UR: YELLOW
CREAT BLD-MCNC: 0.8 MG/DL (ref 0.6–1.3)
DEPRECATED RDW RBC AUTO: 61.8 FL (ref 37–54)
EOSINOPHIL # BLD AUTO: 0.1 10*3/MM3 (ref 0–0.7)
EOSINOPHIL NFR BLD AUTO: 2.3 % (ref 0–7)
ERYTHROCYTE [DISTWIDTH] IN BLOOD BY AUTOMATED COUNT: 16.1 % (ref 11.5–14.5)
GFR SERPL CREATININE-BSD FRML MDRD: 97 ML/MIN/1.73
GLOBULIN UR ELPH-MCNC: 2.7 GM/DL
GLUCOSE BLD-MCNC: 91 MG/DL (ref 74–98)
GLUCOSE UR STRIP-MCNC: NEGATIVE MG/DL
HCT VFR BLD AUTO: 43.2 % (ref 42–52)
HGB BLD-MCNC: 14.8 G/DL (ref 14–18)
HGB UR QL STRIP.AUTO: NEGATIVE
IMM GRANULOCYTES # BLD: 0.02 10*3/MM3 (ref 0–0.06)
IMM GRANULOCYTES NFR BLD: 0.5 % (ref 0–0.6)
KETONES UR QL STRIP: NEGATIVE
LEUKOCYTE ESTERASE UR QL STRIP.AUTO: NEGATIVE
LYMPHOCYTES # BLD AUTO: 1.39 10*3/MM3 (ref 0.6–3.4)
LYMPHOCYTES NFR BLD AUTO: 31.4 % (ref 10–50)
MCH RBC QN AUTO: 35.6 PG (ref 27–31)
MCHC RBC AUTO-ENTMCNC: 34.3 G/DL (ref 30–37)
MCV RBC AUTO: 103.8 FL (ref 80–94)
MONOCYTES # BLD AUTO: 0.37 10*3/MM3 (ref 0–0.9)
MONOCYTES NFR BLD AUTO: 8.4 % (ref 0–12)
NEUTROPHILS # BLD AUTO: 2.53 10*3/MM3 (ref 2–6.9)
NEUTROPHILS NFR BLD AUTO: 56.9 % (ref 37–80)
NITRITE UR QL STRIP: NEGATIVE
NRBC BLD MANUAL-RTO: 0 /100 WBC (ref 0–0)
PH UR STRIP.AUTO: 5.5 [PH] (ref 5–8)
PLATELET # BLD AUTO: 120 10*3/MM3 (ref 130–400)
PMV BLD AUTO: 11.2 FL (ref 6–12)
POTASSIUM BLD-SCNC: 4.2 MMOL/L (ref 3.5–5.1)
PROT SERPL-MCNC: 6.9 G/DL (ref 6.3–8.2)
PROT UR QL STRIP: ABNORMAL
RBC # BLD AUTO: 4.16 10*6/MM3 (ref 4.7–6.1)
SODIUM BLD-SCNC: 140 MMOL/L (ref 137–145)
SP GR UR STRIP: 1.02 (ref 1–1.03)
UROBILINOGEN UR QL STRIP: ABNORMAL
WBC NRBC COR # BLD: 4.43 10*3/MM3 (ref 4.8–10.8)

## 2018-06-25 PROCEDURE — 96413 CHEMO IV INFUSION 1 HR: CPT

## 2018-06-25 PROCEDURE — 82378 CARCINOEMBRYONIC ANTIGEN: CPT

## 2018-06-25 PROCEDURE — 85025 COMPLETE CBC W/AUTO DIFF WBC: CPT

## 2018-06-25 PROCEDURE — 36415 COLL VENOUS BLD VENIPUNCTURE: CPT

## 2018-06-25 PROCEDURE — 25010000002 BEVACIZUMAB PER 10 MG: Performed by: INTERNAL MEDICINE

## 2018-06-25 PROCEDURE — 25010000002 HEPARIN FLUSH (PORCINE) 100 UNIT/ML SOLUTION: Performed by: INTERNAL MEDICINE

## 2018-06-25 PROCEDURE — 80053 COMPREHEN METABOLIC PANEL: CPT

## 2018-06-25 PROCEDURE — 81003 URINALYSIS AUTO W/O SCOPE: CPT

## 2018-06-25 RX ORDER — SODIUM CHLORIDE 0.9 % (FLUSH) 0.9 %
10 SYRINGE (ML) INJECTION AS NEEDED
Status: CANCELLED | OUTPATIENT
Start: 2018-06-25

## 2018-06-25 RX ORDER — SODIUM CHLORIDE 0.9 % (FLUSH) 0.9 %
10 SYRINGE (ML) INJECTION AS NEEDED
Status: DISCONTINUED | OUTPATIENT
Start: 2018-06-25 | End: 2018-06-25 | Stop reason: HOSPADM

## 2018-06-25 RX ADMIN — SODIUM CHLORIDE, PRESERVATIVE FREE 500 UNITS: 5 INJECTION INTRAVENOUS at 11:08

## 2018-06-25 RX ADMIN — Medication 10 ML: at 11:09

## 2018-06-25 RX ADMIN — BEVACIZUMAB 590 MG: 400 INJECTION, SOLUTION INTRAVENOUS at 10:30

## 2018-07-12 ENCOUNTER — INFUSION (OUTPATIENT)
Dept: ONCOLOGY | Facility: HOSPITAL | Age: 68
End: 2018-07-12

## 2018-07-12 VITALS
BODY MASS INDEX: 24.41 KG/M2 | SYSTOLIC BLOOD PRESSURE: 145 MMHG | DIASTOLIC BLOOD PRESSURE: 82 MMHG | RESPIRATION RATE: 16 BRPM | WEIGHT: 185 LBS | TEMPERATURE: 97.8 F | HEART RATE: 52 BPM

## 2018-07-12 DIAGNOSIS — C18.7 MALIGNANT NEOPLASM OF SIGMOID COLON (HCC): Primary | ICD-10-CM

## 2018-07-12 LAB
ALBUMIN SERPL-MCNC: 3.8 G/DL (ref 3.5–5)
ALBUMIN/GLOB SERPL: 1.4 G/DL (ref 1–2)
ALP SERPL-CCNC: 42 U/L (ref 38–126)
ALT SERPL W P-5'-P-CCNC: 23 U/L (ref 13–69)
ANION GAP SERPL CALCULATED.3IONS-SCNC: 11.1 MMOL/L (ref 10–20)
ANISOCYTOSIS BLD QL: NORMAL
AST SERPL-CCNC: 25 U/L (ref 15–46)
BASOPHILS # BLD AUTO: 0.02 10*3/MM3 (ref 0–0.2)
BASOPHILS NFR BLD AUTO: 0.6 % (ref 0–2.5)
BILIRUB SERPL-MCNC: 1.1 MG/DL (ref 0.2–1.3)
BILIRUB UR QL STRIP: NEGATIVE
BUN BLD-MCNC: 14 MG/DL (ref 7–20)
BUN/CREAT SERPL: 17.5 (ref 6.3–21.9)
CALCIUM SPEC-SCNC: 8.7 MG/DL (ref 8.4–10.2)
CEA SERPL-MCNC: 2.75 NG/ML
CHLORIDE SERPL-SCNC: 106 MMOL/L (ref 98–107)
CLARITY UR: CLEAR
CO2 SERPL-SCNC: 27 MMOL/L (ref 26–30)
COLOR UR: YELLOW
CREAT BLD-MCNC: 0.8 MG/DL (ref 0.6–1.3)
DEPRECATED RDW RBC AUTO: 61.8 FL (ref 37–54)
EOSINOPHIL # BLD AUTO: 0.04 10*3/MM3 (ref 0–0.7)
EOSINOPHIL NFR BLD AUTO: 1.2 % (ref 0–7)
ERYTHROCYTE [DISTWIDTH] IN BLOOD BY AUTOMATED COUNT: 15.9 % (ref 11.5–14.5)
GFR SERPL CREATININE-BSD FRML MDRD: 97 ML/MIN/1.73
GLOBULIN UR ELPH-MCNC: 2.7 GM/DL
GLUCOSE BLD-MCNC: 95 MG/DL (ref 74–98)
GLUCOSE UR STRIP-MCNC: NEGATIVE MG/DL
HCT VFR BLD AUTO: 40.4 % (ref 42–52)
HGB BLD-MCNC: 13.9 G/DL (ref 14–18)
HGB UR QL STRIP.AUTO: NEGATIVE
IMM GRANULOCYTES # BLD: 0.02 10*3/MM3 (ref 0–0.06)
IMM GRANULOCYTES NFR BLD: 0.6 % (ref 0–0.6)
KETONES UR QL STRIP: NEGATIVE
LEUKOCYTE ESTERASE UR QL STRIP.AUTO: NEGATIVE
LYMPHOCYTES # BLD AUTO: 1 10*3/MM3 (ref 0.6–3.4)
LYMPHOCYTES NFR BLD AUTO: 30.1 % (ref 10–50)
MACROCYTES BLD QL SMEAR: NORMAL
MCH RBC QN AUTO: 36.2 PG (ref 27–31)
MCHC RBC AUTO-ENTMCNC: 34.4 G/DL (ref 30–37)
MCV RBC AUTO: 105.2 FL (ref 80–94)
MONOCYTES # BLD AUTO: 0.35 10*3/MM3 (ref 0–0.9)
MONOCYTES NFR BLD AUTO: 10.5 % (ref 0–12)
NEUTROPHILS # BLD AUTO: 1.89 10*3/MM3 (ref 2–6.9)
NEUTROPHILS NFR BLD AUTO: 57 % (ref 37–80)
NITRITE UR QL STRIP: NEGATIVE
NRBC BLD MANUAL-RTO: 0 /100 WBC (ref 0–0)
PH UR STRIP.AUTO: 5.5 [PH] (ref 5–8)
PLATELET # BLD AUTO: 90 10*3/MM3 (ref 130–400)
PMV BLD AUTO: 10.3 FL (ref 6–12)
POIKILOCYTOSIS BLD QL SMEAR: NORMAL
POTASSIUM BLD-SCNC: 4.1 MMOL/L (ref 3.5–5.1)
PROT SERPL-MCNC: 6.5 G/DL (ref 6.3–8.2)
PROT UR QL STRIP: NEGATIVE
RBC # BLD AUTO: 3.84 10*6/MM3 (ref 4.7–6.1)
SMALL PLATELETS BLD QL SMEAR: NORMAL
SODIUM BLD-SCNC: 140 MMOL/L (ref 137–145)
SP GR UR STRIP: 1.02 (ref 1–1.03)
UROBILINOGEN UR QL STRIP: NORMAL
WBC MORPH BLD: NORMAL
WBC NRBC COR # BLD: 3.32 10*3/MM3 (ref 4.8–10.8)

## 2018-07-12 PROCEDURE — 25010000002 BEVACIZUMAB PER 10 MG: Performed by: NURSE PRACTITIONER

## 2018-07-12 PROCEDURE — 81003 URINALYSIS AUTO W/O SCOPE: CPT

## 2018-07-12 PROCEDURE — 80053 COMPREHEN METABOLIC PANEL: CPT

## 2018-07-12 PROCEDURE — 25010000002 HEPARIN FLUSH (PORCINE) 100 UNIT/ML SOLUTION: Performed by: INTERNAL MEDICINE

## 2018-07-12 PROCEDURE — 36415 COLL VENOUS BLD VENIPUNCTURE: CPT

## 2018-07-12 PROCEDURE — 96413 CHEMO IV INFUSION 1 HR: CPT

## 2018-07-12 PROCEDURE — 85007 BL SMEAR W/DIFF WBC COUNT: CPT

## 2018-07-12 PROCEDURE — 85025 COMPLETE CBC W/AUTO DIFF WBC: CPT

## 2018-07-12 PROCEDURE — 82378 CARCINOEMBRYONIC ANTIGEN: CPT

## 2018-07-12 RX ORDER — SODIUM CHLORIDE 0.9 % (FLUSH) 0.9 %
10 SYRINGE (ML) INJECTION AS NEEDED
Status: DISCONTINUED | OUTPATIENT
Start: 2018-07-12 | End: 2018-07-12 | Stop reason: HOSPADM

## 2018-07-12 RX ORDER — SODIUM CHLORIDE 9 MG/ML
250 INJECTION, SOLUTION INTRAVENOUS ONCE
Status: DISCONTINUED | OUTPATIENT
Start: 2018-07-12 | End: 2018-07-12 | Stop reason: HOSPADM

## 2018-07-12 RX ORDER — SODIUM CHLORIDE 0.9 % (FLUSH) 0.9 %
10 SYRINGE (ML) INJECTION AS NEEDED
Status: CANCELLED | OUTPATIENT
Start: 2018-07-12

## 2018-07-12 RX ORDER — DEXTROSE MONOHYDRATE 50 MG/ML
250 INJECTION, SOLUTION INTRAVENOUS ONCE
Status: DISCONTINUED | OUTPATIENT
Start: 2018-07-12 | End: 2018-07-12 | Stop reason: HOSPADM

## 2018-07-12 RX ADMIN — Medication 10 ML: at 10:37

## 2018-07-12 RX ADMIN — BEVACIZUMAB 590 MG: 400 INJECTION, SOLUTION INTRAVENOUS at 10:01

## 2018-07-12 RX ADMIN — SODIUM CHLORIDE, PRESERVATIVE FREE 500 UNITS: 5 INJECTION INTRAVENOUS at 10:37

## 2018-07-16 ENCOUNTER — TELEPHONE (OUTPATIENT)
Dept: ONCOLOGY | Facility: CLINIC | Age: 68
End: 2018-07-16

## 2018-07-16 RX ORDER — GABAPENTIN 100 MG/1
100 CAPSULE ORAL 3 TIMES DAILY
Qty: 90 CAPSULE | Refills: 5 | Status: SHIPPED | OUTPATIENT
Start: 2018-07-16 | End: 2019-04-15 | Stop reason: SDUPTHER

## 2018-07-16 NOTE — TELEPHONE ENCOUNTER
----- Message from Sharif Subramanian sent at 7/16/2018  9:48 AM EDT -----  Regarding: RX REFILL REQUEST   Contact: [phone]  GABAPENPIN 100 MG 1 CAP BY MOUTH 3 TIMES A DAY.     THANKS   JOHN

## 2018-07-16 NOTE — TELEPHONE ENCOUNTER
Attempted call to , no answer. No VM option. Returned call to wife. His pcp who usually writes for this Rx has left [facility]. Dr. Mercado will refill this. Phoned into [facility].

## 2018-07-30 ENCOUNTER — HOSPITAL ENCOUNTER (OUTPATIENT)
Dept: MRI IMAGING | Facility: HOSPITAL | Age: 68
Discharge: HOME | End: 2018-07-30
Admitting: INTERNAL MEDICINE

## 2018-07-30 PROCEDURE — A9577 INJ MULTIHANCE: HCPCS | Performed by: INTERNAL MEDICINE

## 2018-07-30 PROCEDURE — 0 GADOBENATE DIMEGLUMINE 529 MG/ML SOLUTION: Performed by: INTERNAL MEDICINE

## 2018-07-30 PROCEDURE — 74183 MRI ABD W/O CNTR FLWD CNTR: CPT

## 2018-07-30 RX ADMIN — GADOBENATE DIMEGLUMINE 15 ML: 529 INJECTION, SOLUTION INTRAVENOUS at 10:19

## 2018-08-02 ENCOUNTER — OFFICE VISIT (OUTPATIENT)
Dept: ONCOLOGY | Facility: CLINIC | Age: 68
End: 2018-08-02

## 2018-08-02 VITALS
HEIGHT: 73 IN | DIASTOLIC BLOOD PRESSURE: 81 MMHG | BODY MASS INDEX: 23.99 KG/M2 | HEART RATE: 60 BPM | WEIGHT: 181 LBS | SYSTOLIC BLOOD PRESSURE: 171 MMHG | TEMPERATURE: 97.8 F | RESPIRATION RATE: 19 BRPM

## 2018-08-02 DIAGNOSIS — C18.7 MALIGNANT NEOPLASM OF SIGMOID COLON (HCC): Primary | ICD-10-CM

## 2018-08-02 PROCEDURE — 99214 OFFICE O/P EST MOD 30 MIN: CPT | Performed by: INTERNAL MEDICINE

## 2018-08-02 RX ORDER — DEXTROSE MONOHYDRATE 50 MG/ML
250 INJECTION, SOLUTION INTRAVENOUS ONCE
Status: CANCELLED | OUTPATIENT
Start: 2018-11-15 | End: 2018-11-15

## 2018-08-02 RX ORDER — SODIUM CHLORIDE 9 MG/ML
250 INJECTION, SOLUTION INTRAVENOUS ONCE
Status: CANCELLED | OUTPATIENT
Start: 2018-11-15

## 2018-08-02 NOTE — PROGRESS NOTES
CHIEF COMPLAINT: 1.  Peripheral neuropathy of the hands and feet, worse in the feet                                       2.  Follow-up for colon cancer    Problem List:  Oncology/Hematology History    1. Stage CARLOS, [id number] colon cancer with 2 out of 14 pericolonic lymph nodes  involved and a left lobe liver biopsy positive for metastasis, presenting with  a 25 pound weight loss and diarrhea with some perirectal soreness and had  colonoscopy with Dr. Mcclain in January that found this lesion that was  circumferential high-grade invading into the pericolonic fat, status post  sigmoid colectomy of a poorly differentiated adenocarcinoma.   a) Baseline CEA postop of 0.8 with alkaline phosphatase 111, with normal liver  enzymes and creatinine of 0.8. Baseline white count 9820 with a hemoglobin  13.8, platelets 339,000, and CT with contrast showing a right lobe liver dome  lesion as well as an anastomotic change in the bowel.   b) Began CapeOx 03/15/2016.  c) Added in Avastin to CapeOx 04/05/2016, second cycle. (This was 8 weeks out  from surgery).  d) KRAS mutation is negative.  E.) CAT scan in August 2016 shows resolution of disease in the liver and colon and a stable lung nodule.  Hence Avastin, Xeloda, and oxaliplatin continued.  F) oxaliplatin discontinued October 2016 due to worsening peripheral neuropathy.  G.) CTs of February 2017 showed no evidence of metastasis and stable bibasilar nodular scar.  Persistent neuropathy not worsening.  CEA 1.4.  Continuing Avastin and Xeloda without oxaliplatin.  Having some problems with irritation of his eyes on Xeloda.  2.  Peripheral neuropathy, chemotherapy induced        Malignant neoplasm of sigmoid colon (CMS/HCC)    5/20/2016 Initial Diagnosis     Malignant neoplasm of sigmoid colon       5/2/2017 Imaging     CT chest, abdomen, pelvis IMPRESSION:  1. No disease recurrence.  2. Minimal areas of nodular thickening in the right lower lobe, stable.         8/2/2017 Imaging      CT chest/abdomen/pelvis IMPRESSION:  Stable examination with no evidence of acute intrathoracic,  intra-abdominal or pelvic abnormality. There is no evidence of  progression of disease. No metastatic disease.          11/13/2017 Imaging     CT chest/abdomen/pelvis IMPRESSION:  Stable examination without evidence of acute intrathoracic  intra-abdominal or intrapelvic abnormality. No evidence of progression  of disease.   CEA 1.3.            HISTORY OF PRESENT ILLNESS:  The patient is a 67 y.o. male, here for follow up on management of Stage IV a colon cancer.  The patient continues to do well and is tolerating therapy with Xeloda and Avastin without any unusual side effects. His functional status is pretty good.  Clinically no issues with occasional abdominal discomfort.  No constipation or diarrhea.  No hand-foot syndrome.     Past Medical History:   Diagnosis Date   • Hypertension    • Liver disease     mets from colon cancer   • Malignant neoplasm of colon (CMS/HCC)      Past Surgical History:   Procedure Laterality Date   • COLON SURGERY      Sigmoid   • LIVER SURGERY     • PORTACATH PLACEMENT         No Known Allergies    Family History and Social History reviewed and changed as necessary      REVIEW OF SYSTEM:   Review of Systems   Constitutional: Negative for appetite change, chills, diaphoresis, fatigue, fever and unexpected weight change.   HENT:   Negative for mouth sores, sore throat and trouble swallowing.    Eyes: Negative for icterus.   Respiratory: Negative for cough, hemoptysis and shortness of breath.    Cardiovascular: Negative for chest pain, leg swelling and palpitations.   Gastrointestinal: Negative for abdominal distention, abdominal pain, blood in stool, constipation, diarrhea, nausea and vomiting.   Endocrine: Negative for hot flashes.   Genitourinary: Negative for bladder incontinence, difficulty urinating, dysuria, frequency and hematuria.    Musculoskeletal: Negative for gait problem, neck  "pain and neck stiffness.   Skin: Negative for rash.  Positive for dry skin.  Neurological: Positive for peripheral neuropathy in the hands and feet.  Hematological: Negative for adenopathy. Does not bruise/bleed easily.   Psychiatric/Behavioral: Negative for depression. The patient is not nervous/anxious.    All other systems reviewed and are negative.       PHYSICAL EXAM    Vitals:    08/02/18 0847   BP: 171/81   Pulse: 60   Resp: 19   Temp: 97.8 °F (36.6 °C)   TempSrc: Temporal Artery    Weight: 82.1 kg (181 lb)   Height: 185.4 cm (73\")     Constitutional: Appears well-developed and well-nourished. No distress.   ECOG: (1) Restricted in physically strenuous activity, ambulatory and able to do work of light nature  HENT:   Head: Normocephalic.   Mouth/Throat: Oropharynx is clear and moist.   Eyes: Conjunctivae are normal. Pupils are equal, round, and reactive to light. No scleral icterus.   Neck: Neck supple. No JVD present. No thyromegaly present.   Cardiovascular: Normal rate, regular rhythm and normal heart sounds.    Pulmonary/Chest: Breath sounds normal. No respiratory distress.   Nodes: No cervical, supraclavicular or axillary nodes palpable on exam.  Abdominal: Soft. Exhibits no distension and no mass. There is no hepatosplenomegaly. There is no tenderness. There is no rebound and no guarding.   Musculoskeletal:Exhibits no edema, tenderness or deformity.   Neurological: Alert and oriented to person, place, and time. Exhibits normal muscle tone.   Skin: Dry.  No ecchymosis, no petechiae and no rash noted. Not diaphoretic. No cyanosis.   Psychiatric: Normal mood and affect.   Vitals reviewed.  Laboratory data reviewed along with CT chest abdomen and pelvis and images thereof as outlined above in the oncology history were reviewed at time of visit.    Lab Results   Component Value Date    WBC 3.32 (L) 07/12/2018    HGB 13.9 (L) 07/12/2018    HCT 40.4 (L) 07/12/2018    .2 (H) 07/12/2018    PLT 90 (L) " 07/12/2018       Lab Results   Component Value Date    GLUCOSE 95 07/12/2018    BUN 14 07/12/2018    CREATININE 0.80 07/12/2018    EGFRIFNONA 97 07/12/2018    BCR 17.5 07/12/2018    K 4.1 07/12/2018    CO2 27.0 07/12/2018    CALCIUM 8.7 07/12/2018    ALBUMIN 3.80 07/12/2018    LABIL2 1.0 02/18/2016    AST 25 07/12/2018    ALT 23 07/12/2018       Lab Results   Component Value Date    CEA 2.75 07/12/2018    CEA 2.04 06/25/2018    CEA 1.94 05/31/2018    CEA 2.00 05/10/2018     PROCEDURE: MRI ABDOMEN W WO CONTRAST-     HISTORY: restaging colon cancer; C18.7-Malignant neoplasm of sigmoid  colon     TECHNIQUE: Multiplanar multisequence imaging of the abdomen was  performed both before and following the administration of 15 mL  MultiHance intravenous contrast.     FINDINGS: In the inferior right lobe of the liver there is an  approximately 2.5 cm T2 hyperintense lesion which is well-circumscribed  and does not demonstrate any postcontrast enhancement consistent with  cyst. This is unchanged compared to multiple prior exams dating back to  2016. No enhancing liver lesions are identified. The adrenals, kidneys,  spleen and pancreas are unremarkable. There is no adenopathy. There is  no ascites.     IMPRESSION:  No evidence of metastatic disease within the abdomen.     This report was finalized on 7/30/2018 10:57 AM by David Ingram M.D..    Assessment/Plan     1. 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 Reviewed his scans with him.  He has no sign of disease at this time.  I think it's reasonable to give him a holiday from his treatment.  We will try to resume in November.  I would like to get labs done a week before I see him.  This may give him some time for his marrow to recover.  2. Peripheral neuropathy secondary to chemotherapy  3. Dental caries: Patient is scheduled for complete dental extraction at the end of March  4. Cytopenia due  chemotherapy:  Will hold treatment for 3 months.    Discussion: At this time he has no measurable disease.  Plan for a drug holiday for 3 months.    Spent 25 minutes on the patient's plan and care with more than 50% spent on counseling the patient regarding the plan going forward and reviewing his scans with him.    Santi Abad MD    11/16/2017

## 2018-09-10 RX ORDER — CLONAZEPAM 1 MG/1
1 TABLET ORAL DAILY
Qty: 30 TABLET | Refills: 5 | Status: SHIPPED | OUTPATIENT
Start: 2018-09-10 | End: 2018-09-14 | Stop reason: SDUPTHER

## 2018-09-14 ENCOUNTER — INFUSION (OUTPATIENT)
Dept: ONCOLOGY | Facility: HOSPITAL | Age: 68
End: 2018-09-14

## 2018-09-14 VITALS
DIASTOLIC BLOOD PRESSURE: 79 MMHG | RESPIRATION RATE: 18 BRPM | TEMPERATURE: 97.8 F | SYSTOLIC BLOOD PRESSURE: 151 MMHG | HEART RATE: 56 BPM

## 2018-09-14 DIAGNOSIS — C18.7 MALIGNANT NEOPLASM OF SIGMOID COLON (HCC): Primary | ICD-10-CM

## 2018-09-14 PROCEDURE — 25010000002 HEPARIN FLUSH (PORCINE) 100 UNIT/ML SOLUTION: Performed by: INTERNAL MEDICINE

## 2018-09-14 PROCEDURE — 96523 IRRIG DRUG DELIVERY DEVICE: CPT

## 2018-09-14 RX ORDER — CLONAZEPAM 1 MG/1
1 TABLET ORAL DAILY
Qty: 30 TABLET | Refills: 3 | Status: SHIPPED | OUTPATIENT
Start: 2018-09-14 | End: 2018-12-26 | Stop reason: SDUPTHER

## 2018-09-14 RX ORDER — SODIUM CHLORIDE 0.9 % (FLUSH) 0.9 %
10 SYRINGE (ML) INJECTION AS NEEDED
Status: CANCELLED | OUTPATIENT
Start: 2018-09-14

## 2018-09-14 RX ORDER — SODIUM CHLORIDE 0.9 % (FLUSH) 0.9 %
10 SYRINGE (ML) INJECTION AS NEEDED
Status: DISCONTINUED | OUTPATIENT
Start: 2018-09-14 | End: 2018-09-14 | Stop reason: HOSPADM

## 2018-09-14 RX ADMIN — SODIUM CHLORIDE, PRESERVATIVE FREE 500 UNITS: 5 INJECTION INTRAVENOUS at 09:06

## 2018-09-14 RX ADMIN — Medication 10 ML: at 09:06

## 2018-09-14 NOTE — TELEPHONE ENCOUNTER
Clonazepam sent to [facility] and approved by Dr. Mercado.  Called patient to inform but no answer.

## 2018-11-09 ENCOUNTER — TELEPHONE (OUTPATIENT)
Dept: ONCOLOGY | Facility: CLINIC | Age: 68
End: 2018-11-09

## 2018-11-09 RX ORDER — ESCITALOPRAM OXALATE 10 MG/1
10 TABLET ORAL DAILY
Qty: 30 TABLET | Refills: 11 | Status: SHIPPED | OUTPATIENT
Start: 2018-11-09 | End: 2019-08-12 | Stop reason: SDUPTHER

## 2018-11-15 ENCOUNTER — OFFICE VISIT (OUTPATIENT)
Dept: ONCOLOGY | Facility: CLINIC | Age: 68
End: 2018-11-15

## 2018-11-15 ENCOUNTER — INFUSION (OUTPATIENT)
Dept: ONCOLOGY | Facility: HOSPITAL | Age: 68
End: 2018-11-15

## 2018-11-15 VITALS
DIASTOLIC BLOOD PRESSURE: 72 MMHG | HEART RATE: 59 BPM | WEIGHT: 176 LBS | RESPIRATION RATE: 14 BRPM | BODY MASS INDEX: 23.33 KG/M2 | HEIGHT: 73 IN | TEMPERATURE: 98 F | SYSTOLIC BLOOD PRESSURE: 136 MMHG

## 2018-11-15 DIAGNOSIS — C18.7 MALIGNANT NEOPLASM OF SIGMOID COLON (HCC): Primary | ICD-10-CM

## 2018-11-15 DIAGNOSIS — C18.7 MALIGNANT NEOPLASM OF SIGMOID COLON (HCC): ICD-10-CM

## 2018-11-15 LAB
ALBUMIN SERPL-MCNC: 4.6 G/DL (ref 3.5–5)
ALBUMIN/GLOB SERPL: 1.6 G/DL (ref 1–2)
ALP SERPL-CCNC: 60 U/L (ref 38–126)
ALT SERPL W P-5'-P-CCNC: 38 U/L (ref 13–69)
ANION GAP SERPL CALCULATED.3IONS-SCNC: 10.2 MMOL/L (ref 10–20)
AST SERPL-CCNC: 29 U/L (ref 15–46)
BASOPHILS # BLD AUTO: 0.01 10*3/MM3 (ref 0–0.2)
BASOPHILS NFR BLD AUTO: 0.2 % (ref 0–2.5)
BILIRUB SERPL-MCNC: 1.5 MG/DL (ref 0.2–1.3)
BILIRUB UR QL STRIP: NEGATIVE
BUN BLD-MCNC: 18 MG/DL (ref 7–20)
BUN/CREAT SERPL: 22.5 (ref 6.3–21.9)
CALCIUM SPEC-SCNC: 9.6 MG/DL (ref 8.4–10.2)
CEA SERPL-MCNC: 2.23 NG/ML
CHLORIDE SERPL-SCNC: 104 MMOL/L (ref 98–107)
CLARITY UR: CLEAR
CO2 SERPL-SCNC: 28 MMOL/L (ref 26–30)
COLOR UR: YELLOW
CREAT BLD-MCNC: 0.8 MG/DL (ref 0.6–1.3)
DEPRECATED RDW RBC AUTO: 46.5 FL (ref 37–54)
EOSINOPHIL # BLD AUTO: 0.07 10*3/MM3 (ref 0–0.7)
EOSINOPHIL NFR BLD AUTO: 1.3 % (ref 0–7)
ERYTHROCYTE [DISTWIDTH] IN BLOOD BY AUTOMATED COUNT: 12.8 % (ref 11.5–14.5)
GFR SERPL CREATININE-BSD FRML MDRD: 97 ML/MIN/1.73
GLOBULIN UR ELPH-MCNC: 2.8 GM/DL
GLUCOSE BLD-MCNC: 97 MG/DL (ref 74–98)
GLUCOSE UR STRIP-MCNC: NEGATIVE MG/DL
HCT VFR BLD AUTO: 43.8 % (ref 42–52)
HGB BLD-MCNC: 14.6 G/DL (ref 14–18)
HGB UR QL STRIP.AUTO: NEGATIVE
IMM GRANULOCYTES # BLD: 0.03 10*3/MM3 (ref 0–0.06)
IMM GRANULOCYTES NFR BLD: 0.6 % (ref 0–0.6)
KETONES UR QL STRIP: NEGATIVE
LEUKOCYTE ESTERASE UR QL STRIP.AUTO: NEGATIVE
LYMPHOCYTES # BLD AUTO: 1.18 10*3/MM3 (ref 0.6–3.4)
LYMPHOCYTES NFR BLD AUTO: 21.9 % (ref 10–50)
MCH RBC QN AUTO: 32.7 PG (ref 27–31)
MCHC RBC AUTO-ENTMCNC: 33.3 G/DL (ref 30–37)
MCV RBC AUTO: 98.2 FL (ref 80–94)
MONOCYTES # BLD AUTO: 0.49 10*3/MM3 (ref 0–0.9)
MONOCYTES NFR BLD AUTO: 9.1 % (ref 0–12)
NEUTROPHILS # BLD AUTO: 3.6 10*3/MM3 (ref 2–6.9)
NEUTROPHILS NFR BLD AUTO: 66.9 % (ref 37–80)
NITRITE UR QL STRIP: NEGATIVE
NRBC BLD MANUAL-RTO: 0 /100 WBC (ref 0–0)
PH UR STRIP.AUTO: 7 [PH] (ref 5–8)
PLATELET # BLD AUTO: 131 10*3/MM3 (ref 130–400)
PMV BLD AUTO: 10.5 FL (ref 6–12)
POTASSIUM BLD-SCNC: 4.2 MMOL/L (ref 3.5–5.1)
PROT SERPL-MCNC: 7.4 G/DL (ref 6.3–8.2)
PROT UR QL STRIP: NEGATIVE
RBC # BLD AUTO: 4.46 10*6/MM3 (ref 4.7–6.1)
SODIUM BLD-SCNC: 138 MMOL/L (ref 137–145)
SP GR UR STRIP: 1.02 (ref 1–1.03)
UROBILINOGEN UR QL STRIP: NORMAL
WBC NRBC COR # BLD: 5.38 10*3/MM3 (ref 4.8–10.8)

## 2018-11-15 PROCEDURE — 99214 OFFICE O/P EST MOD 30 MIN: CPT | Performed by: NURSE PRACTITIONER

## 2018-11-15 PROCEDURE — 25010000002 BEVACIZUMAB PER 10 MG: Performed by: INTERNAL MEDICINE

## 2018-11-15 PROCEDURE — 36415 COLL VENOUS BLD VENIPUNCTURE: CPT

## 2018-11-15 PROCEDURE — 85025 COMPLETE CBC W/AUTO DIFF WBC: CPT

## 2018-11-15 PROCEDURE — 82378 CARCINOEMBRYONIC ANTIGEN: CPT

## 2018-11-15 PROCEDURE — 96413 CHEMO IV INFUSION 1 HR: CPT

## 2018-11-15 PROCEDURE — 80053 COMPREHEN METABOLIC PANEL: CPT

## 2018-11-15 PROCEDURE — 81003 URINALYSIS AUTO W/O SCOPE: CPT

## 2018-11-15 RX ORDER — SODIUM CHLORIDE 9 MG/ML
250 INJECTION, SOLUTION INTRAVENOUS ONCE
Status: DISCONTINUED | OUTPATIENT
Start: 2018-11-15 | End: 2018-11-15 | Stop reason: HOSPADM

## 2018-11-15 RX ORDER — DEXTROSE MONOHYDRATE 50 MG/ML
250 INJECTION, SOLUTION INTRAVENOUS ONCE
Status: DISCONTINUED | OUTPATIENT
Start: 2018-11-15 | End: 2018-11-15 | Stop reason: HOSPADM

## 2018-11-15 RX ADMIN — SODIUM CHLORIDE, PRESERVATIVE FREE 500 UNITS: 5 INJECTION INTRAVENOUS at 11:10

## 2018-11-15 RX ADMIN — BEVACIZUMAB 590 MG: 400 INJECTION, SOLUTION INTRAVENOUS at 10:24

## 2018-11-15 NOTE — PROGRESS NOTES
CHIEF COMPLAINT: 1.  Peripheral neuropathy of the hands and feet, worse in the feet                                       2.  Follow-up for colon cancer    Problem List:  Oncology/Hematology History    1. Stage CARLOS, [id number] colon cancer with 2 out of 14 pericolonic lymph nodes  involved and a left lobe liver biopsy positive for metastasis, presenting with  a 25 pound weight loss and diarrhea with some perirectal soreness and had  colonoscopy with Dr. Mcclain in January that found this lesion that was  circumferential high-grade invading into the pericolonic fat, status post  sigmoid colectomy of a poorly differentiated adenocarcinoma.   a) Baseline CEA postop of 0.8 with alkaline phosphatase 111, with normal liver  enzymes and creatinine of 0.8. Baseline white count 9820 with a hemoglobin  13.8, platelets 339,000, and CT with contrast showing a right lobe liver dome  lesion as well as an anastomotic change in the bowel.   b) Began CapeOx 03/15/2016.  c) Added in Avastin to CapeOx 04/05/2016, second cycle. (This was 8 weeks out  from surgery).  d) KRAS mutation is negative.  E.) CAT scan in August 2016 shows resolution of disease in the liver and colon and a stable lung nodule.  Hence Avastin, Xeloda, and oxaliplatin continued.  F) oxaliplatin discontinued October 2016 due to worsening peripheral neuropathy.  G.) CTs of February 2017 showed no evidence of metastasis and stable bibasilar nodular scar.  Persistent neuropathy not worsening.  CEA 1.4.  Continuing Avastin and Xeloda without oxaliplatin.  Having some problems with irritation of his eyes on Xeloda.  2.  Peripheral neuropathy, chemotherapy induced        Malignant neoplasm of sigmoid colon (CMS/HCC)    5/20/2016 Initial Diagnosis     Malignant neoplasm of sigmoid colon       5/2/2017 Imaging     CT chest, abdomen, pelvis IMPRESSION:  1. No disease recurrence.  2. Minimal areas of nodular thickening in the right lower lobe, stable.         8/2/2017 Imaging      CT chest/abdomen/pelvis IMPRESSION:  Stable examination with no evidence of acute intrathoracic,  intra-abdominal or pelvic abnormality. There is no evidence of  progression of disease. No metastatic disease.          11/13/2017 Imaging     CT chest/abdomen/pelvis IMPRESSION:  Stable examination without evidence of acute intrathoracic  intra-abdominal or intrapelvic abnormality. No evidence of progression  of disease.   CEA 1.3.            HISTORY OF PRESENT ILLNESS:  The patient is a 67 y.o. male, here for follow up on management of Stage IV a colon cancer.  The patient has done well with his drug holiday  He has continued to exercise and play golf.  Clinically reports no issues.  Denies constipation or diarrhea.  Denies hand-foot syndrome.     Past Medical History:   Diagnosis Date   • Hypertension    • Liver disease     mets from colon cancer   • Malignant neoplasm of colon (CMS/HCC)      Past Surgical History:   Procedure Laterality Date   • COLON SURGERY      Sigmoid   • LIVER SURGERY     • PORTACATH PLACEMENT         No Known Allergies    Family History and Social History reviewed and changed as necessary      REVIEW OF SYSTEM:   Review of Systems   Constitutional: Negative for appetite change, chills, diaphoresis, fatigue, fever and unexpected weight change.   HENT:   Negative for mouth sores, sore throat and trouble swallowing.    Eyes: Negative for icterus.   Respiratory: Negative for cough, hemoptysis and shortness of breath.    Cardiovascular: Negative for chest pain, leg swelling and palpitations.   Gastrointestinal: Negative for abdominal distention, abdominal pain, blood in stool, constipation, diarrhea, nausea and vomiting.   Endocrine: Negative for hot flashes.   Genitourinary: Negative for bladder incontinence, difficulty urinating, dysuria, frequency and hematuria.    Musculoskeletal: Negative for gait problem, neck pain and neck stiffness.   Skin: Negative for rash.  Positive for dry  skin.  Neurological: Positive for peripheral neuropathy in the hands and feet.  Hematological: Negative for adenopathy. Does not bruise/bleed easily.   Psychiatric/Behavioral: Negative for depression. The patient is not nervous/anxious.    All other systems reviewed and are negative.       PHYSICAL EXAM    There were no vitals filed for this visit.  Constitutional: Appears well-developed and well-nourished. No distress.   ECOG: (1) Restricted in physically strenuous activity, ambulatory and able to do work of light nature  HENT:   Head: Normocephalic.   Mouth/Throat: Oropharynx is clear and moist.   Eyes: Conjunctivae are normal. Pupils are equal, round, and reactive to light. No scleral icterus.   Neck: Neck supple. No JVD present. No thyromegaly present.   Cardiovascular: Normal rate, regular rhythm and normal heart sounds.    Pulmonary/Chest: Breath sounds normal. No respiratory distress.   Nodes: No cervical, supraclavicular or axillary nodes palpable on exam.  Abdominal: Soft. Exhibits no distension and no mass. There is no hepatosplenomegaly. There is no tenderness. There is no rebound and no guarding.   Musculoskeletal:Exhibits no edema, tenderness or deformity.   Neurological: Alert and oriented to person, place, and time. Exhibits normal muscle tone.   Skin: Dry.  No ecchymosis, no petechiae and no rash noted. Not diaphoretic. No cyanosis.   Psychiatric: Normal mood and affect.   Vitals reviewed.  Laboratory data reviewed along with CT chest abdomen and pelvis and images thereof as outlined above in the oncology history were reviewed at time of visit.    Lab Results   Component Value Date    WBC 3.32 (L) 07/12/2018    HGB 13.9 (L) 07/12/2018    HCT 40.4 (L) 07/12/2018    .2 (H) 07/12/2018    PLT 90 (L) 07/12/2018       Lab Results   Component Value Date    GLUCOSE 95 07/12/2018    BUN 14 07/12/2018    CREATININE 0.80 07/12/2018    EGFRIFNONA 97 07/12/2018    BCR 17.5 07/12/2018    K 4.1 07/12/2018     CO2 27.0 07/12/2018    CALCIUM 8.7 07/12/2018    ALBUMIN 3.80 07/12/2018    LABIL2 1.0 02/18/2016    AST 25 07/12/2018    ALT 23 07/12/2018       Lab Results   Component Value Date    CEA 2.75 07/12/2018    CEA 2.04 06/25/2018    CEA 1.94 05/31/2018    CEA 2.00 05/10/2018     PROCEDURE: MRI ABDOMEN W WO CONTRAST-     HISTORY: restaging colon cancer; C18.7-Malignant neoplasm of sigmoid colon     TECHNIQUE: Multiplanar multisequence imaging of the abdomen was performed both before and following the administration of 15 mL  MultiHance intravenous contrast.     FINDINGS: In the inferior right lobe of the liver there is an  approximately 2.5 cm T2 hyperintense lesion which is well-circumscribed  and does not demonstrate any postcontrast enhancement consistent with  cyst. This is unchanged compared to multiple prior exams dating back to  2016. No enhancing liver lesions are identified. The adrenals, kidneys,  spleen and pancreas are unremarkable. There is no adenopathy. There is  no ascites.     IMPRESSION:  No evidence of metastatic disease within the abdomen.     This report was finalized on 7/30/2018 10:57 AM by David Ingram M.D..    Assessment/Plan     1. 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    2.   Peripheral neuropathy secondary to chemotherapy. We will       continue to monitor  3.   Dental caries: Complete dental extraction at the end of March 2018  4.   Cytopenia due chemotherapy:  Continue to monitor.     Discussion: Clinically, at this time, he is stable with no sign of disease.  We will plan to restart Avastin and Xeloda as long as his counts are stable today.  We reviewed side effects  We reviewed that Avastin can cause hypertension, proteinuria, nosebleeds, and increased risk of both DVT and bleeding. We also reviewed that Xeloda can cause including myelosuppression, nausea, diarrhea, hand-foot syndrome,  coronary vasospasm, and mucositis.  He did not have labs drawn so we will draw labs today to see if his marrow has recovered. We will plan to see him back in 3 weeks to evaluate treatment tolerance.       Spent 25 minutes on the patient's plan and care with more than 50% spent on counseling the patient regarding the plan going forward and reviewing his scans with him.    Adeola Sunshine, APRN    11/16/2017

## 2018-11-30 ENCOUNTER — TELEPHONE (OUTPATIENT)
Dept: ONCOLOGY | Facility: HOSPITAL | Age: 68
End: 2018-11-30

## 2018-11-30 NOTE — TELEPHONE ENCOUNTER
Returned call to patient. Discussed most recent cycle of Xeloda started 11/15/18, should have taken last dose on 11/29/18 (Day 14) and will now be on 7 days off. With next cycle due to start on 12/6/18, pt has same day appt. Pt appreciative of telephone follow-up.     ----- Message -----  From: Jenna Fisher  Sent: 11/30/2018   9:15 AM  To: [facility]  Subject: DR APRIL [place]                                     PT was wondering if today he was suppose to go off chemo pills    Pt call back

## 2018-12-10 ENCOUNTER — INFUSION (OUTPATIENT)
Dept: ONCOLOGY | Facility: HOSPITAL | Age: 68
End: 2018-12-10

## 2018-12-10 VITALS
TEMPERATURE: 97.8 F | BODY MASS INDEX: 23.62 KG/M2 | SYSTOLIC BLOOD PRESSURE: 145 MMHG | WEIGHT: 179 LBS | RESPIRATION RATE: 18 BRPM | DIASTOLIC BLOOD PRESSURE: 79 MMHG | HEART RATE: 59 BPM

## 2018-12-10 DIAGNOSIS — C18.7 MALIGNANT NEOPLASM OF SIGMOID COLON (HCC): ICD-10-CM

## 2018-12-10 DIAGNOSIS — C18.7 MALIGNANT NEOPLASM OF SIGMOID COLON (HCC): Primary | ICD-10-CM

## 2018-12-10 LAB
ALBUMIN SERPL-MCNC: 4.4 G/DL (ref 3.5–5)
ALBUMIN/GLOB SERPL: 1.6 G/DL (ref 1–2)
ALP SERPL-CCNC: 50 U/L (ref 38–126)
ALT SERPL W P-5'-P-CCNC: 29 U/L (ref 13–69)
ANION GAP SERPL CALCULATED.3IONS-SCNC: 9.4 MMOL/L (ref 10–20)
AST SERPL-CCNC: 22 U/L (ref 15–46)
BASOPHILS # BLD AUTO: 0.01 10*3/MM3 (ref 0–0.2)
BASOPHILS NFR BLD AUTO: 0.2 % (ref 0–2.5)
BILIRUB SERPL-MCNC: 1.2 MG/DL (ref 0.2–1.3)
BILIRUB UR QL STRIP: NEGATIVE
BUN BLD-MCNC: 12 MG/DL (ref 7–20)
BUN/CREAT SERPL: 17.1 (ref 6.3–21.9)
CALCIUM SPEC-SCNC: 8.9 MG/DL (ref 8.4–10.2)
CEA SERPL-MCNC: 1.88 NG/ML
CHLORIDE SERPL-SCNC: 106 MMOL/L (ref 98–107)
CLARITY UR: CLEAR
CO2 SERPL-SCNC: 27 MMOL/L (ref 26–30)
COLOR UR: YELLOW
CREAT BLD-MCNC: 0.7 MG/DL (ref 0.6–1.3)
DEPRECATED RDW RBC AUTO: 51.9 FL (ref 37–54)
EOSINOPHIL # BLD AUTO: 0.06 10*3/MM3 (ref 0–0.7)
EOSINOPHIL NFR BLD AUTO: 1.2 % (ref 0–7)
ERYTHROCYTE [DISTWIDTH] IN BLOOD BY AUTOMATED COUNT: 14.4 % (ref 11.5–14.5)
GFR SERPL CREATININE-BSD FRML MDRD: 112 ML/MIN/1.73
GLOBULIN UR ELPH-MCNC: 2.7 GM/DL
GLUCOSE BLD-MCNC: 96 MG/DL (ref 74–98)
GLUCOSE UR STRIP-MCNC: NEGATIVE MG/DL
HCT VFR BLD AUTO: 42.9 % (ref 42–52)
HGB BLD-MCNC: 14.1 G/DL (ref 14–18)
HGB UR QL STRIP.AUTO: NEGATIVE
IMM GRANULOCYTES # BLD: 0.01 10*3/MM3 (ref 0–0.06)
IMM GRANULOCYTES NFR BLD: 0.2 % (ref 0–0.6)
KETONES UR QL STRIP: NEGATIVE
LEUKOCYTE ESTERASE UR QL STRIP.AUTO: NEGATIVE
LYMPHOCYTES # BLD AUTO: 1.15 10*3/MM3 (ref 0.6–3.4)
LYMPHOCYTES NFR BLD AUTO: 22.4 % (ref 10–50)
MCH RBC QN AUTO: 32.4 PG (ref 27–31)
MCHC RBC AUTO-ENTMCNC: 32.9 G/DL (ref 30–37)
MCV RBC AUTO: 98.6 FL (ref 80–94)
MONOCYTES # BLD AUTO: 0.33 10*3/MM3 (ref 0–0.9)
MONOCYTES NFR BLD AUTO: 6.4 % (ref 0–12)
NEUTROPHILS # BLD AUTO: 3.58 10*3/MM3 (ref 2–6.9)
NEUTROPHILS NFR BLD AUTO: 69.6 % (ref 37–80)
NITRITE UR QL STRIP: NEGATIVE
NRBC BLD MANUAL-RTO: 0 /100 WBC (ref 0–0)
PH UR STRIP.AUTO: 6 [PH] (ref 5–8)
PLATELET # BLD AUTO: 128 10*3/MM3 (ref 130–400)
PMV BLD AUTO: 10.1 FL (ref 6–12)
POTASSIUM BLD-SCNC: 4.4 MMOL/L (ref 3.5–5.1)
PROT SERPL-MCNC: 7.1 G/DL (ref 6.3–8.2)
PROT UR QL STRIP: NEGATIVE
RBC # BLD AUTO: 4.35 10*6/MM3 (ref 4.7–6.1)
SODIUM BLD-SCNC: 138 MMOL/L (ref 137–145)
SP GR UR STRIP: 1.01 (ref 1–1.03)
UROBILINOGEN UR QL STRIP: NORMAL
WBC NRBC COR # BLD: 5.14 10*3/MM3 (ref 4.8–10.8)

## 2018-12-10 PROCEDURE — 96413 CHEMO IV INFUSION 1 HR: CPT

## 2018-12-10 PROCEDURE — 85025 COMPLETE CBC W/AUTO DIFF WBC: CPT

## 2018-12-10 PROCEDURE — 82378 CARCINOEMBRYONIC ANTIGEN: CPT

## 2018-12-10 PROCEDURE — 25010000002 BEVACIZUMAB PER 10 MG: Performed by: NURSE PRACTITIONER

## 2018-12-10 PROCEDURE — 36415 COLL VENOUS BLD VENIPUNCTURE: CPT

## 2018-12-10 PROCEDURE — 80053 COMPREHEN METABOLIC PANEL: CPT

## 2018-12-10 PROCEDURE — 81003 URINALYSIS AUTO W/O SCOPE: CPT

## 2018-12-10 PROCEDURE — 25010000002 BEVACIZUMAB 100 MG/4ML SOLUTION 4 ML VIAL: Performed by: NURSE PRACTITIONER

## 2018-12-10 RX ORDER — DEXTROSE MONOHYDRATE 50 MG/ML
250 INJECTION, SOLUTION INTRAVENOUS ONCE
Status: CANCELLED | OUTPATIENT
Start: 2018-12-10 | End: 2018-12-10

## 2018-12-10 RX ORDER — SODIUM CHLORIDE 0.9 % (FLUSH) 0.9 %
10 SYRINGE (ML) INJECTION AS NEEDED
Status: CANCELLED | OUTPATIENT
Start: 2018-12-10

## 2018-12-10 RX ORDER — SODIUM CHLORIDE 9 MG/ML
250 INJECTION, SOLUTION INTRAVENOUS ONCE
Status: DISCONTINUED | OUTPATIENT
Start: 2018-12-10 | End: 2018-12-10 | Stop reason: HOSPADM

## 2018-12-10 RX ORDER — AMOXICILLIN AND CLAVULANATE POTASSIUM 875; 125 MG/1; MG/1
1 TABLET, FILM COATED ORAL EVERY 12 HOURS SCHEDULED
Qty: 20 TABLET | Refills: 1 | Status: CANCELLED | OUTPATIENT
Start: 2018-12-10

## 2018-12-10 RX ORDER — SODIUM CHLORIDE 9 MG/ML
250 INJECTION, SOLUTION INTRAVENOUS ONCE
Status: CANCELLED | OUTPATIENT
Start: 2018-12-10

## 2018-12-10 RX ORDER — DEXTROSE MONOHYDRATE 50 MG/ML
250 INJECTION, SOLUTION INTRAVENOUS ONCE
Status: DISCONTINUED | OUTPATIENT
Start: 2018-12-10 | End: 2018-12-10 | Stop reason: HOSPADM

## 2018-12-10 RX ORDER — SODIUM CHLORIDE 0.9 % (FLUSH) 0.9 %
10 SYRINGE (ML) INJECTION AS NEEDED
Status: DISCONTINUED | OUTPATIENT
Start: 2018-12-10 | End: 2018-12-10 | Stop reason: HOSPADM

## 2018-12-10 RX ADMIN — BEVACIZUMAB 590 MG: 400 INJECTION, SOLUTION INTRAVENOUS at 11:35

## 2018-12-10 RX ADMIN — SODIUM CHLORIDE, PRESERVATIVE FREE 10 ML: 5 INJECTION INTRAVENOUS at 12:12

## 2018-12-10 RX ADMIN — SODIUM CHLORIDE, PRESERVATIVE FREE 500 UNITS: 5 INJECTION INTRAVENOUS at 12:12

## 2018-12-12 NOTE — PROGRESS NOTES
CHIEF COMPLAINT: 1.  Peripheral neuropathy of the hands and feet, worse in the feet                                       2.  Follow-up for colon cancer    Problem List:  Oncology/Hematology History    1. Stage CARLOS, [id number] colon cancer with 2 out of 14 pericolonic lymph nodes  involved and a left lobe liver biopsy positive for metastasis, presenting with  a 25 pound weight loss and diarrhea with some perirectal soreness and had  colonoscopy with Dr. Mcclain in January that found this lesion that was  circumferential high-grade invading into the pericolonic fat, status post  sigmoid colectomy of a poorly differentiated adenocarcinoma.   a) Baseline CEA postop of 0.8 with alkaline phosphatase 111, with normal liver  enzymes and creatinine of 0.8. Baseline white count 9820 with a hemoglobin  13.8, platelets 339,000, and CT with contrast showing a right lobe liver dome  lesion as well as an anastomotic change in the bowel.   b) Began CapeOx 03/15/2016.  c) Added in Avastin to [facility]Ox 04/05/2016, second cycle. (This was 8 weeks out  from surgery).  d) KRAS mutation is negative.  E.) CAT scan in August 2016 shows resolution of disease in the liver and colon and a stable lung nodule.  Hence Avastin, Xeloda, and oxaliplatin continued.  F) oxaliplatin discontinued October 2016 due to worsening peripheral neuropathy.  G.) CTs of February 2017 showed no evidence of metastasis and stable bibasilar nodular scar.  Persistent neuropathy not worsening.  CEA 1.4.  Continuing Avastin and Xeloda without oxaliplatin.  Having some problems with irritation of his eyes on Xeloda.  2.  Peripheral neuropathy, chemotherapy induced        Malignant neoplasm of sigmoid colon (CMS/HCC)    5/20/2016 Initial Diagnosis     Malignant neoplasm of sigmoid colon       5/2/2017 Imaging     CT chest, abdomen, pelvis IMPRESSION:  1. No disease recurrence.  2. Minimal areas of nodular thickening in the right lower lobe, stable.         8/2/2017 Imaging      CT chest/abdomen/pelvis IMPRESSION:  Stable examination with no evidence of acute intrathoracic,  intra-abdominal or pelvic abnormality. There is no evidence of  progression of disease. No metastatic disease.          11/13/2017 Imaging     CT chest/abdomen/pelvis IMPRESSION:  Stable examination without evidence of acute intrathoracic  intra-abdominal or intrapelvic abnormality. No evidence of progression  of disease.   CEA 1.3.            HISTORY OF PRESENT ILLNESS:  The patient is a 68 y.o. male, here for follow up on management of Stage IV a colon cancer.  The patient has done well with last cycle.  He has been feeling well.  He was confused and missed his appointment.  He was treated a few days late. He has continued to exercise.  He plays golf 3 days a week. Clinically reports no issues.  Denies constipation or diarrhea.  Denies hand-foot syndrome.  He is wondering when his next scan is due.     Past Medical History:   Diagnosis Date   • Hypertension    • Liver disease     mets from colon cancer   • Malignant neoplasm of colon (CMS/HCC)      Past Surgical History:   Procedure Laterality Date   • COLON SURGERY      Sigmoid   • LIVER SURGERY     • PORTACATH PLACEMENT         No Known Allergies    Family History and Social History reviewed and changed as necessary      REVIEW OF SYSTEM:   Review of Systems   Constitutional: Negative for appetite change, chills, diaphoresis, fatigue, fever and unexpected weight change.   HENT:   Negative for mouth sores, sore throat and trouble swallowing.    Eyes: Negative for icterus.   Respiratory: Negative for cough, hemoptysis and shortness of breath.    Cardiovascular: Negative for chest pain, leg swelling and palpitations.   Gastrointestinal: Negative for abdominal distention, abdominal pain, blood in stool, constipation, diarrhea, nausea and vomiting.   Endocrine: Negative for hot flashes.   Genitourinary: Negative for bladder incontinence, difficulty urinating, dysuria,  "frequency and hematuria.    Musculoskeletal: Negative for gait problem, neck pain and neck stiffness.   Skin: Negative for rash.  Positive for dry skin.  Neurological: Positive for peripheral neuropathy in the hands and feet.  Hematological: Negative for adenopathy. Does not bruise/bleed easily.   Psychiatric/Behavioral: Negative for depression. The patient is not nervous/anxious.    All other systems reviewed and are negative.       PHYSICAL EXAM    Vitals:    12/13/18 0851   BP: 154/82   Pulse: 60   Resp: 18   Temp: 98 °F (36.7 °C)   TempSrc: Temporal   Weight: 79.8 kg (176 lb)   Height: 185.4 cm (73\")     Constitutional: Appears well-developed and well-nourished. No distress.   ECOG: (1) Restricted in physically strenuous activity, ambulatory and able to do work of light nature  HENT:   Head: Normocephalic.   Mouth/Throat: Oropharynx is clear and moist.   Eyes: Conjunctivae are normal. Pupils are equal, round, and reactive to light. No scleral icterus.   Neck: Neck supple. No JVD present. No thyromegaly present.   Cardiovascular: Normal rate, regular rhythm and normal heart sounds.    Pulmonary/Chest: Breath sounds normal. No respiratory distress.   Nodes: No cervical, supraclavicular or axillary nodes palpable on exam.  Abdominal: Soft. Exhibits no distension and no mass. There is no hepatosplenomegaly. There is no tenderness. There is no rebound and no guarding.   Musculoskeletal:Exhibits no edema, tenderness or deformity.   Neurological: Alert and oriented to person, place, and time. Exhibits normal muscle tone.   Skin: Dry.  No ecchymosis, no petechiae and no rash noted. Not diaphoretic. No cyanosis.   Psychiatric: Normal mood and affect.   Vitals reviewed.  Laboratory data reviewed along with CT chest abdomen and pelvis and images thereof as outlined above in the oncology history were reviewed at time of visit.    Lab Results   Component Value Date    WBC 5.14 12/10/2018    HGB 14.1 12/10/2018    HCT 42.9 " 12/10/2018    MCV 98.6 (H) 12/10/2018     (L) 12/10/2018       Lab Results   Component Value Date    GLUCOSE 96 12/10/2018    BUN 12 12/10/2018    CREATININE 0.70 12/10/2018    EGFRIFNONA 112 12/10/2018    BCR 17.1 12/10/2018    K 4.4 12/10/2018    CO2 27.0 12/10/2018    CALCIUM 8.9 12/10/2018    ALBUMIN 4.40 12/10/2018    LABIL2 1.0 02/18/2016    AST 22 12/10/2018    ALT 29 12/10/2018       Lab Results   Component Value Date    CEA 1.88 12/10/2018    CEA 2.23 11/15/2018    CEA 2.75 07/12/2018    CEA 2.04 06/25/2018       Assessment/Plan     1. 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    2.   Peripheral neuropathy secondary to chemotherapy. We will       continue to monitor  3.   Dental caries: Complete dental extraction at the end of March 2018  4.   Cytopenia due chemotherapy:  Continue to monitor.     Discussion: Clinically, he is remains stable with no sign of disease.  We will continue Avastin and Xeloda as long as his counts are stable.  We reviewed that Avastin can cause hypertension, proteinuria, nosebleeds, and increased risk of both DVT and bleeding. We also disucssed that Xeloda can cause including myelosuppression, nausea, diarrhea, hand-foot syndrome, coronary vasospasm, and mucositis.  We will plan to see him back in 3 weeks.  We discussed his scans are due after January 30th.     He was treated on Monday and is currently on Xeloda and doing well. We will plan to see him back in 3 weeks to evaluate treatment tolerance. He would like his treatment to be on the same day as his appointment if possible.  We will try to adjust appointment and infusion appointment.       Spent 25 minutes on the patient's plan and care with more than 50% spent on counseling the patient regarding the plan going forward and reviewing his scans with him.    Adeola Sunshine, APRN    11/16/2017

## 2018-12-13 ENCOUNTER — OFFICE VISIT (OUTPATIENT)
Dept: ONCOLOGY | Facility: CLINIC | Age: 68
End: 2018-12-13

## 2018-12-13 VITALS
HEIGHT: 73 IN | SYSTOLIC BLOOD PRESSURE: 154 MMHG | WEIGHT: 176 LBS | TEMPERATURE: 98 F | RESPIRATION RATE: 18 BRPM | HEART RATE: 60 BPM | DIASTOLIC BLOOD PRESSURE: 82 MMHG | BODY MASS INDEX: 23.33 KG/M2

## 2018-12-13 DIAGNOSIS — C18.7 MALIGNANT NEOPLASM OF SIGMOID COLON (HCC): Primary | ICD-10-CM

## 2018-12-13 PROCEDURE — 99214 OFFICE O/P EST MOD 30 MIN: CPT | Performed by: NURSE PRACTITIONER

## 2018-12-26 ENCOUNTER — TELEPHONE (OUTPATIENT)
Dept: ONCOLOGY | Facility: CLINIC | Age: 68
End: 2018-12-26

## 2018-12-26 RX ORDER — CLONAZEPAM 1 MG/1
1 TABLET ORAL DAILY
Qty: 30 TABLET | Refills: 3 | OUTPATIENT
Start: 2018-12-26 | End: 2019-06-24 | Stop reason: SDUPTHER

## 2018-12-26 NOTE — TELEPHONE ENCOUNTER
----- Message from Jenna Fisher sent at 12/26/2018 11:11 AM EST -----  Regarding: VINNIE MAXWELL  PT IS NEEDING A REFILL ON CLONAZEPAM AT [facility] AT [facility]. PLEASE ALSO CALL PT WHEN YOU REFILL. THANKS

## 2018-12-29 DIAGNOSIS — C18.7 MALIGNANT NEOPLASM OF SIGMOID COLON (HCC): ICD-10-CM

## 2018-12-31 RX ORDER — CAPECITABINE 500 MG/1
TABLET, FILM COATED ORAL
Qty: 56 TABLET | Refills: 10 | OUTPATIENT
Start: 2018-12-31

## 2019-01-02 DIAGNOSIS — C18.7 MALIGNANT NEOPLASM OF SIGMOID COLON (HCC): ICD-10-CM

## 2019-01-02 RX ORDER — CAPECITABINE 500 MG/1
1000 TABLET, FILM COATED ORAL 2 TIMES DAILY
Qty: 56 TABLET | Refills: 11 | Status: SHIPPED | OUTPATIENT
Start: 2019-01-02 | End: 2019-08-02 | Stop reason: SDUPTHER

## 2019-01-03 ENCOUNTER — INFUSION (OUTPATIENT)
Dept: ONCOLOGY | Facility: HOSPITAL | Age: 69
End: 2019-01-03

## 2019-01-03 ENCOUNTER — OFFICE VISIT (OUTPATIENT)
Dept: ONCOLOGY | Facility: CLINIC | Age: 69
End: 2019-01-03

## 2019-01-03 VITALS — SYSTOLIC BLOOD PRESSURE: 140 MMHG | DIASTOLIC BLOOD PRESSURE: 80 MMHG

## 2019-01-03 VITALS
BODY MASS INDEX: 23.86 KG/M2 | DIASTOLIC BLOOD PRESSURE: 89 MMHG | RESPIRATION RATE: 18 BRPM | SYSTOLIC BLOOD PRESSURE: 173 MMHG | WEIGHT: 180 LBS | HEIGHT: 73 IN | HEART RATE: 59 BPM | TEMPERATURE: 98 F

## 2019-01-03 DIAGNOSIS — C18.7 MALIGNANT NEOPLASM OF SIGMOID COLON (HCC): ICD-10-CM

## 2019-01-03 DIAGNOSIS — C18.7 MALIGNANT NEOPLASM OF SIGMOID COLON (HCC): Primary | ICD-10-CM

## 2019-01-03 LAB
ALBUMIN SERPL-MCNC: 4.6 G/DL (ref 3.5–5)
ALBUMIN/GLOB SERPL: 1.8 G/DL (ref 1–2)
ALP SERPL-CCNC: 53 U/L (ref 38–126)
ALT SERPL W P-5'-P-CCNC: 28 U/L (ref 13–69)
ANION GAP SERPL CALCULATED.3IONS-SCNC: 12.3 MMOL/L (ref 10–20)
AST SERPL-CCNC: 25 U/L (ref 15–46)
BASOPHILS # BLD AUTO: 0.02 10*3/MM3 (ref 0–0.2)
BASOPHILS NFR BLD AUTO: 0.4 % (ref 0–2.5)
BILIRUB SERPL-MCNC: 1.7 MG/DL (ref 0.2–1.3)
BILIRUB UR QL STRIP: NEGATIVE
BUN BLD-MCNC: 11 MG/DL (ref 7–20)
BUN/CREAT SERPL: 13.8 (ref 6.3–21.9)
CALCIUM SPEC-SCNC: 9.1 MG/DL (ref 8.4–10.2)
CEA SERPL-MCNC: 2.04 NG/ML
CHLORIDE SERPL-SCNC: 105 MMOL/L (ref 98–107)
CLARITY UR: CLEAR
CO2 SERPL-SCNC: 26 MMOL/L (ref 26–30)
COLOR UR: YELLOW
CREAT BLD-MCNC: 0.8 MG/DL (ref 0.6–1.3)
DEPRECATED RDW RBC AUTO: 56 FL (ref 37–54)
EOSINOPHIL # BLD AUTO: 0.04 10*3/MM3 (ref 0–0.7)
EOSINOPHIL NFR BLD AUTO: 0.8 % (ref 0–7)
ERYTHROCYTE [DISTWIDTH] IN BLOOD BY AUTOMATED COUNT: 15.9 % (ref 11.5–14.5)
GFR SERPL CREATININE-BSD FRML MDRD: 96 ML/MIN/1.73
GLOBULIN UR ELPH-MCNC: 2.6 GM/DL
GLUCOSE BLD-MCNC: 88 MG/DL (ref 74–98)
GLUCOSE UR STRIP-MCNC: NEGATIVE MG/DL
HCT VFR BLD AUTO: 43 % (ref 42–52)
HGB BLD-MCNC: 14.5 G/DL (ref 14–18)
HGB UR QL STRIP.AUTO: NEGATIVE
IMM GRANULOCYTES # BLD AUTO: 0.01 10*3/MM3 (ref 0–0.06)
IMM GRANULOCYTES NFR BLD AUTO: 0.2 % (ref 0–0.6)
KETONES UR QL STRIP: NEGATIVE
LEUKOCYTE ESTERASE UR QL STRIP.AUTO: ABNORMAL
LYMPHOCYTES # BLD AUTO: 1.09 10*3/MM3 (ref 0.6–3.4)
LYMPHOCYTES NFR BLD AUTO: 21.8 % (ref 10–50)
MCH RBC QN AUTO: 33 PG (ref 27–31)
MCHC RBC AUTO-ENTMCNC: 33.7 G/DL (ref 30–37)
MCV RBC AUTO: 97.9 FL (ref 80–94)
MONOCYTES # BLD AUTO: 0.39 10*3/MM3 (ref 0–0.9)
MONOCYTES NFR BLD AUTO: 7.8 % (ref 0–12)
NEUTROPHILS # BLD AUTO: 3.44 10*3/MM3 (ref 2–6.9)
NEUTROPHILS NFR BLD AUTO: 69 % (ref 37–80)
NITRITE UR QL STRIP: NEGATIVE
NRBC BLD AUTO-RTO: 0 /100 WBC (ref 0–0)
PH UR STRIP.AUTO: 7 [PH] (ref 5–8)
PLATELET # BLD AUTO: 129 10*3/MM3 (ref 130–400)
PMV BLD AUTO: 10.1 FL (ref 6–12)
POTASSIUM BLD-SCNC: 4.3 MMOL/L (ref 3.5–5.1)
PROT SERPL-MCNC: 7.2 G/DL (ref 6.3–8.2)
PROT UR QL STRIP: NEGATIVE
RBC # BLD AUTO: 4.39 10*6/MM3 (ref 4.7–6.1)
SODIUM BLD-SCNC: 139 MMOL/L (ref 137–145)
SP GR UR STRIP: 1.01 (ref 1–1.03)
UROBILINOGEN UR QL STRIP: ABNORMAL
WBC NRBC COR # BLD: 4.99 10*3/MM3 (ref 4.8–10.8)

## 2019-01-03 PROCEDURE — 80053 COMPREHEN METABOLIC PANEL: CPT

## 2019-01-03 PROCEDURE — 96413 CHEMO IV INFUSION 1 HR: CPT

## 2019-01-03 PROCEDURE — 25010000002 BEVACIZUMAB PER 10 MG: Performed by: NURSE PRACTITIONER

## 2019-01-03 PROCEDURE — 99214 OFFICE O/P EST MOD 30 MIN: CPT | Performed by: NURSE PRACTITIONER

## 2019-01-03 PROCEDURE — 85025 COMPLETE CBC W/AUTO DIFF WBC: CPT

## 2019-01-03 PROCEDURE — 81003 URINALYSIS AUTO W/O SCOPE: CPT

## 2019-01-03 PROCEDURE — 25010000002 BEVACIZUMAB 100 MG/4ML SOLUTION 4 ML VIAL: Performed by: NURSE PRACTITIONER

## 2019-01-03 PROCEDURE — 36415 COLL VENOUS BLD VENIPUNCTURE: CPT

## 2019-01-03 PROCEDURE — 82378 CARCINOEMBRYONIC ANTIGEN: CPT

## 2019-01-03 RX ORDER — SODIUM CHLORIDE 9 MG/ML
250 INJECTION, SOLUTION INTRAVENOUS ONCE
Status: CANCELLED | OUTPATIENT
Start: 2019-01-03

## 2019-01-03 RX ORDER — DEXTROSE MONOHYDRATE 50 MG/ML
250 INJECTION, SOLUTION INTRAVENOUS ONCE
Status: DISCONTINUED | OUTPATIENT
Start: 2019-01-03 | End: 2019-01-03 | Stop reason: HOSPADM

## 2019-01-03 RX ORDER — SODIUM CHLORIDE 0.9 % (FLUSH) 0.9 %
10 SYRINGE (ML) INJECTION AS NEEDED
Status: DISCONTINUED | OUTPATIENT
Start: 2019-01-03 | End: 2019-01-03 | Stop reason: HOSPADM

## 2019-01-03 RX ORDER — DEXTROSE MONOHYDRATE 50 MG/ML
250 INJECTION, SOLUTION INTRAVENOUS ONCE
Status: CANCELLED | OUTPATIENT
Start: 2019-01-03 | End: 2019-01-03

## 2019-01-03 RX ORDER — SODIUM CHLORIDE 0.9 % (FLUSH) 0.9 %
10 SYRINGE (ML) INJECTION AS NEEDED
Status: CANCELLED | OUTPATIENT
Start: 2019-01-03

## 2019-01-03 RX ORDER — SODIUM CHLORIDE 9 MG/ML
250 INJECTION, SOLUTION INTRAVENOUS ONCE
Status: DISCONTINUED | OUTPATIENT
Start: 2019-01-03 | End: 2019-01-03 | Stop reason: HOSPADM

## 2019-01-03 RX ADMIN — SODIUM CHLORIDE, PRESERVATIVE FREE 500 UNITS: 5 INJECTION INTRAVENOUS at 12:14

## 2019-01-03 RX ADMIN — SODIUM CHLORIDE, PRESERVATIVE FREE 10 ML: 5 INJECTION INTRAVENOUS at 12:14

## 2019-01-03 RX ADMIN — BEVACIZUMAB 590 MG: 400 INJECTION, SOLUTION INTRAVENOUS at 11:42

## 2019-01-03 NOTE — PROGRESS NOTES
CHIEF COMPLAINT: 1.  Peripheral neuropathy of the hands and feet, worse in the feet                                       2.  Follow-up for colon cancer    Problem List:  Oncology/Hematology History    1. Stage CARLOS, [id number] colon cancer with 2 out of 14 pericolonic lymph nodes  involved and a left lobe liver biopsy positive for metastasis, presenting with  a 25 pound weight loss and diarrhea with some perirectal soreness and had  colonoscopy with Dr. Mcclain in January that found this lesion that was  circumferential high-grade invading into the pericolonic fat, status post  sigmoid colectomy of a poorly differentiated adenocarcinoma.   a) Baseline CEA postop of 0.8 with alkaline phosphatase 111, with normal liver  enzymes and creatinine of 0.8. Baseline white count 9820 with a hemoglobin  13.8, platelets 339,000, and CT with contrast showing a right lobe liver dome  lesion as well as an anastomotic change in the bowel.   b) Began CapeOx 03/15/2016.  c) Added in Avastin to CapeOx 04/05/2016, second cycle. (This was 8 weeks out  from surgery).  d) KRAS mutation is negative.  E.) CAT scan in August 2016 shows resolution of disease in the liver and colon and a stable lung nodule.  Hence Avastin, Xeloda, and oxaliplatin continued.  F) oxaliplatin discontinued October 2016 due to worsening peripheral neuropathy.  G.) CTs of February 2017 showed no evidence of metastasis and stable bibasilar nodular scar.  Persistent neuropathy not worsening.  CEA 1.4.  Continuing Avastin and Xeloda without oxaliplatin.  Having some problems with irritation of his eyes on Xeloda.  2.  Peripheral neuropathy, chemotherapy induced        Malignant neoplasm of sigmoid colon (CMS/HCC)    5/20/2016 Initial Diagnosis     Malignant neoplasm of sigmoid colon       5/2/2017 Imaging     CT chest, abdomen, pelvis IMPRESSION:  1. No disease recurrence.  2. Minimal areas of nodular thickening in the right lower lobe, stable.         8/2/2017 Imaging      CT chest/abdomen/pelvis IMPRESSION:  Stable examination with no evidence of acute intrathoracic,  intra-abdominal or pelvic abnormality. There is no evidence of  progression of disease. No metastatic disease.          11/13/2017 Imaging     CT chest/abdomen/pelvis IMPRESSION:  Stable examination without evidence of acute intrathoracic  intra-abdominal or intrapelvic abnormality. No evidence of progression  of disease.   CEA 1.3.            HISTORY OF PRESENT ILLNESS:  The patient is a 68 y.o. male, here for follow up on management of Stage IV a colon cancer.  The patient has done well since restarting chemotherapy.  He is tolerating Xeloda and Avastin with minimal side effects. He continues to exercise a few days a week as well as golf 3 days a week. Clinically reports no issues.  Denies constipation or diarrhea.  Denies hand-foot syndrome.      Past Medical History:   Diagnosis Date   • Hypertension    • Liver disease     mets from colon cancer   • Malignant neoplasm of colon (CMS/HCC)      Past Surgical History:   Procedure Laterality Date   • COLON SURGERY      Sigmoid   • LIVER SURGERY     • PORTACATH PLACEMENT         No Known Allergies    Family History and Social History reviewed and changed as necessary      REVIEW OF SYSTEM:   Review of Systems   Constitutional: Negative for appetite change, chills, diaphoresis, fatigue, fever and unexpected weight change.   HENT:   Negative for mouth sores, sore throat and trouble swallowing.    Eyes: Negative for icterus.   Respiratory: Negative for cough, hemoptysis and shortness of breath.    Cardiovascular: Negative for chest pain, leg swelling and palpitations.   Gastrointestinal: Negative for abdominal distention, abdominal pain, blood in stool, constipation, diarrhea, nausea and vomiting.   Endocrine: Negative for hot flashes.   Genitourinary: Negative for bladder incontinence, difficulty urinating, dysuria, frequency and hematuria.    Musculoskeletal: Negative for  "gait problem, neck pain and neck stiffness.   Skin: Negative for rash.  Positive for dry skin.  Neurological: Positive for peripheral neuropathy in the hands and feet.  Hematological: Negative for adenopathy. Does not bruise/bleed easily.   Psychiatric/Behavioral: Negative for depression. The patient is not nervous/anxious.    All other systems reviewed and are negative.       PHYSICAL EXAM    Vitals:    01/03/19 0953   BP: 173/89   Pulse: 59   Resp: 18   Temp: 98 °F (36.7 °C)   TempSrc: Temporal   Weight: 81.6 kg (180 lb)   Height: 185.4 cm (73\")     Constitutional: Appears well-developed and well-nourished. No distress.   ECOG: (1) Restricted in physically strenuous activity, ambulatory and able to do work of light nature  HENT:   Head: Normocephalic.   Mouth/Throat: Oropharynx is clear and moist.   Eyes: Conjunctivae are normal. Pupils are equal, round, and reactive to light. No scleral icterus.   Neck: Neck supple. No JVD present. No thyromegaly present.   Cardiovascular: Normal rate, regular rhythm and normal heart sounds.    Pulmonary/Chest: Breath sounds normal. No respiratory distress.   Nodes: No cervical, supraclavicular or axillary nodes palpable on exam.  Abdominal: Soft. Exhibits no distension and no mass. There is no hepatosplenomegaly. There is no tenderness. There is no rebound and no guarding.   Musculoskeletal:Exhibits no edema, tenderness or deformity.   Neurological: Alert and oriented to person, place, and time. Exhibits normal muscle tone.   Skin: Dry.  No ecchymosis, no petechiae and no rash noted. Not diaphoretic. No cyanosis.   Psychiatric: Normal mood and affect.   Vitals reviewed.  Laboratory data reviewed along with CT chest abdomen and pelvis and images thereof as outlined above in the oncology history were reviewed at time of visit.    Lab Results   Component Value Date    WBC 5.14 12/10/2018    HGB 14.1 12/10/2018    HCT 42.9 12/10/2018    MCV 98.6 (H) 12/10/2018     (L) " 12/10/2018       Lab Results   Component Value Date    GLUCOSE 96 12/10/2018    BUN 12 12/10/2018    CREATININE 0.70 12/10/2018    EGFRIFNONA 112 12/10/2018    BCR 17.1 12/10/2018    K 4.4 12/10/2018    CO2 27.0 12/10/2018    CALCIUM 8.9 12/10/2018    ALBUMIN 4.40 12/10/2018    LABIL2 1.0 02/18/2016    AST 22 12/10/2018    ALT 29 12/10/2018       Lab Results   Component Value Date    CEA 1.88 12/10/2018    CEA 2.23 11/15/2018    CEA 2.75 07/12/2018    CEA 2.04 06/25/2018       Assessment/Plan     1. 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    2.   Peripheral neuropathy secondary to chemotherapy. We will       continue to monitor  3.   Dental caries: Complete dental extraction at the end of March 2018  4.   Cytopenia due chemotherapy:  Continue to monitor.     Discussion: Clinically, he is remains stable with no sign of disease. His CEA has improved.  We will continue Avastin and Xeloda as long as his counts are stable.  We discussed that Avastin can cause hypertension, proteinuria, nosebleeds, and increased risk of both DVT and bleeding. We also reviewed that Xeloda can cause including myelosuppression, nausea, diarrhea, hand-foot syndrome, coronary vasospasm, and mucositis.  We will plan to see him back in 3 weeks.  We will plan to order scans after January 30th.       I spent 25 minutes on the patient's plan and care with more than 50% spent on counseling the patient regarding the plan going forward and reviewing his scans with him.    Adeola Sunshine, APRN  01/03/2019

## 2019-01-24 ENCOUNTER — OFFICE VISIT (OUTPATIENT)
Dept: ONCOLOGY | Facility: CLINIC | Age: 69
End: 2019-01-24

## 2019-01-24 ENCOUNTER — INFUSION (OUTPATIENT)
Dept: ONCOLOGY | Facility: HOSPITAL | Age: 69
End: 2019-01-24

## 2019-01-24 VITALS
TEMPERATURE: 97.8 F | RESPIRATION RATE: 15 BRPM | BODY MASS INDEX: 24.12 KG/M2 | HEIGHT: 73 IN | WEIGHT: 182 LBS | DIASTOLIC BLOOD PRESSURE: 84 MMHG | HEART RATE: 60 BPM | SYSTOLIC BLOOD PRESSURE: 150 MMHG

## 2019-01-24 VITALS — HEART RATE: 53 BPM | SYSTOLIC BLOOD PRESSURE: 134 MMHG | DIASTOLIC BLOOD PRESSURE: 82 MMHG

## 2019-01-24 DIAGNOSIS — C18.7 MALIGNANT NEOPLASM OF SIGMOID COLON (HCC): Primary | ICD-10-CM

## 2019-01-24 DIAGNOSIS — C18.7 MALIGNANT NEOPLASM OF SIGMOID COLON (HCC): ICD-10-CM

## 2019-01-24 LAB
ALBUMIN SERPL-MCNC: 4.5 G/DL (ref 3.5–5)
ALBUMIN/GLOB SERPL: 2 G/DL (ref 1–2)
ALP SERPL-CCNC: 46 U/L (ref 38–126)
ALT SERPL W P-5'-P-CCNC: 25 U/L (ref 13–69)
ANION GAP SERPL CALCULATED.3IONS-SCNC: 12.3 MMOL/L (ref 10–20)
AST SERPL-CCNC: 22 U/L (ref 15–46)
BASOPHILS # BLD AUTO: 0.01 10*3/MM3 (ref 0–0.2)
BASOPHILS NFR BLD AUTO: 0.2 % (ref 0–2.5)
BILIRUB SERPL-MCNC: 1.6 MG/DL (ref 0.2–1.3)
BILIRUB UR QL STRIP: NEGATIVE
BUN BLD-MCNC: 15 MG/DL (ref 7–20)
BUN/CREAT SERPL: 18.8 (ref 6.3–21.9)
CALCIUM SPEC-SCNC: 8.9 MG/DL (ref 8.4–10.2)
CEA SERPL-MCNC: 2.56 NG/ML
CHLORIDE SERPL-SCNC: 106 MMOL/L (ref 98–107)
CLARITY UR: CLEAR
CO2 SERPL-SCNC: 25 MMOL/L (ref 26–30)
COLOR UR: YELLOW
CREAT BLD-MCNC: 0.8 MG/DL (ref 0.6–1.3)
DEPRECATED RDW RBC AUTO: 61 FL (ref 37–54)
EOSINOPHIL # BLD AUTO: 0.04 10*3/MM3 (ref 0–0.7)
EOSINOPHIL NFR BLD AUTO: 1 % (ref 0–7)
ERYTHROCYTE [DISTWIDTH] IN BLOOD BY AUTOMATED COUNT: 16.2 % (ref 11.5–14.5)
GFR SERPL CREATININE-BSD FRML MDRD: 96 ML/MIN/1.73
GLOBULIN UR ELPH-MCNC: 2.3 GM/DL
GLUCOSE BLD-MCNC: 92 MG/DL (ref 74–98)
GLUCOSE UR STRIP-MCNC: NEGATIVE MG/DL
HCT VFR BLD AUTO: 42.8 % (ref 42–52)
HGB BLD-MCNC: 14.5 G/DL (ref 14–18)
HGB UR QL STRIP.AUTO: NEGATIVE
IMM GRANULOCYTES # BLD AUTO: 0.02 10*3/MM3 (ref 0–0.06)
IMM GRANULOCYTES NFR BLD AUTO: 0.5 % (ref 0–0.6)
KETONES UR QL STRIP: NEGATIVE
LEUKOCYTE ESTERASE UR QL STRIP.AUTO: NEGATIVE
LYMPHOCYTES # BLD AUTO: 1.08 10*3/MM3 (ref 0.6–3.4)
LYMPHOCYTES NFR BLD AUTO: 26.3 % (ref 10–50)
MCH RBC QN AUTO: 34.7 PG (ref 27–31)
MCHC RBC AUTO-ENTMCNC: 33.9 G/DL (ref 30–37)
MCV RBC AUTO: 102.4 FL (ref 80–94)
MONOCYTES # BLD AUTO: 0.35 10*3/MM3 (ref 0–0.9)
MONOCYTES NFR BLD AUTO: 8.5 % (ref 0–12)
NEUTROPHILS # BLD AUTO: 2.61 10*3/MM3 (ref 2–6.9)
NEUTROPHILS NFR BLD AUTO: 63.5 % (ref 37–80)
NITRITE UR QL STRIP: NEGATIVE
NRBC BLD AUTO-RTO: 0 /100 WBC (ref 0–0)
PH UR STRIP.AUTO: 5.5 [PH] (ref 5–8)
PLATELET # BLD AUTO: 127 10*3/MM3 (ref 130–400)
PMV BLD AUTO: 10 FL (ref 6–12)
POTASSIUM BLD-SCNC: 4.3 MMOL/L (ref 3.5–5.1)
PROT SERPL-MCNC: 6.8 G/DL (ref 6.3–8.2)
PROT UR QL STRIP: NEGATIVE
RBC # BLD AUTO: 4.18 10*6/MM3 (ref 4.7–6.1)
SODIUM BLD-SCNC: 139 MMOL/L (ref 137–145)
SP GR UR STRIP: 1.01 (ref 1–1.03)
UROBILINOGEN UR QL STRIP: NORMAL
WBC NRBC COR # BLD: 4.11 10*3/MM3 (ref 4.8–10.8)

## 2019-01-24 PROCEDURE — 25010000002 BEVACIZUMAB PER 10 MG: Performed by: NURSE PRACTITIONER

## 2019-01-24 PROCEDURE — 81003 URINALYSIS AUTO W/O SCOPE: CPT

## 2019-01-24 PROCEDURE — 96413 CHEMO IV INFUSION 1 HR: CPT

## 2019-01-24 PROCEDURE — 80053 COMPREHEN METABOLIC PANEL: CPT

## 2019-01-24 PROCEDURE — 99213 OFFICE O/P EST LOW 20 MIN: CPT | Performed by: NURSE PRACTITIONER

## 2019-01-24 PROCEDURE — 25010000002 BEVACIZUMAB 100 MG/4ML SOLUTION 4 ML VIAL: Performed by: NURSE PRACTITIONER

## 2019-01-24 PROCEDURE — 36415 COLL VENOUS BLD VENIPUNCTURE: CPT

## 2019-01-24 PROCEDURE — 82378 CARCINOEMBRYONIC ANTIGEN: CPT

## 2019-01-24 PROCEDURE — 85025 COMPLETE CBC W/AUTO DIFF WBC: CPT

## 2019-01-24 RX ORDER — DEXTROSE MONOHYDRATE 50 MG/ML
250 INJECTION, SOLUTION INTRAVENOUS ONCE
Status: CANCELLED | OUTPATIENT
Start: 2019-01-24 | End: 2019-01-24

## 2019-01-24 RX ORDER — SODIUM CHLORIDE 9 MG/ML
250 INJECTION, SOLUTION INTRAVENOUS ONCE
Status: CANCELLED | OUTPATIENT
Start: 2019-01-24

## 2019-01-24 RX ORDER — SODIUM CHLORIDE 9 MG/ML
250 INJECTION, SOLUTION INTRAVENOUS ONCE
Status: DISCONTINUED | OUTPATIENT
Start: 2019-01-24 | End: 2019-01-24 | Stop reason: HOSPADM

## 2019-01-24 RX ORDER — DEXTROSE MONOHYDRATE 50 MG/ML
250 INJECTION, SOLUTION INTRAVENOUS ONCE
Status: DISCONTINUED | OUTPATIENT
Start: 2019-01-24 | End: 2019-01-24 | Stop reason: HOSPADM

## 2019-01-24 RX ORDER — SODIUM CHLORIDE 0.9 % (FLUSH) 0.9 %
10 SYRINGE (ML) INJECTION AS NEEDED
Status: CANCELLED | OUTPATIENT
Start: 2019-01-24

## 2019-01-24 RX ORDER — SODIUM CHLORIDE 0.9 % (FLUSH) 0.9 %
10 SYRINGE (ML) INJECTION AS NEEDED
Status: DISCONTINUED | OUTPATIENT
Start: 2019-01-24 | End: 2019-01-24 | Stop reason: HOSPADM

## 2019-01-24 RX ADMIN — SODIUM CHLORIDE, PRESERVATIVE FREE 10 ML: 5 INJECTION INTRAVENOUS at 10:55

## 2019-01-24 RX ADMIN — SODIUM CHLORIDE, PRESERVATIVE FREE 500 UNITS: 5 INJECTION INTRAVENOUS at 10:55

## 2019-01-24 RX ADMIN — BEVACIZUMAB 590 MG: 400 INJECTION, SOLUTION INTRAVENOUS at 10:20

## 2019-01-24 NOTE — PROGRESS NOTES
CHIEF COMPLAINT: 1.  Peripheral neuropathy of the hands and feet, worse in the feet                                       2.  Follow-up for colon cancer    Problem List:  Oncology/Hematology History    1. Stage CARLOS, [id number] colon cancer with 2 out of 14 pericolonic lymph nodes  involved and a left lobe liver biopsy positive for metastasis, presenting with  a 25 pound weight loss and diarrhea with some perirectal soreness and had  colonoscopy with Dr. Mcclain in January that found this lesion that was  circumferential high-grade invading into the pericolonic fat, status post  sigmoid colectomy of a poorly differentiated adenocarcinoma.   a) Baseline CEA postop of 0.8 with alkaline phosphatase 111, with normal liver  enzymes and creatinine of 0.8. Baseline white count 9820 with a hemoglobin  13.8, platelets 339,000, and CT with contrast showing a right lobe liver dome  lesion as well as an anastomotic change in the bowel.   b) Began CapeOx 03/15/2016.  c) Added in Avastin to CapeOx 04/05/2016, second cycle. (This was 8 weeks out  from surgery).  d) KRAS mutation is negative.  E.) CAT scan in August 2016 shows resolution of disease in the liver and colon and a stable lung nodule.  Hence Avastin, Xeloda, and oxaliplatin continued.  F) oxaliplatin discontinued October 2016 due to worsening peripheral neuropathy.  G.) CTs of February 2017 showed no evidence of metastasis and stable bibasilar nodular scar.  Persistent neuropathy not worsening.  CEA 1.4.  Continuing Avastin and Xeloda without oxaliplatin.  Having some problems with irritation of his eyes on Xeloda.  2.  Peripheral neuropathy, chemotherapy induced        Malignant neoplasm of sigmoid colon (CMS/HCC)    5/20/2016 Initial Diagnosis     Malignant neoplasm of sigmoid colon       5/2/2017 Imaging     CT chest, abdomen, pelvis IMPRESSION:  1. No disease recurrence.  2. Minimal areas of nodular thickening in the right lower lobe, stable.         8/2/2017 Imaging      CT chest/abdomen/pelvis IMPRESSION:  Stable examination with no evidence of acute intrathoracic,  intra-abdominal or pelvic abnormality. There is no evidence of  progression of disease. No metastatic disease.          11/13/2017 Imaging     CT chest/abdomen/pelvis IMPRESSION:  Stable examination without evidence of acute intrathoracic  intra-abdominal or intrapelvic abnormality. No evidence of progression  of disease.   CEA 1.3.            HISTORY OF PRESENT ILLNESS:  The patient is a 68 y.o. male, here for follow up on management of Stage IV a colon cancer.  The patient has done well..  He is continues to tolerate Xeloda and Avastin with minimal side effects. He continues to exercise a few days a week as well as golf a few days a week. Clinically reports no issues.  Denies constipation or diarrhea.  Denies hand-foot syndrome.  He continues to have peripheral neuropathy that is stable.  He manages this with gabapentin.     Past Medical History:   Diagnosis Date   • Hypertension    • Liver disease     mets from colon cancer   • Malignant neoplasm of colon (CMS/HCC)      Past Surgical History:   Procedure Laterality Date   • COLON SURGERY      Sigmoid   • LIVER SURGERY     • PORTACATH PLACEMENT         No Known Allergies    Family History and Social History reviewed and changed as necessary      REVIEW OF SYSTEM:   Review of Systems   Constitutional: Negative for appetite change, chills, diaphoresis, fatigue, fever and unexpected weight change.   HENT:   Negative for mouth sores, sore throat and trouble swallowing.    Eyes: Negative for icterus.   Respiratory: Negative for cough, hemoptysis and shortness of breath.    Cardiovascular: Negative for chest pain, leg swelling and palpitations.   Gastrointestinal: Negative for abdominal distention, abdominal pain, blood in stool, constipation, diarrhea, nausea and vomiting.   Endocrine: Negative for hot flashes.   Genitourinary: Negative for bladder incontinence, difficulty  urinating, dysuria, frequency and hematuria.    Musculoskeletal: Negative for gait problem, neck pain and neck stiffness.   Skin: Negative for rash.  Positive for dry skin.  Neurological: Positive for peripheral neuropathy in the hands and feet.  Hematological: Negative for adenopathy. Does not bruise/bleed easily.   Psychiatric/Behavioral: Negative for depression. The patient is not nervous/anxious.    All other systems reviewed and are negative.       PHYSICAL EXAM    There were no vitals filed for this visit.  Constitutional: Appears well-developed and well-nourished. No distress.   ECOG: (1) Restricted in physically strenuous activity, ambulatory and able to do work of light nature  HENT:   Head: Normocephalic.   Mouth/Throat: Oropharynx is clear and moist.   Eyes: Conjunctivae are normal. Pupils are equal, round, and reactive to light. No scleral icterus.   Neck: Neck supple. No JVD present. No thyromegaly present.   Cardiovascular: Normal rate, regular rhythm and normal heart sounds.    Pulmonary/Chest: Breath sounds normal. No respiratory distress.   Nodes: No cervical, supraclavicular or axillary nodes palpable on exam.  Abdominal: Soft. Exhibits no distension and no mass. There is no hepatosplenomegaly. There is no tenderness. There is no rebound and no guarding.   Musculoskeletal:Exhibits no edema, tenderness or deformity.   Neurological: Alert and oriented to person, place, and time. Exhibits normal muscle tone.   Skin: Dry.  No ecchymosis, no petechiae and no rash noted. Not diaphoretic. No cyanosis.   Psychiatric: Normal mood and affect.   Vitals reviewed.  Laboratory data reviewed along with CT chest abdomen and pelvis and images thereof as outlined above in the oncology history were reviewed at time of visit.    Lab Results   Component Value Date    WBC 4.99 01/03/2019    HGB 14.5 01/03/2019    HCT 43.0 01/03/2019    MCV 97.9 (H) 01/03/2019     (L) 01/03/2019       Lab Results   Component Value  Date    GLUCOSE 88 01/03/2019    BUN 11 01/03/2019    CREATININE 0.80 01/03/2019    EGFRIFNONA 96 01/03/2019    BCR 13.8 01/03/2019    K 4.3 01/03/2019    CO2 26.0 01/03/2019    CALCIUM 9.1 01/03/2019    ALBUMIN 4.60 01/03/2019    LABIL2 1.0 02/18/2016    AST 25 01/03/2019    ALT 28 01/03/2019       Lab Results   Component Value Date    CEA 2.04 01/03/2019    CEA 1.88 12/10/2018    CEA 2.23 11/15/2018    CEA 2.75 07/12/2018       Assessment/Plan     1. 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    2.   Peripheral neuropathy secondary to chemotherapy. We will       continue to monitor  3.   Dental caries: Complete dental extraction at the end of March 2018  4.   Cytopenia due chemotherapy:  Continue to monitor.     Discussion: Clinically, he remains stable with no sign of disease. His CEA is stable.  We will not make any dose adjustments for now as he is tolerating current Avastin and Xeloda dosing. We will continue treatment until progression or his counts become unstable. I will plan to repeat his MRI abdomen and CT chest prior to return.  We will plan to see him back in 3 weeks.         I spent 15 minutes on the patient's plan and care with more than 50% spent on counseling the patient regarding the plan going forward and reviewing his scans with him.    Adeola Sunshine, APRN  01/24/2019

## 2019-02-11 ENCOUNTER — HOSPITAL ENCOUNTER (OUTPATIENT)
Dept: CT IMAGING | Facility: HOSPITAL | Age: 69
Discharge: HOME | End: 2019-02-11
Admitting: NURSE PRACTITIONER

## 2019-02-11 DIAGNOSIS — C18.7 MALIGNANT NEOPLASM OF SIGMOID COLON (HCC): ICD-10-CM

## 2019-02-11 PROCEDURE — 71260 CT THORAX DX C+: CPT

## 2019-02-11 PROCEDURE — 25010000002 IOPAMIDOL 61 % SOLUTION: Performed by: NURSE PRACTITIONER

## 2019-02-11 RX ADMIN — IOPAMIDOL 95 ML: 612 INJECTION, SOLUTION INTRAVENOUS at 10:45

## 2019-02-12 ENCOUNTER — HOSPITAL ENCOUNTER (OUTPATIENT)
Dept: MRI IMAGING | Facility: HOSPITAL | Age: 69
Discharge: HOME | End: 2019-02-12
Admitting: NURSE PRACTITIONER

## 2019-02-12 DIAGNOSIS — C18.7 MALIGNANT NEOPLASM OF SIGMOID COLON (HCC): ICD-10-CM

## 2019-02-12 PROCEDURE — 74183 MRI ABD W/O CNTR FLWD CNTR: CPT

## 2019-02-12 PROCEDURE — A9577 INJ MULTIHANCE: HCPCS | Performed by: NURSE PRACTITIONER

## 2019-02-12 PROCEDURE — 0 GADOBENATE DIMEGLUMINE 529 MG/ML SOLUTION: Performed by: NURSE PRACTITIONER

## 2019-02-12 RX ADMIN — GADOBENATE DIMEGLUMINE 15 ML: 529 INJECTION, SOLUTION INTRAVENOUS at 08:27

## 2019-02-14 ENCOUNTER — INFUSION (OUTPATIENT)
Dept: ONCOLOGY | Facility: HOSPITAL | Age: 69
End: 2019-02-14

## 2019-02-14 ENCOUNTER — OFFICE VISIT (OUTPATIENT)
Dept: ONCOLOGY | Facility: CLINIC | Age: 69
End: 2019-02-14

## 2019-02-14 VITALS
HEIGHT: 73 IN | SYSTOLIC BLOOD PRESSURE: 170 MMHG | TEMPERATURE: 97.7 F | BODY MASS INDEX: 23.86 KG/M2 | RESPIRATION RATE: 17 BRPM | HEART RATE: 61 BPM | DIASTOLIC BLOOD PRESSURE: 81 MMHG | WEIGHT: 180 LBS

## 2019-02-14 VITALS — SYSTOLIC BLOOD PRESSURE: 130 MMHG | HEART RATE: 59 BPM | DIASTOLIC BLOOD PRESSURE: 81 MMHG

## 2019-02-14 DIAGNOSIS — C18.7 MALIGNANT NEOPLASM OF SIGMOID COLON (HCC): Primary | ICD-10-CM

## 2019-02-14 DIAGNOSIS — C18.7 MALIGNANT NEOPLASM OF SIGMOID COLON (HCC): ICD-10-CM

## 2019-02-14 LAB
ALBUMIN SERPL-MCNC: 3.9 G/DL (ref 3.5–5)
ALBUMIN/GLOB SERPL: 1.4 G/DL (ref 1–2)
ALP SERPL-CCNC: 54 U/L (ref 38–126)
ALT SERPL W P-5'-P-CCNC: 22 U/L (ref 13–69)
ANION GAP SERPL CALCULATED.3IONS-SCNC: 10.2 MMOL/L (ref 10–20)
AST SERPL-CCNC: 22 U/L (ref 15–46)
BASOPHILS # BLD AUTO: 0 10*3/MM3 (ref 0–0.2)
BASOPHILS NFR BLD AUTO: 0 % (ref 0–2.5)
BILIRUB SERPL-MCNC: 0.8 MG/DL (ref 0.2–1.3)
BILIRUB UR QL STRIP: NEGATIVE
BUN BLD-MCNC: 16 MG/DL (ref 7–20)
BUN/CREAT SERPL: 22.9 (ref 6.3–21.9)
CALCIUM SPEC-SCNC: 8.6 MG/DL (ref 8.4–10.2)
CEA SERPL-MCNC: 1.9 NG/ML
CHLORIDE SERPL-SCNC: 106 MMOL/L (ref 98–107)
CLARITY UR: CLEAR
CO2 SERPL-SCNC: 26 MMOL/L (ref 26–30)
COLOR UR: YELLOW
CREAT BLD-MCNC: 0.7 MG/DL (ref 0.6–1.3)
DEPRECATED RDW RBC AUTO: 59.8 FL (ref 37–54)
EOSINOPHIL # BLD AUTO: 0.02 10*3/MM3 (ref 0–0.7)
EOSINOPHIL NFR BLD AUTO: 0.5 % (ref 0–7)
ERYTHROCYTE [DISTWIDTH] IN BLOOD BY AUTOMATED COUNT: 15.7 % (ref 11.5–14.5)
GFR SERPL CREATININE-BSD FRML MDRD: 112 ML/MIN/1.73
GLOBULIN UR ELPH-MCNC: 2.7 GM/DL
GLUCOSE BLD-MCNC: 97 MG/DL (ref 74–98)
GLUCOSE UR STRIP-MCNC: NEGATIVE MG/DL
HCT VFR BLD AUTO: 41.6 % (ref 42–52)
HGB BLD-MCNC: 14 G/DL (ref 14–18)
HGB UR QL STRIP.AUTO: NEGATIVE
IMM GRANULOCYTES # BLD AUTO: 0.01 10*3/MM3 (ref 0–0.06)
IMM GRANULOCYTES NFR BLD AUTO: 0.2 % (ref 0–0.6)
KETONES UR QL STRIP: NEGATIVE
LEUKOCYTE ESTERASE UR QL STRIP.AUTO: NEGATIVE
LYMPHOCYTES # BLD AUTO: 0.86 10*3/MM3 (ref 0.6–3.4)
LYMPHOCYTES NFR BLD AUTO: 20.4 % (ref 10–50)
MCH RBC QN AUTO: 35 PG (ref 27–31)
MCHC RBC AUTO-ENTMCNC: 33.7 G/DL (ref 30–37)
MCV RBC AUTO: 104 FL (ref 80–94)
MONOCYTES # BLD AUTO: 0.37 10*3/MM3 (ref 0–0.9)
MONOCYTES NFR BLD AUTO: 8.8 % (ref 0–12)
NEUTROPHILS # BLD AUTO: 2.96 10*3/MM3 (ref 2–6.9)
NEUTROPHILS NFR BLD AUTO: 70.1 % (ref 37–80)
NITRITE UR QL STRIP: NEGATIVE
NRBC BLD AUTO-RTO: 0 /100 WBC (ref 0–0)
PH UR STRIP.AUTO: <=5 [PH] (ref 5–8)
PLATELET # BLD AUTO: 124 10*3/MM3 (ref 130–400)
PMV BLD AUTO: 10.8 FL (ref 6–12)
POTASSIUM BLD-SCNC: 4.2 MMOL/L (ref 3.5–5.1)
PROT SERPL-MCNC: 6.6 G/DL (ref 6.3–8.2)
PROT UR QL STRIP: NEGATIVE
RBC # BLD AUTO: 4 10*6/MM3 (ref 4.7–6.1)
SODIUM BLD-SCNC: 138 MMOL/L (ref 137–145)
SP GR UR STRIP: 1.02 (ref 1–1.03)
UROBILINOGEN UR QL STRIP: NORMAL
WBC NRBC COR # BLD: 4.22 10*3/MM3 (ref 4.8–10.8)

## 2019-02-14 PROCEDURE — 81003 URINALYSIS AUTO W/O SCOPE: CPT

## 2019-02-14 PROCEDURE — 82378 CARCINOEMBRYONIC ANTIGEN: CPT

## 2019-02-14 PROCEDURE — 80053 COMPREHEN METABOLIC PANEL: CPT

## 2019-02-14 PROCEDURE — 96413 CHEMO IV INFUSION 1 HR: CPT

## 2019-02-14 PROCEDURE — 85025 COMPLETE CBC W/AUTO DIFF WBC: CPT

## 2019-02-14 PROCEDURE — 36415 COLL VENOUS BLD VENIPUNCTURE: CPT

## 2019-02-14 PROCEDURE — 25010000002 BEVACIZUMAB 100 MG/4ML SOLUTION 4 ML VIAL: Performed by: NURSE PRACTITIONER

## 2019-02-14 PROCEDURE — 99214 OFFICE O/P EST MOD 30 MIN: CPT | Performed by: NURSE PRACTITIONER

## 2019-02-14 PROCEDURE — 25010000002 BEVACIZUMAB PER 10 MG: Performed by: NURSE PRACTITIONER

## 2019-02-14 RX ORDER — DEXTROSE MONOHYDRATE 50 MG/ML
250 INJECTION, SOLUTION INTRAVENOUS ONCE
Status: CANCELLED | OUTPATIENT
Start: 2019-02-14 | End: 2019-02-14

## 2019-02-14 RX ORDER — SODIUM CHLORIDE 0.9 % (FLUSH) 0.9 %
10 SYRINGE (ML) INJECTION AS NEEDED
Status: DISCONTINUED | OUTPATIENT
Start: 2019-02-14 | End: 2019-02-14 | Stop reason: HOSPADM

## 2019-02-14 RX ORDER — DEXTROSE MONOHYDRATE 50 MG/ML
250 INJECTION, SOLUTION INTRAVENOUS ONCE
Status: DISCONTINUED | OUTPATIENT
Start: 2019-02-14 | End: 2019-02-14 | Stop reason: HOSPADM

## 2019-02-14 RX ORDER — SODIUM CHLORIDE 0.9 % (FLUSH) 0.9 %
10 SYRINGE (ML) INJECTION AS NEEDED
Status: CANCELLED | OUTPATIENT
Start: 2019-02-14

## 2019-02-14 RX ORDER — SODIUM CHLORIDE 9 MG/ML
250 INJECTION, SOLUTION INTRAVENOUS ONCE
Status: DISCONTINUED | OUTPATIENT
Start: 2019-02-14 | End: 2019-02-14 | Stop reason: HOSPADM

## 2019-02-14 RX ORDER — SODIUM CHLORIDE 9 MG/ML
250 INJECTION, SOLUTION INTRAVENOUS ONCE
Status: CANCELLED | OUTPATIENT
Start: 2019-02-14

## 2019-02-14 RX ADMIN — SODIUM CHLORIDE, PRESERVATIVE FREE 10 ML: 5 INJECTION INTRAVENOUS at 10:29

## 2019-02-14 RX ADMIN — SODIUM CHLORIDE, PRESERVATIVE FREE 500 UNITS: 5 INJECTION INTRAVENOUS at 10:29

## 2019-02-14 RX ADMIN — BEVACIZUMAB 590 MG: 400 INJECTION, SOLUTION INTRAVENOUS at 09:46

## 2019-02-14 NOTE — PROGRESS NOTES
CHIEF COMPLAINT: 1.  Peripheral neuropathy of the hands and feet, worse in the feet                                       2.  Follow-up for colon cancer    Problem List:  Oncology/Hematology History    1. Stage CARLOS, [id number] colon cancer with 2 out of 14 pericolonic lymph nodes  involved and a left lobe liver biopsy positive for metastasis, presenting with  a 25 pound weight loss and diarrhea with some perirectal soreness and had  colonoscopy with Dr. Mcclain in January that found this lesion that was  circumferential high-grade invading into the pericolonic fat, status post  sigmoid colectomy of a poorly differentiated adenocarcinoma.   a) Baseline CEA postop of 0.8 with alkaline phosphatase 111, with normal liver  enzymes and creatinine of 0.8. Baseline white count 9820 with a hemoglobin  13.8, platelets 339,000, and CT with contrast showing a right lobe liver dome  lesion as well as an anastomotic change in the bowel.   b) Began [facility]Ox 03/15/2016.  c) Added in Avastin to [facility]Ox 04/05/2016, second cycle. (This was 8 weeks out  from surgery).  d) KRAS mutation is negative.  E.) CAT scan in August 2016 shows resolution of disease in the liver and colon and a stable lung nodule.  Hence Avastin, Xeloda, and oxaliplatin continued.  F) oxaliplatin discontinued October 2016 due to worsening peripheral neuropathy.  G.) CTs of February 2017 showed no evidence of metastasis and stable bibasilar nodular scar.  Persistent neuropathy not worsening.  CEA 1.4.  Continuing Avastin and Xeloda without oxaliplatin.  Having some problems with irritation of his eyes on Xeloda.  2.  Peripheral neuropathy, chemotherapy induced        Malignant neoplasm of sigmoid colon (CMS/HCC)    5/20/2016 Initial Diagnosis     Malignant neoplasm of sigmoid colon       5/2/2017 Imaging     CT chest, abdomen, pelvis IMPRESSION:  1. No disease recurrence.  2. Minimal areas of nodular thickening in the right lower lobe, stable.         8/2/2017 Imaging      CT chest/abdomen/pelvis IMPRESSION:  Stable examination with no evidence of acute intrathoracic,  intra-abdominal or pelvic abnormality. There is no evidence of  progression of disease. No metastatic disease.          11/13/2017 Imaging     CT chest/abdomen/pelvis IMPRESSION:  Stable examination without evidence of acute intrathoracic  intra-abdominal or intrapelvic abnormality. No evidence of progression  of disease.   CEA 1.3.            HISTORY OF PRESENT ILLNESS:  The patient is a 68 y.o. male, here for follow up on management of Stage IV a colon cancer.  The patient continues to do well..  He is able to tolerate Xeloda and Avastin with minimal side effects. He continues to exercise a few days a week. Clinically he has no issues to reports.  Denies constipation and diarrhea.  Denies hand-foot syndrome.  Peripheral neuropathy is stable.  He continues to manage this with gabapentin. He is changing PCPs and may need us to refill his daily medications until he establishes a new primary.     Past Medical History:   Diagnosis Date   • Hypertension    • Liver disease     mets from colon cancer   • Malignant neoplasm of colon (CMS/HCC)      Past Surgical History:   Procedure Laterality Date   • COLON SURGERY      Sigmoid   • LIVER SURGERY     • PORTACATH PLACEMENT         No Known Allergies    Family History and Social History reviewed and changed as necessary      REVIEW OF SYSTEM:   Review of Systems   Constitutional: Negative for appetite change, chills, diaphoresis, fatigue, fever and unexpected weight change.   HENT:   Negative for mouth sores, sore throat and trouble swallowing.    Eyes: Negative for icterus.   Respiratory: Negative for cough, hemoptysis and shortness of breath.    Cardiovascular: Negative for chest pain, leg swelling and palpitations.   Gastrointestinal: Negative for abdominal distention, abdominal pain, blood in stool, constipation, diarrhea, nausea and vomiting.   Endocrine: Negative for hot  "flashes.   Genitourinary: Negative for bladder incontinence, difficulty urinating, dysuria, frequency and hematuria.    Musculoskeletal: Negative for gait problem, neck pain and neck stiffness.   Skin: Negative for rash.  Positive for dry skin.  Neurological: Positive for peripheral neuropathy in the hands and feet.  Hematological: Negative for adenopathy. Does not bruise/bleed easily.   Psychiatric/Behavioral: Negative for depression. The patient is not nervous/anxious.    All other systems reviewed and are negative.       PHYSICAL EXAM    Vitals:    02/14/19 0823   BP: 170/81   Pulse: 61   Resp: 17   Temp: 97.7 °F (36.5 °C)   TempSrc: Temporal   Weight: 81.6 kg (180 lb)   Height: 185.4 cm (73\")     Constitutional: Appears well-developed and well-nourished. No distress.   ECOG: (1) Restricted in physically strenuous activity, ambulatory and able to do work of light nature  HENT:   Head: Normocephalic.   Mouth/Throat: Oropharynx is clear and moist.   Eyes: Conjunctivae are normal. Pupils are equal, round, and reactive to light. No scleral icterus.   Neck: Neck supple. No JVD present. No thyromegaly present.   Cardiovascular: Normal rate, regular rhythm and normal heart sounds.    Pulmonary/Chest: Breath sounds normal. No respiratory distress.   Nodes: No cervical, supraclavicular or axillary nodes palpable on exam.  Abdominal: Soft. Exhibits no distension and no mass. There is no hepatosplenomegaly. There is no tenderness. There is no rebound and no guarding.   Musculoskeletal:Exhibits no edema, tenderness or deformity.   Neurological: Alert and oriented to person, place, and time. Exhibits normal muscle tone.   Skin: Dry.  No ecchymosis, no petechiae and no rash noted. Not diaphoretic. No cyanosis.   Psychiatric: Normal mood and affect.   Vitals reviewed.  Laboratory data reviewed along with CT chest abdomen and pelvis and images thereof as outlined above in the oncology history were reviewed at time of " visit.    Lab Results   Component Value Date    WBC 4.11 (L) 01/24/2019    HGB 14.5 01/24/2019    HCT 42.8 01/24/2019    .4 (H) 01/24/2019     (L) 01/24/2019       Lab Results   Component Value Date    GLUCOSE 92 01/24/2019    BUN 15 01/24/2019    CREATININE 0.80 01/24/2019    EGFRIFNONA 96 01/24/2019    BCR 18.8 01/24/2019    K 4.3 01/24/2019    CO2 25.0 (L) 01/24/2019    CALCIUM 8.9 01/24/2019    ALBUMIN 4.50 01/24/2019    LABIL2 1.0 02/18/2016    AST 22 01/24/2019    ALT 25 01/24/2019       Lab Results   Component Value Date    CEA 2.56 01/24/2019    CEA 2.04 01/03/2019    CEA 1.88 12/10/2018    CEA 2.23 11/15/2018       Assessment/Plan     1. 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    2.   Peripheral neuropathy secondary to chemotherapy. We will       continue to monitor  3.   Dental caries: Complete dental extraction at the end of March 2018  4.   Cytopenia due chemotherapy:  Continue to monitor.     Discussion: Clinically, he remains stable with no sign of disease. His CEA is stable.  We discussed that his MRI abdomen  showed no evidence of disease. Ct chest showed No evidence for progression of disease with prior healed granulomatous involvement including calcified fibronodular scarring within the right hilar and right suprahilar regions.  We will not make any dose adjustments as he is tolerating current Avastin and Xeloda dosing and scans are stable. We will continue treatment until progression or his counts become unstable.  We will plan to see him back in 3 weeks.         I spent 25 minutes on the patient's plan and care with more than 50% spent on counseling the patient regarding the plan going forward and reviewing his scans with him.    Adeola Sunshine, APRN  02/14/2019

## 2019-03-07 ENCOUNTER — OFFICE VISIT (OUTPATIENT)
Dept: ONCOLOGY | Facility: CLINIC | Age: 69
End: 2019-03-07

## 2019-03-07 ENCOUNTER — INFUSION (OUTPATIENT)
Dept: ONCOLOGY | Facility: HOSPITAL | Age: 69
End: 2019-03-07

## 2019-03-07 VITALS
TEMPERATURE: 98.3 F | DIASTOLIC BLOOD PRESSURE: 84 MMHG | BODY MASS INDEX: 23.72 KG/M2 | RESPIRATION RATE: 17 BRPM | HEART RATE: 54 BPM | HEIGHT: 73 IN | WEIGHT: 179 LBS | SYSTOLIC BLOOD PRESSURE: 162 MMHG

## 2019-03-07 VITALS — HEART RATE: 52 BPM | DIASTOLIC BLOOD PRESSURE: 80 MMHG | SYSTOLIC BLOOD PRESSURE: 160 MMHG

## 2019-03-07 DIAGNOSIS — C18.7 MALIGNANT NEOPLASM OF SIGMOID COLON (HCC): Primary | ICD-10-CM

## 2019-03-07 DIAGNOSIS — C18.7 MALIGNANT NEOPLASM OF SIGMOID COLON (HCC): ICD-10-CM

## 2019-03-07 LAB
ALBUMIN SERPL-MCNC: 4.3 G/DL (ref 3.5–5)
ALBUMIN/GLOB SERPL: 1.5 G/DL (ref 1–2)
ALP SERPL-CCNC: 67 U/L (ref 38–126)
ALT SERPL W P-5'-P-CCNC: 21 U/L (ref 13–69)
ANION GAP SERPL CALCULATED.3IONS-SCNC: 10.1 MMOL/L (ref 10–20)
AST SERPL-CCNC: 22 U/L (ref 15–46)
BASOPHILS # BLD AUTO: 0.01 10*3/MM3 (ref 0–0.2)
BASOPHILS NFR BLD AUTO: 0.2 % (ref 0–2.5)
BILIRUB SERPL-MCNC: 1 MG/DL (ref 0.2–1.3)
BILIRUB UR QL STRIP: NEGATIVE
BUN BLD-MCNC: 11 MG/DL (ref 7–20)
BUN/CREAT SERPL: 13.8 (ref 6.3–21.9)
CALCIUM SPEC-SCNC: 9.1 MG/DL (ref 8.4–10.2)
CEA SERPL-MCNC: 1.47 NG/ML
CHLORIDE SERPL-SCNC: 105 MMOL/L (ref 98–107)
CLARITY UR: CLEAR
CO2 SERPL-SCNC: 27 MMOL/L (ref 26–30)
COLOR UR: YELLOW
CREAT BLD-MCNC: 0.8 MG/DL (ref 0.6–1.3)
DEPRECATED RDW RBC AUTO: 60.4 FL (ref 37–54)
EOSINOPHIL # BLD AUTO: 0.05 10*3/MM3 (ref 0–0.7)
EOSINOPHIL NFR BLD AUTO: 0.8 % (ref 0–7)
ERYTHROCYTE [DISTWIDTH] IN BLOOD BY AUTOMATED COUNT: 15.4 % (ref 11.5–14.5)
GFR SERPL CREATININE-BSD FRML MDRD: 96 ML/MIN/1.73
GLOBULIN UR ELPH-MCNC: 2.8 GM/DL
GLUCOSE BLD-MCNC: 85 MG/DL (ref 74–98)
GLUCOSE UR STRIP-MCNC: NEGATIVE MG/DL
HCT VFR BLD AUTO: 41.9 % (ref 42–52)
HGB BLD-MCNC: 13.9 G/DL (ref 14–18)
HGB UR QL STRIP.AUTO: NEGATIVE
IMM GRANULOCYTES # BLD AUTO: 0.04 10*3/MM3 (ref 0–0.06)
IMM GRANULOCYTES NFR BLD AUTO: 0.7 % (ref 0–0.6)
KETONES UR QL STRIP: NEGATIVE
LEUKOCYTE ESTERASE UR QL STRIP.AUTO: NEGATIVE
LYMPHOCYTES # BLD AUTO: 1.25 10*3/MM3 (ref 0.6–3.4)
LYMPHOCYTES NFR BLD AUTO: 21 % (ref 10–50)
MACROCYTES BLD QL SMEAR: NORMAL
MCH RBC QN AUTO: 35.1 PG (ref 27–31)
MCHC RBC AUTO-ENTMCNC: 33.2 G/DL (ref 30–37)
MCV RBC AUTO: 105.8 FL (ref 80–94)
MONOCYTES # BLD AUTO: 0.54 10*3/MM3 (ref 0–0.9)
MONOCYTES NFR BLD AUTO: 9.1 % (ref 0–12)
NEUTROPHILS # BLD AUTO: 4.06 10*3/MM3 (ref 2–6.9)
NEUTROPHILS NFR BLD AUTO: 68.2 % (ref 37–80)
NITRITE UR QL STRIP: NEGATIVE
NRBC BLD AUTO-RTO: 0 /100 WBC (ref 0–0)
PH UR STRIP.AUTO: 7 [PH] (ref 5–8)
PLATELET # BLD AUTO: 145 10*3/MM3 (ref 130–400)
PMV BLD AUTO: 9.3 FL (ref 6–12)
POTASSIUM BLD-SCNC: 4.1 MMOL/L (ref 3.5–5.1)
PROT SERPL-MCNC: 7.1 G/DL (ref 6.3–8.2)
PROT UR QL STRIP: NEGATIVE
RBC # BLD AUTO: 3.96 10*6/MM3 (ref 4.7–6.1)
SMALL PLATELETS BLD QL SMEAR: ADEQUATE
SODIUM BLD-SCNC: 138 MMOL/L (ref 137–145)
SP GR UR STRIP: 1.01 (ref 1–1.03)
UROBILINOGEN UR QL STRIP: NORMAL
WBC MORPH BLD: NORMAL
WBC NRBC COR # BLD: 5.95 10*3/MM3 (ref 4.8–10.8)

## 2019-03-07 PROCEDURE — 96413 CHEMO IV INFUSION 1 HR: CPT

## 2019-03-07 PROCEDURE — 82378 CARCINOEMBRYONIC ANTIGEN: CPT

## 2019-03-07 PROCEDURE — 25010000002 BEVACIZUMAB 100 MG/4ML SOLUTION 4 ML VIAL: Performed by: NURSE PRACTITIONER

## 2019-03-07 PROCEDURE — 80053 COMPREHEN METABOLIC PANEL: CPT

## 2019-03-07 PROCEDURE — 99214 OFFICE O/P EST MOD 30 MIN: CPT | Performed by: NURSE PRACTITIONER

## 2019-03-07 PROCEDURE — 85007 BL SMEAR W/DIFF WBC COUNT: CPT

## 2019-03-07 PROCEDURE — 81003 URINALYSIS AUTO W/O SCOPE: CPT

## 2019-03-07 PROCEDURE — 25010000002 BEVACIZUMAB PER 10 MG: Performed by: NURSE PRACTITIONER

## 2019-03-07 PROCEDURE — 36415 COLL VENOUS BLD VENIPUNCTURE: CPT

## 2019-03-07 PROCEDURE — 85025 COMPLETE CBC W/AUTO DIFF WBC: CPT

## 2019-03-07 RX ORDER — DEXTROSE MONOHYDRATE 50 MG/ML
250 INJECTION, SOLUTION INTRAVENOUS ONCE
Status: CANCELLED | OUTPATIENT
Start: 2019-03-07 | End: 2019-03-07

## 2019-03-07 RX ORDER — SODIUM CHLORIDE 9 MG/ML
250 INJECTION, SOLUTION INTRAVENOUS ONCE
Status: CANCELLED | OUTPATIENT
Start: 2019-03-07

## 2019-03-07 RX ORDER — DEXTROSE MONOHYDRATE 50 MG/ML
250 INJECTION, SOLUTION INTRAVENOUS ONCE
Status: DISCONTINUED | OUTPATIENT
Start: 2019-03-07 | End: 2019-03-07 | Stop reason: HOSPADM

## 2019-03-07 RX ORDER — SODIUM CHLORIDE 9 MG/ML
250 INJECTION, SOLUTION INTRAVENOUS ONCE
Status: DISCONTINUED | OUTPATIENT
Start: 2019-03-07 | End: 2019-03-07 | Stop reason: HOSPADM

## 2019-03-07 RX ORDER — SODIUM CHLORIDE 0.9 % (FLUSH) 0.9 %
10 SYRINGE (ML) INJECTION AS NEEDED
Status: DISCONTINUED | OUTPATIENT
Start: 2019-03-07 | End: 2019-03-07 | Stop reason: HOSPADM

## 2019-03-07 RX ORDER — SODIUM CHLORIDE 0.9 % (FLUSH) 0.9 %
10 SYRINGE (ML) INJECTION AS NEEDED
Status: CANCELLED | OUTPATIENT
Start: 2019-03-07

## 2019-03-07 RX ADMIN — SODIUM CHLORIDE, PRESERVATIVE FREE 10 ML: 5 INJECTION INTRAVENOUS at 16:16

## 2019-03-07 RX ADMIN — BEVACIZUMAB 590 MG: 400 INJECTION, SOLUTION INTRAVENOUS at 15:40

## 2019-03-07 RX ADMIN — HEPARIN SODIUM (PORCINE) LOCK FLUSH IV SOLN 100 UNIT/ML 500 UNITS: 100 SOLUTION at 16:16

## 2019-03-07 NOTE — PROGRESS NOTES
CHIEF COMPLAINT: 1. Follow-up for colon cancer                                      2.  Peripheral neuropathy of the hands and feet, worse in the feet      Problem List:  Oncology/Hematology History    1. Stage CARLOS, [id number] colon cancer with 2 out of 14 pericolonic lymph nodes  involved and a left lobe liver biopsy positive for metastasis, presenting with  a 25 pound weight loss and diarrhea with some perirectal soreness and had  colonoscopy with Dr. Mcclain in January that found this lesion that was  circumferential high-grade invading into the pericolonic fat, status post  sigmoid colectomy of a poorly differentiated adenocarcinoma.   a) Baseline CEA postop of 0.8 with alkaline phosphatase 111, with normal liver  enzymes and creatinine of 0.8. Baseline white count 9820 with a hemoglobin  13.8, platelets 339,000, and CT with contrast showing a right lobe liver dome  lesion as well as an anastomotic change in the bowel.   b) Began [facility]Ox 03/15/2016.  c) Added in Avastin to [facility]Ox 04/05/2016, second cycle. (This was 8 weeks out  from surgery).  d) KRAS mutation is negative.  E.) CAT scan in August 2016 shows resolution of disease in the liver and colon and a stable lung nodule.  Hence Avastin, Xeloda, and oxaliplatin continued.  F) oxaliplatin discontinued October 2016 due to worsening peripheral neuropathy.  G.) CTs of February 2017 showed no evidence of metastasis and stable bibasilar nodular scar.  Persistent neuropathy not worsening.  CEA 1.4.  Continuing Avastin and Xeloda without oxaliplatin.  Having some problems with irritation of his eyes on Xeloda.  2.  Peripheral neuropathy, chemotherapy induced        Malignant neoplasm of sigmoid colon (CMS/HCC)    5/20/2016 Initial Diagnosis     Malignant neoplasm of sigmoid colon       5/2/2017 Imaging     CT chest, abdomen, pelvis IMPRESSION:  1. No disease recurrence.  2. Minimal areas of nodular thickening in the right lower lobe, stable.         8/2/2017 Imaging      CT chest/abdomen/pelvis IMPRESSION:  Stable examination with no evidence of acute intrathoracic,  intra-abdominal or pelvic abnormality. There is no evidence of  progression of disease. No metastatic disease.          11/13/2017 Imaging     CT chest/abdomen/pelvis IMPRESSION:  Stable examination without evidence of acute intrathoracic  intra-abdominal or intrapelvic abnormality. No evidence of progression  of disease.   CEA 1.3.            HISTORY OF PRESENT ILLNESS:  The patient is a 68 y.o. male, here for follow up on management of Stage IV a colon cancer.  The patient continues to do well..  He continues to tolerate Xeloda and Avastin with minimal side effects. Clinically he has no issues to reports.  Denies constipation and diarrhea.  Denies hand-foot syndrome.  Peripheral neuropathy is stable.  He continues to manage this with gabapentin. He is planning a trip to [place] after treatment.     Past Medical History:   Diagnosis Date   • Hypertension    • Liver disease     mets from colon cancer   • Malignant neoplasm of colon (CMS/HCC)      Past Surgical History:   Procedure Laterality Date   • COLON SURGERY      Sigmoid   • LIVER SURGERY     • PORTACATH PLACEMENT         No Known Allergies    Family History and Social History reviewed and changed as necessary      REVIEW OF SYSTEM:   Review of Systems   Constitutional: Negative for appetite change, chills, diaphoresis, fatigue, fever and unexpected weight change.   HENT:   Negative for mouth sores, sore throat and trouble swallowing.    Eyes: Negative for icterus.   Respiratory: Negative for cough, hemoptysis and shortness of breath.    Cardiovascular: Negative for chest pain, leg swelling and palpitations.   Gastrointestinal: Negative for abdominal distention, abdominal pain, blood in stool, constipation, diarrhea, nausea and vomiting.   Endocrine: Negative for hot flashes.   Genitourinary: Negative for bladder incontinence, difficulty urinating, dysuria,  "frequency and hematuria.    Musculoskeletal: Negative for gait problem, neck pain and neck stiffness.   Skin: Negative for rash.  Positive for dry skin.  Neurological: Positive for peripheral neuropathy in the hands and feet.  Hematological: Negative for adenopathy. Does not bruise/bleed easily.   Psychiatric/Behavioral: Negative for depression. The patient is not nervous/anxious.    All other systems reviewed and are negative.       PHYSICAL EXAM    Vitals:    03/07/19 1406   BP: 162/84   Pulse: 54   Resp: 17   Temp: 98.3 °F (36.8 °C)   TempSrc: Temporal   Weight: 81.2 kg (179 lb)   Height: 185.4 cm (73\")     Constitutional: Appears well-developed and well-nourished. No distress.   ECOG: (1) Restricted in physically strenuous activity, ambulatory and able to do work of light nature  HENT:   Head: Normocephalic.   Mouth/Throat: Oropharynx is clear and moist.   Eyes: Conjunctivae are normal. Pupils are equal, round, and reactive to light. No scleral icterus.   Neck: Neck supple. No JVD present. No thyromegaly present.   Cardiovascular: Normal rate, regular rhythm and normal heart sounds.    Pulmonary/Chest: Breath sounds normal. No respiratory distress.   Nodes: No cervical, supraclavicular or axillary nodes palpable on exam.  Abdominal: Soft. Exhibits no distension and no mass. There is no hepatosplenomegaly. There is no tenderness. There is no rebound and no guarding.   Musculoskeletal:Exhibits no edema, tenderness or deformity.   Neurological: Alert and oriented to person, place, and time. Exhibits normal muscle tone.   Skin: Dry.  No ecchymosis, no petechiae and no rash noted. Not diaphoretic. No cyanosis.   Psychiatric: Normal mood and affect.   Vitals reviewed.  Laboratory data reviewed along with CT chest abdomen and pelvis and images thereof as outlined above in the oncology history were reviewed at time of visit.    Lab Results   Component Value Date    WBC 4.22 (L) 02/14/2019    HGB 14.0 02/14/2019    HCT " 41.6 (L) 02/14/2019    .0 (H) 02/14/2019     (L) 02/14/2019       Lab Results   Component Value Date    GLUCOSE 97 02/14/2019    BUN 16 02/14/2019    CREATININE 0.70 02/14/2019    EGFRIFNONA 112 02/14/2019    BCR 22.9 (H) 02/14/2019    K 4.2 02/14/2019    CO2 26.0 02/14/2019    CALCIUM 8.6 02/14/2019    ALBUMIN 3.90 02/14/2019    LABIL2 1.0 02/18/2016    AST 22 02/14/2019    ALT 22 02/14/2019       Lab Results   Component Value Date    CEA 1.90 02/14/2019    CEA 2.56 01/24/2019    CEA 2.04 01/03/2019    CEA 1.88 12/10/2018       Assessment/Plan     68  Year old gentlemen with metastatic colon cancer with Solitary Liver metastasis initially diagnosed 2-. He restarted Xeloda and Avastin 11/15/2018.  Clinically, he remains stable with no sign of disease. His CEA is stable.  We will continue current dose of Avastin and Xeloda. We will not make any dose adjustments as he is tolerating treatment and scans are stable. We will plan to scan again in 6 months. This would be due in August 2019.  We will plan for him to follow up in 6 weeks.     Peripheral neuropathy secondary to chemotherapy. We will continue to monitor and continue gabapentin three times daily.         I spent 25 minutes on the patient's plan and care with more than 50% spent on counseling the patient regarding the plan going forward and reviewing his scans with him.    Adeola Sunshine, APRN  03/07/2019

## 2019-03-27 DIAGNOSIS — C18.7 MALIGNANT NEOPLASM OF SIGMOID COLON (HCC): ICD-10-CM

## 2019-03-27 RX ORDER — SODIUM CHLORIDE 9 MG/ML
250 INJECTION, SOLUTION INTRAVENOUS ONCE
Status: CANCELLED | OUTPATIENT
Start: 2019-04-18

## 2019-03-27 RX ORDER — DEXTROSE MONOHYDRATE 50 MG/ML
250 INJECTION, SOLUTION INTRAVENOUS ONCE
Status: CANCELLED | OUTPATIENT
Start: 2019-03-28 | End: 2019-03-28

## 2019-03-27 RX ORDER — DEXTROSE MONOHYDRATE 50 MG/ML
250 INJECTION, SOLUTION INTRAVENOUS ONCE
Status: CANCELLED | OUTPATIENT
Start: 2019-05-09 | End: 2019-05-09

## 2019-03-27 RX ORDER — DEXTROSE MONOHYDRATE 50 MG/ML
250 INJECTION, SOLUTION INTRAVENOUS ONCE
Status: CANCELLED | OUTPATIENT
Start: 2019-04-18 | End: 2019-04-18

## 2019-03-27 RX ORDER — SODIUM CHLORIDE 9 MG/ML
250 INJECTION, SOLUTION INTRAVENOUS ONCE
Status: CANCELLED | OUTPATIENT
Start: 2019-03-28

## 2019-03-27 RX ORDER — SODIUM CHLORIDE 9 MG/ML
250 INJECTION, SOLUTION INTRAVENOUS ONCE
Status: CANCELLED | OUTPATIENT
Start: 2019-05-09

## 2019-03-28 ENCOUNTER — INFUSION (OUTPATIENT)
Dept: ONCOLOGY | Facility: HOSPITAL | Age: 69
End: 2019-03-28

## 2019-03-28 VITALS
TEMPERATURE: 98.2 F | RESPIRATION RATE: 18 BRPM | HEART RATE: 60 BPM | BODY MASS INDEX: 23.62 KG/M2 | SYSTOLIC BLOOD PRESSURE: 146 MMHG | WEIGHT: 179 LBS | DIASTOLIC BLOOD PRESSURE: 80 MMHG

## 2019-03-28 DIAGNOSIS — C18.7 MALIGNANT NEOPLASM OF SIGMOID COLON (HCC): Primary | ICD-10-CM

## 2019-03-28 LAB
ALBUMIN SERPL-MCNC: 4.4 G/DL (ref 3.5–5)
ALBUMIN/GLOB SERPL: 1.6 G/DL (ref 1–2)
ALP SERPL-CCNC: 59 U/L (ref 38–126)
ALT SERPL W P-5'-P-CCNC: 21 U/L (ref 13–69)
ANION GAP SERPL CALCULATED.3IONS-SCNC: 13.3 MMOL/L (ref 10–20)
AST SERPL-CCNC: 25 U/L (ref 15–46)
BASOPHILS # BLD AUTO: 0.01 10*3/MM3 (ref 0–0.2)
BASOPHILS NFR BLD AUTO: 0.3 % (ref 0–1.5)
BILIRUB SERPL-MCNC: 1.5 MG/DL (ref 0.2–1.3)
BILIRUB UR QL STRIP: NEGATIVE
BUN BLD-MCNC: 12 MG/DL (ref 7–20)
BUN/CREAT SERPL: 15 (ref 6.3–21.9)
CALCIUM SPEC-SCNC: 9.5 MG/DL (ref 8.4–10.2)
CEA SERPL-MCNC: 1.87 NG/ML
CHLORIDE SERPL-SCNC: 103 MMOL/L (ref 98–107)
CLARITY UR: CLEAR
CO2 SERPL-SCNC: 27 MMOL/L (ref 26–30)
COLOR UR: YELLOW
CREAT BLD-MCNC: 0.8 MG/DL (ref 0.6–1.3)
DEPRECATED RDW RBC AUTO: 59.6 FL (ref 37–54)
EOSINOPHIL # BLD AUTO: 0.06 10*3/MM3 (ref 0–0.4)
EOSINOPHIL NFR BLD AUTO: 1.6 % (ref 0.3–6.2)
ERYTHROCYTE [DISTWIDTH] IN BLOOD BY AUTOMATED COUNT: 15 % (ref 12.3–15.4)
GFR SERPL CREATININE-BSD FRML MDRD: 96 ML/MIN/1.73
GLOBULIN UR ELPH-MCNC: 2.7 GM/DL
GLUCOSE BLD-MCNC: 96 MG/DL (ref 74–98)
GLUCOSE UR STRIP-MCNC: NEGATIVE MG/DL
HCT VFR BLD AUTO: 42.3 % (ref 37.5–51)
HGB BLD-MCNC: 14.5 G/DL (ref 13–17.7)
HGB UR QL STRIP.AUTO: NEGATIVE
IMM GRANULOCYTES # BLD AUTO: 0.01 10*3/MM3 (ref 0–0.05)
IMM GRANULOCYTES NFR BLD AUTO: 0.3 % (ref 0–0.5)
KETONES UR QL STRIP: NEGATIVE
LEUKOCYTE ESTERASE UR QL STRIP.AUTO: NEGATIVE
LYMPHOCYTES # BLD AUTO: 0.92 10*3/MM3 (ref 0.7–3.1)
LYMPHOCYTES NFR BLD AUTO: 24.1 % (ref 19.6–45.3)
MACROCYTES BLD QL SMEAR: NORMAL
MCH RBC QN AUTO: 36.3 PG (ref 26.6–33)
MCHC RBC AUTO-ENTMCNC: 34.3 G/DL (ref 31.5–35.7)
MCV RBC AUTO: 105.8 FL (ref 79–97)
MONOCYTES # BLD AUTO: 0.37 10*3/MM3 (ref 0.1–0.9)
MONOCYTES NFR BLD AUTO: 9.7 % (ref 5–12)
NEUTROPHILS # BLD AUTO: 2.44 10*3/MM3 (ref 1.4–7)
NEUTROPHILS NFR BLD AUTO: 64 % (ref 42.7–76)
NITRITE UR QL STRIP: NEGATIVE
NRBC BLD AUTO-RTO: 0 /100 WBC (ref 0–0)
PH UR STRIP.AUTO: 5.5 [PH] (ref 5–8)
PLAT MORPH BLD: NORMAL
PLATELET # BLD AUTO: 117 10*3/MM3 (ref 140–450)
PMV BLD AUTO: 10.3 FL (ref 6–12)
POTASSIUM BLD-SCNC: 4.3 MMOL/L (ref 3.5–5.1)
PROT SERPL-MCNC: 7.1 G/DL (ref 6.3–8.2)
PROT UR QL STRIP: NEGATIVE
RBC # BLD AUTO: 4 10*6/MM3 (ref 4.14–5.8)
SODIUM BLD-SCNC: 139 MMOL/L (ref 137–145)
SP GR UR STRIP: 1.02 (ref 1–1.03)
UROBILINOGEN UR QL STRIP: NORMAL
WBC MORPH BLD: NORMAL
WBC NRBC COR # BLD: 3.81 10*3/MM3 (ref 3.4–10.8)

## 2019-03-28 PROCEDURE — 25010000002 BEVACIZUMAB PER 10 MG: Performed by: INTERNAL MEDICINE

## 2019-03-28 PROCEDURE — 85007 BL SMEAR W/DIFF WBC COUNT: CPT

## 2019-03-28 PROCEDURE — 96413 CHEMO IV INFUSION 1 HR: CPT

## 2019-03-28 PROCEDURE — 82378 CARCINOEMBRYONIC ANTIGEN: CPT

## 2019-03-28 PROCEDURE — 81003 URINALYSIS AUTO W/O SCOPE: CPT

## 2019-03-28 PROCEDURE — 85025 COMPLETE CBC W/AUTO DIFF WBC: CPT

## 2019-03-28 PROCEDURE — 80053 COMPREHEN METABOLIC PANEL: CPT

## 2019-03-28 PROCEDURE — 25010000002 BEVACIZUMAB 100 MG/4ML SOLUTION 4 ML VIAL: Performed by: INTERNAL MEDICINE

## 2019-03-28 PROCEDURE — 36415 COLL VENOUS BLD VENIPUNCTURE: CPT

## 2019-03-28 RX ORDER — DEXTROSE MONOHYDRATE 50 MG/ML
250 INJECTION, SOLUTION INTRAVENOUS ONCE
Status: DISCONTINUED | OUTPATIENT
Start: 2019-03-28 | End: 2019-03-28 | Stop reason: HOSPADM

## 2019-03-28 RX ORDER — SODIUM CHLORIDE 9 MG/ML
250 INJECTION, SOLUTION INTRAVENOUS ONCE
Status: DISCONTINUED | OUTPATIENT
Start: 2019-03-28 | End: 2019-03-28 | Stop reason: HOSPADM

## 2019-03-28 RX ORDER — SODIUM CHLORIDE 0.9 % (FLUSH) 0.9 %
10 SYRINGE (ML) INJECTION AS NEEDED
Status: DISCONTINUED | OUTPATIENT
Start: 2019-03-28 | End: 2019-03-28 | Stop reason: HOSPADM

## 2019-03-28 RX ORDER — SODIUM CHLORIDE 0.9 % (FLUSH) 0.9 %
10 SYRINGE (ML) INJECTION AS NEEDED
Status: CANCELLED | OUTPATIENT
Start: 2019-03-28

## 2019-03-28 RX ADMIN — BEVACIZUMAB 590 MG: 400 INJECTION, SOLUTION INTRAVENOUS at 09:51

## 2019-03-28 RX ADMIN — SODIUM CHLORIDE, PRESERVATIVE FREE 500 UNITS: 5 INJECTION INTRAVENOUS at 10:23

## 2019-03-28 RX ADMIN — SODIUM CHLORIDE, PRESERVATIVE FREE 10 ML: 5 INJECTION INTRAVENOUS at 10:23

## 2019-04-15 RX ORDER — GABAPENTIN 100 MG/1
100 CAPSULE ORAL 3 TIMES DAILY
Qty: 90 CAPSULE | Refills: 5 | OUTPATIENT
Start: 2019-04-15 | End: 2019-11-19 | Stop reason: SDUPTHER

## 2019-04-18 ENCOUNTER — OFFICE VISIT (OUTPATIENT)
Dept: ONCOLOGY | Facility: CLINIC | Age: 69
End: 2019-04-18

## 2019-04-18 ENCOUNTER — INFUSION (OUTPATIENT)
Dept: ONCOLOGY | Facility: HOSPITAL | Age: 69
End: 2019-04-18

## 2019-04-18 VITALS
HEART RATE: 59 BPM | RESPIRATION RATE: 18 BRPM | SYSTOLIC BLOOD PRESSURE: 167 MMHG | BODY MASS INDEX: 23.72 KG/M2 | HEIGHT: 73 IN | TEMPERATURE: 98.1 F | DIASTOLIC BLOOD PRESSURE: 82 MMHG | WEIGHT: 179 LBS

## 2019-04-18 VITALS — SYSTOLIC BLOOD PRESSURE: 166 MMHG | DIASTOLIC BLOOD PRESSURE: 89 MMHG

## 2019-04-18 DIAGNOSIS — C18.7 MALIGNANT NEOPLASM OF SIGMOID COLON (HCC): Primary | ICD-10-CM

## 2019-04-18 LAB
ALBUMIN SERPL-MCNC: 4.4 G/DL (ref 3.5–5)
ALBUMIN/GLOB SERPL: 1.6 G/DL (ref 1–2)
ALP SERPL-CCNC: 62 U/L (ref 38–126)
ALT SERPL W P-5'-P-CCNC: 21 U/L (ref 13–69)
ANION GAP SERPL CALCULATED.3IONS-SCNC: 14.2 MMOL/L (ref 10–20)
ANISOCYTOSIS BLD QL: NORMAL
AST SERPL-CCNC: 26 U/L (ref 15–46)
BASOPHILS # BLD AUTO: 0.01 10*3/MM3 (ref 0–0.2)
BASOPHILS NFR BLD AUTO: 0.2 % (ref 0–1.5)
BILIRUB SERPL-MCNC: 1 MG/DL (ref 0.2–1.3)
BILIRUB UR QL STRIP: NEGATIVE
BUN BLD-MCNC: 12 MG/DL (ref 7–20)
BUN/CREAT SERPL: 15 (ref 6.3–21.9)
CALCIUM SPEC-SCNC: 9.2 MG/DL (ref 8.4–10.2)
CEA SERPL-MCNC: 2.12 NG/ML
CHLORIDE SERPL-SCNC: 101 MMOL/L (ref 98–107)
CLARITY UR: CLEAR
CO2 SERPL-SCNC: 26 MMOL/L (ref 26–30)
COLOR UR: YELLOW
CREAT BLD-MCNC: 0.8 MG/DL (ref 0.6–1.3)
DEPRECATED RDW RBC AUTO: 58.2 FL (ref 37–54)
EOSINOPHIL # BLD AUTO: 0.04 10*3/MM3 (ref 0–0.4)
EOSINOPHIL NFR BLD AUTO: 0.6 % (ref 0.3–6.2)
ERYTHROCYTE [DISTWIDTH] IN BLOOD BY AUTOMATED COUNT: 14.8 % (ref 12.3–15.4)
GFR SERPL CREATININE-BSD FRML MDRD: 96 ML/MIN/1.73
GLOBULIN UR ELPH-MCNC: 2.8 GM/DL
GLUCOSE BLD-MCNC: 80 MG/DL (ref 74–98)
GLUCOSE UR STRIP-MCNC: NEGATIVE MG/DL
HCT VFR BLD AUTO: 42.5 % (ref 37.5–51)
HGB BLD-MCNC: 14.6 G/DL (ref 13–17.7)
HGB UR QL STRIP.AUTO: NEGATIVE
IMM GRANULOCYTES # BLD AUTO: 0.02 10*3/MM3 (ref 0–0.05)
IMM GRANULOCYTES NFR BLD AUTO: 0.3 % (ref 0–0.5)
KETONES UR QL STRIP: NEGATIVE
LEUKOCYTE ESTERASE UR QL STRIP.AUTO: NEGATIVE
LYMPHOCYTES # BLD AUTO: 1.38 10*3/MM3 (ref 0.7–3.1)
LYMPHOCYTES NFR BLD AUTO: 22.3 % (ref 19.6–45.3)
MACROCYTES BLD QL SMEAR: NORMAL
MCH RBC QN AUTO: 36.1 PG (ref 26.6–33)
MCHC RBC AUTO-ENTMCNC: 34.4 G/DL (ref 31.5–35.7)
MCV RBC AUTO: 105.2 FL (ref 79–97)
MONOCYTES # BLD AUTO: 0.49 10*3/MM3 (ref 0.1–0.9)
MONOCYTES NFR BLD AUTO: 7.9 % (ref 5–12)
NEUTROPHILS # BLD AUTO: 4.25 10*3/MM3 (ref 1.4–7)
NEUTROPHILS NFR BLD AUTO: 68.7 % (ref 42.7–76)
NITRITE UR QL STRIP: NEGATIVE
NRBC BLD AUTO-RTO: 0 /100 WBC (ref 0–0)
PH UR STRIP.AUTO: 6.5 [PH] (ref 5–8)
PLATELET # BLD AUTO: 136 10*3/MM3 (ref 140–450)
PMV BLD AUTO: 10.5 FL (ref 6–12)
POTASSIUM BLD-SCNC: 4.2 MMOL/L (ref 3.5–5.1)
PROT SERPL-MCNC: 7.2 G/DL (ref 6.3–8.2)
PROT UR QL STRIP: NEGATIVE
RBC # BLD AUTO: 4.04 10*6/MM3 (ref 4.14–5.8)
SMALL PLATELETS BLD QL SMEAR: ADEQUATE
SODIUM BLD-SCNC: 137 MMOL/L (ref 137–145)
SP GR UR STRIP: 1.01 (ref 1–1.03)
UROBILINOGEN UR QL STRIP: NORMAL
WBC MORPH BLD: NORMAL
WBC NRBC COR # BLD: 6.19 10*3/MM3 (ref 3.4–10.8)

## 2019-04-18 PROCEDURE — 85025 COMPLETE CBC W/AUTO DIFF WBC: CPT

## 2019-04-18 PROCEDURE — 25010000002 BEVACIZUMAB PER 10 MG: Performed by: INTERNAL MEDICINE

## 2019-04-18 PROCEDURE — 36415 COLL VENOUS BLD VENIPUNCTURE: CPT

## 2019-04-18 PROCEDURE — 96413 CHEMO IV INFUSION 1 HR: CPT

## 2019-04-18 PROCEDURE — 82378 CARCINOEMBRYONIC ANTIGEN: CPT

## 2019-04-18 PROCEDURE — 99214 OFFICE O/P EST MOD 30 MIN: CPT | Performed by: NURSE PRACTITIONER

## 2019-04-18 PROCEDURE — 80053 COMPREHEN METABOLIC PANEL: CPT

## 2019-04-18 PROCEDURE — 85007 BL SMEAR W/DIFF WBC COUNT: CPT

## 2019-04-18 PROCEDURE — 81003 URINALYSIS AUTO W/O SCOPE: CPT

## 2019-04-18 PROCEDURE — 25010000002 BEVACIZUMAB 100 MG/4ML SOLUTION 4 ML VIAL: Performed by: INTERNAL MEDICINE

## 2019-04-18 RX ORDER — SODIUM CHLORIDE 0.9 % (FLUSH) 0.9 %
10 SYRINGE (ML) INJECTION AS NEEDED
Status: DISCONTINUED | OUTPATIENT
Start: 2019-04-18 | End: 2019-04-18 | Stop reason: HOSPADM

## 2019-04-18 RX ORDER — DEXTROSE MONOHYDRATE 50 MG/ML
250 INJECTION, SOLUTION INTRAVENOUS ONCE
Status: DISCONTINUED | OUTPATIENT
Start: 2019-04-18 | End: 2019-04-18 | Stop reason: HOSPADM

## 2019-04-18 RX ORDER — SODIUM CHLORIDE 9 MG/ML
250 INJECTION, SOLUTION INTRAVENOUS ONCE
Status: DISCONTINUED | OUTPATIENT
Start: 2019-04-18 | End: 2019-04-18 | Stop reason: HOSPADM

## 2019-04-18 RX ORDER — SODIUM CHLORIDE 0.9 % (FLUSH) 0.9 %
10 SYRINGE (ML) INJECTION AS NEEDED
Status: CANCELLED | OUTPATIENT
Start: 2019-04-18

## 2019-04-18 RX ADMIN — BEVACIZUMAB 590 MG: 400 INJECTION, SOLUTION INTRAVENOUS at 15:35

## 2019-04-18 RX ADMIN — HEPARIN SODIUM (PORCINE) LOCK FLUSH IV SOLN 100 UNIT/ML 500 UNITS: 100 SOLUTION at 16:09

## 2019-04-18 RX ADMIN — SODIUM CHLORIDE, PRESERVATIVE FREE 10 ML: 5 INJECTION INTRAVENOUS at 16:09

## 2019-04-18 NOTE — PROGRESS NOTES
CHIEF COMPLAINT: 1. Follow-up for colon cancer                                      2.  Peripheral neuropathy of the hands and feet, worse in the feet      Problem List:  Oncology/Hematology History    1. Stage CARLOS, [id number] colon cancer with 2 out of 14 pericolonic lymph nodes  involved and a left lobe liver biopsy positive for metastasis, presenting with  a 25 pound weight loss and diarrhea with some perirectal soreness and had  colonoscopy with Dr. Mcclain in January that found this lesion that was  circumferential high-grade invading into the pericolonic fat, status post  sigmoid colectomy of a poorly differentiated adenocarcinoma.   a) Baseline CEA postop of 0.8 with alkaline phosphatase 111, with normal liver  enzymes and creatinine of 0.8. Baseline white count 9820 with a hemoglobin  13.8, platelets 339,000, and CT with contrast showing a right lobe liver dome  lesion as well as an anastomotic change in the bowel.   b) Began [facility] 03/15/2016.  c) Added in Avastin to [facility]Ox 04/05/2016, second cycle. (This was 8 weeks out  from surgery).  d) KRAS mutation is negative.  E.) CAT scan in August 2016 shows resolution of disease in the liver and colon and a stable lung nodule.  Hence Avastin, Xeloda, and oxaliplatin continued.  F) oxaliplatin discontinued October 2016 due to worsening peripheral neuropathy.  G.) CTs of February 2017 showed no evidence of metastasis and stable bibasilar nodular scar.  Persistent neuropathy not worsening.  CEA 1.4.  Continuing Avastin and Xeloda without oxaliplatin.  Having some problems with irritation of his eyes on Xeloda.  2.  Peripheral neuropathy, chemotherapy induced        Malignant neoplasm of sigmoid colon (CMS/HCC)    5/20/2016 Initial Diagnosis     Malignant neoplasm of sigmoid colon         5/2/2017 Imaging     CT chest, abdomen, pelvis IMPRESSION:  1. No disease recurrence.  2. Minimal areas of nodular thickening in the right lower lobe, stable.         8/2/2017 Imaging      CT chest/abdomen/pelvis IMPRESSION:  Stable examination with no evidence of acute intrathoracic,  intra-abdominal or pelvic abnormality. There is no evidence of  progression of disease. No metastatic disease.          11/13/2017 Imaging     CT chest/abdomen/pelvis IMPRESSION:  Stable examination without evidence of acute intrathoracic  intra-abdominal or intrapelvic abnormality. No evidence of progression  of disease.   CEA 1.3.            HISTORY OF PRESENT ILLNESS:  The patient is a 68 y.o. male, here for follow up on management of Stage IV a colon cancer.  The patient continues to do well.  He continues to tolerate Xeloda and Avastin with minimal side effects.He has no issues to report currently.  Denies constipation and diarrhea.  Denies hand-foot syndrome.  Peripheral neuropathy is stable. We recently refilled his Neurontin.  He continues to manage well.  He does not have a PCP currently.  He is actively looking for another.       Past Medical History:   Diagnosis Date   • Hypertension    • Liver disease     mets from colon cancer   • Malignant neoplasm of colon (CMS/HCC)      Past Surgical History:   Procedure Laterality Date   • COLON SURGERY      Sigmoid   • LIVER SURGERY     • PORTACATH PLACEMENT         No Known Allergies    Family History and Social History reviewed and changed as necessary      REVIEW OF SYSTEM:   Review of Systems   Constitutional: Negative for appetite change, chills, diaphoresis, fatigue, fever and unexpected weight change.   HENT:   Negative for mouth sores, sore throat and trouble swallowing.    Eyes: Negative for icterus.   Respiratory: Negative for cough, hemoptysis and shortness of breath.    Cardiovascular: Negative for chest pain, leg swelling and palpitations.   Gastrointestinal: Negative for abdominal distention, abdominal pain, blood in stool, constipation, diarrhea, nausea and vomiting.   Endocrine: Negative for hot flashes.   Genitourinary: Negative for bladder  "incontinence, difficulty urinating, dysuria, frequency and hematuria.    Musculoskeletal: Negative for gait problem, neck pain and neck stiffness.   Skin: Negative for rash.  Positive for dry skin.  Neurological: Positive for peripheral neuropathy in the hands and feet.  Hematological: Negative for adenopathy. Does not bruise/bleed easily.   Psychiatric/Behavioral: Negative for depression. The patient is not nervous/anxious.    All other systems reviewed and are negative.       PHYSICAL EXAM    Vitals:    04/18/19 1354   BP: 167/82   Pulse: 59   Resp: 18   Temp: 98.1 °F (36.7 °C)   TempSrc: Temporal   Weight: 81.2 kg (179 lb)   Height: 185.4 cm (73\")     Constitutional: Appears well-developed and well-nourished. No distress.   ECOG: (1) Restricted in physically strenuous activity, ambulatory and able to do work of light nature  HENT:   Head: Normocephalic.   Mouth/Throat: Oropharynx is clear and moist.   Eyes: Conjunctivae are normal. Pupils are equal, round, and reactive to light. No scleral icterus.   Neck: Neck supple. No JVD present. No thyromegaly present.   Cardiovascular: Normal rate, regular rhythm and normal heart sounds.    Pulmonary/Chest: Breath sounds normal. No respiratory distress.   Nodes: No cervical, supraclavicular or axillary nodes palpable on exam.  Abdominal: Soft. Exhibits no distension and no mass. There is no hepatosplenomegaly. There is no tenderness. There is no rebound and no guarding.   Musculoskeletal:Exhibits no edema, tenderness or deformity.   Neurological: Alert and oriented to person, place, and time. Exhibits normal muscle tone.   Skin: Dry.  No ecchymosis, no petechiae and no rash noted. Not diaphoretic. No cyanosis.   Psychiatric: Normal mood and affect.   Vitals reviewed.  Laboratory data reviewed along with CT chest abdomen and pelvis and images thereof as outlined above in the oncology history were reviewed at time of visit.    Lab Results   Component Value Date    WBC 6.19 " 04/18/2019    HGB 14.6 04/18/2019    HCT 42.5 04/18/2019    .2 (H) 04/18/2019     (L) 04/18/2019       Lab Results   Component Value Date    GLUCOSE 80 04/18/2019    BUN 12 04/18/2019    CREATININE 0.80 04/18/2019    EGFRIFNONA 96 04/18/2019    BCR 15.0 04/18/2019    K 4.2 04/18/2019    CO2 26.0 04/18/2019    CALCIUM 9.2 04/18/2019    ALBUMIN 4.40 04/18/2019    LABIL2 1.0 02/18/2016    AST 26 04/18/2019    ALT 21 04/18/2019       Lab Results   Component Value Date    CEA 1.87 03/28/2019    CEA 1.47 03/07/2019    CEA 1.90 02/14/2019    CEA 2.56 01/24/2019       Assessment/Plan     68 y.o.  year old gentlemen with metastatic colon cancer with Solitary Liver metastasis initially diagnosed 2-. We will continue Xeloda and Avastin at current dosages.  Clinically, he remains stable with no sign of disease. His CEA remains stable.  We will continue current dose of Avastin and Xeloda. We will not make any dose adjustments as he is tolerating treatment and scans are stable. We will plan to scan again in 6 months. This would be due in August 2019.  We will plan for him to follow up in 6 weeks.     Peripheral neuropathy secondary to chemotherapy. We will continue to monitor and continue gabapentin three times daily.       I spent a total of  25 minutes in direct patient care, greater than 15    minutes (greater than 50%) were spent in coordination of care, and counseling the patient regarding colon cancer. I answered any questions patient had with medication and plan.     Adeola Sunshine, DEISY  04/18/2019

## 2019-05-09 ENCOUNTER — INFUSION (OUTPATIENT)
Dept: ONCOLOGY | Facility: HOSPITAL | Age: 69
End: 2019-05-09

## 2019-05-09 VITALS
DIASTOLIC BLOOD PRESSURE: 80 MMHG | BODY MASS INDEX: 23.22 KG/M2 | SYSTOLIC BLOOD PRESSURE: 142 MMHG | HEART RATE: 56 BPM | TEMPERATURE: 98.4 F | WEIGHT: 176 LBS | RESPIRATION RATE: 16 BRPM

## 2019-05-09 DIAGNOSIS — C18.7 MALIGNANT NEOPLASM OF SIGMOID COLON (HCC): Primary | ICD-10-CM

## 2019-05-09 LAB
ALBUMIN SERPL-MCNC: 4.3 G/DL (ref 3.5–5)
ALBUMIN/GLOB SERPL: 1.5 G/DL (ref 1–2)
ALP SERPL-CCNC: 64 U/L (ref 38–126)
ALT SERPL W P-5'-P-CCNC: 28 U/L (ref 13–69)
ANION GAP SERPL CALCULATED.3IONS-SCNC: 12.4 MMOL/L (ref 10–20)
ANISOCYTOSIS BLD QL: NORMAL
AST SERPL-CCNC: 25 U/L (ref 15–46)
BASOPHILS # BLD AUTO: 0.01 10*3/MM3 (ref 0–0.2)
BASOPHILS NFR BLD AUTO: 0.2 % (ref 0–1.5)
BILIRUB SERPL-MCNC: 2 MG/DL (ref 0.2–1.3)
BILIRUB UR QL STRIP: NEGATIVE
BUN BLD-MCNC: 13 MG/DL (ref 7–20)
BUN/CREAT SERPL: 16.3 (ref 6.3–21.9)
CALCIUM SPEC-SCNC: 8.9 MG/DL (ref 8.4–10.2)
CEA SERPL-MCNC: 1.84 NG/ML
CHLORIDE SERPL-SCNC: 103 MMOL/L (ref 98–107)
CLARITY UR: CLEAR
CO2 SERPL-SCNC: 26 MMOL/L (ref 26–30)
COLOR UR: YELLOW
CREAT BLD-MCNC: 0.8 MG/DL (ref 0.6–1.3)
DEPRECATED RDW RBC AUTO: 59.2 FL (ref 37–54)
EOSINOPHIL # BLD AUTO: 0.04 10*3/MM3 (ref 0–0.4)
EOSINOPHIL NFR BLD AUTO: 0.9 % (ref 0.3–6.2)
ERYTHROCYTE [DISTWIDTH] IN BLOOD BY AUTOMATED COUNT: 14.9 % (ref 12.3–15.4)
GFR SERPL CREATININE-BSD FRML MDRD: 96 ML/MIN/1.73
GLOBULIN UR ELPH-MCNC: 2.8 GM/DL
GLUCOSE BLD-MCNC: 88 MG/DL (ref 74–98)
GLUCOSE UR STRIP-MCNC: NEGATIVE MG/DL
HCT VFR BLD AUTO: 42.8 % (ref 37.5–51)
HGB BLD-MCNC: 14.7 G/DL (ref 13–17.7)
HGB UR QL STRIP.AUTO: NEGATIVE
IMM GRANULOCYTES # BLD AUTO: 0.01 10*3/MM3 (ref 0–0.05)
IMM GRANULOCYTES NFR BLD AUTO: 0.2 % (ref 0–0.5)
KETONES UR QL STRIP: NEGATIVE
LEUKOCYTE ESTERASE UR QL STRIP.AUTO: NEGATIVE
LYMPHOCYTES # BLD AUTO: 0.97 10*3/MM3 (ref 0.7–3.1)
LYMPHOCYTES NFR BLD AUTO: 21.7 % (ref 19.6–45.3)
MACROCYTES BLD QL SMEAR: NORMAL
MCH RBC QN AUTO: 36.5 PG (ref 26.6–33)
MCHC RBC AUTO-ENTMCNC: 34.3 G/DL (ref 31.5–35.7)
MCV RBC AUTO: 106.2 FL (ref 79–97)
MONOCYTES # BLD AUTO: 0.43 10*3/MM3 (ref 0.1–0.9)
MONOCYTES NFR BLD AUTO: 9.6 % (ref 5–12)
NEUTROPHILS # BLD AUTO: 3.02 10*3/MM3 (ref 1.7–7)
NEUTROPHILS NFR BLD AUTO: 67.4 % (ref 42.7–76)
NITRITE UR QL STRIP: NEGATIVE
NRBC BLD AUTO-RTO: 0 /100 WBC (ref 0–0.2)
PH UR STRIP.AUTO: 5.5 [PH] (ref 5–8)
PLATELET # BLD AUTO: 130 10*3/MM3 (ref 140–450)
PMV BLD AUTO: 10.2 FL (ref 6–12)
POTASSIUM BLD-SCNC: 4.4 MMOL/L (ref 3.5–5.1)
PROT SERPL-MCNC: 7.1 G/DL (ref 6.3–8.2)
PROT UR QL STRIP: NEGATIVE
RBC # BLD AUTO: 4.03 10*6/MM3 (ref 4.14–5.8)
SMALL PLATELETS BLD QL SMEAR: ADEQUATE
SODIUM BLD-SCNC: 137 MMOL/L (ref 137–145)
SP GR UR STRIP: 1.02 (ref 1–1.03)
UROBILINOGEN UR QL STRIP: NORMAL
WBC MORPH BLD: NORMAL
WBC NRBC COR # BLD: 4.48 10*3/MM3 (ref 3.4–10.8)

## 2019-05-09 PROCEDURE — 96413 CHEMO IV INFUSION 1 HR: CPT

## 2019-05-09 PROCEDURE — 36415 COLL VENOUS BLD VENIPUNCTURE: CPT

## 2019-05-09 PROCEDURE — 85025 COMPLETE CBC W/AUTO DIFF WBC: CPT

## 2019-05-09 PROCEDURE — 80053 COMPREHEN METABOLIC PANEL: CPT

## 2019-05-09 PROCEDURE — 82378 CARCINOEMBRYONIC ANTIGEN: CPT

## 2019-05-09 PROCEDURE — 25010000002 BEVACIZUMAB 100 MG/4ML SOLUTION 4 ML VIAL: Performed by: INTERNAL MEDICINE

## 2019-05-09 PROCEDURE — 25010000002 BEVACIZUMAB PER 10 MG: Performed by: INTERNAL MEDICINE

## 2019-05-09 PROCEDURE — 85007 BL SMEAR W/DIFF WBC COUNT: CPT

## 2019-05-09 PROCEDURE — 81003 URINALYSIS AUTO W/O SCOPE: CPT

## 2019-05-09 RX ORDER — SODIUM CHLORIDE 9 MG/ML
250 INJECTION, SOLUTION INTRAVENOUS ONCE
Status: DISCONTINUED | OUTPATIENT
Start: 2019-05-09 | End: 2019-05-09 | Stop reason: HOSPADM

## 2019-05-09 RX ORDER — SODIUM CHLORIDE 0.9 % (FLUSH) 0.9 %
10 SYRINGE (ML) INJECTION AS NEEDED
Status: CANCELLED | OUTPATIENT
Start: 2019-05-09

## 2019-05-09 RX ORDER — SODIUM CHLORIDE 0.9 % (FLUSH) 0.9 %
10 SYRINGE (ML) INJECTION AS NEEDED
Status: DISCONTINUED | OUTPATIENT
Start: 2019-05-09 | End: 2019-05-09 | Stop reason: HOSPADM

## 2019-05-09 RX ORDER — DEXTROSE MONOHYDRATE 50 MG/ML
250 INJECTION, SOLUTION INTRAVENOUS ONCE
Status: DISCONTINUED | OUTPATIENT
Start: 2019-05-09 | End: 2019-05-09 | Stop reason: HOSPADM

## 2019-05-09 RX ADMIN — BEVACIZUMAB 590 MG: 400 INJECTION, SOLUTION INTRAVENOUS at 10:21

## 2019-05-09 RX ADMIN — SODIUM CHLORIDE, PRESERVATIVE FREE 10 ML: 5 INJECTION INTRAVENOUS at 11:00

## 2019-05-09 RX ADMIN — HEPARIN SODIUM (PORCINE) LOCK FLUSH IV SOLN 100 UNIT/ML 500 UNITS: 100 SOLUTION at 11:01

## 2019-05-24 ENCOUNTER — TELEPHONE (OUTPATIENT)
Dept: ONCOLOGY | Facility: CLINIC | Age: 69
End: 2019-05-24

## 2019-05-24 RX ORDER — LISINOPRIL 20 MG/1
20 TABLET ORAL DAILY
Qty: 30 TABLET | Refills: 11 | OUTPATIENT
Start: 2019-05-24 | End: 2019-07-08 | Stop reason: SDUPTHER

## 2019-05-30 ENCOUNTER — INFUSION (OUTPATIENT)
Dept: ONCOLOGY | Facility: HOSPITAL | Age: 69
End: 2019-05-30

## 2019-05-30 ENCOUNTER — OFFICE VISIT (OUTPATIENT)
Dept: ONCOLOGY | Facility: CLINIC | Age: 69
End: 2019-05-30

## 2019-05-30 VITALS
HEIGHT: 73 IN | BODY MASS INDEX: 23.33 KG/M2 | RESPIRATION RATE: 17 BRPM | SYSTOLIC BLOOD PRESSURE: 136 MMHG | HEART RATE: 56 BPM | TEMPERATURE: 97.8 F | WEIGHT: 176 LBS | DIASTOLIC BLOOD PRESSURE: 80 MMHG

## 2019-05-30 DIAGNOSIS — C18.7 MALIGNANT NEOPLASM OF SIGMOID COLON (HCC): Primary | ICD-10-CM

## 2019-05-30 LAB
ALBUMIN SERPL-MCNC: 4.2 G/DL (ref 3.5–5)
ALBUMIN/GLOB SERPL: 1.4 G/DL (ref 1–2)
ALP SERPL-CCNC: 54 U/L (ref 38–126)
ALT SERPL W P-5'-P-CCNC: 24 U/L (ref 13–69)
ANION GAP SERPL CALCULATED.3IONS-SCNC: 12.3 MMOL/L (ref 10–20)
ANISOCYTOSIS BLD QL: NORMAL
AST SERPL-CCNC: 27 U/L (ref 15–46)
BASOPHILS # BLD AUTO: 0.01 10*3/MM3 (ref 0–0.2)
BASOPHILS NFR BLD AUTO: 0.2 % (ref 0–1.5)
BILIRUB SERPL-MCNC: 0.8 MG/DL (ref 0.2–1.3)
BILIRUB UR QL STRIP: NEGATIVE
BUN BLD-MCNC: 10 MG/DL (ref 7–20)
BUN/CREAT SERPL: 14.3 (ref 6.3–21.9)
CALCIUM SPEC-SCNC: 8.7 MG/DL (ref 8.4–10.2)
CEA SERPL-MCNC: 2.32 NG/ML
CHLORIDE SERPL-SCNC: 103 MMOL/L (ref 98–107)
CLARITY UR: CLEAR
CO2 SERPL-SCNC: 27 MMOL/L (ref 26–30)
COLOR UR: YELLOW
CREAT BLD-MCNC: 0.7 MG/DL (ref 0.6–1.3)
DEPRECATED RDW RBC AUTO: 58.9 FL (ref 37–54)
EOSINOPHIL # BLD AUTO: 0.04 10*3/MM3 (ref 0–0.4)
EOSINOPHIL NFR BLD AUTO: 0.9 % (ref 0.3–6.2)
ERYTHROCYTE [DISTWIDTH] IN BLOOD BY AUTOMATED COUNT: 14.9 % (ref 12.3–15.4)
GFR SERPL CREATININE-BSD FRML MDRD: 112 ML/MIN/1.73
GLOBULIN UR ELPH-MCNC: 2.9 GM/DL
GLUCOSE BLD-MCNC: 90 MG/DL (ref 74–98)
GLUCOSE UR STRIP-MCNC: NEGATIVE MG/DL
HCT VFR BLD AUTO: 42.8 % (ref 37.5–51)
HGB BLD-MCNC: 14.4 G/DL (ref 13–17.7)
HGB UR QL STRIP.AUTO: NEGATIVE
IMM GRANULOCYTES # BLD AUTO: 0.02 10*3/MM3 (ref 0–0.05)
IMM GRANULOCYTES NFR BLD AUTO: 0.4 % (ref 0–0.5)
KETONES UR QL STRIP: NEGATIVE
LEUKOCYTE ESTERASE UR QL STRIP.AUTO: NEGATIVE
LYMPHOCYTES # BLD AUTO: 0.94 10*3/MM3 (ref 0.7–3.1)
LYMPHOCYTES NFR BLD AUTO: 20.8 % (ref 19.6–45.3)
MACROCYTES BLD QL SMEAR: NORMAL
MCH RBC QN AUTO: 36 PG (ref 26.6–33)
MCHC RBC AUTO-ENTMCNC: 33.6 G/DL (ref 31.5–35.7)
MCV RBC AUTO: 107 FL (ref 79–97)
MONOCYTES # BLD AUTO: 0.42 10*3/MM3 (ref 0.1–0.9)
MONOCYTES NFR BLD AUTO: 9.3 % (ref 5–12)
NEUTROPHILS # BLD AUTO: 3.08 10*3/MM3 (ref 1.7–7)
NEUTROPHILS NFR BLD AUTO: 68.4 % (ref 42.7–76)
NITRITE UR QL STRIP: NEGATIVE
NRBC BLD AUTO-RTO: 0 /100 WBC (ref 0–0.2)
PH UR STRIP.AUTO: 7 [PH] (ref 5–8)
PLATELET # BLD AUTO: 127 10*3/MM3 (ref 140–450)
PMV BLD AUTO: 10.3 FL (ref 6–12)
POTASSIUM BLD-SCNC: 4.3 MMOL/L (ref 3.5–5.1)
PROT SERPL-MCNC: 7.1 G/DL (ref 6.3–8.2)
PROT UR QL STRIP: NEGATIVE
RBC # BLD AUTO: 4 10*6/MM3 (ref 4.14–5.8)
SMALL PLATELETS BLD QL SMEAR: ADEQUATE
SODIUM BLD-SCNC: 138 MMOL/L (ref 137–145)
SP GR UR STRIP: 1.01 (ref 1–1.03)
UROBILINOGEN UR QL STRIP: NORMAL
WBC MORPH BLD: NORMAL
WBC NRBC COR # BLD: 4.51 10*3/MM3 (ref 3.4–10.8)

## 2019-05-30 PROCEDURE — 25010000002 BEVACIZUMAB 100 MG/4ML SOLUTION 4 ML VIAL: Performed by: NURSE PRACTITIONER

## 2019-05-30 PROCEDURE — 36415 COLL VENOUS BLD VENIPUNCTURE: CPT

## 2019-05-30 PROCEDURE — 80053 COMPREHEN METABOLIC PANEL: CPT

## 2019-05-30 PROCEDURE — 85025 COMPLETE CBC W/AUTO DIFF WBC: CPT

## 2019-05-30 PROCEDURE — 99214 OFFICE O/P EST MOD 30 MIN: CPT | Performed by: NURSE PRACTITIONER

## 2019-05-30 PROCEDURE — 25010000002 BEVACIZUMAB PER 10 MG: Performed by: NURSE PRACTITIONER

## 2019-05-30 PROCEDURE — 85007 BL SMEAR W/DIFF WBC COUNT: CPT

## 2019-05-30 PROCEDURE — 82378 CARCINOEMBRYONIC ANTIGEN: CPT

## 2019-05-30 PROCEDURE — 96413 CHEMO IV INFUSION 1 HR: CPT

## 2019-05-30 PROCEDURE — 81003 URINALYSIS AUTO W/O SCOPE: CPT

## 2019-05-30 RX ORDER — SODIUM CHLORIDE 9 MG/ML
250 INJECTION, SOLUTION INTRAVENOUS ONCE
Status: CANCELLED | OUTPATIENT
Start: 2019-05-30

## 2019-05-30 RX ORDER — DEXTROSE MONOHYDRATE 50 MG/ML
250 INJECTION, SOLUTION INTRAVENOUS ONCE
Status: CANCELLED | OUTPATIENT
Start: 2019-06-20 | End: 2019-06-20

## 2019-05-30 RX ORDER — DEXTROSE MONOHYDRATE 50 MG/ML
250 INJECTION, SOLUTION INTRAVENOUS ONCE
Status: DISCONTINUED | OUTPATIENT
Start: 2019-05-30 | End: 2019-05-30 | Stop reason: HOSPADM

## 2019-05-30 RX ORDER — SODIUM CHLORIDE 0.9 % (FLUSH) 0.9 %
10 SYRINGE (ML) INJECTION AS NEEDED
Status: DISCONTINUED | OUTPATIENT
Start: 2019-05-30 | End: 2019-05-30 | Stop reason: HOSPADM

## 2019-05-30 RX ORDER — SODIUM CHLORIDE 9 MG/ML
250 INJECTION, SOLUTION INTRAVENOUS ONCE
Status: DISCONTINUED | OUTPATIENT
Start: 2019-05-30 | End: 2019-05-30 | Stop reason: HOSPADM

## 2019-05-30 RX ORDER — DEXTROSE MONOHYDRATE 50 MG/ML
250 INJECTION, SOLUTION INTRAVENOUS ONCE
Status: CANCELLED | OUTPATIENT
Start: 2019-05-30 | End: 2019-05-30

## 2019-05-30 RX ORDER — SODIUM CHLORIDE 9 MG/ML
250 INJECTION, SOLUTION INTRAVENOUS ONCE
Status: CANCELLED | OUTPATIENT
Start: 2019-06-20

## 2019-05-30 RX ORDER — SODIUM CHLORIDE 0.9 % (FLUSH) 0.9 %
10 SYRINGE (ML) INJECTION AS NEEDED
Status: CANCELLED | OUTPATIENT
Start: 2019-05-30

## 2019-05-30 RX ADMIN — SODIUM CHLORIDE, PRESERVATIVE FREE 10 ML: 5 INJECTION INTRAVENOUS at 12:01

## 2019-05-30 RX ADMIN — BEVACIZUMAB 590 MG: 400 INJECTION, SOLUTION INTRAVENOUS at 11:22

## 2019-05-30 RX ADMIN — HEPARIN SODIUM (PORCINE) LOCK FLUSH IV SOLN 100 UNIT/ML 500 UNITS: 100 SOLUTION at 12:01

## 2019-05-30 NOTE — PROGRESS NOTES
CHIEF COMPLAINT: 1. Follow-up for colon cancer                                       2.  Peripheral neuropathy of the hands and feet, worse in the feet      Problem List:  Oncology/Hematology History    1. Stage CARLOS, [id number] colon cancer with 2 out of 14 pericolonic lymph nodes  involved and a left lobe liver biopsy positive for metastasis, presenting with  a 25 pound weight loss and diarrhea with some perirectal soreness and had  colonoscopy with Dr. Mcclain in January that found this lesion that was  circumferential high-grade invading into the pericolonic fat, status post  sigmoid colectomy of a poorly differentiated adenocarcinoma.   a) Baseline CEA postop of 0.8 with alkaline phosphatase 111, with normal liver  enzymes and creatinine of 0.8. Baseline white count 9820 with a hemoglobin  13.8, platelets 339,000, and CT with contrast showing a right lobe liver dome  lesion as well as an anastomotic change in the bowel.   b) Began CapeOx 03/15/2016.  c) Added in Avastin to CapeOx 04/05/2016, second cycle. (This was 8 weeks out  from surgery).  d) KRAS mutation is negative.  E.) CAT scan in August 2016 shows resolution of disease in the liver and colon and a stable lung nodule.  Hence Avastin, Xeloda, and oxaliplatin continued.  F) oxaliplatin discontinued October 2016 due to worsening peripheral neuropathy.  G.) CTs of February 2017 showed no evidence of metastasis and stable bibasilar nodular scar.  Persistent neuropathy not worsening.  CEA 1.4.  Continuing Avastin and Xeloda without oxaliplatin.  Having some problems with irritation of his eyes on Xeloda.  2.  Peripheral neuropathy, chemotherapy induced        Malignant neoplasm of sigmoid colon (CMS/HCC)    5/20/2016 Initial Diagnosis     Malignant neoplasm of sigmoid colon         5/2/2017 Imaging     CT chest, abdomen, pelvis IMPRESSION:  1. No disease recurrence.  2. Minimal areas of nodular thickening in the right lower lobe, stable.         8/2/2017 Imaging      CT chest/abdomen/pelvis IMPRESSION:  Stable examination with no evidence of acute intrathoracic,  intra-abdominal or pelvic abnormality. There is no evidence of  progression of disease. No metastatic disease.          11/13/2017 Imaging     CT chest/abdomen/pelvis IMPRESSION:  Stable examination without evidence of acute intrathoracic  intra-abdominal or intrapelvic abnormality. No evidence of progression  of disease.   CEA 1.3.            HISTORY OF PRESENT ILLNESS:  The patient is a 68 y.o. male, here for follow up on management of Stage IV a colon cancer.  The patient continues to do very well.  He continues to tolerate Xeloda and Avastin with minimal side effects.He has no complaints today.  Denies constipation and diarrhea.  Denies hand-foot syndrome.  Peripheral neuropathy is stable. He does not have a PCP currently and would like to be referred to Dr. Abraham. He notes he has had a lot of stress lately. His brother, who is disabled, has required surgery to his thumb and IV antibiotics.  He has been helping manage his care.     Past Medical History:   Diagnosis Date   • Hypertension    • Liver disease     mets from colon cancer   • Malignant neoplasm of colon (CMS/HCC)      Past Surgical History:   Procedure Laterality Date   • COLON SURGERY      Sigmoid   • LIVER SURGERY     • PORTACATH PLACEMENT         No Known Allergies    Family History and Social History reviewed and changed as necessary      REVIEW OF SYSTEM:   Review of Systems   Constitutional: Negative for appetite change, chills, diaphoresis, fatigue, fever and unexpected weight change.   HENT:   Negative for mouth sores, sore throat and trouble swallowing.    Eyes: Negative for icterus.   Respiratory: Negative for cough, hemoptysis and shortness of breath.    Cardiovascular: Negative for chest pain, leg swelling and palpitations.   Gastrointestinal: Negative for abdominal distention, abdominal pain, blood in stool, constipation, diarrhea, nausea  "and vomiting.   Endocrine: Negative for hot flashes.   Genitourinary: Negative for bladder incontinence, difficulty urinating, dysuria, frequency and hematuria.    Musculoskeletal: Negative for gait problem, neck pain and neck stiffness.   Skin: Negative for rash.  Positive for dry skin.  Neurological: Positive for peripheral neuropathy in the hands and feet.  Hematological: Negative for adenopathy. Does not bruise/bleed easily.   Psychiatric/Behavioral: Negative for depression. The patient is not nervous/anxious.    All other systems reviewed and are negative.       PHYSICAL EXAM    Vitals:    05/30/19 0924   BP: 136/80   Pulse: 56   Resp: 17   Temp: 97.8 °F (36.6 °C)   TempSrc: Temporal   Weight: 79.8 kg (176 lb)   Height: 185.4 cm (73\")     Constitutional: Appears well-developed and well-nourished. No distress.   ECOG: (1) Restricted in physically strenuous activity, ambulatory and able to do work of light nature  HENT:   Head: Normocephalic.   Mouth/Throat: Oropharynx is clear and moist.   Eyes: Conjunctivae are normal. Pupils are equal, round, and reactive to light. No scleral icterus.   Neck: Neck supple. No JVD present. No thyromegaly present.   Cardiovascular: Normal rate, regular rhythm and normal heart sounds.    Pulmonary/Chest: Breath sounds normal. No respiratory distress.   Nodes: No cervical, supraclavicular or axillary nodes palpable on exam.  Abdominal: Soft. Exhibits no distension and no mass. There is no hepatosplenomegaly. There is no tenderness. There is no rebound and no guarding.   Musculoskeletal:Exhibits no edema, tenderness or deformity.   Neurological: Alert and oriented to person, place, and time. Exhibits normal muscle tone.   Skin: Dry.  No ecchymosis, no petechiae and no rash noted. Not diaphoretic. No cyanosis.   Psychiatric: Normal mood and affect.       Vitals reviewed.  Laboratory data reviewed along with CT chest abdomen and pelvis and images thereof as outlined above in the " oncology history were reviewed at time of visit.    Lab Results   Component Value Date    WBC 4.48 05/09/2019    HGB 14.7 05/09/2019    HCT 42.8 05/09/2019    .2 (H) 05/09/2019     (L) 05/09/2019       Lab Results   Component Value Date    GLUCOSE 88 05/09/2019    BUN 13 05/09/2019    CREATININE 0.80 05/09/2019    EGFRIFNONA 96 05/09/2019    BCR 16.3 05/09/2019    K 4.4 05/09/2019    CO2 26.0 05/09/2019    CALCIUM 8.9 05/09/2019    ALBUMIN 4.30 05/09/2019    LABIL2 1.0 02/18/2016    AST 25 05/09/2019    ALT 28 05/09/2019       Lab Results   Component Value Date    CEA 1.84 05/09/2019    CEA 2.12 04/18/2019    CEA 1.87 03/28/2019    CEA 1.47 03/07/2019       Assessment/Plan     68 y.o.  year old gentlemen with metastatic colon cancer with Solitary Liver metastasis initially diagnosed 2-. We will continue Xeloda and Avastin at current dosages.  Clinically, he remains stable.  His CEA remains stable.  We will continue current dose of Avastin and Xeloda. We will not make any dose adjustments at this time.  We will plan a scan prior to next visit. We will plan for him to follow up in 6 weeks.     Peripheral neuropathy secondary to chemotherapy. We will continue to monitor and continue gabapentin three times daily. Patient knows to call if neuropathy symptoms worsen.     I will refer him to Dr. Abraham to establish a new primary care.  We will continue to fill his regular prescriptions until care is established with Dr. Abraham.       I spent a total of  25 minutes in direct patient care, greater than 18  minutes (greater than 50%) were spent in coordination of care, and counseling the patient regarding colon cancer. I answered any questions patient had with medication and plan.     Adeola Sunshine, APRN  05/30/2019

## 2019-06-20 ENCOUNTER — INFUSION (OUTPATIENT)
Dept: ONCOLOGY | Facility: HOSPITAL | Age: 69
End: 2019-06-20

## 2019-06-20 VITALS
DIASTOLIC BLOOD PRESSURE: 79 MMHG | BODY MASS INDEX: 23.09 KG/M2 | WEIGHT: 175 LBS | TEMPERATURE: 98.2 F | HEART RATE: 56 BPM | RESPIRATION RATE: 16 BRPM | SYSTOLIC BLOOD PRESSURE: 139 MMHG

## 2019-06-20 DIAGNOSIS — C18.7 MALIGNANT NEOPLASM OF SIGMOID COLON (HCC): Primary | ICD-10-CM

## 2019-06-20 LAB
ALBUMIN SERPL-MCNC: 4.1 G/DL (ref 3.5–5)
ALBUMIN/GLOB SERPL: 1.4 G/DL (ref 1–2)
ALP SERPL-CCNC: 63 U/L (ref 38–126)
ALT SERPL W P-5'-P-CCNC: 21 U/L (ref 13–69)
ANION GAP SERPL CALCULATED.3IONS-SCNC: 9 MMOL/L (ref 10–20)
AST SERPL-CCNC: 25 U/L (ref 15–46)
BASOPHILS # BLD AUTO: 0.01 10*3/MM3 (ref 0–0.2)
BASOPHILS NFR BLD AUTO: 0.2 % (ref 0–1.5)
BILIRUB SERPL-MCNC: 1.6 MG/DL (ref 0.2–1.3)
BILIRUB UR QL STRIP: NEGATIVE
BUN BLD-MCNC: 15 MG/DL (ref 7–20)
BUN/CREAT SERPL: 18.8 (ref 6.3–21.9)
CALCIUM SPEC-SCNC: 8.7 MG/DL (ref 8.4–10.2)
CEA SERPL-MCNC: 2.44 NG/ML
CHLORIDE SERPL-SCNC: 101 MMOL/L (ref 98–107)
CLARITY UR: CLEAR
CO2 SERPL-SCNC: 29 MMOL/L (ref 26–30)
COLOR UR: YELLOW
CREAT BLD-MCNC: 0.8 MG/DL (ref 0.6–1.3)
DEPRECATED RDW RBC AUTO: 58.4 FL (ref 37–54)
EOSINOPHIL # BLD AUTO: 0.04 10*3/MM3 (ref 0–0.4)
EOSINOPHIL NFR BLD AUTO: 0.9 % (ref 0.3–6.2)
ERYTHROCYTE [DISTWIDTH] IN BLOOD BY AUTOMATED COUNT: 15 % (ref 12.3–15.4)
GFR SERPL CREATININE-BSD FRML MDRD: 96 ML/MIN/1.73
GLOBULIN UR ELPH-MCNC: 3 GM/DL
GLUCOSE BLD-MCNC: 85 MG/DL (ref 74–98)
GLUCOSE UR STRIP-MCNC: NEGATIVE MG/DL
HCT VFR BLD AUTO: 42.8 % (ref 37.5–51)
HGB BLD-MCNC: 14.4 G/DL (ref 13–17.7)
HGB UR QL STRIP.AUTO: NEGATIVE
IMM GRANULOCYTES # BLD AUTO: 0.02 10*3/MM3 (ref 0–0.05)
IMM GRANULOCYTES NFR BLD AUTO: 0.5 % (ref 0–0.5)
KETONES UR QL STRIP: NEGATIVE
LEUKOCYTE ESTERASE UR QL STRIP.AUTO: NEGATIVE
LYMPHOCYTES # BLD AUTO: 1.12 10*3/MM3 (ref 0.7–3.1)
LYMPHOCYTES NFR BLD AUTO: 25.2 % (ref 19.6–45.3)
MCH RBC QN AUTO: 35.2 PG (ref 26.6–33)
MCHC RBC AUTO-ENTMCNC: 33.6 G/DL (ref 31.5–35.7)
MCV RBC AUTO: 104.6 FL (ref 79–97)
MONOCYTES # BLD AUTO: 0.42 10*3/MM3 (ref 0.1–0.9)
MONOCYTES NFR BLD AUTO: 9.5 % (ref 5–12)
NEUTROPHILS # BLD AUTO: 2.83 10*3/MM3 (ref 1.7–7)
NEUTROPHILS NFR BLD AUTO: 63.7 % (ref 42.7–76)
NITRITE UR QL STRIP: NEGATIVE
NRBC BLD AUTO-RTO: 0 /100 WBC (ref 0–0.2)
PH UR STRIP.AUTO: 5.5 [PH] (ref 5–8)
PLATELET # BLD AUTO: 130 10*3/MM3 (ref 140–450)
PMV BLD AUTO: 10.2 FL (ref 6–12)
POTASSIUM BLD-SCNC: 4 MMOL/L (ref 3.5–5.1)
PROT SERPL-MCNC: 7.1 G/DL (ref 6.3–8.2)
PROT UR QL STRIP: NEGATIVE
RBC # BLD AUTO: 4.09 10*6/MM3 (ref 4.14–5.8)
SODIUM BLD-SCNC: 135 MMOL/L (ref 137–145)
SP GR UR STRIP: 1.02 (ref 1–1.03)
UROBILINOGEN UR QL STRIP: NORMAL
WBC NRBC COR # BLD: 4.44 10*3/MM3 (ref 3.4–10.8)

## 2019-06-20 PROCEDURE — 85025 COMPLETE CBC W/AUTO DIFF WBC: CPT

## 2019-06-20 PROCEDURE — 81003 URINALYSIS AUTO W/O SCOPE: CPT

## 2019-06-20 PROCEDURE — 36415 COLL VENOUS BLD VENIPUNCTURE: CPT

## 2019-06-20 PROCEDURE — 96413 CHEMO IV INFUSION 1 HR: CPT

## 2019-06-20 PROCEDURE — 25010000002 BEVACIZUMAB 100 MG/4ML SOLUTION 4 ML VIAL: Performed by: NURSE PRACTITIONER

## 2019-06-20 PROCEDURE — 25010000002 BEVACIZUMAB PER 10 MG: Performed by: NURSE PRACTITIONER

## 2019-06-20 PROCEDURE — 80053 COMPREHEN METABOLIC PANEL: CPT

## 2019-06-20 PROCEDURE — 82378 CARCINOEMBRYONIC ANTIGEN: CPT

## 2019-06-20 RX ORDER — SODIUM CHLORIDE 9 MG/ML
250 INJECTION, SOLUTION INTRAVENOUS ONCE
Status: DISCONTINUED | OUTPATIENT
Start: 2019-06-20 | End: 2019-06-20 | Stop reason: HOSPADM

## 2019-06-20 RX ORDER — SODIUM CHLORIDE 0.9 % (FLUSH) 0.9 %
10 SYRINGE (ML) INJECTION AS NEEDED
Status: DISCONTINUED | OUTPATIENT
Start: 2019-06-20 | End: 2019-06-20 | Stop reason: HOSPADM

## 2019-06-20 RX ORDER — SODIUM CHLORIDE 0.9 % (FLUSH) 0.9 %
10 SYRINGE (ML) INJECTION AS NEEDED
Status: CANCELLED | OUTPATIENT
Start: 2019-06-20

## 2019-06-20 RX ADMIN — BEVACIZUMAB 590 MG: 400 INJECTION, SOLUTION INTRAVENOUS at 10:18

## 2019-06-20 RX ADMIN — HEPARIN 500 UNITS: 100 SYRINGE at 10:54

## 2019-06-20 RX ADMIN — SODIUM CHLORIDE, PRESERVATIVE FREE 10 ML: 5 INJECTION INTRAVENOUS at 10:54

## 2019-06-24 RX ORDER — CLONAZEPAM 1 MG/1
1 TABLET ORAL DAILY
Qty: 30 TABLET | Refills: 3 | OUTPATIENT
Start: 2019-06-24 | End: 2019-06-25 | Stop reason: SDUPTHER

## 2019-06-25 ENCOUNTER — TELEPHONE (OUTPATIENT)
Dept: ONCOLOGY | Facility: CLINIC | Age: 69
End: 2019-06-25

## 2019-06-25 RX ORDER — CLONAZEPAM 1 MG/1
1 TABLET ORAL DAILY
Qty: 30 TABLET | Refills: 3 | OUTPATIENT
Start: 2019-06-25 | End: 2019-09-12 | Stop reason: SDUPTHER

## 2019-06-25 RX ORDER — CLONAZEPAM 1 MG/1
1 TABLET ORAL DAILY
Qty: 30 TABLET | Refills: 3 | Status: CANCELLED | OUTPATIENT
Start: 2019-06-25

## 2019-07-08 ENCOUNTER — OFFICE VISIT (OUTPATIENT)
Dept: INTERNAL MEDICINE | Facility: CLINIC | Age: 69
End: 2019-07-08

## 2019-07-08 VITALS
WEIGHT: 176.8 LBS | OXYGEN SATURATION: 98 % | HEART RATE: 72 BPM | SYSTOLIC BLOOD PRESSURE: 141 MMHG | DIASTOLIC BLOOD PRESSURE: 82 MMHG | HEIGHT: 73 IN | BODY MASS INDEX: 23.43 KG/M2 | TEMPERATURE: 98.5 F

## 2019-07-08 DIAGNOSIS — C18.7 MALIGNANT NEOPLASM OF SIGMOID COLON (HCC): ICD-10-CM

## 2019-07-08 DIAGNOSIS — T45.1X5A PERIPHERAL NEUROPATHY DUE TO CHEMOTHERAPY (HCC): Primary | ICD-10-CM

## 2019-07-08 DIAGNOSIS — I10 HTN (HYPERTENSION), BENIGN: ICD-10-CM

## 2019-07-08 DIAGNOSIS — G62.0 PERIPHERAL NEUROPATHY DUE TO CHEMOTHERAPY (HCC): Primary | ICD-10-CM

## 2019-07-08 PROCEDURE — 99204 OFFICE O/P NEW MOD 45 MIN: CPT | Performed by: INTERNAL MEDICINE

## 2019-07-08 RX ORDER — LISINOPRIL 20 MG/1
20 TABLET ORAL DAILY
Qty: 90 TABLET | Refills: 3 | Status: SHIPPED | OUTPATIENT
Start: 2019-07-08 | End: 2020-07-24

## 2019-07-08 NOTE — PROGRESS NOTES
Subjective  Gordon Avila is a 68 y.o. male    HPI coming in to establish care here patient with sigmoid colon CA with metastatic disease diagnosed about 2-1/2 years ago.  Status post resection and currently on chemotherapy with Xeloda.  He is following up with oncology.  Most recent imaging studies included an MRI of his abdomen and pelvis, CT of his chest both of which were without evidence of metastatic disease.  He has hypertension and anxiety disorder has significant neuropathy thought to be secondary to chemotherapy agents.  He is on gabapentin for this    The following portions of the patient's history were reviewed and updated as appropriate: allergies, current medications, past family history, past medical history, past social history, past surgical history, and problem list.     Review of Systems   Constitutional: Negative.  Negative for activity change, appetite change, fatigue and fever.   HENT: Negative for congestion, ear discharge, ear pain and trouble swallowing.    Eyes: Negative for photophobia and visual disturbance.   Respiratory: Negative for cough and shortness of breath.    Cardiovascular: Negative for chest pain and palpitations.   Gastrointestinal: Negative for abdominal distention, abdominal pain, constipation, diarrhea, nausea and vomiting.   Endocrine: Negative.    Genitourinary: Negative for dysuria, hematuria and urgency.   Musculoskeletal: Positive for arthralgias. Negative for back pain, joint swelling and myalgias.   Skin: Negative for color change and rash.   Allergic/Immunologic: Negative.    Neurological: Negative for dizziness, weakness, light-headedness and headaches.   Hematological: Negative for adenopathy. Does not bruise/bleed easily.   Psychiatric/Behavioral: Positive for sleep disturbance. Negative for agitation, confusion and dysphoric mood. The patient is nervous/anxious.        Visit Vitals  /82 Comment: Automatic   Pulse 72   Temp 98.5 °F (36.9 °C) (Temporal)  "  Ht 185.4 cm (73\")   Wt 80.2 kg (176 lb 12.8 oz)   SpO2 98%   BMI 23.33 kg/m²       Objective  Physical Exam   Constitutional: He is oriented to person, place, and time. He appears well-developed and well-nourished. No distress.   HENT:   Nose: Nose normal.   Mouth/Throat: Oropharynx is clear and moist.   Eyes: Conjunctivae and EOM are normal. No scleral icterus.   Neck: No tracheal deviation present. No thyromegaly present.   Cardiovascular: Normal rate and regular rhythm. Exam reveals no friction rub.   No murmur heard.  Pulmonary/Chest: No respiratory distress. He has no wheezes. He has no rales.   Abdominal: Soft. He exhibits no distension and no mass. There is no tenderness. There is no guarding.   Musculoskeletal: Normal range of motion. He exhibits deformity.   Lymphadenopathy:     He has no cervical adenopathy.   Neurological: He is alert and oriented to person, place, and time. He has normal reflexes. No cranial nerve deficit. Coordination normal.   Skin: Skin is warm and dry. No rash noted. No erythema.   Psychiatric: He has a normal mood and affect. His behavior is normal. Judgment and thought content normal.       Diagnoses and all orders for this visit:    Peripheral neuropathy due to chemotherapy (CMS/HCC) stable on gabapentin pain does not keep him up at night    Malignant neoplasm of sigmoid colon (CMS/HCC) stable most recent surveillance imaging studies from 6 months ago were okay he is asymptomatic denies blood in his stools or bowel symptoms    HTN (hypertension), benign stable discussed low-sodium diet continue with lisinopril        "

## 2019-07-09 ENCOUNTER — HOSPITAL ENCOUNTER (OUTPATIENT)
Dept: CT IMAGING | Facility: HOSPITAL | Age: 69
End: 2019-07-09

## 2019-07-09 ENCOUNTER — HOSPITAL ENCOUNTER (OUTPATIENT)
Dept: MRI IMAGING | Facility: HOSPITAL | Age: 69
Discharge: HOME | End: 2019-07-09
Admitting: NURSE PRACTITIONER

## 2019-07-09 DIAGNOSIS — C18.7 MALIGNANT NEOPLASM OF SIGMOID COLON (HCC): ICD-10-CM

## 2019-07-09 PROCEDURE — A9577 INJ MULTIHANCE: HCPCS | Performed by: NURSE PRACTITIONER

## 2019-07-09 PROCEDURE — 0 GADOBENATE DIMEGLUMINE 529 MG/ML SOLUTION: Performed by: NURSE PRACTITIONER

## 2019-07-09 PROCEDURE — 74183 MRI ABD W/O CNTR FLWD CNTR: CPT

## 2019-07-09 RX ADMIN — GADOBENATE DIMEGLUMINE 15 ML: 529 INJECTION, SOLUTION INTRAVENOUS at 09:13

## 2019-07-11 ENCOUNTER — OFFICE VISIT (OUTPATIENT)
Dept: ONCOLOGY | Facility: CLINIC | Age: 69
End: 2019-07-11

## 2019-07-11 ENCOUNTER — INFUSION (OUTPATIENT)
Dept: ONCOLOGY | Facility: HOSPITAL | Age: 69
End: 2019-07-11

## 2019-07-11 ENCOUNTER — HOSPITAL ENCOUNTER (OUTPATIENT)
Dept: CT IMAGING | Facility: HOSPITAL | Age: 69
Discharge: HOME | End: 2019-07-11
Admitting: NURSE PRACTITIONER

## 2019-07-11 VITALS
WEIGHT: 176 LBS | SYSTOLIC BLOOD PRESSURE: 148 MMHG | DIASTOLIC BLOOD PRESSURE: 71 MMHG | HEART RATE: 54 BPM | RESPIRATION RATE: 18 BRPM | HEIGHT: 73 IN | TEMPERATURE: 97.6 F | BODY MASS INDEX: 23.33 KG/M2

## 2019-07-11 DIAGNOSIS — C18.7 MALIGNANT NEOPLASM OF SIGMOID COLON (HCC): ICD-10-CM

## 2019-07-11 DIAGNOSIS — C18.7 MALIGNANT NEOPLASM OF SIGMOID COLON (HCC): Primary | ICD-10-CM

## 2019-07-11 LAB
ALBUMIN SERPL-MCNC: 3.9 G/DL (ref 3.5–5)
ALBUMIN/GLOB SERPL: 1.3 G/DL (ref 1–2)
ALP SERPL-CCNC: 67 U/L (ref 38–126)
ALT SERPL W P-5'-P-CCNC: 25 U/L (ref 13–69)
ANION GAP SERPL CALCULATED.3IONS-SCNC: 11.5 MMOL/L (ref 10–20)
AST SERPL-CCNC: 25 U/L (ref 15–46)
BASOPHILS # BLD AUTO: 0.01 10*3/MM3 (ref 0–0.2)
BASOPHILS NFR BLD AUTO: 0.2 % (ref 0–1.5)
BILIRUB SERPL-MCNC: 1.1 MG/DL (ref 0.2–1.3)
BILIRUB UR QL STRIP: NEGATIVE
BUN BLD-MCNC: 9 MG/DL (ref 7–20)
BUN/CREAT SERPL: 12.9 (ref 6.3–21.9)
CALCIUM SPEC-SCNC: 8.5 MG/DL (ref 8.4–10.2)
CEA SERPL-MCNC: 2.02 NG/ML
CHLORIDE SERPL-SCNC: 105 MMOL/L (ref 98–107)
CLARITY UR: CLEAR
CO2 SERPL-SCNC: 26 MMOL/L (ref 26–30)
COLOR UR: YELLOW
CREAT BLD-MCNC: 0.7 MG/DL (ref 0.6–1.3)
DEPRECATED RDW RBC AUTO: 59.3 FL (ref 37–54)
EOSINOPHIL # BLD AUTO: 0.03 10*3/MM3 (ref 0–0.4)
EOSINOPHIL NFR BLD AUTO: 0.6 % (ref 0.3–6.2)
ERYTHROCYTE [DISTWIDTH] IN BLOOD BY AUTOMATED COUNT: 15.2 % (ref 12.3–15.4)
GFR SERPL CREATININE-BSD FRML MDRD: 112 ML/MIN/1.73
GLOBULIN UR ELPH-MCNC: 3 GM/DL
GLUCOSE BLD-MCNC: 87 MG/DL (ref 74–98)
GLUCOSE UR STRIP-MCNC: NEGATIVE MG/DL
HCT VFR BLD AUTO: 43.3 % (ref 37.5–51)
HGB BLD-MCNC: 14.4 G/DL (ref 13–17.7)
HGB UR QL STRIP.AUTO: NEGATIVE
IMM GRANULOCYTES # BLD AUTO: 0.02 10*3/MM3 (ref 0–0.05)
IMM GRANULOCYTES NFR BLD AUTO: 0.4 % (ref 0–0.5)
KETONES UR QL STRIP: NEGATIVE
LEUKOCYTE ESTERASE UR QL STRIP.AUTO: NEGATIVE
LYMPHOCYTES # BLD AUTO: 1.04 10*3/MM3 (ref 0.7–3.1)
LYMPHOCYTES NFR BLD AUTO: 20.1 % (ref 19.6–45.3)
MACROCYTES BLD QL SMEAR: NORMAL
MCH RBC QN AUTO: 35.1 PG (ref 26.6–33)
MCHC RBC AUTO-ENTMCNC: 33.3 G/DL (ref 31.5–35.7)
MCV RBC AUTO: 105.6 FL (ref 79–97)
MONOCYTES # BLD AUTO: 0.48 10*3/MM3 (ref 0.1–0.9)
MONOCYTES NFR BLD AUTO: 9.3 % (ref 5–12)
NEUTROPHILS # BLD AUTO: 3.59 10*3/MM3 (ref 1.7–7)
NEUTROPHILS NFR BLD AUTO: 69.4 % (ref 42.7–76)
NITRITE UR QL STRIP: NEGATIVE
NRBC BLD AUTO-RTO: 0 /100 WBC (ref 0–0.2)
PH UR STRIP.AUTO: 7 [PH] (ref 5–8)
PLAT MORPH BLD: NORMAL
PLATELET # BLD AUTO: 119 10*3/MM3 (ref 140–450)
PMV BLD AUTO: 10.1 FL (ref 6–12)
POTASSIUM BLD-SCNC: 4.5 MMOL/L (ref 3.5–5.1)
PROT SERPL-MCNC: 6.9 G/DL (ref 6.3–8.2)
PROT UR QL STRIP: NEGATIVE
RBC # BLD AUTO: 4.1 10*6/MM3 (ref 4.14–5.8)
SODIUM BLD-SCNC: 138 MMOL/L (ref 137–145)
SP GR UR STRIP: 1.02 (ref 1–1.03)
UROBILINOGEN UR QL STRIP: NORMAL
WBC MORPH BLD: NORMAL
WBC NRBC COR # BLD: 5.17 10*3/MM3 (ref 3.4–10.8)

## 2019-07-11 PROCEDURE — 25010000002 BEVACIZUMAB PER 10 MG: Performed by: NURSE PRACTITIONER

## 2019-07-11 PROCEDURE — 25010000002 IOPAMIDOL 61 % SOLUTION: Performed by: NURSE PRACTITIONER

## 2019-07-11 PROCEDURE — 81003 URINALYSIS AUTO W/O SCOPE: CPT

## 2019-07-11 PROCEDURE — 71260 CT THORAX DX C+: CPT

## 2019-07-11 PROCEDURE — 85007 BL SMEAR W/DIFF WBC COUNT: CPT

## 2019-07-11 PROCEDURE — 82378 CARCINOEMBRYONIC ANTIGEN: CPT

## 2019-07-11 PROCEDURE — 80053 COMPREHEN METABOLIC PANEL: CPT

## 2019-07-11 PROCEDURE — 25010000002 BEVACIZUMAB 100 MG/4ML SOLUTION 4 ML VIAL: Performed by: NURSE PRACTITIONER

## 2019-07-11 PROCEDURE — 99214 OFFICE O/P EST MOD 30 MIN: CPT | Performed by: NURSE PRACTITIONER

## 2019-07-11 PROCEDURE — 36415 COLL VENOUS BLD VENIPUNCTURE: CPT

## 2019-07-11 PROCEDURE — 85025 COMPLETE CBC W/AUTO DIFF WBC: CPT

## 2019-07-11 PROCEDURE — 96413 CHEMO IV INFUSION 1 HR: CPT

## 2019-07-11 RX ORDER — SODIUM CHLORIDE 9 MG/ML
250 INJECTION, SOLUTION INTRAVENOUS ONCE
Status: CANCELLED | OUTPATIENT
Start: 2019-08-01

## 2019-07-11 RX ORDER — DEXTROSE MONOHYDRATE 50 MG/ML
250 INJECTION, SOLUTION INTRAVENOUS ONCE
Status: CANCELLED | OUTPATIENT
Start: 2019-08-01 | End: 2019-08-01

## 2019-07-11 RX ORDER — SODIUM CHLORIDE 0.9 % (FLUSH) 0.9 %
10 SYRINGE (ML) INJECTION AS NEEDED
Status: DISCONTINUED | OUTPATIENT
Start: 2019-07-11 | End: 2019-07-11 | Stop reason: HOSPADM

## 2019-07-11 RX ORDER — SODIUM CHLORIDE 9 MG/ML
250 INJECTION, SOLUTION INTRAVENOUS ONCE
Status: DISCONTINUED | OUTPATIENT
Start: 2019-07-11 | End: 2019-07-11 | Stop reason: HOSPADM

## 2019-07-11 RX ORDER — SODIUM CHLORIDE 9 MG/ML
250 INJECTION, SOLUTION INTRAVENOUS ONCE
Status: CANCELLED | OUTPATIENT
Start: 2019-07-11

## 2019-07-11 RX ORDER — DEXTROSE MONOHYDRATE 50 MG/ML
250 INJECTION, SOLUTION INTRAVENOUS ONCE
Status: DISCONTINUED | OUTPATIENT
Start: 2019-07-11 | End: 2019-07-11 | Stop reason: HOSPADM

## 2019-07-11 RX ORDER — DEXTROSE MONOHYDRATE 50 MG/ML
250 INJECTION, SOLUTION INTRAVENOUS ONCE
Status: CANCELLED | OUTPATIENT
Start: 2019-07-11 | End: 2019-07-11

## 2019-07-11 RX ORDER — SODIUM CHLORIDE 0.9 % (FLUSH) 0.9 %
10 SYRINGE (ML) INJECTION AS NEEDED
Status: CANCELLED | OUTPATIENT
Start: 2019-07-11

## 2019-07-11 RX ADMIN — BEVACIZUMAB 590 MG: 400 INJECTION, SOLUTION INTRAVENOUS at 10:18

## 2019-07-11 RX ADMIN — HEPARIN 500 UNITS: 100 SYRINGE at 10:52

## 2019-07-11 RX ADMIN — SODIUM CHLORIDE, PRESERVATIVE FREE 10 ML: 5 INJECTION INTRAVENOUS at 10:52

## 2019-07-11 RX ADMIN — IOPAMIDOL 95 ML: 612 INJECTION, SOLUTION INTRAVENOUS at 08:15

## 2019-07-11 NOTE — PROGRESS NOTES
CHIEF COMPLAINT: 1. Follow-up for colon cancer                                       2.  Peripheral neuropathy of the hands and feet, worse in the feet      Problem List:  Oncology/Hematology History    1. Stage CARLOS, [id number] colon cancer with 2 out of 14 pericolonic lymph nodes  involved and a left lobe liver biopsy positive for metastasis, presenting with  a 25 pound weight loss and diarrhea with some perirectal soreness and had  colonoscopy with Dr. Mcclain in January that found this lesion that was  circumferential high-grade invading into the pericolonic fat, status post  sigmoid colectomy of a poorly differentiated adenocarcinoma.   a) Baseline CEA postop of 0.8 with alkaline phosphatase 111, with normal liver  enzymes and creatinine of 0.8. Baseline white count 9820 with a hemoglobin  13.8, platelets 339,000, and CT with contrast showing a right lobe liver dome  lesion as well as an anastomotic change in the bowel.   b) Began CapeOx 03/15/2016.  c) Added in Avastin to CapeOx 04/05/2016, second cycle. (This was 8 weeks out  from surgery).  d) KRAS mutation is negative.  E.) CAT scan in August 2016 shows resolution of disease in the liver and colon and a stable lung nodule.  Hence Avastin, Xeloda, and oxaliplatin continued.  F) oxaliplatin discontinued October 2016 due to worsening peripheral neuropathy.  G.) CTs of February 2017 showed no evidence of metastasis and stable bibasilar nodular scar.  Persistent neuropathy not worsening.  CEA 1.4.  Continuing Avastin and Xeloda without oxaliplatin.  Having some problems with irritation of his eyes on Xeloda.  2.  Peripheral neuropathy, chemotherapy induced        Malignant neoplasm of sigmoid colon (CMS/HCC)    5/20/2016 Initial Diagnosis     Malignant neoplasm of sigmoid colon         5/2/2017 Imaging     CT chest, abdomen, pelvis IMPRESSION:  1. No disease recurrence.  2. Minimal areas of nodular thickening in the right lower lobe, stable.         8/2/2017 Imaging      CT chest/abdomen/pelvis IMPRESSION:  Stable examination with no evidence of acute intrathoracic,  intra-abdominal or pelvic abnormality. There is no evidence of  progression of disease. No metastatic disease.          11/13/2017 Imaging     CT chest/abdomen/pelvis IMPRESSION:  Stable examination without evidence of acute intrathoracic  intra-abdominal or intrapelvic abnormality. No evidence of progression  of disease.   CEA 1.3.            HISTORY OF PRESENT ILLNESS:  The patient is a 68 y.o. male, here for follow up on management of Stage IV a colon cancer.  The patient continues to do very well.  He is tolerating Xeloda and Avastin with minimal side effects. Denies constipation and diarrhea.  He notes his hands are little more tender today and red. Peripheral neuropathy is stable. He has established care with Dr. Abraham. He is anxious about scan results.     Past Medical History:   Diagnosis Date   • Hypertension    • Liver disease     mets from colon cancer   • Malignant neoplasm of colon (CMS/HCC)      Past Surgical History:   Procedure Laterality Date   • COLON SURGERY      Sigmoid   • LIVER SURGERY     • PORTACATH PLACEMENT         No Known Allergies    Family History and Social History reviewed and changed as necessary      REVIEW OF SYSTEM:   Review of Systems   Constitutional: Negative for appetite change, chills, diaphoresis, fatigue, fever and unexpected weight change.   HENT:   Negative for mouth sores, sore throat and trouble swallowing.    Eyes: Negative for icterus.   Respiratory: Negative for cough, hemoptysis and shortness of breath.    Cardiovascular: Negative for chest pain, leg swelling and palpitations.   Gastrointestinal: Negative for abdominal distention, abdominal pain, blood in stool, constipation, diarrhea, nausea and vomiting.   Endocrine: Negative for hot flashes.   Genitourinary: Negative for bladder incontinence, difficulty urinating, dysuria, frequency and hematuria.   "  Musculoskeletal: Negative for gait problem, neck pain and neck stiffness.   Skin: Negative for rash.  Positive for dry skin.  Neurological: Positive for peripheral neuropathy in the hands and feet.  Hematological: Negative for adenopathy. Does not bruise/bleed easily.   Psychiatric/Behavioral: Negative for depression. The patient is not nervous/anxious.    All other systems reviewed and are negative.       PHYSICAL EXAM    Vitals:    07/11/19 0842   BP: 148/71   Pulse: 54   Resp: 18   Temp: 97.6 °F (36.4 °C)   TempSrc: Temporal   Weight: 79.8 kg (176 lb)   Height: 185.4 cm (73\")     Constitutional: Appears well-developed and well-nourished. No distress.   ECOG: (1) Restricted in physically strenuous activity, ambulatory and able to do work of light nature  HENT:   Head: Normocephalic.   Mouth/Throat: Oropharynx is clear and moist.   Eyes: Conjunctivae are normal. Pupils are equal, round, and reactive to light. No scleral icterus.   Neck: Neck supple. No JVD present. No thyromegaly present.   Cardiovascular: Normal rate, regular rhythm and normal heart sounds.    Pulmonary/Chest: Breath sounds normal. No respiratory distress.   Nodes: No cervical, supraclavicular or axillary nodes palpable on exam.  Abdominal: Soft. Exhibits no distension and no mass. There is no hepatosplenomegaly. There is no tenderness. There is no rebound and no guarding.   Musculoskeletal:Exhibits no edema, tenderness or deformity.   Neurological: Alert and oriented to person, place, and time. Exhibits normal muscle tone.   Skin: Dry.  No ecchymosis, no petechiae and no rash noted. Not diaphoretic. No cyanosis. erythema noted to bilateral hands  Psychiatric: Normal mood and affect.         Lab Results   Component Value Date    WBC 4.44 06/20/2019    HGB 14.4 06/20/2019    HCT 42.8 06/20/2019    .6 (H) 06/20/2019     (L) 06/20/2019       Lab Results   Component Value Date    GLUCOSE 85 06/20/2019    BUN 15 06/20/2019    CREATININE " 0.80 06/20/2019    EGFRIFNONA 96 06/20/2019    BCR 18.8 06/20/2019    K 4.0 06/20/2019    CO2 29.0 06/20/2019    CALCIUM 8.7 06/20/2019    ALBUMIN 4.10 06/20/2019    LABIL2 1.0 02/18/2016    AST 25 06/20/2019    ALT 21 06/20/2019       Lab Results   Component Value Date    CEA 2.44 06/20/2019    CEA 2.32 05/30/2019    CEA 1.84 05/09/2019    CEA 2.12 04/18/2019       Vitals reviewed.  Laboratory data reviewed along with CT chest abdomen and MRI pelvis and images. I reviewed the films myself    Assessment/Plan     68 y.o.  year old gentlemen with metastatic colon cancer with Solitary Liver metastasis initially diagnosed 2-. We will continue Xeloda and Avastin at current dosages.  Clinically, he remains stable.  His CEA is stable.  We will continue current dose of Avastin and Xeloda.  I discussed with the patient that scans showed No evidence of metastatic disease within the abdomen. CT chest is still pending review. I will contact him with results.  We will plan for repeat scans in 4 months of chest, abdomen, and pelvis.       Peripheral neuropathy secondary to chemotherapy. Stable on gabapentin three times daily. We will continue to monitor. Patient knows to call if neuropathy symptoms worsen.     Hand foot syndrome. I discussed with the patient that he needs to keep his hands moisturized and use gloves if he continues gardening. He will also need to call the office if hands worsen.     He has established with Dr. Abraham. We discussed that he should contact his office for refills other than related to cancer care.      I spent a total of  25 minutes in direct patient care, greater than 20  minutes (greater than 50%) were spent in coordination of care, and counseling the patient regarding colon cancer. I answered any questions patient had with medication and plan.     Adeola Sunshine, APRN  07/11/2019

## 2019-08-01 ENCOUNTER — INFUSION (OUTPATIENT)
Dept: ONCOLOGY | Facility: HOSPITAL | Age: 69
End: 2019-08-01

## 2019-08-01 VITALS
SYSTOLIC BLOOD PRESSURE: 140 MMHG | TEMPERATURE: 98 F | WEIGHT: 174 LBS | RESPIRATION RATE: 18 BRPM | BODY MASS INDEX: 22.96 KG/M2 | DIASTOLIC BLOOD PRESSURE: 77 MMHG | HEART RATE: 57 BPM

## 2019-08-01 DIAGNOSIS — C18.7 MALIGNANT NEOPLASM OF SIGMOID COLON (HCC): Primary | ICD-10-CM

## 2019-08-01 LAB
ALBUMIN SERPL-MCNC: 4.3 G/DL (ref 3.5–5)
ALBUMIN/GLOB SERPL: 1.4 G/DL (ref 1–2)
ALP SERPL-CCNC: 68 U/L (ref 38–126)
ALT SERPL W P-5'-P-CCNC: 16 U/L (ref 13–69)
ANION GAP SERPL CALCULATED.3IONS-SCNC: 14.4 MMOL/L (ref 10–20)
AST SERPL-CCNC: 32 U/L (ref 15–46)
BASOPHILS # BLD AUTO: 0.02 10*3/MM3 (ref 0–0.2)
BASOPHILS NFR BLD AUTO: 0.5 % (ref 0–1.5)
BILIRUB SERPL-MCNC: 1.6 MG/DL (ref 0.2–1.3)
BILIRUB UR QL STRIP: NEGATIVE
BUN BLD-MCNC: 15 MG/DL (ref 7–20)
BUN/CREAT SERPL: 18.8 (ref 6.3–21.9)
CALCIUM SPEC-SCNC: 9.2 MG/DL (ref 8.4–10.2)
CEA SERPL-MCNC: 2.13 NG/ML
CHLORIDE SERPL-SCNC: 102 MMOL/L (ref 98–107)
CLARITY UR: CLEAR
CO2 SERPL-SCNC: 25 MMOL/L (ref 26–30)
COLOR UR: YELLOW
CREAT BLD-MCNC: 0.8 MG/DL (ref 0.6–1.3)
DEPRECATED RDW RBC AUTO: 60.4 FL (ref 37–54)
EOSINOPHIL # BLD AUTO: 0.04 10*3/MM3 (ref 0–0.4)
EOSINOPHIL NFR BLD AUTO: 1 % (ref 0.3–6.2)
ERYTHROCYTE [DISTWIDTH] IN BLOOD BY AUTOMATED COUNT: 15.4 % (ref 12.3–15.4)
GFR SERPL CREATININE-BSD FRML MDRD: 96 ML/MIN/1.73
GLOBULIN UR ELPH-MCNC: 3 GM/DL
GLUCOSE BLD-MCNC: 89 MG/DL (ref 74–98)
GLUCOSE UR STRIP-MCNC: NEGATIVE MG/DL
HCT VFR BLD AUTO: 43.8 % (ref 37.5–51)
HGB BLD-MCNC: 14.5 G/DL (ref 13–17.7)
HGB UR QL STRIP.AUTO: NEGATIVE
IMM GRANULOCYTES # BLD AUTO: 0.02 10*3/MM3 (ref 0–0.05)
IMM GRANULOCYTES NFR BLD AUTO: 0.5 % (ref 0–0.5)
KETONES UR QL STRIP: NEGATIVE
LEUKOCYTE ESTERASE UR QL STRIP.AUTO: NEGATIVE
LYMPHOCYTES # BLD AUTO: 0.88 10*3/MM3 (ref 0.7–3.1)
LYMPHOCYTES NFR BLD AUTO: 21.3 % (ref 19.6–45.3)
MACROCYTES BLD QL SMEAR: NORMAL
MCH RBC QN AUTO: 35 PG (ref 26.6–33)
MCHC RBC AUTO-ENTMCNC: 33.1 G/DL (ref 31.5–35.7)
MCV RBC AUTO: 105.8 FL (ref 79–97)
MONOCYTES # BLD AUTO: 0.38 10*3/MM3 (ref 0.1–0.9)
MONOCYTES NFR BLD AUTO: 9.2 % (ref 5–12)
NEUTROPHILS # BLD AUTO: 2.8 10*3/MM3 (ref 1.7–7)
NEUTROPHILS NFR BLD AUTO: 67.5 % (ref 42.7–76)
NITRITE UR QL STRIP: NEGATIVE
NRBC BLD AUTO-RTO: 0 /100 WBC (ref 0–0.2)
PH UR STRIP.AUTO: <=5 [PH] (ref 5–8)
PLAT MORPH BLD: NORMAL
PLATELET # BLD AUTO: 146 10*3/MM3 (ref 140–450)
PMV BLD AUTO: 10 FL (ref 6–12)
POTASSIUM BLD-SCNC: 4.4 MMOL/L (ref 3.5–5.1)
PROT SERPL-MCNC: 7.3 G/DL (ref 6.3–8.2)
PROT UR QL STRIP: NEGATIVE
RBC # BLD AUTO: 4.14 10*6/MM3 (ref 4.14–5.8)
SODIUM BLD-SCNC: 137 MMOL/L (ref 137–145)
SP GR UR STRIP: 1.01 (ref 1–1.03)
UROBILINOGEN UR QL STRIP: NORMAL
WBC MORPH BLD: NORMAL
WBC NRBC COR # BLD: 4.14 10*3/MM3 (ref 3.4–10.8)

## 2019-08-01 PROCEDURE — 25010000002 BEVACIZUMAB 100 MG/4ML SOLUTION 4 ML VIAL: Performed by: NURSE PRACTITIONER

## 2019-08-01 PROCEDURE — 81003 URINALYSIS AUTO W/O SCOPE: CPT

## 2019-08-01 PROCEDURE — 85025 COMPLETE CBC W/AUTO DIFF WBC: CPT

## 2019-08-01 PROCEDURE — 96413 CHEMO IV INFUSION 1 HR: CPT

## 2019-08-01 PROCEDURE — 85007 BL SMEAR W/DIFF WBC COUNT: CPT

## 2019-08-01 PROCEDURE — 80053 COMPREHEN METABOLIC PANEL: CPT

## 2019-08-01 PROCEDURE — 36415 COLL VENOUS BLD VENIPUNCTURE: CPT

## 2019-08-01 PROCEDURE — 25010000002 BEVACIZUMAB PER 10 MG: Performed by: NURSE PRACTITIONER

## 2019-08-01 PROCEDURE — 82378 CARCINOEMBRYONIC ANTIGEN: CPT

## 2019-08-01 RX ORDER — SODIUM CHLORIDE 0.9 % (FLUSH) 0.9 %
10 SYRINGE (ML) INJECTION AS NEEDED
Status: CANCELLED | OUTPATIENT
Start: 2019-08-01

## 2019-08-01 RX ORDER — SODIUM CHLORIDE 0.9 % (FLUSH) 0.9 %
10 SYRINGE (ML) INJECTION AS NEEDED
Status: DISCONTINUED | OUTPATIENT
Start: 2019-08-01 | End: 2019-08-01 | Stop reason: HOSPADM

## 2019-08-01 RX ORDER — SODIUM CHLORIDE 9 MG/ML
250 INJECTION, SOLUTION INTRAVENOUS ONCE
Status: DISCONTINUED | OUTPATIENT
Start: 2019-08-01 | End: 2019-08-01 | Stop reason: HOSPADM

## 2019-08-01 RX ORDER — DEXTROSE MONOHYDRATE 50 MG/ML
250 INJECTION, SOLUTION INTRAVENOUS ONCE
Status: DISCONTINUED | OUTPATIENT
Start: 2019-08-01 | End: 2019-08-01 | Stop reason: HOSPADM

## 2019-08-01 RX ADMIN — BEVACIZUMAB 590 MG: 400 INJECTION, SOLUTION INTRAVENOUS at 10:22

## 2019-08-01 RX ADMIN — HEPARIN 500 UNITS: 100 SYRINGE at 10:59

## 2019-08-01 RX ADMIN — SODIUM CHLORIDE, PRESERVATIVE FREE 10 ML: 5 INJECTION INTRAVENOUS at 10:59

## 2019-08-02 ENCOUNTER — SPECIALTY PHARMACY (OUTPATIENT)
Dept: ONCOLOGY | Facility: HOSPITAL | Age: 69
End: 2019-08-02

## 2019-08-02 DIAGNOSIS — C18.7 MALIGNANT NEOPLASM OF SIGMOID COLON (HCC): ICD-10-CM

## 2019-08-02 RX ORDER — CAPECITABINE 500 MG/1
1000 TABLET, FILM COATED ORAL 2 TIMES DAILY
Qty: 56 TABLET | Refills: 5 | Status: SHIPPED | OUTPATIENT
Start: 2019-08-02 | End: 2019-11-21 | Stop reason: SDUPTHER

## 2019-08-02 RX ORDER — CAPECITABINE 500 MG/1
TABLET, FILM COATED ORAL
Qty: 56 TABLET | Refills: 10 | OUTPATIENT
Start: 2019-08-02

## 2019-08-02 NOTE — PROGRESS NOTES
Received refill request for Xeloda 1000mg PO BID x 14 days, followed by 7 days off from [facility]es[facility] FERGUSON. Reviewed Adeola Sunshine APRN note from 7/11/19. Plan to continue Xeloda at current dose. Refill submitted to [employer] for continuation of treatment.

## 2019-08-12 ENCOUNTER — TELEPHONE (OUTPATIENT)
Dept: INTERNAL MEDICINE | Facility: CLINIC | Age: 69
End: 2019-08-12

## 2019-08-12 RX ORDER — ESCITALOPRAM OXALATE 10 MG/1
10 TABLET ORAL DAILY
Qty: 30 TABLET | Refills: 11 | Status: SHIPPED | OUTPATIENT
Start: 2019-08-12 | End: 2019-08-15 | Stop reason: SDUPTHER

## 2019-08-12 RX ORDER — ESCITALOPRAM OXALATE 10 MG/1
10 TABLET ORAL DAILY
Qty: 30 TABLET | Refills: 11 | Status: SHIPPED | OUTPATIENT
Start: 2019-08-12 | End: 2019-08-12 | Stop reason: SDUPTHER

## 2019-08-12 NOTE — TELEPHONE ENCOUNTER
I tried to call him back.  No answer.  I have called in prescription for Lexapro.  He is to resume this

## 2019-08-12 NOTE — TELEPHONE ENCOUNTER
Patient came into the office stating that he is a new patient for Dr. Abraham. He said that Dr. Abraham took him off his anxiety medication and his sleep aid. Patient stated that he is not himself. He said that his nerves are shot and he is not sleeping. Patient is requesting that something be called in for him.     Dimitri Garcia    Please notify the patient if something can be called in or not.

## 2019-08-15 RX ORDER — ESCITALOPRAM OXALATE 10 MG/1
10 TABLET ORAL DAILY
Qty: 30 TABLET | Refills: 11 | Status: SHIPPED | OUTPATIENT
Start: 2019-08-15 | End: 2019-11-07 | Stop reason: SDUPTHER

## 2019-08-16 NOTE — PROGRESS NOTES
CHIEF COMPLAINT: 1. Follow-up for colon cancer                                       2.  Peripheral neuropathy of the hands and feet, worse in the feet      Problem List:  Oncology/Hematology History    1. Stage CARLOS, [id number] colon cancer with 2 out of 14 pericolonic lymph nodes  involved and a left lobe liver biopsy positive for metastasis, presenting with  a 25 pound weight loss and diarrhea with some perirectal soreness and had  colonoscopy with Dr. Mcclain in January that found this lesion that was  circumferential high-grade invading into the pericolonic fat, status post  sigmoid colectomy of a poorly differentiated adenocarcinoma.   a) Baseline CEA postop of 0.8 with alkaline phosphatase 111, with normal liver  enzymes and creatinine of 0.8. Baseline white count 9820 with a hemoglobin  13.8, platelets 339,000, and CT with contrast showing a right lobe liver dome  lesion as well as an anastomotic change in the bowel.   b) Began CapeOx 03/15/2016.  c) Added in Avastin to CapeOx 04/05/2016, second cycle. (This was 8 weeks out  from surgery).  d) KRAS mutation is negative.  E.) CAT scan in August 2016 shows resolution of disease in the liver and colon and a stable lung nodule.  Hence Avastin, Xeloda, and oxaliplatin continued.  F) oxaliplatin discontinued October 2016 due to worsening peripheral neuropathy.  G.) CTs of February 2017 showed no evidence of metastasis and stable bibasilar nodular scar.  Persistent neuropathy not worsening.  CEA 1.4.  Continuing Avastin and Xeloda without oxaliplatin.  Having some problems with irritation of his eyes on Xeloda.  2.  Peripheral neuropathy, chemotherapy induced        Malignant neoplasm of sigmoid colon (CMS/HCC)    5/20/2016 Initial Diagnosis     Malignant neoplasm of sigmoid colon         5/2/2017 Imaging     CT chest, abdomen, pelvis IMPRESSION:  1. No disease recurrence.  2. Minimal areas of nodular thickening in the right lower lobe, stable.         8/2/2017 Imaging      CT chest/abdomen/pelvis IMPRESSION:  Stable examination with no evidence of acute intrathoracic,  intra-abdominal or pelvic abnormality. There is no evidence of  progression of disease. No metastatic disease.          11/13/2017 Imaging     CT chest/abdomen/pelvis IMPRESSION:  Stable examination without evidence of acute intrathoracic  intra-abdominal or intrapelvic abnormality. No evidence of progression  of disease.   CEA 1.3.            HISTORY OF PRESENT ILLNESS:  The patient is a 68 y.o. male, here for follow up on management of Stage IV a colon cancer.   He says he is doing very well. He continues to tolerate Xeloda and Avastin with minimal side effects. He continues to deny constipation and diarrhea. His hands are stable.  Peripheral neuropathy is stable. He reports that after last visit he was stung multiple times by hornets and was swollen all over. He also reports that for the past 3 weeks he has had an ongoing cough and some shortness of breath.  The cough is non productive and worse at night.  He took some left over antibiotics that he had but he has not felt better. He does not know the name of the antibiotic.     Past Medical History:   Diagnosis Date   • Hypertension    • Liver disease     mets from colon cancer   • Malignant neoplasm of colon (CMS/HCC)      Past Surgical History:   Procedure Laterality Date   • COLON SURGERY      Sigmoid   • LIVER SURGERY     • PORTACATH PLACEMENT         No Known Allergies    Family History and Social History reviewed and changed as necessary      REVIEW OF SYSTEM:   Review of Systems   Constitutional: Negative for appetite change, chills, diaphoresis, fatigue, fever and unexpected weight change.   HENT:   Negative for mouth sores, sore throat and trouble swallowing.    Eyes: Negative for icterus.   Respiratory: Negative for cough, hemoptysis and shortness of breath.    Cardiovascular: Negative for chest pain, leg swelling and palpitations.   Gastrointestinal:  "Negative for abdominal distention, abdominal pain, blood in stool, constipation, diarrhea, nausea and vomiting.   Endocrine: Negative for hot flashes.   Genitourinary: Negative for bladder incontinence, difficulty urinating, dysuria, frequency and hematuria.    Musculoskeletal: Negative for gait problem, neck pain and neck stiffness.   Skin: Negative for rash.  Positive for dry skin.  Neurological: Positive for peripheral neuropathy in the hands and feet.  Hematological: Negative for adenopathy. Does not bruise/bleed easily.   Psychiatric/Behavioral: Negative for depression. The patient is not nervous/anxious.    All other systems reviewed and are negative.       PHYSICAL EXAM    Vitals:    08/22/19 0831   BP: 138/78   Pulse: 58   Resp: 16   Temp: 97.1 °F (36.2 °C)   TempSrc: Temporal   Weight: 77.6 kg (171 lb)   Height: 185.4 cm (73\")     Constitutional: Appears well-developed and well-nourished. No distress.   ECOG: (1) Restricted in physically strenuous activity, ambulatory and able to do work of light nature  HENT:   Head: Normocephalic.   Mouth/Throat: Oropharynx is clear and moist.   Eyes: Conjunctivae are normal. Pupils are equal, round, and reactive to light. No scleral icterus.   Neck: Neck supple. No JVD present. No thyromegaly present.   Cardiovascular: Normal rate, regular rhythm and normal heart sounds.    Pulmonary/Chest: Bilateral wheezes in upper and lower lobes. No respiratory distress.   Nodes: No cervical, supraclavicular or axillary nodes palpable on exam.  Abdominal: Soft. Exhibits no distension and no mass. There is no hepatosplenomegaly. There is no tenderness. There is no rebound and no guarding.   Musculoskeletal:Exhibits no edema, tenderness or deformity.   Neurological: Alert and oriented to person, place, and time. Exhibits normal muscle tone.   Skin: Dry.  No ecchymosis, no petechiae and no rash noted. Not diaphoretic. No cyanosis. erythema noted to bilateral hands  Psychiatric: Normal " mood and affect.         Lab Results   Component Value Date    WBC 3.76 08/22/2019    HGB 14.3 08/22/2019    HCT 42.6 08/22/2019    .2 (H) 08/22/2019     (L) 08/22/2019       Lab Results   Component Value Date    GLUCOSE 89 08/22/2019    BUN 16 08/22/2019    CREATININE 0.88 08/22/2019    EGFRIFNONA 86 08/22/2019    BCR 18.2 08/22/2019    K 4.4 08/22/2019    CO2 23.3 08/22/2019    CALCIUM 9.1 08/22/2019    ALBUMIN 4.30 08/22/2019    LABIL2 1.0 02/18/2016    AST 20 08/22/2019    ALT 12 08/22/2019       Lab Results   Component Value Date    CEA 2.13 08/01/2019    CEA 2.02 07/11/2019    CEA 2.44 06/20/2019    CEA 2.32 05/30/2019       Vitals reviewed.  Laboratory data reviewed.      Assessment/Plan     Mr. Avila is a 68 y.o.  year old gentlemen with metastatic colon cancer with Solitary Liver metastasis initially diagnosed 2-. We will continue Xeloda and Avastin at current dosages.  He remains stable.  His CEA is stable.   We will plan for repeat scans in 3 months of chest, abdomen, and pelvis. This would be due in November 2019.    Peripheral neuropathy secondary to chemotherapy. Stable on gabapentin three times daily. We will continue to monitor. Patient knows to call if neuropathy symptoms worsen.     Hand foot syndrome. He will continue to keep his hands moisturized and use gloves if he continues gardening. He will also need to call the office if hands worsen.     Upper respiratory infection and bronchitis.  I will send in an antibiotic for his infection as well as a steroid pack to help with his shortness of breath and wheezing.        I spent a total of  25 minutes in direct patient care, greater than 18 minutes (greater than 50%) were spent in coordination of care, and counseling the patient regarding colon cancer. I answered any questions patient had with medication and plan.     Adeola Sunshine, APRN  08/22/2019

## 2019-08-22 ENCOUNTER — OFFICE VISIT (OUTPATIENT)
Dept: ONCOLOGY | Facility: CLINIC | Age: 69
End: 2019-08-22

## 2019-08-22 ENCOUNTER — INFUSION (OUTPATIENT)
Dept: ONCOLOGY | Facility: HOSPITAL | Age: 69
End: 2019-08-22

## 2019-08-22 VITALS
DIASTOLIC BLOOD PRESSURE: 78 MMHG | RESPIRATION RATE: 16 BRPM | WEIGHT: 171 LBS | BODY MASS INDEX: 22.66 KG/M2 | SYSTOLIC BLOOD PRESSURE: 138 MMHG | TEMPERATURE: 97.1 F | HEIGHT: 73 IN | HEART RATE: 58 BPM

## 2019-08-22 DIAGNOSIS — C18.7 MALIGNANT NEOPLASM OF SIGMOID COLON (HCC): Primary | ICD-10-CM

## 2019-08-22 DIAGNOSIS — C18.7 MALIGNANT NEOPLASM OF SIGMOID COLON (HCC): ICD-10-CM

## 2019-08-22 LAB
ALBUMIN SERPL-MCNC: 4.3 G/DL (ref 3.5–5.2)
ALBUMIN/GLOB SERPL: 1.6 G/DL
ALP SERPL-CCNC: 65 U/L (ref 39–117)
ALT SERPL W P-5'-P-CCNC: 12 U/L (ref 1–41)
ANION GAP SERPL CALCULATED.3IONS-SCNC: 12.7 MMOL/L (ref 5–15)
AST SERPL-CCNC: 20 U/L (ref 1–40)
BASOPHILS # BLD AUTO: 0.01 10*3/MM3 (ref 0–0.2)
BASOPHILS NFR BLD AUTO: 0.3 % (ref 0–1.5)
BILIRUB SERPL-MCNC: 1 MG/DL (ref 0.2–1.2)
BILIRUB UR QL STRIP: NEGATIVE
BUN BLD-MCNC: 16 MG/DL (ref 8–23)
BUN/CREAT SERPL: 18.2 (ref 7–25)
CALCIUM SPEC-SCNC: 9.1 MG/DL (ref 8.6–10.5)
CEA SERPL-MCNC: 3.23 NG/ML
CHLORIDE SERPL-SCNC: 102 MMOL/L (ref 98–107)
CLARITY UR: CLEAR
CO2 SERPL-SCNC: 23.3 MMOL/L (ref 22–29)
COLOR UR: YELLOW
CREAT BLD-MCNC: 0.88 MG/DL (ref 0.76–1.27)
DEPRECATED RDW RBC AUTO: 59.4 FL (ref 37–54)
EOSINOPHIL # BLD AUTO: 0.12 10*3/MM3 (ref 0–0.4)
EOSINOPHIL NFR BLD AUTO: 3.2 % (ref 0.3–6.2)
ERYTHROCYTE [DISTWIDTH] IN BLOOD BY AUTOMATED COUNT: 15.2 % (ref 12.3–15.4)
GFR SERPL CREATININE-BSD FRML MDRD: 86 ML/MIN/1.73
GLOBULIN UR ELPH-MCNC: 2.7 GM/DL
GLUCOSE BLD-MCNC: 89 MG/DL (ref 65–99)
GLUCOSE UR STRIP-MCNC: NEGATIVE MG/DL
HCT VFR BLD AUTO: 42.6 % (ref 37.5–51)
HGB BLD-MCNC: 14.3 G/DL (ref 13–17.7)
HGB UR QL STRIP.AUTO: NEGATIVE
IMM GRANULOCYTES # BLD AUTO: 0.01 10*3/MM3 (ref 0–0.05)
IMM GRANULOCYTES NFR BLD AUTO: 0.3 % (ref 0–0.5)
KETONES UR QL STRIP: NEGATIVE
LEUKOCYTE ESTERASE UR QL STRIP.AUTO: NEGATIVE
LYMPHOCYTES # BLD AUTO: 1.09 10*3/MM3 (ref 0.7–3.1)
LYMPHOCYTES NFR BLD AUTO: 29 % (ref 19.6–45.3)
MACROCYTES BLD QL SMEAR: NORMAL
MCH RBC QN AUTO: 35.3 PG (ref 26.6–33)
MCHC RBC AUTO-ENTMCNC: 33.6 G/DL (ref 31.5–35.7)
MCV RBC AUTO: 105.2 FL (ref 79–97)
MONOCYTES # BLD AUTO: 0.5 10*3/MM3 (ref 0.1–0.9)
MONOCYTES NFR BLD AUTO: 13.3 % (ref 5–12)
NEUTROPHILS # BLD AUTO: 2.03 10*3/MM3 (ref 1.7–7)
NEUTROPHILS NFR BLD AUTO: 53.9 % (ref 42.7–76)
NITRITE UR QL STRIP: NEGATIVE
NRBC BLD AUTO-RTO: 0 /100 WBC (ref 0–0.2)
PH UR STRIP.AUTO: 5.5 [PH] (ref 5–8)
PLATELET # BLD AUTO: 139 10*3/MM3 (ref 140–450)
PMV BLD AUTO: 10.2 FL (ref 6–12)
POTASSIUM BLD-SCNC: 4.4 MMOL/L (ref 3.5–5.2)
PROT SERPL-MCNC: 7 G/DL (ref 6–8.5)
PROT UR QL STRIP: NEGATIVE
RBC # BLD AUTO: 4.05 10*6/MM3 (ref 4.14–5.8)
SMALL PLATELETS BLD QL SMEAR: ADEQUATE
SODIUM BLD-SCNC: 138 MMOL/L (ref 136–145)
SP GR UR STRIP: 1.02 (ref 1–1.03)
UROBILINOGEN UR QL STRIP: NORMAL
WBC MORPH BLD: NORMAL
WBC NRBC COR # BLD: 3.76 10*3/MM3 (ref 3.4–10.8)

## 2019-08-22 PROCEDURE — 25010000002 BEVACIZUMAB 100 MG/4ML SOLUTION 4 ML VIAL: Performed by: NURSE PRACTITIONER

## 2019-08-22 PROCEDURE — 85007 BL SMEAR W/DIFF WBC COUNT: CPT

## 2019-08-22 PROCEDURE — 36415 COLL VENOUS BLD VENIPUNCTURE: CPT

## 2019-08-22 PROCEDURE — 85025 COMPLETE CBC W/AUTO DIFF WBC: CPT

## 2019-08-22 PROCEDURE — 82378 CARCINOEMBRYONIC ANTIGEN: CPT

## 2019-08-22 PROCEDURE — 99214 OFFICE O/P EST MOD 30 MIN: CPT | Performed by: NURSE PRACTITIONER

## 2019-08-22 PROCEDURE — 96413 CHEMO IV INFUSION 1 HR: CPT

## 2019-08-22 PROCEDURE — 80053 COMPREHEN METABOLIC PANEL: CPT

## 2019-08-22 PROCEDURE — 25010000002 BEVACIZUMAB PER 10 MG: Performed by: NURSE PRACTITIONER

## 2019-08-22 PROCEDURE — 81003 URINALYSIS AUTO W/O SCOPE: CPT

## 2019-08-22 RX ORDER — SODIUM CHLORIDE 9 MG/ML
250 INJECTION, SOLUTION INTRAVENOUS ONCE
Status: CANCELLED | OUTPATIENT
Start: 2019-08-22

## 2019-08-22 RX ORDER — SODIUM CHLORIDE 0.9 % (FLUSH) 0.9 %
10 SYRINGE (ML) INJECTION AS NEEDED
Status: CANCELLED | OUTPATIENT
Start: 2019-08-22

## 2019-08-22 RX ORDER — AZITHROMYCIN 250 MG/1
TABLET, FILM COATED ORAL
Qty: 6 TABLET | Refills: 0 | Status: SHIPPED | OUTPATIENT
Start: 2019-08-22 | End: 2019-08-30 | Stop reason: SDUPTHER

## 2019-08-22 RX ORDER — SODIUM CHLORIDE 9 MG/ML
250 INJECTION, SOLUTION INTRAVENOUS ONCE
Status: DISCONTINUED | OUTPATIENT
Start: 2019-08-22 | End: 2019-08-22 | Stop reason: HOSPADM

## 2019-08-22 RX ORDER — AZITHROMYCIN 250 MG/1
TABLET, FILM COATED ORAL
Qty: 6 TABLET | Refills: 0 | Status: SHIPPED | OUTPATIENT
Start: 2019-08-22 | End: 2019-08-22 | Stop reason: SDUPTHER

## 2019-08-22 RX ORDER — DEXTROSE MONOHYDRATE 50 MG/ML
250 INJECTION, SOLUTION INTRAVENOUS ONCE
Status: CANCELLED | OUTPATIENT
Start: 2019-08-22 | End: 2019-08-22

## 2019-08-22 RX ORDER — DEXTROSE MONOHYDRATE 50 MG/ML
250 INJECTION, SOLUTION INTRAVENOUS ONCE
Status: CANCELLED | OUTPATIENT
Start: 2019-09-12 | End: 2019-09-12

## 2019-08-22 RX ORDER — SODIUM CHLORIDE 0.9 % (FLUSH) 0.9 %
10 SYRINGE (ML) INJECTION AS NEEDED
Status: DISCONTINUED | OUTPATIENT
Start: 2019-08-22 | End: 2019-08-22 | Stop reason: HOSPADM

## 2019-08-22 RX ORDER — SODIUM CHLORIDE 9 MG/ML
250 INJECTION, SOLUTION INTRAVENOUS ONCE
Status: CANCELLED | OUTPATIENT
Start: 2019-09-12

## 2019-08-22 RX ADMIN — BEVACIZUMAB 590 MG: 400 INJECTION, SOLUTION INTRAVENOUS at 10:12

## 2019-08-22 RX ADMIN — SODIUM CHLORIDE, PRESERVATIVE FREE 10 ML: 5 INJECTION INTRAVENOUS at 10:50

## 2019-08-22 RX ADMIN — HEPARIN 500 UNITS: 100 SYRINGE at 10:50

## 2019-08-24 ENCOUNTER — APPOINTMENT (OUTPATIENT)
Dept: CT IMAGING | Facility: HOSPITAL | Age: 69
End: 2019-08-24

## 2019-08-24 ENCOUNTER — HOSPITAL ENCOUNTER (EMERGENCY)
Facility: HOSPITAL | Age: 69
Discharge: HOME | End: 2019-08-24
Attending: EMERGENCY MEDICINE | Admitting: EMERGENCY MEDICINE

## 2019-08-24 ENCOUNTER — APPOINTMENT (OUTPATIENT)
Dept: GENERAL RADIOLOGY | Facility: HOSPITAL | Age: 69
End: 2019-08-24

## 2019-08-24 VITALS
RESPIRATION RATE: 14 BRPM | SYSTOLIC BLOOD PRESSURE: 105 MMHG | WEIGHT: 175 LBS | OXYGEN SATURATION: 98 % | HEIGHT: 72 IN | TEMPERATURE: 98.3 F | DIASTOLIC BLOOD PRESSURE: 67 MMHG | BODY MASS INDEX: 23.7 KG/M2 | HEART RATE: 54 BPM

## 2019-08-24 DIAGNOSIS — S39.011A STRAIN OF ABDOMINAL WALL, INITIAL ENCOUNTER: Primary | ICD-10-CM

## 2019-08-24 DIAGNOSIS — R56.9 SEIZURE (HCC): ICD-10-CM

## 2019-08-24 LAB
ALBUMIN SERPL-MCNC: 4.2 G/DL (ref 3.5–5.2)
ALBUMIN/GLOB SERPL: 1.7 G/DL
ALP SERPL-CCNC: 60 U/L (ref 39–117)
ALT SERPL W P-5'-P-CCNC: 14 U/L (ref 1–41)
ANION GAP SERPL CALCULATED.3IONS-SCNC: 14 MMOL/L (ref 5–15)
ANISOCYTOSIS BLD QL: NORMAL
AST SERPL-CCNC: 21 U/L (ref 1–40)
BASOPHILS # BLD AUTO: 0.01 10*3/MM3 (ref 0–0.2)
BASOPHILS NFR BLD AUTO: 0.1 % (ref 0–1.5)
BILIRUB SERPL-MCNC: 0.9 MG/DL (ref 0.2–1.2)
BILIRUB UR QL STRIP: NEGATIVE
BUN BLD-MCNC: 13 MG/DL (ref 8–23)
BUN/CREAT SERPL: 14 (ref 7–25)
CALCIUM SPEC-SCNC: 8.7 MG/DL (ref 8.6–10.5)
CHLORIDE SERPL-SCNC: 100 MMOL/L (ref 98–107)
CLARITY UR: CLEAR
CO2 SERPL-SCNC: 24 MMOL/L (ref 22–29)
COLOR UR: YELLOW
CREAT BLD-MCNC: 0.93 MG/DL (ref 0.76–1.27)
D-LACTATE SERPL-SCNC: 3.7 MMOL/L (ref 0.5–2)
DACRYOCYTES BLD QL SMEAR: NORMAL
DEPRECATED RDW RBC AUTO: 58.8 FL (ref 37–54)
EOSINOPHIL # BLD AUTO: 0 10*3/MM3 (ref 0–0.4)
EOSINOPHIL NFR BLD AUTO: 0 % (ref 0.3–6.2)
ERYTHROCYTE [DISTWIDTH] IN BLOOD BY AUTOMATED COUNT: 14.8 % (ref 12.3–15.4)
GFR SERPL CREATININE-BSD FRML MDRD: 81 ML/MIN/1.73
GLOBULIN UR ELPH-MCNC: 2.5 GM/DL
GLUCOSE BLD-MCNC: 113 MG/DL (ref 65–99)
GLUCOSE UR STRIP-MCNC: NEGATIVE MG/DL
HCT VFR BLD AUTO: 39.6 % (ref 37.5–51)
HGB BLD-MCNC: 13 G/DL (ref 13–17.7)
HGB UR QL STRIP.AUTO: NEGATIVE
HOLD SPECIMEN: NORMAL
IMM GRANULOCYTES # BLD AUTO: 0.03 10*3/MM3 (ref 0–0.05)
IMM GRANULOCYTES NFR BLD AUTO: 0.3 % (ref 0–0.5)
KETONES UR QL STRIP: NEGATIVE
LEUKOCYTE ESTERASE UR QL STRIP.AUTO: NEGATIVE
LIPASE SERPL-CCNC: 24 U/L (ref 13–60)
LYMPHOCYTES # BLD AUTO: 1.04 10*3/MM3 (ref 0.7–3.1)
LYMPHOCYTES NFR BLD AUTO: 11.4 % (ref 19.6–45.3)
MACROCYTES BLD QL SMEAR: NORMAL
MCH RBC QN AUTO: 34.9 PG (ref 26.6–33)
MCHC RBC AUTO-ENTMCNC: 32.8 G/DL (ref 31.5–35.7)
MCV RBC AUTO: 106.5 FL (ref 79–97)
MONOCYTES # BLD AUTO: 0.56 10*3/MM3 (ref 0.1–0.9)
MONOCYTES NFR BLD AUTO: 6.1 % (ref 5–12)
NEUTROPHILS # BLD AUTO: 7.51 10*3/MM3 (ref 1.7–7)
NEUTROPHILS NFR BLD AUTO: 82.1 % (ref 42.7–76)
NITRITE UR QL STRIP: NEGATIVE
NRBC BLD AUTO-RTO: 0 /100 WBC (ref 0–0.2)
OVALOCYTES BLD QL SMEAR: NORMAL
PH UR STRIP.AUTO: 5.5 [PH] (ref 5–8)
PLATELET # BLD AUTO: 145 10*3/MM3 (ref 140–450)
PMV BLD AUTO: 10.4 FL (ref 6–12)
POIKILOCYTOSIS BLD QL SMEAR: NORMAL
POTASSIUM BLD-SCNC: 4 MMOL/L (ref 3.5–5.2)
PROT SERPL-MCNC: 6.7 G/DL (ref 6–8.5)
PROT UR QL STRIP: NEGATIVE
RBC # BLD AUTO: 3.72 10*6/MM3 (ref 4.14–5.8)
SMALL PLATELETS BLD QL SMEAR: ADEQUATE
SODIUM BLD-SCNC: 138 MMOL/L (ref 136–145)
SP GR UR STRIP: 1.02 (ref 1–1.03)
UROBILINOGEN UR QL STRIP: NORMAL
WBC MORPH BLD: NORMAL
WBC NRBC COR # BLD: 9.15 10*3/MM3 (ref 3.4–10.8)

## 2019-08-24 PROCEDURE — 85007 BL SMEAR W/DIFF WBC COUNT: CPT | Performed by: EMERGENCY MEDICINE

## 2019-08-24 PROCEDURE — 25010000002 LORAZEPAM PER 2 MG: Performed by: EMERGENCY MEDICINE

## 2019-08-24 PROCEDURE — 80053 COMPREHEN METABOLIC PANEL: CPT | Performed by: EMERGENCY MEDICINE

## 2019-08-24 PROCEDURE — 83605 ASSAY OF LACTIC ACID: CPT | Performed by: EMERGENCY MEDICINE

## 2019-08-24 PROCEDURE — 74176 CT ABD & PELVIS W/O CONTRAST: CPT

## 2019-08-24 PROCEDURE — 81003 URINALYSIS AUTO W/O SCOPE: CPT | Performed by: EMERGENCY MEDICINE

## 2019-08-24 PROCEDURE — 83690 ASSAY OF LIPASE: CPT | Performed by: EMERGENCY MEDICINE

## 2019-08-24 PROCEDURE — 85025 COMPLETE CBC W/AUTO DIFF WBC: CPT | Performed by: EMERGENCY MEDICINE

## 2019-08-24 PROCEDURE — 96361 HYDRATE IV INFUSION ADD-ON: CPT

## 2019-08-24 PROCEDURE — 71045 X-RAY EXAM CHEST 1 VIEW: CPT

## 2019-08-24 PROCEDURE — 70450 CT HEAD/BRAIN W/O DYE: CPT

## 2019-08-24 PROCEDURE — 96374 THER/PROPH/DIAG INJ IV PUSH: CPT

## 2019-08-24 PROCEDURE — 99284 EMERGENCY DEPT VISIT MOD MDM: CPT

## 2019-08-24 RX ORDER — LORAZEPAM 2 MG/ML
1 INJECTION INTRAMUSCULAR ONCE
Status: COMPLETED | OUTPATIENT
Start: 2019-08-24 | End: 2019-08-24

## 2019-08-24 RX ORDER — HYDROCODONE BITARTRATE AND ACETAMINOPHEN 5; 325 MG/1; MG/1
1 TABLET ORAL EVERY 6 HOURS PRN
Qty: 10 TABLET | Refills: 0 | Status: SHIPPED | OUTPATIENT
Start: 2019-08-24 | End: 2020-09-28

## 2019-08-24 RX ADMIN — HEPARIN SODIUM (PORCINE) LOCK FLUSH IV SOLN 100 UNIT/ML 300 UNITS: 100 SOLUTION at 16:32

## 2019-08-24 RX ADMIN — SODIUM CHLORIDE 1000 ML: 9 INJECTION, SOLUTION INTRAVENOUS at 14:48

## 2019-08-24 RX ADMIN — LORAZEPAM 1 MG: 2 INJECTION INTRAMUSCULAR; INTRAVENOUS at 14:48

## 2019-09-03 RX ORDER — AZITHROMYCIN 250 MG/1
TABLET, FILM COATED ORAL
Qty: 6 TABLET | Refills: 0 | Status: SHIPPED | OUTPATIENT
Start: 2019-09-03 | End: 2019-09-03

## 2019-09-12 ENCOUNTER — INFUSION (OUTPATIENT)
Dept: ONCOLOGY | Facility: HOSPITAL | Age: 69
End: 2019-09-12

## 2019-09-12 VITALS
BODY MASS INDEX: 22.51 KG/M2 | SYSTOLIC BLOOD PRESSURE: 129 MMHG | HEART RATE: 55 BPM | WEIGHT: 166 LBS | TEMPERATURE: 98.2 F | DIASTOLIC BLOOD PRESSURE: 77 MMHG | RESPIRATION RATE: 16 BRPM

## 2019-09-12 DIAGNOSIS — C18.7 MALIGNANT NEOPLASM OF SIGMOID COLON (HCC): Primary | ICD-10-CM

## 2019-09-12 LAB
ALBUMIN SERPL-MCNC: 4.4 G/DL (ref 3.5–5.2)
ALBUMIN/GLOB SERPL: 1.6 G/DL
ALP SERPL-CCNC: 59 U/L (ref 39–117)
ALT SERPL W P-5'-P-CCNC: 11 U/L (ref 1–41)
ANION GAP SERPL CALCULATED.3IONS-SCNC: 14.4 MMOL/L (ref 5–15)
AST SERPL-CCNC: 19 U/L (ref 1–40)
BASOPHILS # BLD AUTO: 0.01 10*3/MM3 (ref 0–0.2)
BASOPHILS NFR BLD AUTO: 0.3 % (ref 0–1.5)
BILIRUB SERPL-MCNC: 1.3 MG/DL (ref 0.2–1.2)
BILIRUB UR QL STRIP: NEGATIVE
BUN BLD-MCNC: 14 MG/DL (ref 8–23)
BUN/CREAT SERPL: 16.7 (ref 7–25)
CALCIUM SPEC-SCNC: 9.1 MG/DL (ref 8.6–10.5)
CEA SERPL-MCNC: 3.18 NG/ML
CHLORIDE SERPL-SCNC: 102 MMOL/L (ref 98–107)
CLARITY UR: CLEAR
CO2 SERPL-SCNC: 22.6 MMOL/L (ref 22–29)
COLOR UR: YELLOW
CREAT BLD-MCNC: 0.84 MG/DL (ref 0.76–1.27)
DEPRECATED RDW RBC AUTO: 61.9 FL (ref 37–54)
EOSINOPHIL # BLD AUTO: 0.05 10*3/MM3 (ref 0–0.4)
EOSINOPHIL NFR BLD AUTO: 1.3 % (ref 0.3–6.2)
ERYTHROCYTE [DISTWIDTH] IN BLOOD BY AUTOMATED COUNT: 15.5 % (ref 12.3–15.4)
GFR SERPL CREATININE-BSD FRML MDRD: 91 ML/MIN/1.73
GLOBULIN UR ELPH-MCNC: 2.8 GM/DL
GLUCOSE BLD-MCNC: 100 MG/DL (ref 65–99)
GLUCOSE UR STRIP-MCNC: NEGATIVE MG/DL
HCT VFR BLD AUTO: 43.3 % (ref 37.5–51)
HGB BLD-MCNC: 14.3 G/DL (ref 13–17.7)
HGB UR QL STRIP.AUTO: NEGATIVE
IMM GRANULOCYTES # BLD AUTO: 0.01 10*3/MM3 (ref 0–0.05)
IMM GRANULOCYTES NFR BLD AUTO: 0.3 % (ref 0–0.5)
KETONES UR QL STRIP: NEGATIVE
LEUKOCYTE ESTERASE UR QL STRIP.AUTO: NEGATIVE
LYMPHOCYTES # BLD AUTO: 0.88 10*3/MM3 (ref 0.7–3.1)
LYMPHOCYTES NFR BLD AUTO: 23.5 % (ref 19.6–45.3)
MACROCYTES BLD QL SMEAR: NORMAL
MCH RBC QN AUTO: 35.4 PG (ref 26.6–33)
MCHC RBC AUTO-ENTMCNC: 33 G/DL (ref 31.5–35.7)
MCV RBC AUTO: 107.2 FL (ref 79–97)
MONOCYTES # BLD AUTO: 0.35 10*3/MM3 (ref 0.1–0.9)
MONOCYTES NFR BLD AUTO: 9.3 % (ref 5–12)
NEUTROPHILS # BLD AUTO: 2.45 10*3/MM3 (ref 1.7–7)
NEUTROPHILS NFR BLD AUTO: 65.3 % (ref 42.7–76)
NITRITE UR QL STRIP: NEGATIVE
NRBC BLD AUTO-RTO: 0 /100 WBC (ref 0–0.2)
PH UR STRIP.AUTO: <=5 [PH] (ref 5–8)
PLATELET # BLD AUTO: 125 10*3/MM3 (ref 140–450)
PMV BLD AUTO: 10 FL (ref 6–12)
POTASSIUM BLD-SCNC: 4.3 MMOL/L (ref 3.5–5.2)
PROT SERPL-MCNC: 7.2 G/DL (ref 6–8.5)
PROT UR QL STRIP: NEGATIVE
RBC # BLD AUTO: 4.04 10*6/MM3 (ref 4.14–5.8)
SMALL PLATELETS BLD QL SMEAR: NORMAL
SODIUM BLD-SCNC: 139 MMOL/L (ref 136–145)
SP GR UR STRIP: 1.02 (ref 1–1.03)
UROBILINOGEN UR QL STRIP: NORMAL
WBC MORPH BLD: NORMAL
WBC NRBC COR # BLD: 3.75 10*3/MM3 (ref 3.4–10.8)

## 2019-09-12 PROCEDURE — 96413 CHEMO IV INFUSION 1 HR: CPT

## 2019-09-12 PROCEDURE — 85007 BL SMEAR W/DIFF WBC COUNT: CPT

## 2019-09-12 PROCEDURE — 36415 COLL VENOUS BLD VENIPUNCTURE: CPT

## 2019-09-12 PROCEDURE — 82378 CARCINOEMBRYONIC ANTIGEN: CPT

## 2019-09-12 PROCEDURE — 81003 URINALYSIS AUTO W/O SCOPE: CPT

## 2019-09-12 PROCEDURE — 25010000002 BEVACIZUMAB PER 10 MG: Performed by: NURSE PRACTITIONER

## 2019-09-12 PROCEDURE — 85025 COMPLETE CBC W/AUTO DIFF WBC: CPT

## 2019-09-12 PROCEDURE — 25010000002 BEVACIZUMAB 100 MG/4ML SOLUTION 4 ML VIAL: Performed by: NURSE PRACTITIONER

## 2019-09-12 PROCEDURE — 80053 COMPREHEN METABOLIC PANEL: CPT

## 2019-09-12 RX ORDER — DEXTROSE MONOHYDRATE 50 MG/ML
250 INJECTION, SOLUTION INTRAVENOUS ONCE
Status: DISCONTINUED | OUTPATIENT
Start: 2019-09-12 | End: 2019-09-12 | Stop reason: HOSPADM

## 2019-09-12 RX ORDER — SODIUM CHLORIDE 0.9 % (FLUSH) 0.9 %
10 SYRINGE (ML) INJECTION AS NEEDED
Status: CANCELLED | OUTPATIENT
Start: 2019-09-12

## 2019-09-12 RX ORDER — SODIUM CHLORIDE 9 MG/ML
250 INJECTION, SOLUTION INTRAVENOUS ONCE
Status: DISCONTINUED | OUTPATIENT
Start: 2019-09-12 | End: 2019-09-12 | Stop reason: HOSPADM

## 2019-09-12 RX ORDER — CLONAZEPAM 1 MG/1
1 TABLET ORAL DAILY
Qty: 30 TABLET | Refills: 3 | OUTPATIENT
Start: 2019-09-12 | End: 2019-11-06 | Stop reason: SDUPTHER

## 2019-09-12 RX ORDER — SODIUM CHLORIDE 0.9 % (FLUSH) 0.9 %
10 SYRINGE (ML) INJECTION AS NEEDED
Status: DISCONTINUED | OUTPATIENT
Start: 2019-09-12 | End: 2019-09-12 | Stop reason: HOSPADM

## 2019-09-12 RX ADMIN — HEPARIN SODIUM (PORCINE) LOCK FLUSH IV SOLN 100 UNIT/ML 500 UNITS: 100 SOLUTION at 10:24

## 2019-09-12 RX ADMIN — SODIUM CHLORIDE, PRESERVATIVE FREE 10 ML: 5 INJECTION INTRAVENOUS at 10:23

## 2019-09-12 RX ADMIN — BEVACIZUMAB 590 MG: 400 INJECTION, SOLUTION INTRAVENOUS at 09:43

## 2019-10-03 ENCOUNTER — OFFICE VISIT (OUTPATIENT)
Dept: ONCOLOGY | Facility: CLINIC | Age: 69
End: 2019-10-03

## 2019-10-03 ENCOUNTER — INFUSION (OUTPATIENT)
Dept: ONCOLOGY | Facility: HOSPITAL | Age: 69
End: 2019-10-03

## 2019-10-03 VITALS — HEART RATE: 53 BPM | DIASTOLIC BLOOD PRESSURE: 79 MMHG | SYSTOLIC BLOOD PRESSURE: 142 MMHG

## 2019-10-03 VITALS
TEMPERATURE: 97.7 F | WEIGHT: 167 LBS | HEIGHT: 72 IN | SYSTOLIC BLOOD PRESSURE: 149 MMHG | HEART RATE: 56 BPM | RESPIRATION RATE: 16 BRPM | BODY MASS INDEX: 22.62 KG/M2 | DIASTOLIC BLOOD PRESSURE: 82 MMHG

## 2019-10-03 DIAGNOSIS — C18.7 MALIGNANT NEOPLASM OF SIGMOID COLON (HCC): Primary | ICD-10-CM

## 2019-10-03 DIAGNOSIS — C18.7 MALIGNANT NEOPLASM OF SIGMOID COLON (HCC): ICD-10-CM

## 2019-10-03 LAB
ALBUMIN SERPL-MCNC: 4.3 G/DL (ref 3.5–5.2)
ALBUMIN/GLOB SERPL: 1.7 G/DL
ALP SERPL-CCNC: 62 U/L (ref 39–117)
ALT SERPL W P-5'-P-CCNC: 13 U/L (ref 1–41)
ANION GAP SERPL CALCULATED.3IONS-SCNC: 13.7 MMOL/L (ref 5–15)
AST SERPL-CCNC: 24 U/L (ref 1–40)
BASOPHILS # BLD AUTO: 0.01 10*3/MM3 (ref 0–0.2)
BASOPHILS NFR BLD AUTO: 0.2 % (ref 0–1.5)
BILIRUB SERPL-MCNC: 1.1 MG/DL (ref 0.2–1.2)
BILIRUB UR QL STRIP: NEGATIVE
BUN BLD-MCNC: 10 MG/DL (ref 8–23)
BUN/CREAT SERPL: 12.7 (ref 7–25)
CALCIUM SPEC-SCNC: 8.8 MG/DL (ref 8.6–10.5)
CEA SERPL-MCNC: 3.19 NG/ML
CHLORIDE SERPL-SCNC: 102 MMOL/L (ref 98–107)
CLARITY UR: CLEAR
CO2 SERPL-SCNC: 21.3 MMOL/L (ref 22–29)
COLOR UR: YELLOW
CREAT BLD-MCNC: 0.79 MG/DL (ref 0.76–1.27)
DEPRECATED RDW RBC AUTO: 64.1 FL (ref 37–54)
EOSINOPHIL # BLD AUTO: 0.04 10*3/MM3 (ref 0–0.4)
EOSINOPHIL NFR BLD AUTO: 0.8 % (ref 0.3–6.2)
ERYTHROCYTE [DISTWIDTH] IN BLOOD BY AUTOMATED COUNT: 15.7 % (ref 12.3–15.4)
GFR SERPL CREATININE-BSD FRML MDRD: 98 ML/MIN/1.73
GLOBULIN UR ELPH-MCNC: 2.6 GM/DL
GLUCOSE BLD-MCNC: 85 MG/DL (ref 65–99)
GLUCOSE UR STRIP-MCNC: NEGATIVE MG/DL
HCT VFR BLD AUTO: 43.2 % (ref 37.5–51)
HGB BLD-MCNC: 13.9 G/DL (ref 13–17.7)
HGB UR QL STRIP.AUTO: NEGATIVE
IMM GRANULOCYTES # BLD AUTO: 0.01 10*3/MM3 (ref 0–0.05)
IMM GRANULOCYTES NFR BLD AUTO: 0.2 % (ref 0–0.5)
KETONES UR QL STRIP: NEGATIVE
LEUKOCYTE ESTERASE UR QL STRIP.AUTO: NEGATIVE
LYMPHOCYTES # BLD AUTO: 1.16 10*3/MM3 (ref 0.7–3.1)
LYMPHOCYTES NFR BLD AUTO: 24.4 % (ref 19.6–45.3)
MACROCYTES BLD QL SMEAR: NORMAL
MCH RBC QN AUTO: 35.5 PG (ref 26.6–33)
MCHC RBC AUTO-ENTMCNC: 32.2 G/DL (ref 31.5–35.7)
MCV RBC AUTO: 110.5 FL (ref 79–97)
MONOCYTES # BLD AUTO: 0.41 10*3/MM3 (ref 0.1–0.9)
MONOCYTES NFR BLD AUTO: 8.6 % (ref 5–12)
NEUTROPHILS # BLD AUTO: 3.13 10*3/MM3 (ref 1.7–7)
NEUTROPHILS NFR BLD AUTO: 65.8 % (ref 42.7–76)
NITRITE UR QL STRIP: NEGATIVE
NRBC BLD AUTO-RTO: 0 /100 WBC (ref 0–0.2)
PH UR STRIP.AUTO: 6 [PH] (ref 5–8)
PLATELET # BLD AUTO: 139 10*3/MM3 (ref 140–450)
PMV BLD AUTO: 9.7 FL (ref 6–12)
POTASSIUM BLD-SCNC: 4.3 MMOL/L (ref 3.5–5.2)
PROT SERPL-MCNC: 6.9 G/DL (ref 6–8.5)
PROT UR QL STRIP: NEGATIVE
RBC # BLD AUTO: 3.91 10*6/MM3 (ref 4.14–5.8)
SMALL PLATELETS BLD QL SMEAR: ADEQUATE
SODIUM BLD-SCNC: 137 MMOL/L (ref 136–145)
SP GR UR STRIP: 1.01 (ref 1–1.03)
UROBILINOGEN UR QL STRIP: NORMAL
WBC MORPH BLD: NORMAL
WBC NRBC COR # BLD: 4.76 10*3/MM3 (ref 3.4–10.8)

## 2019-10-03 PROCEDURE — 25010000002 BEVACIZUMAB 100 MG/4ML SOLUTION 4 ML VIAL: Performed by: NURSE PRACTITIONER

## 2019-10-03 PROCEDURE — 85025 COMPLETE CBC W/AUTO DIFF WBC: CPT

## 2019-10-03 PROCEDURE — 81003 URINALYSIS AUTO W/O SCOPE: CPT

## 2019-10-03 PROCEDURE — 80053 COMPREHEN METABOLIC PANEL: CPT

## 2019-10-03 PROCEDURE — 99214 OFFICE O/P EST MOD 30 MIN: CPT | Performed by: NURSE PRACTITIONER

## 2019-10-03 PROCEDURE — 85007 BL SMEAR W/DIFF WBC COUNT: CPT

## 2019-10-03 PROCEDURE — 82378 CARCINOEMBRYONIC ANTIGEN: CPT

## 2019-10-03 PROCEDURE — 25010000002 BEVACIZUMAB PER 10 MG: Performed by: NURSE PRACTITIONER

## 2019-10-03 PROCEDURE — 96413 CHEMO IV INFUSION 1 HR: CPT

## 2019-10-03 PROCEDURE — 36415 COLL VENOUS BLD VENIPUNCTURE: CPT

## 2019-10-03 RX ORDER — SODIUM CHLORIDE 9 MG/ML
250 INJECTION, SOLUTION INTRAVENOUS ONCE
Status: CANCELLED | OUTPATIENT
Start: 2019-10-03

## 2019-10-03 RX ORDER — DEXTROSE MONOHYDRATE 50 MG/ML
250 INJECTION, SOLUTION INTRAVENOUS ONCE
Status: CANCELLED | OUTPATIENT
Start: 2019-10-03 | End: 2019-10-03

## 2019-10-03 RX ORDER — SODIUM CHLORIDE 9 MG/ML
250 INJECTION, SOLUTION INTRAVENOUS ONCE
Status: DISCONTINUED | OUTPATIENT
Start: 2019-10-03 | End: 2019-10-03 | Stop reason: HOSPADM

## 2019-10-03 RX ORDER — DEXTROSE MONOHYDRATE 50 MG/ML
250 INJECTION, SOLUTION INTRAVENOUS ONCE
Status: DISCONTINUED | OUTPATIENT
Start: 2019-10-03 | End: 2019-10-03 | Stop reason: HOSPADM

## 2019-10-03 RX ORDER — SODIUM CHLORIDE 0.9 % (FLUSH) 0.9 %
10 SYRINGE (ML) INJECTION AS NEEDED
Status: DISCONTINUED | OUTPATIENT
Start: 2019-10-03 | End: 2019-10-03 | Stop reason: HOSPADM

## 2019-10-03 RX ORDER — SODIUM CHLORIDE 0.9 % (FLUSH) 0.9 %
10 SYRINGE (ML) INJECTION AS NEEDED
Status: CANCELLED | OUTPATIENT
Start: 2019-10-03

## 2019-10-03 RX ADMIN — SODIUM CHLORIDE, PRESERVATIVE FREE 10 ML: 5 INJECTION INTRAVENOUS at 15:44

## 2019-10-03 RX ADMIN — BEVACIZUMAB 590 MG: 400 INJECTION, SOLUTION INTRAVENOUS at 15:06

## 2019-10-03 RX ADMIN — HEPARIN 500 UNITS: 100 SYRINGE at 15:44

## 2019-10-03 NOTE — PROGRESS NOTES
CHIEF COMPLAINT: 1. Follow-up for colon cancer                                       2.  Peripheral neuropathy of the hands and feet, worse in the feet      Problem List:  Oncology/Hematology History    1. Stage CARLOS, [id number] colon cancer with 2 out of 14 pericolonic lymph nodes  involved and a left lobe liver biopsy positive for metastasis, presenting with  a 25 pound weight loss and diarrhea with some perirectal soreness and had  colonoscopy with Dr. Mcclain in January that found this lesion that was  circumferential high-grade invading into the pericolonic fat, status post  sigmoid colectomy of a poorly differentiated adenocarcinoma.   a) Baseline CEA postop of 0.8 with alkaline phosphatase 111, with normal liver  enzymes and creatinine of 0.8. Baseline white count 9820 with a hemoglobin  13.8, platelets 339,000, and CT with contrast showing a right lobe liver dome  lesion as well as an anastomotic change in the bowel.   b) Began CapeOx 03/15/2016.  c) Added in Avastin to CapeOx 04/05/2016, second cycle. (This was 8 weeks out  from surgery).  d) KRAS mutation is negative.  E.) CAT scan in August 2016 shows resolution of disease in the liver and colon and a stable lung nodule.  Hence Avastin, Xeloda, and oxaliplatin continued.  F) oxaliplatin discontinued October 2016 due to worsening peripheral neuropathy.  G.) CTs of February 2017 showed no evidence of metastasis and stable bibasilar nodular scar.  Persistent neuropathy not worsening.  CEA 1.4.  Continuing Avastin and Xeloda without oxaliplatin.  Having some problems with irritation of his eyes on Xeloda.  2.  Peripheral neuropathy, chemotherapy induced        Malignant neoplasm of sigmoid colon (CMS/HCC)    5/20/2016 Initial Diagnosis     Malignant neoplasm of sigmoid colon         5/2/2017 Imaging     CT chest, abdomen, pelvis IMPRESSION:  1. No disease recurrence.  2. Minimal areas of nodular thickening in the right lower lobe, stable.         8/2/2017 Imaging      CT chest/abdomen/pelvis IMPRESSION:  Stable examination with no evidence of acute intrathoracic,  intra-abdominal or pelvic abnormality. There is no evidence of  progression of disease. No metastatic disease.          11/13/2017 Imaging     CT chest/abdomen/pelvis IMPRESSION:  Stable examination without evidence of acute intrathoracic  intra-abdominal or intrapelvic abnormality. No evidence of progression  of disease.   CEA 1.3.            HISTORY OF PRESENT ILLNESS:  The patient is a 68 y.o. male, here for follow up on management of Stage IV a colon cancer.   He says he is doing very well. He continues to tolerate Xeloda and Avastin with minimal side effects. Denies constipation and diarrhea. His hands are stable.  Peripheral neuropathy is stable. He had to go to ED and almost had seizure like activity vs vagal. He says that he had worked in the garden all day and was pulling weeds. He has felt much better after some rest and is almost back to normal. He says the ED told him he had pulled muscles in his abdomen as well as exposure to sun. CT scan was normal.     Past Medical History:   Diagnosis Date   • Hypertension    • Liver disease     mets from colon cancer   • Malignant neoplasm of colon (CMS/HCC)      Past Surgical History:   Procedure Laterality Date   • COLON SURGERY      Sigmoid   • LIVER SURGERY     • PORTACATH PLACEMENT         No Known Allergies    Family History and Social History reviewed and changed as necessary      REVIEW OF SYSTEM:   Review of Systems   Constitutional: Negative for appetite change, chills, diaphoresis, fatigue, fever and unexpected weight change.   HENT:   Negative for mouth sores, sore throat and trouble swallowing.    Eyes: Negative for icterus.   Respiratory: Negative for cough, hemoptysis and shortness of breath.    Cardiovascular: Negative for chest pain, leg swelling and palpitations.   Gastrointestinal: Negative for abdominal distention, abdominal pain, blood in stool,  "constipation, diarrhea, nausea and vomiting.   Endocrine: Negative for hot flashes.   Genitourinary: Negative for bladder incontinence, difficulty urinating, dysuria, frequency and hematuria.    Musculoskeletal: Negative for gait problem, neck pain and neck stiffness.   Skin: Negative for rash.  Positive for dry skin.  Neurological: Positive for peripheral neuropathy in the hands and feet.  Hematological: Negative for adenopathy. Does not bruise/bleed easily.   Psychiatric/Behavioral: Negative for depression. The patient is not nervous/anxious.    All other systems reviewed and are negative.       PHYSICAL EXAM    Vitals:    10/03/19 1307   BP: 149/82   Pulse: 56   Resp: 16   Temp: 97.7 °F (36.5 °C)   TempSrc: Temporal   Weight: 75.8 kg (167 lb)   Height: 182.9 cm (72\")     Constitutional: Appears well-developed and well-nourished. No distress.   ECOG: (1) Restricted in physically strenuous activity, ambulatory and able to do work of light nature  HENT:   Head: Normocephalic.   Mouth/Throat: Oropharynx is clear and moist.   Eyes: Conjunctivae are normal. Pupils are equal, round, and reactive to light. No scleral icterus.   Neck: Neck supple. No JVD present. No thyromegaly present.   Cardiovascular: Normal rate, regular rhythm and normal heart sounds.    Pulmonary/Chest: Negative for cough, hemoptysis and shortness of breath No respiratory distress.   Nodes: No cervical, supraclavicular or axillary nodes palpable on exam.  Abdominal: Soft. Exhibits no distension and no mass. There is no hepatosplenomegaly. There is no tenderness. There is no rebound and no guarding.   Musculoskeletal:Exhibits no edema, tenderness or deformity.   Neurological: Alert and oriented to person, place, and time. Exhibits normal muscle tone.   Skin: Dry.  No ecchymosis, no petechiae and no rash noted. Not diaphoretic. No cyanosis. erythema noted to bilateral hands  Psychiatric: Normal mood and affect.         Lab Results   Component Value " Date    WBC 3.75 09/12/2019    HGB 14.3 09/12/2019    HCT 43.3 09/12/2019    .2 (H) 09/12/2019     (L) 09/12/2019       Lab Results   Component Value Date    GLUCOSE 85 10/03/2019    BUN 10 10/03/2019    CREATININE 0.79 10/03/2019    EGFRIFNONA 98 10/03/2019    BCR 12.7 10/03/2019    K 4.3 10/03/2019    CO2 21.3 (L) 10/03/2019    CALCIUM 8.8 10/03/2019    ALBUMIN 4.30 10/03/2019    LABIL2 1.0 02/18/2016    AST 24 10/03/2019    ALT 13 10/03/2019       Lab Results   Component Value Date    CEA 3.18 09/12/2019    CEA 3.23 08/22/2019    CEA 2.13 08/01/2019    CEA 2.02 07/11/2019       Vitals reviewed.  Laboratory data reviewed.      Assessment/Plan     Mr. Avila is a 68 y.o.  year old gentlemen with metastatic colon cancer with Solitary Liver metastasis initially diagnosed 2-. We will continue Xeloda and Avastin at current dosages.  He continues to be clinically stable.  His CEA is stable.   We will plan for repeat scans in 3 months of chest, abdomen, and pelvis. This would be due in November 2019.    Peripheral neuropathy secondary to chemotherapy. Stable on gabapentin three times daily. We will continue to monitor. Patient knows to call if neuropathy symptoms worsen.     Hand foot syndrome. He will continue to keep his hands moisturized and use gloves if he continues gardening. He will also need to call the office if hands worsen.           I spent a total of  25 minutes in direct patient care, greater than 15 minutes (greater than 50%) were spent in coordination of care, and counseling the patient regarding colon cancer. I answered any questions patient had with medication and plan.     Adeola Sunshine, APRN  10/03/2019

## 2019-10-23 DIAGNOSIS — C18.7 MALIGNANT NEOPLASM OF SIGMOID COLON (HCC): ICD-10-CM

## 2019-10-23 RX ORDER — DEXTROSE MONOHYDRATE 50 MG/ML
250 INJECTION, SOLUTION INTRAVENOUS ONCE
Status: CANCELLED | OUTPATIENT
Start: 2019-10-24 | End: 2019-10-24

## 2019-10-23 RX ORDER — SODIUM CHLORIDE 9 MG/ML
250 INJECTION, SOLUTION INTRAVENOUS ONCE
Status: CANCELLED | OUTPATIENT
Start: 2019-10-24

## 2019-10-24 ENCOUNTER — INFUSION (OUTPATIENT)
Dept: ONCOLOGY | Facility: HOSPITAL | Age: 69
End: 2019-10-24

## 2019-10-24 VITALS
RESPIRATION RATE: 18 BRPM | DIASTOLIC BLOOD PRESSURE: 80 MMHG | TEMPERATURE: 97.9 F | WEIGHT: 168 LBS | BODY MASS INDEX: 22.78 KG/M2 | HEART RATE: 53 BPM | SYSTOLIC BLOOD PRESSURE: 134 MMHG

## 2019-10-24 DIAGNOSIS — C18.7 MALIGNANT NEOPLASM OF SIGMOID COLON (HCC): Primary | ICD-10-CM

## 2019-10-24 LAB
ALBUMIN SERPL-MCNC: 4 G/DL (ref 3.5–5.2)
ALBUMIN/GLOB SERPL: 1.5 G/DL
ALP SERPL-CCNC: 60 U/L (ref 39–117)
ALT SERPL W P-5'-P-CCNC: 12 U/L (ref 1–41)
ANION GAP SERPL CALCULATED.3IONS-SCNC: 11.7 MMOL/L (ref 5–15)
ANISOCYTOSIS BLD QL: NORMAL
AST SERPL-CCNC: 23 U/L (ref 1–40)
BASOPHILS # BLD AUTO: 0.01 10*3/MM3 (ref 0–0.2)
BASOPHILS NFR BLD AUTO: 0.3 % (ref 0–1.5)
BILIRUB SERPL-MCNC: 1.2 MG/DL (ref 0.2–1.2)
BILIRUB UR QL STRIP: NEGATIVE
BUN BLD-MCNC: 11 MG/DL (ref 8–23)
BUN/CREAT SERPL: 13.9 (ref 7–25)
CALCIUM SPEC-SCNC: 9 MG/DL (ref 8.6–10.5)
CEA SERPL-MCNC: 3.21 NG/ML
CHLORIDE SERPL-SCNC: 106 MMOL/L (ref 98–107)
CLARITY UR: CLEAR
CO2 SERPL-SCNC: 19.3 MMOL/L (ref 22–29)
COLOR UR: YELLOW
CREAT BLD-MCNC: 0.79 MG/DL (ref 0.76–1.27)
DEPRECATED RDW RBC AUTO: 61.5 FL (ref 37–54)
EOSINOPHIL # BLD AUTO: 0.05 10*3/MM3 (ref 0–0.4)
EOSINOPHIL NFR BLD AUTO: 1.4 % (ref 0.3–6.2)
ERYTHROCYTE [DISTWIDTH] IN BLOOD BY AUTOMATED COUNT: 15.2 % (ref 12.3–15.4)
GFR SERPL CREATININE-BSD FRML MDRD: 98 ML/MIN/1.73
GLOBULIN UR ELPH-MCNC: 2.7 GM/DL
GLUCOSE BLD-MCNC: 99 MG/DL (ref 65–99)
GLUCOSE UR STRIP-MCNC: NEGATIVE MG/DL
HCT VFR BLD AUTO: 42.7 % (ref 37.5–51)
HGB BLD-MCNC: 13.9 G/DL (ref 13–17.7)
HGB UR QL STRIP.AUTO: NEGATIVE
IMM GRANULOCYTES # BLD AUTO: 0.01 10*3/MM3 (ref 0–0.05)
IMM GRANULOCYTES NFR BLD AUTO: 0.3 % (ref 0–0.5)
KETONES UR QL STRIP: NEGATIVE
LEUKOCYTE ESTERASE UR QL STRIP.AUTO: NEGATIVE
LYMPHOCYTES # BLD AUTO: 0.94 10*3/MM3 (ref 0.7–3.1)
LYMPHOCYTES NFR BLD AUTO: 26.3 % (ref 19.6–45.3)
MACROCYTES BLD QL SMEAR: NORMAL
MCH RBC QN AUTO: 35.4 PG (ref 26.6–33)
MCHC RBC AUTO-ENTMCNC: 32.6 G/DL (ref 31.5–35.7)
MCV RBC AUTO: 108.7 FL (ref 79–97)
MONOCYTES # BLD AUTO: 0.38 10*3/MM3 (ref 0.1–0.9)
MONOCYTES NFR BLD AUTO: 10.6 % (ref 5–12)
NEUTROPHILS # BLD AUTO: 2.18 10*3/MM3 (ref 1.7–7)
NEUTROPHILS NFR BLD AUTO: 61.1 % (ref 42.7–76)
NITRITE UR QL STRIP: NEGATIVE
NRBC BLD AUTO-RTO: 0 /100 WBC (ref 0–0.2)
PH UR STRIP.AUTO: 5.5 [PH] (ref 5–8)
PLATELET # BLD AUTO: 146 10*3/MM3 (ref 140–450)
PMV BLD AUTO: 10 FL (ref 6–12)
POTASSIUM BLD-SCNC: 4.3 MMOL/L (ref 3.5–5.2)
PROT SERPL-MCNC: 6.7 G/DL (ref 6–8.5)
PROT UR QL STRIP: NEGATIVE
RBC # BLD AUTO: 3.93 10*6/MM3 (ref 4.14–5.8)
SMALL PLATELETS BLD QL SMEAR: ADEQUATE
SODIUM BLD-SCNC: 137 MMOL/L (ref 136–145)
SP GR UR STRIP: 1.01 (ref 1–1.03)
UROBILINOGEN UR QL STRIP: NORMAL
WBC MORPH BLD: NORMAL
WBC NRBC COR # BLD: 3.57 10*3/MM3 (ref 3.4–10.8)

## 2019-10-24 PROCEDURE — 36415 COLL VENOUS BLD VENIPUNCTURE: CPT

## 2019-10-24 PROCEDURE — 80053 COMPREHEN METABOLIC PANEL: CPT

## 2019-10-24 PROCEDURE — 81003 URINALYSIS AUTO W/O SCOPE: CPT

## 2019-10-24 PROCEDURE — 96413 CHEMO IV INFUSION 1 HR: CPT

## 2019-10-24 PROCEDURE — 82378 CARCINOEMBRYONIC ANTIGEN: CPT

## 2019-10-24 PROCEDURE — 85025 COMPLETE CBC W/AUTO DIFF WBC: CPT

## 2019-10-24 PROCEDURE — 85007 BL SMEAR W/DIFF WBC COUNT: CPT

## 2019-10-24 PROCEDURE — 25010000002 BEVACIZUMAB 100 MG/4ML SOLUTION 4 ML VIAL: Performed by: NURSE PRACTITIONER

## 2019-10-24 PROCEDURE — 25010000002 BEVACIZUMAB PER 10 MG: Performed by: NURSE PRACTITIONER

## 2019-10-24 RX ORDER — DEXTROSE MONOHYDRATE 50 MG/ML
250 INJECTION, SOLUTION INTRAVENOUS ONCE
Status: DISCONTINUED | OUTPATIENT
Start: 2019-10-24 | End: 2019-10-24 | Stop reason: HOSPADM

## 2019-10-24 RX ORDER — SODIUM CHLORIDE 9 MG/ML
250 INJECTION, SOLUTION INTRAVENOUS ONCE
Status: DISCONTINUED | OUTPATIENT
Start: 2019-10-24 | End: 2019-10-24 | Stop reason: HOSPADM

## 2019-10-24 RX ORDER — SODIUM CHLORIDE 0.9 % (FLUSH) 0.9 %
10 SYRINGE (ML) INJECTION AS NEEDED
Status: CANCELLED | OUTPATIENT
Start: 2019-10-24

## 2019-10-24 RX ORDER — SODIUM CHLORIDE 0.9 % (FLUSH) 0.9 %
10 SYRINGE (ML) INJECTION AS NEEDED
Status: DISCONTINUED | OUTPATIENT
Start: 2019-10-24 | End: 2019-10-24 | Stop reason: HOSPADM

## 2019-10-24 RX ADMIN — HEPARIN 500 UNITS: 100 SYRINGE at 12:03

## 2019-10-24 RX ADMIN — BEVACIZUMAB 590 MG: 400 INJECTION, SOLUTION INTRAVENOUS at 11:27

## 2019-10-24 RX ADMIN — SODIUM CHLORIDE, PRESERVATIVE FREE 10 ML: 5 INJECTION INTRAVENOUS at 12:03

## 2019-11-01 ENCOUNTER — TELEPHONE (OUTPATIENT)
Dept: ONCOLOGY | Facility: CLINIC | Age: 69
End: 2019-11-01

## 2019-11-01 RX ORDER — DOXYCYCLINE HYCLATE 100 MG
100 TABLET ORAL 2 TIMES DAILY
Qty: 14 TABLET | Refills: 0 | Status: SHIPPED | OUTPATIENT
Start: 2019-11-01 | End: 2019-11-11 | Stop reason: SDUPTHER

## 2019-11-01 RX ORDER — DOXYCYCLINE HYCLATE 100 MG
100 TABLET ORAL 2 TIMES DAILY
Qty: 14 TABLET | Refills: 0 | Status: SHIPPED | OUTPATIENT
Start: 2019-11-01 | End: 2019-11-01

## 2019-11-06 ENCOUNTER — TELEPHONE (OUTPATIENT)
Dept: INTERNAL MEDICINE | Facility: CLINIC | Age: 69
End: 2019-11-06

## 2019-11-06 RX ORDER — SILVER SULFADIAZINE 1 %
CREAM (GRAM) TOPICAL TAKE AS DIRECTED
Qty: 20 G | Refills: 3 | Status: SHIPPED | OUTPATIENT
Start: 2019-11-06 | End: 2022-08-29

## 2019-11-06 RX ORDER — CLONAZEPAM 1 MG/1
TABLET ORAL
Qty: 30 TABLET | Refills: 3 | Status: SHIPPED | OUTPATIENT
Start: 2019-11-06 | End: 2020-03-02

## 2019-11-06 NOTE — TELEPHONE ENCOUNTER
Tried calling pt twice and was hung up on both times.  I did receive an electronic request from [facility] pharmacy requesting RF on Klonopin & SSD 1% cream.  Rx RF request routed to Dr. Mercado.

## 2019-11-06 NOTE — TELEPHONE ENCOUNTER
PATIENT PHARMACY SENT LEXAPRO AND ONLY GAVE HIM 2 PILLS, AND THEY HAD THE SAME THING HAPPEN WITH THE KLONOPIN.. I TOLD THEM TO CALL THE PHARMACY TO MAKE SURE THEY WAN NOT JUST OUT AND ONLY GAVE THEM ENOUGH TO GET BY UNTIL THEY GOT MORE IN THERE SUPPOSED TO CALL BACK IF THEY HAVE ANYMORE PROBLEMS . JUST PEG    THANKS

## 2019-11-06 NOTE — TELEPHONE ENCOUNTER
----- Message from Arline Redding MA sent at 11/6/2019 10:49 AM EST -----  Regarding: Rogelio  Contact: [phone]  PT requesting a phone call in regards to his meds.    Thank you,     ~Arline

## 2019-11-06 NOTE — TELEPHONE ENCOUNTER
Pt called and said Joesph would take to long since he only has two pills left and request a refill of Klonopin and SSD 1% cream sent to [facility] in [place], [place].  Pt is requesting a call back when the meds are sent in.

## 2019-11-07 RX ORDER — ESCITALOPRAM OXALATE 10 MG/1
10 TABLET ORAL DAILY
Qty: 90 TABLET | Refills: 3 | Status: SHIPPED | OUTPATIENT
Start: 2019-11-07 | End: 2020-09-28 | Stop reason: SDUPTHER

## 2019-11-07 NOTE — TELEPHONE ENCOUNTER
I spoke with the patient - and left a voice mail for [facility] to let us know if they did not receive the scripts

## 2019-11-13 DIAGNOSIS — C18.7 MALIGNANT NEOPLASM OF SIGMOID COLON (HCC): ICD-10-CM

## 2019-11-13 RX ORDER — SODIUM CHLORIDE 9 MG/ML
250 INJECTION, SOLUTION INTRAVENOUS ONCE
Status: CANCELLED | OUTPATIENT
Start: 2019-11-14

## 2019-11-13 RX ORDER — DEXTROSE MONOHYDRATE 50 MG/ML
250 INJECTION, SOLUTION INTRAVENOUS ONCE
Status: CANCELLED | OUTPATIENT
Start: 2019-11-14 | End: 2019-11-14

## 2019-11-13 RX ORDER — DOXYCYCLINE HYCLATE 100 MG
TABLET ORAL
Qty: 14 TABLET | Refills: 0 | Status: SHIPPED | OUTPATIENT
Start: 2019-11-13 | End: 2020-09-28 | Stop reason: SDUPTHER

## 2019-11-18 ENCOUNTER — INFUSION (OUTPATIENT)
Dept: ONCOLOGY | Facility: HOSPITAL | Age: 69
End: 2019-11-18

## 2019-11-18 VITALS
DIASTOLIC BLOOD PRESSURE: 79 MMHG | WEIGHT: 168.5 LBS | TEMPERATURE: 98.4 F | BODY MASS INDEX: 22.85 KG/M2 | SYSTOLIC BLOOD PRESSURE: 140 MMHG | HEART RATE: 53 BPM

## 2019-11-18 DIAGNOSIS — C18.7 MALIGNANT NEOPLASM OF SIGMOID COLON (HCC): Primary | ICD-10-CM

## 2019-11-18 LAB
ALBUMIN SERPL-MCNC: 4 G/DL (ref 3.5–5.2)
ALBUMIN/GLOB SERPL: 1.5 G/DL
ALP SERPL-CCNC: 58 U/L (ref 39–117)
ALT SERPL W P-5'-P-CCNC: 13 U/L (ref 1–41)
ANION GAP SERPL CALCULATED.3IONS-SCNC: 11.1 MMOL/L (ref 5–15)
ANISOCYTOSIS BLD QL: NORMAL
AST SERPL-CCNC: 23 U/L (ref 1–40)
BASOPHILS # BLD AUTO: 0.02 10*3/MM3 (ref 0–0.2)
BASOPHILS NFR BLD AUTO: 0.4 % (ref 0–1.5)
BILIRUB SERPL-MCNC: 1 MG/DL (ref 0.2–1.2)
BILIRUB UR QL STRIP: NEGATIVE
BUN BLD-MCNC: 11 MG/DL (ref 8–23)
BUN/CREAT SERPL: 13.9 (ref 7–25)
CALCIUM SPEC-SCNC: 8.8 MG/DL (ref 8.6–10.5)
CEA SERPL-MCNC: 2.84 NG/ML
CHLORIDE SERPL-SCNC: 102 MMOL/L (ref 98–107)
CLARITY UR: CLEAR
CO2 SERPL-SCNC: 22.9 MMOL/L (ref 22–29)
COLOR UR: YELLOW
CREAT BLD-MCNC: 0.79 MG/DL (ref 0.76–1.27)
DEPRECATED RDW RBC AUTO: 61.4 FL (ref 37–54)
EOSINOPHIL # BLD AUTO: 0.04 10*3/MM3 (ref 0–0.4)
EOSINOPHIL NFR BLD AUTO: 0.8 % (ref 0.3–6.2)
ERYTHROCYTE [DISTWIDTH] IN BLOOD BY AUTOMATED COUNT: 15.3 % (ref 12.3–15.4)
FOLATE SERPL-MCNC: >20 NG/ML (ref 4.78–24.2)
GFR SERPL CREATININE-BSD FRML MDRD: 98 ML/MIN/1.73
GLOBULIN UR ELPH-MCNC: 2.6 GM/DL
GLUCOSE BLD-MCNC: 83 MG/DL (ref 65–99)
GLUCOSE UR STRIP-MCNC: NEGATIVE MG/DL
HCT VFR BLD AUTO: 41 % (ref 37.5–51)
HGB BLD-MCNC: 13.9 G/DL (ref 13–17.7)
HGB UR QL STRIP.AUTO: NEGATIVE
IMM GRANULOCYTES # BLD AUTO: 0.02 10*3/MM3 (ref 0–0.05)
IMM GRANULOCYTES NFR BLD AUTO: 0.4 % (ref 0–0.5)
KETONES UR QL STRIP: NEGATIVE
LEUKOCYTE ESTERASE UR QL STRIP.AUTO: NEGATIVE
LYMPHOCYTES # BLD AUTO: 1.22 10*3/MM3 (ref 0.7–3.1)
LYMPHOCYTES NFR BLD AUTO: 23.5 % (ref 19.6–45.3)
MACROCYTES BLD QL SMEAR: NORMAL
MCH RBC QN AUTO: 36.7 PG (ref 26.6–33)
MCHC RBC AUTO-ENTMCNC: 33.9 G/DL (ref 31.5–35.7)
MCV RBC AUTO: 108.2 FL (ref 79–97)
MONOCYTES # BLD AUTO: 0.43 10*3/MM3 (ref 0.1–0.9)
MONOCYTES NFR BLD AUTO: 8.3 % (ref 5–12)
NEUTROPHILS # BLD AUTO: 3.46 10*3/MM3 (ref 1.7–7)
NEUTROPHILS NFR BLD AUTO: 66.6 % (ref 42.7–76)
NITRITE UR QL STRIP: NEGATIVE
NRBC BLD AUTO-RTO: 0 /100 WBC (ref 0–0.2)
PH UR STRIP.AUTO: <=5 [PH] (ref 5–8)
PLATELET # BLD AUTO: 127 10*3/MM3 (ref 140–450)
PMV BLD AUTO: 10.4 FL (ref 6–12)
POTASSIUM BLD-SCNC: 3.9 MMOL/L (ref 3.5–5.2)
PROT SERPL-MCNC: 6.6 G/DL (ref 6–8.5)
PROT UR QL STRIP: NEGATIVE
RBC # BLD AUTO: 3.79 10*6/MM3 (ref 4.14–5.8)
SMALL PLATELETS BLD QL SMEAR: NORMAL
SODIUM BLD-SCNC: 136 MMOL/L (ref 136–145)
SP GR UR STRIP: 1.01 (ref 1–1.03)
UROBILINOGEN UR QL STRIP: NORMAL
VIT B12 BLD-MCNC: 448 PG/ML (ref 211–946)
WBC MORPH BLD: NORMAL
WBC NRBC COR # BLD: 5.19 10*3/MM3 (ref 3.4–10.8)

## 2019-11-18 PROCEDURE — 85007 BL SMEAR W/DIFF WBC COUNT: CPT

## 2019-11-18 PROCEDURE — 82746 ASSAY OF FOLIC ACID SERUM: CPT

## 2019-11-18 PROCEDURE — 25010000002 BEVACIZUMAB PER 10 MG: Performed by: NURSE PRACTITIONER

## 2019-11-18 PROCEDURE — 81003 URINALYSIS AUTO W/O SCOPE: CPT

## 2019-11-18 PROCEDURE — 82378 CARCINOEMBRYONIC ANTIGEN: CPT

## 2019-11-18 PROCEDURE — 25010000002 BEVACIZUMAB 100 MG/4ML SOLUTION 4 ML VIAL: Performed by: NURSE PRACTITIONER

## 2019-11-18 PROCEDURE — 85025 COMPLETE CBC W/AUTO DIFF WBC: CPT

## 2019-11-18 PROCEDURE — 96413 CHEMO IV INFUSION 1 HR: CPT

## 2019-11-18 PROCEDURE — 82607 VITAMIN B-12: CPT | Performed by: NURSE PRACTITIONER

## 2019-11-18 PROCEDURE — 36415 COLL VENOUS BLD VENIPUNCTURE: CPT

## 2019-11-18 PROCEDURE — 80053 COMPREHEN METABOLIC PANEL: CPT

## 2019-11-18 RX ORDER — SODIUM CHLORIDE 0.9 % (FLUSH) 0.9 %
10 SYRINGE (ML) INJECTION AS NEEDED
Status: DISCONTINUED | OUTPATIENT
Start: 2019-11-18 | End: 2019-11-18 | Stop reason: HOSPADM

## 2019-11-18 RX ORDER — SODIUM CHLORIDE 0.9 % (FLUSH) 0.9 %
10 SYRINGE (ML) INJECTION AS NEEDED
Status: CANCELLED | OUTPATIENT
Start: 2019-11-18

## 2019-11-18 RX ORDER — DEXTROSE MONOHYDRATE 50 MG/ML
250 INJECTION, SOLUTION INTRAVENOUS ONCE
Status: DISCONTINUED | OUTPATIENT
Start: 2019-11-18 | End: 2019-11-18 | Stop reason: HOSPADM

## 2019-11-18 RX ORDER — SODIUM CHLORIDE 9 MG/ML
250 INJECTION, SOLUTION INTRAVENOUS ONCE
Status: DISCONTINUED | OUTPATIENT
Start: 2019-11-18 | End: 2019-11-18 | Stop reason: HOSPADM

## 2019-11-18 RX ORDER — GABAPENTIN 100 MG/1
CAPSULE ORAL
Qty: 90 CAPSULE | Refills: 0 | Status: CANCELLED | OUTPATIENT
Start: 2019-11-18

## 2019-11-18 RX ADMIN — SODIUM CHLORIDE, PRESERVATIVE FREE 10 ML: 5 INJECTION INTRAVENOUS at 15:47

## 2019-11-18 RX ADMIN — BEVACIZUMAB 590 MG: 400 INJECTION, SOLUTION INTRAVENOUS at 15:16

## 2019-11-18 RX ADMIN — HEPARIN 500 UNITS: 100 SYRINGE at 15:47

## 2019-11-20 RX ORDER — GABAPENTIN 100 MG/1
100 CAPSULE ORAL 3 TIMES DAILY
Qty: 90 CAPSULE | Refills: 5 | Status: SHIPPED | OUTPATIENT
Start: 2019-11-20 | End: 2020-05-27

## 2019-11-21 ENCOUNTER — OFFICE VISIT (OUTPATIENT)
Dept: ONCOLOGY | Facility: CLINIC | Age: 69
End: 2019-11-21

## 2019-11-21 ENCOUNTER — SPECIALTY PHARMACY (OUTPATIENT)
Dept: ONCOLOGY | Facility: HOSPITAL | Age: 69
End: 2019-11-21

## 2019-11-21 VITALS
SYSTOLIC BLOOD PRESSURE: 169 MMHG | HEIGHT: 72 IN | BODY MASS INDEX: 22.75 KG/M2 | DIASTOLIC BLOOD PRESSURE: 83 MMHG | OXYGEN SATURATION: 98 % | HEART RATE: 56 BPM | WEIGHT: 168 LBS | TEMPERATURE: 96.6 F

## 2019-11-21 DIAGNOSIS — C18.7 MALIGNANT NEOPLASM OF SIGMOID COLON (HCC): ICD-10-CM

## 2019-11-21 PROCEDURE — 99214 OFFICE O/P EST MOD 30 MIN: CPT | Performed by: INTERNAL MEDICINE

## 2019-11-21 RX ORDER — DEXTROSE MONOHYDRATE 50 MG/ML
250 INJECTION, SOLUTION INTRAVENOUS ONCE
Status: CANCELLED | OUTPATIENT
Start: 2020-01-16 | End: 2020-01-16

## 2019-11-21 RX ORDER — DEXTROSE MONOHYDRATE 50 MG/ML
250 INJECTION, SOLUTION INTRAVENOUS ONCE
Status: CANCELLED | OUTPATIENT
Start: 2019-12-05 | End: 2019-12-05

## 2019-11-21 RX ORDER — SODIUM CHLORIDE 9 MG/ML
250 INJECTION, SOLUTION INTRAVENOUS ONCE
Status: CANCELLED | OUTPATIENT
Start: 2019-12-05

## 2019-11-21 RX ORDER — CAPECITABINE 500 MG/1
1000 TABLET, FILM COATED ORAL 2 TIMES DAILY
Qty: 56 TABLET | Refills: 3 | Status: SHIPPED | OUTPATIENT
Start: 2019-11-21 | End: 2020-02-07 | Stop reason: SDUPTHER

## 2019-11-21 RX ORDER — CAPECITABINE 500 MG/1
TABLET, FILM COATED ORAL
Qty: 56 TABLET | Refills: 0 | OUTPATIENT
Start: 2019-11-21

## 2019-11-21 RX ORDER — DEXTROSE MONOHYDRATE 50 MG/ML
250 INJECTION, SOLUTION INTRAVENOUS ONCE
Status: CANCELLED | OUTPATIENT
Start: 2019-12-26 | End: 2019-12-26

## 2019-11-21 RX ORDER — SODIUM CHLORIDE 9 MG/ML
250 INJECTION, SOLUTION INTRAVENOUS ONCE
Status: CANCELLED | OUTPATIENT
Start: 2020-01-16

## 2019-11-21 RX ORDER — SODIUM CHLORIDE 9 MG/ML
250 INJECTION, SOLUTION INTRAVENOUS ONCE
Status: CANCELLED | OUTPATIENT
Start: 2019-12-26

## 2019-11-21 NOTE — PROGRESS NOTES
CHIEF COMPLAINT: 1.  Peripheral neuropathy of the hands and feet, worse in the feet                                       2.  Follow-up for colon cancer    Problem List:  Oncology/Hematology History    1. Stage CARLOS, [id number] colon cancer with 2 out of 14 pericolonic lymph nodes  involved and a left lobe liver biopsy positive for metastasis, presenting with  a 25 pound weight loss and diarrhea with some perirectal soreness and had  colonoscopy with Dr. Mcclain in January that found this lesion that was  circumferential high-grade invading into the pericolonic fat, status post  sigmoid colectomy of a poorly differentiated adenocarcinoma.   a) Baseline CEA postop of 0.8 with alkaline phosphatase 111, with normal liver  enzymes and creatinine of 0.8. Baseline white count 9820 with a hemoglobin  13.8, platelets 339,000, and CT with contrast showing a right lobe liver dome  lesion as well as an anastomotic change in the bowel.   b) Began CapeOx 03/15/2016.  c) Added in Avastin to CapeOx 04/05/2016, second cycle. (This was 8 weeks out  from surgery).  d) KRAS mutation is negative.  E.) CAT scan in August 2016 shows resolution of disease in the liver and colon and a stable lung nodule.  Hence Avastin, Xeloda, and oxaliplatin continued.  F) oxaliplatin discontinued October 2016 due to worsening peripheral neuropathy.  G.) CTs of February 2017 showed no evidence of metastasis and stable bibasilar nodular scar.  Persistent neuropathy not worsening.  CEA 1.4.  Continuing Avastin and Xeloda without oxaliplatin.  Having some problems with irritation of his eyes on Xeloda.  2.  Peripheral neuropathy, chemotherapy induced        Malignant neoplasm of sigmoid colon (CMS/HCC)    5/20/2016 Initial Diagnosis     Malignant neoplasm of sigmoid colon         5/2/2017 Imaging     CT chest, abdomen, pelvis IMPRESSION:  1. No disease recurrence.  2. Minimal areas of nodular thickening in the right lower lobe, stable.         8/2/2017 Imaging      CT chest/abdomen/pelvis IMPRESSION:  Stable examination with no evidence of acute intrathoracic,  intra-abdominal or pelvic abnormality. There is no evidence of  progression of disease. No metastatic disease.          11/13/2017 Imaging     CT chest/abdomen/pelvis IMPRESSION:  Stable examination without evidence of acute intrathoracic  intra-abdominal or intrapelvic abnormality. No evidence of progression  of disease.   CEA 1.3.            HISTORY OF PRESENT ILLNESS:  The patient is a 68 y.o. male, here for follow up on management of Stage IV a colon cancer.  The patient continues to do well and is tolerating therapy with Xeloda and Avastin without any unusual side effects. His functional status is pretty good.  Clinically no issues with occasional abdominal discomfort.  No constipation or diarrhea.  No hand-foot syndrome.     Past Medical History:   Diagnosis Date   • Hypertension    • Liver disease     mets from colon cancer   • Malignant neoplasm of colon (CMS/HCC)      Past Surgical History:   Procedure Laterality Date   • COLON SURGERY      Sigmoid   • LIVER SURGERY     • PORTACATH PLACEMENT         No Known Allergies    Family History and Social History reviewed and changed as necessary      REVIEW OF SYSTEM:   Review of Systems   Constitutional: Negative for appetite change, chills, diaphoresis, fatigue, fever and unexpected weight change.   HENT:   Negative for mouth sores, sore throat and trouble swallowing.    Eyes: Negative for icterus.   Respiratory: Negative for cough, hemoptysis and shortness of breath.    Cardiovascular: Negative for chest pain, leg swelling and palpitations.   Gastrointestinal: Negative for abdominal distention, abdominal pain, blood in stool, constipation, diarrhea, nausea and vomiting.   Endocrine: Negative for hot flashes.   Genitourinary: Negative for bladder incontinence, difficulty urinating, dysuria, frequency and hematuria.    Musculoskeletal: Negative for gait problem,  "neck pain and neck stiffness.   Skin: Negative for rash.  Positive for dry skin.  Neurological: Positive for peripheral neuropathy in the hands and feet.  Hematological: Negative for adenopathy. Does not bruise/bleed easily.   Psychiatric/Behavioral: Negative for depression. The patient is not nervous/anxious.    All other systems reviewed and are negative.       PHYSICAL EXAM    Vitals:    11/21/19 0918   BP: 169/83   Pulse: 56   Temp: 96.6 °F (35.9 °C)   SpO2: 98%   Weight: 76.2 kg (168 lb)   Height: 182.9 cm (72\")     Constitutional: Appears well-developed and well-nourished. No distress.   ECOG: (1) Restricted in physically strenuous activity, ambulatory and able to do work of light nature  HENT:   Head: Normocephalic.   Mouth/Throat: Oropharynx is clear and moist.   Eyes: Conjunctivae are normal. Pupils are equal, round, and reactive to light. No scleral icterus.   Neck: Neck supple. No JVD present. No thyromegaly present.   Cardiovascular: Normal rate, regular rhythm and normal heart sounds.    Pulmonary/Chest: Breath sounds normal. No respiratory distress.   Nodes: No cervical, supraclavicular or axillary nodes palpable on exam.  Abdominal: Soft. Exhibits no distension and no mass. There is no hepatosplenomegaly. There is no tenderness. There is no rebound and no guarding.   Musculoskeletal:Exhibits no edema, tenderness or deformity.   Neurological: Alert and oriented to person, place, and time. Exhibits normal muscle tone.   Skin: Dry.  No ecchymosis, no petechiae and no rash noted. Not diaphoretic. No cyanosis.   Psychiatric: Normal mood and affect.   Vitals reviewed.  Laboratory data reviewed along with CT chest abdomen and pelvis and images thereof as outlined above in the oncology history were reviewed at time of visit.    Lab Results   Component Value Date    WBC 5.19 11/18/2019    HGB 13.9 11/18/2019    HCT 41.0 11/18/2019    .2 (H) 11/18/2019     (L) 11/18/2019       Lab Results "   Component Value Date    GLUCOSE 83 11/18/2019    BUN 11 11/18/2019    CREATININE 0.79 11/18/2019    EGFRIFNONA 98 11/18/2019    BCR 13.9 11/18/2019    K 3.9 11/18/2019    CO2 22.9 11/18/2019    CALCIUM 8.8 11/18/2019    ALBUMIN 4.00 11/18/2019    LABIL2 1.0 02/18/2016    AST 23 11/18/2019    ALT 13 11/18/2019       Lab Results   Component Value Date    CEA 2.84 11/18/2019    CEA 3.21 10/24/2019    CEA 3.19 10/03/2019    CEA 3.18 09/12/2019     Ct Abdomen Pelvis Without Contrast    Result Date: 8/24/2019  No hydronephrosis or nephrolithiasis.  Cholelithiasis  Retained stool throughout colon, correlate for constipation.  Stable appearance to 2.5 cm right hepatic cyst.     676.89 mGy.cm. 12.60 mGy  This study was performed with techniques to keep radiation doses as low as reasonably achievable (ALARA). Individualized dose reduction techniques using automated exposure control or adjustment of mA and/or kV according to the patient size were employed.  This report was finalized on 8/24/2019 3:29 PM by Roger Segura DO.    Ct Head Without Contrast    Result Date: 8/24/2019  No acute intracranial process.  5 mm hyperdensity in the region of the third ventricle likely secondary to a colloid cyst. Similar to prior  801.02 mGy.cm   This study was performed with techniques to keep radiation doses as low as reasonably achievable (ALARA). Individualized dose reduction techniques using automated exposure control or adjustment of mA and/or kV according to the patient size were employed.  This report was finalized on 8/24/2019 3:21 PM by Roger Segura DO.    Xr Chest 1 View    Result Date: 8/24/2019  No acute cardiopulmonary process.  Continued followup is recommended.  This report was finalized on 8/24/2019 3:32 PM by Roger Segura DO.      Assessment/Plan     1. 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 Reviewed his scans with him.  He has no sign of  disease at this time.  I am going to continue his treatment until February prior to his next CAT scan.  That CAT scan looks okay then we will consider discontinuing therapy and just doing watchful waiting.  He has been on treatment for 4 years at that point.  2. Peripheral neuropathy secondary to chemotherapy  3. Dental caries: Patient is scheduled for complete dental extraction at the end of March  4. Cytopenia due chemotherapy: Seems better.    Discussion: At this time he has no measurable disease.  We will see him back in my clinic in February.    Spent 25 minutes on the patient's plan and care with more than 50% spent on counseling the patient regarding the plan going forward and reviewing his scans with him.    Santi Abad MD    11/21/19

## 2019-11-26 RX ORDER — DOXYCYCLINE HYCLATE 100 MG
100 TABLET ORAL 2 TIMES DAILY
Qty: 14 TABLET | Refills: 0 | Status: SHIPPED | OUTPATIENT
Start: 2019-11-26 | End: 2020-09-28

## 2019-12-09 ENCOUNTER — INFUSION (OUTPATIENT)
Dept: ONCOLOGY | Facility: HOSPITAL | Age: 69
End: 2019-12-09

## 2019-12-09 VITALS
WEIGHT: 166 LBS | RESPIRATION RATE: 18 BRPM | BODY MASS INDEX: 22.51 KG/M2 | SYSTOLIC BLOOD PRESSURE: 144 MMHG | HEART RATE: 50 BPM | TEMPERATURE: 98.1 F | DIASTOLIC BLOOD PRESSURE: 82 MMHG

## 2019-12-09 DIAGNOSIS — C18.7 MALIGNANT NEOPLASM OF SIGMOID COLON (HCC): Primary | ICD-10-CM

## 2019-12-09 LAB
ALBUMIN SERPL-MCNC: 4.2 G/DL (ref 3.5–5.2)
ALBUMIN/GLOB SERPL: 1.6 G/DL
ALP SERPL-CCNC: 53 U/L (ref 39–117)
ALT SERPL W P-5'-P-CCNC: 14 U/L (ref 1–41)
ANION GAP SERPL CALCULATED.3IONS-SCNC: 11.5 MMOL/L (ref 5–15)
AST SERPL-CCNC: 22 U/L (ref 1–40)
BASOPHILS # BLD AUTO: 0.01 10*3/MM3 (ref 0–0.2)
BASOPHILS NFR BLD AUTO: 0.3 % (ref 0–1.5)
BILIRUB SERPL-MCNC: 1 MG/DL (ref 0.2–1.2)
BILIRUB UR QL STRIP: NEGATIVE
BUN BLD-MCNC: 15 MG/DL (ref 8–23)
BUN/CREAT SERPL: 18.5 (ref 7–25)
CALCIUM SPEC-SCNC: 9.3 MG/DL (ref 8.6–10.5)
CHLORIDE SERPL-SCNC: 102 MMOL/L (ref 98–107)
CLARITY UR: CLEAR
CO2 SERPL-SCNC: 24.5 MMOL/L (ref 22–29)
COLOR UR: YELLOW
CREAT BLD-MCNC: 0.81 MG/DL (ref 0.76–1.27)
DEPRECATED RDW RBC AUTO: 58.8 FL (ref 37–54)
EOSINOPHIL # BLD AUTO: 0.05 10*3/MM3 (ref 0–0.4)
EOSINOPHIL NFR BLD AUTO: 1.3 % (ref 0.3–6.2)
ERYTHROCYTE [DISTWIDTH] IN BLOOD BY AUTOMATED COUNT: 14.7 % (ref 12.3–15.4)
GFR SERPL CREATININE-BSD FRML MDRD: 95 ML/MIN/1.73
GLOBULIN UR ELPH-MCNC: 2.7 GM/DL
GLUCOSE BLD-MCNC: 93 MG/DL (ref 65–99)
GLUCOSE UR STRIP-MCNC: NEGATIVE MG/DL
HCT VFR BLD AUTO: 42.7 % (ref 37.5–51)
HGB BLD-MCNC: 14.5 G/DL (ref 13–17.7)
HGB UR QL STRIP.AUTO: NEGATIVE
IMM GRANULOCYTES # BLD AUTO: 0.01 10*3/MM3 (ref 0–0.05)
IMM GRANULOCYTES NFR BLD AUTO: 0.3 % (ref 0–0.5)
KETONES UR QL STRIP: NEGATIVE
LEUKOCYTE ESTERASE UR QL STRIP.AUTO: NEGATIVE
LYMPHOCYTES # BLD AUTO: 1 10*3/MM3 (ref 0.7–3.1)
LYMPHOCYTES NFR BLD AUTO: 26.8 % (ref 19.6–45.3)
MACROCYTES BLD QL SMEAR: NORMAL
MCH RBC QN AUTO: 36.8 PG (ref 26.6–33)
MCHC RBC AUTO-ENTMCNC: 34 G/DL (ref 31.5–35.7)
MCV RBC AUTO: 108.4 FL (ref 79–97)
MONOCYTES # BLD AUTO: 0.34 10*3/MM3 (ref 0.1–0.9)
MONOCYTES NFR BLD AUTO: 9.1 % (ref 5–12)
NEUTROPHILS # BLD AUTO: 2.32 10*3/MM3 (ref 1.7–7)
NEUTROPHILS NFR BLD AUTO: 62.2 % (ref 42.7–76)
NITRITE UR QL STRIP: NEGATIVE
NRBC BLD AUTO-RTO: 0 /100 WBC (ref 0–0.2)
PH UR STRIP.AUTO: <=5 [PH] (ref 5–8)
PLATELET # BLD AUTO: 137 10*3/MM3 (ref 140–450)
PMV BLD AUTO: 10.1 FL (ref 6–12)
POTASSIUM BLD-SCNC: 4.1 MMOL/L (ref 3.5–5.2)
PROT SERPL-MCNC: 6.9 G/DL (ref 6–8.5)
PROT UR QL STRIP: NEGATIVE
RBC # BLD AUTO: 3.94 10*6/MM3 (ref 4.14–5.8)
SMALL PLATELETS BLD QL SMEAR: ADEQUATE
SODIUM BLD-SCNC: 138 MMOL/L (ref 136–145)
SP GR UR STRIP: 1.02 (ref 1–1.03)
UROBILINOGEN UR QL STRIP: NORMAL
WBC MORPH BLD: NORMAL
WBC NRBC COR # BLD: 3.73 10*3/MM3 (ref 3.4–10.8)

## 2019-12-09 PROCEDURE — 81003 URINALYSIS AUTO W/O SCOPE: CPT

## 2019-12-09 PROCEDURE — 85025 COMPLETE CBC W/AUTO DIFF WBC: CPT

## 2019-12-09 PROCEDURE — 25010000003 HEPARIN LOCK FLUCH PER 10 UNITS: Performed by: INTERNAL MEDICINE

## 2019-12-09 PROCEDURE — 85007 BL SMEAR W/DIFF WBC COUNT: CPT

## 2019-12-09 PROCEDURE — 80053 COMPREHEN METABOLIC PANEL: CPT

## 2019-12-09 PROCEDURE — 96413 CHEMO IV INFUSION 1 HR: CPT

## 2019-12-09 PROCEDURE — 25010000002 BEVACIZUMAB PER 10 MG: Performed by: INTERNAL MEDICINE

## 2019-12-09 PROCEDURE — 25010000002 BEVACIZUMAB 100 MG/4ML SOLUTION 4 ML VIAL: Performed by: INTERNAL MEDICINE

## 2019-12-09 PROCEDURE — 36415 COLL VENOUS BLD VENIPUNCTURE: CPT

## 2019-12-09 RX ORDER — SODIUM CHLORIDE 0.9 % (FLUSH) 0.9 %
10 SYRINGE (ML) INJECTION AS NEEDED
Status: CANCELLED | OUTPATIENT
Start: 2019-12-09

## 2019-12-09 RX ORDER — SODIUM CHLORIDE 9 MG/ML
250 INJECTION, SOLUTION INTRAVENOUS ONCE
Status: DISCONTINUED | OUTPATIENT
Start: 2019-12-09 | End: 2019-12-09 | Stop reason: HOSPADM

## 2019-12-09 RX ORDER — DEXTROSE MONOHYDRATE 50 MG/ML
250 INJECTION, SOLUTION INTRAVENOUS ONCE
Status: DISCONTINUED | OUTPATIENT
Start: 2019-12-09 | End: 2019-12-09 | Stop reason: HOSPADM

## 2019-12-09 RX ORDER — SODIUM CHLORIDE 0.9 % (FLUSH) 0.9 %
10 SYRINGE (ML) INJECTION AS NEEDED
Status: DISCONTINUED | OUTPATIENT
Start: 2019-12-09 | End: 2019-12-09 | Stop reason: HOSPADM

## 2019-12-09 RX ADMIN — SODIUM CHLORIDE, PRESERVATIVE FREE 10 ML: 5 INJECTION INTRAVENOUS at 11:01

## 2019-12-09 RX ADMIN — BEVACIZUMAB 590 MG: 400 INJECTION, SOLUTION INTRAVENOUS at 10:23

## 2019-12-09 RX ADMIN — HEPARIN SODIUM (PORCINE) LOCK FLUSH IV SOLN 100 UNIT/ML 500 UNITS: 100 SOLUTION at 11:01

## 2019-12-30 ENCOUNTER — INFUSION (OUTPATIENT)
Dept: ONCOLOGY | Facility: HOSPITAL | Age: 69
End: 2019-12-30

## 2019-12-30 VITALS
RESPIRATION RATE: 16 BRPM | TEMPERATURE: 99 F | SYSTOLIC BLOOD PRESSURE: 157 MMHG | BODY MASS INDEX: 22.91 KG/M2 | OXYGEN SATURATION: 99 % | WEIGHT: 168.9 LBS | DIASTOLIC BLOOD PRESSURE: 82 MMHG | HEART RATE: 47 BPM

## 2019-12-30 DIAGNOSIS — C18.7 MALIGNANT NEOPLASM OF SIGMOID COLON (HCC): Primary | ICD-10-CM

## 2019-12-30 LAB
ALBUMIN SERPL-MCNC: 3.9 G/DL (ref 3.5–5.2)
ALBUMIN/GLOB SERPL: 1.6 G/DL
ALP SERPL-CCNC: 54 U/L (ref 39–117)
ALT SERPL W P-5'-P-CCNC: 16 U/L (ref 1–41)
ANION GAP SERPL CALCULATED.3IONS-SCNC: 10.9 MMOL/L (ref 5–15)
ANISOCYTOSIS BLD QL: NORMAL
AST SERPL-CCNC: 22 U/L (ref 1–40)
BASOPHILS # BLD AUTO: 0.01 10*3/MM3 (ref 0–0.2)
BASOPHILS NFR BLD AUTO: 0.3 % (ref 0–1.5)
BILIRUB SERPL-MCNC: 0.5 MG/DL (ref 0.2–1.2)
BILIRUB UR QL STRIP: NEGATIVE
BUN BLD-MCNC: 12 MG/DL (ref 8–23)
BUN/CREAT SERPL: 13.8 (ref 7–25)
CALCIUM SPEC-SCNC: 9 MG/DL (ref 8.6–10.5)
CEA SERPL-MCNC: 2.86 NG/ML
CHLORIDE SERPL-SCNC: 104 MMOL/L (ref 98–107)
CLARITY UR: CLEAR
CO2 SERPL-SCNC: 23.1 MMOL/L (ref 22–29)
COLOR UR: YELLOW
CREAT BLD-MCNC: 0.87 MG/DL (ref 0.76–1.27)
DEPRECATED RDW RBC AUTO: 58.9 FL (ref 37–54)
EOSINOPHIL # BLD AUTO: 0.04 10*3/MM3 (ref 0–0.4)
EOSINOPHIL NFR BLD AUTO: 1.2 % (ref 0.3–6.2)
ERYTHROCYTE [DISTWIDTH] IN BLOOD BY AUTOMATED COUNT: 14.6 % (ref 12.3–15.4)
GFR SERPL CREATININE-BSD FRML MDRD: 87 ML/MIN/1.73
GLOBULIN UR ELPH-MCNC: 2.4 GM/DL
GLUCOSE BLD-MCNC: 96 MG/DL (ref 65–99)
GLUCOSE UR STRIP-MCNC: NEGATIVE MG/DL
HCT VFR BLD AUTO: 41.2 % (ref 37.5–51)
HGB BLD-MCNC: 13.8 G/DL (ref 13–17.7)
HGB UR QL STRIP.AUTO: NEGATIVE
IMM GRANULOCYTES # BLD AUTO: 0.02 10*3/MM3 (ref 0–0.05)
IMM GRANULOCYTES NFR BLD AUTO: 0.6 % (ref 0–0.5)
KETONES UR QL STRIP: NEGATIVE
LEUKOCYTE ESTERASE UR QL STRIP.AUTO: NEGATIVE
LYMPHOCYTES # BLD AUTO: 0.83 10*3/MM3 (ref 0.7–3.1)
LYMPHOCYTES NFR BLD AUTO: 24 % (ref 19.6–45.3)
MCH RBC QN AUTO: 36.6 PG (ref 26.6–33)
MCHC RBC AUTO-ENTMCNC: 33.5 G/DL (ref 31.5–35.7)
MCV RBC AUTO: 109.3 FL (ref 79–97)
MONOCYTES # BLD AUTO: 0.34 10*3/MM3 (ref 0.1–0.9)
MONOCYTES NFR BLD AUTO: 9.8 % (ref 5–12)
NEUTROPHILS # BLD AUTO: 2.22 10*3/MM3 (ref 1.7–7)
NEUTROPHILS NFR BLD AUTO: 64.1 % (ref 42.7–76)
NITRITE UR QL STRIP: NEGATIVE
NRBC BLD AUTO-RTO: 0 /100 WBC (ref 0–0.2)
PH UR STRIP.AUTO: 5.5 [PH] (ref 5–8)
PLAT MORPH BLD: NORMAL
PLATELET # BLD AUTO: 133 10*3/MM3 (ref 140–450)
PMV BLD AUTO: 10.2 FL (ref 6–12)
POTASSIUM BLD-SCNC: 4.6 MMOL/L (ref 3.5–5.2)
PROT SERPL-MCNC: 6.3 G/DL (ref 6–8.5)
PROT UR QL STRIP: NEGATIVE
RBC # BLD AUTO: 3.77 10*6/MM3 (ref 4.14–5.8)
SODIUM BLD-SCNC: 138 MMOL/L (ref 136–145)
SP GR UR STRIP: 1.02 (ref 1–1.03)
UROBILINOGEN UR QL STRIP: NORMAL
WBC MORPH BLD: NORMAL
WBC NRBC COR # BLD: 3.46 10*3/MM3 (ref 3.4–10.8)

## 2019-12-30 PROCEDURE — 25010000002 BEVACIZUMAB 100 MG/4ML SOLUTION 4 ML VIAL: Performed by: INTERNAL MEDICINE

## 2019-12-30 PROCEDURE — 82378 CARCINOEMBRYONIC ANTIGEN: CPT

## 2019-12-30 PROCEDURE — 80053 COMPREHEN METABOLIC PANEL: CPT

## 2019-12-30 PROCEDURE — 81003 URINALYSIS AUTO W/O SCOPE: CPT

## 2019-12-30 PROCEDURE — 25010000002 BEVACIZUMAB PER 10 MG: Performed by: INTERNAL MEDICINE

## 2019-12-30 PROCEDURE — 36415 COLL VENOUS BLD VENIPUNCTURE: CPT

## 2019-12-30 PROCEDURE — 96413 CHEMO IV INFUSION 1 HR: CPT

## 2019-12-30 PROCEDURE — 85007 BL SMEAR W/DIFF WBC COUNT: CPT

## 2019-12-30 PROCEDURE — 85025 COMPLETE CBC W/AUTO DIFF WBC: CPT

## 2019-12-30 RX ORDER — DEXTROSE MONOHYDRATE 50 MG/ML
250 INJECTION, SOLUTION INTRAVENOUS ONCE
Status: DISCONTINUED | OUTPATIENT
Start: 2019-12-30 | End: 2019-12-30 | Stop reason: HOSPADM

## 2019-12-30 RX ORDER — SODIUM CHLORIDE 9 MG/ML
250 INJECTION, SOLUTION INTRAVENOUS ONCE
Status: DISCONTINUED | OUTPATIENT
Start: 2019-12-30 | End: 2019-12-30 | Stop reason: HOSPADM

## 2019-12-30 RX ORDER — SODIUM CHLORIDE 0.9 % (FLUSH) 0.9 %
10 SYRINGE (ML) INJECTION AS NEEDED
Status: CANCELLED | OUTPATIENT
Start: 2019-12-30

## 2019-12-30 RX ORDER — SODIUM CHLORIDE 0.9 % (FLUSH) 0.9 %
10 SYRINGE (ML) INJECTION AS NEEDED
Status: DISCONTINUED | OUTPATIENT
Start: 2019-12-30 | End: 2019-12-30 | Stop reason: HOSPADM

## 2019-12-30 RX ADMIN — HEPARIN 500 UNITS: 100 SYRINGE at 11:11

## 2019-12-30 RX ADMIN — BEVACIZUMAB 590 MG: 400 INJECTION, SOLUTION INTRAVENOUS at 10:30

## 2019-12-30 RX ADMIN — SODIUM CHLORIDE, PRESERVATIVE FREE 10 ML: 5 INJECTION INTRAVENOUS at 11:10

## 2020-01-20 ENCOUNTER — INFUSION (OUTPATIENT)
Dept: ONCOLOGY | Facility: HOSPITAL | Age: 70
End: 2020-01-20

## 2020-01-20 VITALS
HEART RATE: 51 BPM | BODY MASS INDEX: 22.65 KG/M2 | SYSTOLIC BLOOD PRESSURE: 138 MMHG | TEMPERATURE: 98.3 F | WEIGHT: 167 LBS | DIASTOLIC BLOOD PRESSURE: 83 MMHG

## 2020-01-20 DIAGNOSIS — C18.7 MALIGNANT NEOPLASM OF SIGMOID COLON (HCC): Primary | ICD-10-CM

## 2020-01-20 LAB
ALBUMIN SERPL-MCNC: 4.2 G/DL (ref 3.5–5.2)
ALBUMIN/GLOB SERPL: 1.8 G/DL
ALP SERPL-CCNC: 58 U/L (ref 39–117)
ALT SERPL W P-5'-P-CCNC: 16 U/L (ref 1–41)
ANION GAP SERPL CALCULATED.3IONS-SCNC: 12.1 MMOL/L (ref 5–15)
ANISOCYTOSIS BLD QL: NORMAL
AST SERPL-CCNC: 23 U/L (ref 1–40)
BASOPHILS # BLD AUTO: 0.01 10*3/MM3 (ref 0–0.2)
BASOPHILS NFR BLD AUTO: 0.3 % (ref 0–1.5)
BILIRUB SERPL-MCNC: 1.1 MG/DL (ref 0.2–1.2)
BILIRUB UR QL STRIP: NEGATIVE
BUN BLD-MCNC: 12 MG/DL (ref 8–23)
BUN/CREAT SERPL: 14.1 (ref 7–25)
CALCIUM SPEC-SCNC: 9.1 MG/DL (ref 8.6–10.5)
CEA SERPL-MCNC: 4.07 NG/ML
CHLORIDE SERPL-SCNC: 101 MMOL/L (ref 98–107)
CLARITY UR: CLEAR
CO2 SERPL-SCNC: 23.9 MMOL/L (ref 22–29)
COLOR UR: YELLOW
CREAT BLD-MCNC: 0.85 MG/DL (ref 0.76–1.27)
DEPRECATED RDW RBC AUTO: 60.2 FL (ref 37–54)
EOSINOPHIL # BLD AUTO: 0.04 10*3/MM3 (ref 0–0.4)
EOSINOPHIL NFR BLD AUTO: 1 % (ref 0.3–6.2)
ERYTHROCYTE [DISTWIDTH] IN BLOOD BY AUTOMATED COUNT: 14.9 % (ref 12.3–15.4)
GFR SERPL CREATININE-BSD FRML MDRD: 90 ML/MIN/1.73
GLOBULIN UR ELPH-MCNC: 2.4 GM/DL
GLUCOSE BLD-MCNC: 98 MG/DL (ref 65–99)
GLUCOSE UR STRIP-MCNC: NEGATIVE MG/DL
HCT VFR BLD AUTO: 42.4 % (ref 37.5–51)
HGB BLD-MCNC: 14.4 G/DL (ref 13–17.7)
HGB UR QL STRIP.AUTO: NEGATIVE
IMM GRANULOCYTES # BLD AUTO: 0.01 10*3/MM3 (ref 0–0.05)
IMM GRANULOCYTES NFR BLD AUTO: 0.3 % (ref 0–0.5)
KETONES UR QL STRIP: NEGATIVE
LEUKOCYTE ESTERASE UR QL STRIP.AUTO: NEGATIVE
LYMPHOCYTES # BLD AUTO: 1.01 10*3/MM3 (ref 0.7–3.1)
LYMPHOCYTES NFR BLD AUTO: 25.3 % (ref 19.6–45.3)
MACROCYTES BLD QL SMEAR: NORMAL
MCH RBC QN AUTO: 36.6 PG (ref 26.6–33)
MCHC RBC AUTO-ENTMCNC: 34 G/DL (ref 31.5–35.7)
MCV RBC AUTO: 107.9 FL (ref 79–97)
MONOCYTES # BLD AUTO: 0.37 10*3/MM3 (ref 0.1–0.9)
MONOCYTES NFR BLD AUTO: 9.3 % (ref 5–12)
NEUTROPHILS # BLD AUTO: 2.56 10*3/MM3 (ref 1.7–7)
NEUTROPHILS NFR BLD AUTO: 63.8 % (ref 42.7–76)
NITRITE UR QL STRIP: NEGATIVE
NRBC BLD AUTO-RTO: 0 /100 WBC (ref 0–0.2)
PH UR STRIP.AUTO: <=5 [PH] (ref 5–8)
PLATELET # BLD AUTO: 118 10*3/MM3 (ref 140–450)
PMV BLD AUTO: 10.3 FL (ref 6–12)
POIKILOCYTOSIS BLD QL SMEAR: NORMAL
POTASSIUM BLD-SCNC: 4.1 MMOL/L (ref 3.5–5.2)
PROT SERPL-MCNC: 6.6 G/DL (ref 6–8.5)
PROT UR QL STRIP: NEGATIVE
RBC # BLD AUTO: 3.93 10*6/MM3 (ref 4.14–5.8)
SMALL PLATELETS BLD QL SMEAR: NORMAL
SODIUM BLD-SCNC: 137 MMOL/L (ref 136–145)
SP GR UR STRIP: 1.02 (ref 1–1.03)
UROBILINOGEN UR QL STRIP: NORMAL
WBC MORPH BLD: NORMAL
WBC NRBC COR # BLD: 4 10*3/MM3 (ref 3.4–10.8)

## 2020-01-20 PROCEDURE — 85025 COMPLETE CBC W/AUTO DIFF WBC: CPT

## 2020-01-20 PROCEDURE — 80053 COMPREHEN METABOLIC PANEL: CPT

## 2020-01-20 PROCEDURE — 36415 COLL VENOUS BLD VENIPUNCTURE: CPT

## 2020-01-20 PROCEDURE — 82378 CARCINOEMBRYONIC ANTIGEN: CPT

## 2020-01-20 PROCEDURE — 25010000002 BEVACIZUMAB PER 10 MG: Performed by: INTERNAL MEDICINE

## 2020-01-20 PROCEDURE — 25010000002 BEVACIZUMAB 100 MG/4ML SOLUTION 4 ML VIAL: Performed by: INTERNAL MEDICINE

## 2020-01-20 PROCEDURE — 85007 BL SMEAR W/DIFF WBC COUNT: CPT

## 2020-01-20 PROCEDURE — 96413 CHEMO IV INFUSION 1 HR: CPT

## 2020-01-20 PROCEDURE — 81003 URINALYSIS AUTO W/O SCOPE: CPT

## 2020-01-20 RX ORDER — SODIUM CHLORIDE 0.9 % (FLUSH) 0.9 %
10 SYRINGE (ML) INJECTION AS NEEDED
Status: CANCELLED | OUTPATIENT
Start: 2020-01-20

## 2020-01-20 RX ORDER — SODIUM CHLORIDE 9 MG/ML
250 INJECTION, SOLUTION INTRAVENOUS ONCE
Status: DISCONTINUED | OUTPATIENT
Start: 2020-01-20 | End: 2020-01-20 | Stop reason: HOSPADM

## 2020-01-20 RX ORDER — SODIUM CHLORIDE 0.9 % (FLUSH) 0.9 %
10 SYRINGE (ML) INJECTION AS NEEDED
Status: DISCONTINUED | OUTPATIENT
Start: 2020-01-20 | End: 2020-01-20 | Stop reason: HOSPADM

## 2020-01-20 RX ORDER — HEPARIN SODIUM (PORCINE) LOCK FLUSH IV SOLN 100 UNIT/ML 100 UNIT/ML
500 SOLUTION INTRAVENOUS AS NEEDED
Status: CANCELLED | OUTPATIENT
Start: 2020-01-20

## 2020-01-20 RX ADMIN — BEVACIZUMAB 590 MG: 400 INJECTION, SOLUTION INTRAVENOUS at 09:43

## 2020-01-20 RX ADMIN — SODIUM CHLORIDE, PRESERVATIVE FREE 10 ML: 5 INJECTION INTRAVENOUS at 10:19

## 2020-01-20 RX ADMIN — HEPARIN 500 UNITS: 100 SYRINGE at 10:19

## 2020-01-27 ENCOUNTER — HOSPITAL ENCOUNTER (OUTPATIENT)
Dept: CT IMAGING | Facility: HOSPITAL | Age: 70
Discharge: HOME | End: 2020-01-27
Admitting: INTERNAL MEDICINE

## 2020-01-27 DIAGNOSIS — C18.7 MALIGNANT NEOPLASM OF SIGMOID COLON (HCC): ICD-10-CM

## 2020-01-27 PROCEDURE — 74178 CT ABD&PLV WO CNTR FLWD CNTR: CPT

## 2020-01-27 PROCEDURE — 25010000002 IOPAMIDOL 61 % SOLUTION: Performed by: INTERNAL MEDICINE

## 2020-01-27 RX ADMIN — IOPAMIDOL 100 ML: 612 INJECTION, SOLUTION INTRAVENOUS at 10:43

## 2020-01-30 DIAGNOSIS — C18.7 MALIGNANT NEOPLASM OF SIGMOID COLON (HCC): ICD-10-CM

## 2020-01-30 RX ORDER — CAPECITABINE 500 MG/1
TABLET, FILM COATED ORAL
OUTPATIENT
Start: 2020-01-30

## 2020-02-06 ENCOUNTER — OFFICE VISIT (OUTPATIENT)
Dept: ONCOLOGY | Facility: CLINIC | Age: 70
End: 2020-02-06

## 2020-02-06 ENCOUNTER — INFUSION (OUTPATIENT)
Dept: ONCOLOGY | Facility: HOSPITAL | Age: 70
End: 2020-02-06

## 2020-02-06 VITALS
TEMPERATURE: 98.2 F | DIASTOLIC BLOOD PRESSURE: 84 MMHG | RESPIRATION RATE: 18 BRPM | HEIGHT: 72 IN | BODY MASS INDEX: 22.89 KG/M2 | SYSTOLIC BLOOD PRESSURE: 175 MMHG | HEART RATE: 62 BPM | WEIGHT: 169 LBS

## 2020-02-06 DIAGNOSIS — C18.7 MALIGNANT NEOPLASM OF SIGMOID COLON (HCC): Primary | ICD-10-CM

## 2020-02-06 DIAGNOSIS — C18.7 MALIGNANT NEOPLASM OF SIGMOID COLON (HCC): ICD-10-CM

## 2020-02-06 LAB
ALBUMIN SERPL-MCNC: 4.1 G/DL (ref 3.5–5.2)
ALBUMIN/GLOB SERPL: 1.8 G/DL
ALP SERPL-CCNC: 57 U/L (ref 39–117)
ALT SERPL W P-5'-P-CCNC: 15 U/L (ref 1–41)
ANION GAP SERPL CALCULATED.3IONS-SCNC: 12.9 MMOL/L (ref 5–15)
ANISOCYTOSIS BLD QL: NORMAL
AST SERPL-CCNC: 22 U/L (ref 1–40)
BASOPHILS # BLD AUTO: 0.01 10*3/MM3 (ref 0–0.2)
BASOPHILS NFR BLD AUTO: 0.2 % (ref 0–1.5)
BILIRUB SERPL-MCNC: 0.9 MG/DL (ref 0.2–1.2)
BUN BLD-MCNC: 10 MG/DL (ref 8–23)
BUN/CREAT SERPL: 11.6 (ref 7–25)
CALCIUM SPEC-SCNC: 9.1 MG/DL (ref 8.6–10.5)
CHLORIDE SERPL-SCNC: 104 MMOL/L (ref 98–107)
CO2 SERPL-SCNC: 24.1 MMOL/L (ref 22–29)
CREAT BLD-MCNC: 0.86 MG/DL (ref 0.76–1.27)
DEPRECATED RDW RBC AUTO: 59.7 FL (ref 37–54)
EOSINOPHIL # BLD AUTO: 0.02 10*3/MM3 (ref 0–0.4)
EOSINOPHIL NFR BLD AUTO: 0.5 % (ref 0.3–6.2)
ERYTHROCYTE [DISTWIDTH] IN BLOOD BY AUTOMATED COUNT: 14.9 % (ref 12.3–15.4)
GFR SERPL CREATININE-BSD FRML MDRD: 88 ML/MIN/1.73
GLOBULIN UR ELPH-MCNC: 2.3 GM/DL
GLUCOSE BLD-MCNC: 89 MG/DL (ref 65–99)
HCT VFR BLD AUTO: 41.3 % (ref 37.5–51)
HGB BLD-MCNC: 13.9 G/DL (ref 13–17.7)
IMM GRANULOCYTES # BLD AUTO: 0.01 10*3/MM3 (ref 0–0.05)
IMM GRANULOCYTES NFR BLD AUTO: 0.2 % (ref 0–0.5)
LYMPHOCYTES # BLD AUTO: 0.9 10*3/MM3 (ref 0.7–3.1)
LYMPHOCYTES NFR BLD AUTO: 21.7 % (ref 19.6–45.3)
MACROCYTES BLD QL SMEAR: NORMAL
MCH RBC QN AUTO: 36.5 PG (ref 26.6–33)
MCHC RBC AUTO-ENTMCNC: 33.7 G/DL (ref 31.5–35.7)
MCV RBC AUTO: 108.4 FL (ref 79–97)
MONOCYTES # BLD AUTO: 0.31 10*3/MM3 (ref 0.1–0.9)
MONOCYTES NFR BLD AUTO: 7.5 % (ref 5–12)
NEUTROPHILS # BLD AUTO: 2.9 10*3/MM3 (ref 1.7–7)
NEUTROPHILS NFR BLD AUTO: 69.9 % (ref 42.7–76)
NRBC BLD AUTO-RTO: 0 /100 WBC (ref 0–0.2)
PLATELET # BLD AUTO: 140 10*3/MM3 (ref 140–450)
PMV BLD AUTO: 10.5 FL (ref 6–12)
POIKILOCYTOSIS BLD QL SMEAR: NORMAL
POTASSIUM BLD-SCNC: 4.2 MMOL/L (ref 3.5–5.2)
PROT SERPL-MCNC: 6.4 G/DL (ref 6–8.5)
RBC # BLD AUTO: 3.81 10*6/MM3 (ref 4.14–5.8)
SMALL PLATELETS BLD QL SMEAR: ADEQUATE
SODIUM BLD-SCNC: 141 MMOL/L (ref 136–145)
WBC MORPH BLD: NORMAL
WBC NRBC COR # BLD: 4.15 10*3/MM3 (ref 3.4–10.8)

## 2020-02-06 PROCEDURE — 85007 BL SMEAR W/DIFF WBC COUNT: CPT

## 2020-02-06 PROCEDURE — 99214 OFFICE O/P EST MOD 30 MIN: CPT | Performed by: INTERNAL MEDICINE

## 2020-02-06 PROCEDURE — 36415 COLL VENOUS BLD VENIPUNCTURE: CPT

## 2020-02-06 PROCEDURE — 25010000003 HEPARIN LOCK FLUCH PER 10 UNITS: Performed by: INTERNAL MEDICINE

## 2020-02-06 PROCEDURE — 36591 DRAW BLOOD OFF VENOUS DEVICE: CPT

## 2020-02-06 PROCEDURE — 85025 COMPLETE CBC W/AUTO DIFF WBC: CPT

## 2020-02-06 PROCEDURE — 80053 COMPREHEN METABOLIC PANEL: CPT

## 2020-02-06 RX ORDER — SODIUM CHLORIDE 0.9 % (FLUSH) 0.9 %
10 SYRINGE (ML) INJECTION AS NEEDED
Status: CANCELLED | OUTPATIENT
Start: 2020-02-06

## 2020-02-06 RX ORDER — HEPARIN SODIUM (PORCINE) LOCK FLUSH IV SOLN 100 UNIT/ML 100 UNIT/ML
500 SOLUTION INTRAVENOUS AS NEEDED
Status: DISCONTINUED | OUTPATIENT
Start: 2020-02-06 | End: 2020-02-06 | Stop reason: HOSPADM

## 2020-02-06 RX ORDER — SODIUM CHLORIDE 0.9 % (FLUSH) 0.9 %
10 SYRINGE (ML) INJECTION AS NEEDED
Status: DISCONTINUED | OUTPATIENT
Start: 2020-02-06 | End: 2020-02-06 | Stop reason: HOSPADM

## 2020-02-06 RX ORDER — HEPARIN SODIUM (PORCINE) LOCK FLUSH IV SOLN 100 UNIT/ML 100 UNIT/ML
500 SOLUTION INTRAVENOUS AS NEEDED
Status: CANCELLED | OUTPATIENT
Start: 2020-02-06

## 2020-02-06 RX ADMIN — HEPARIN 500 UNITS: 100 SYRINGE at 09:45

## 2020-02-06 RX ADMIN — SODIUM CHLORIDE, PRESERVATIVE FREE 10 ML: 5 INJECTION INTRAVENOUS at 09:44

## 2020-02-06 NOTE — PROGRESS NOTES
CHIEF COMPLAINT: 1.  Peripheral neuropathy of the hands and feet, worse in the feet                                       2.  Follow-up for colon cancer    Problem List:  Oncology/Hematology History    1. Stage CARLOS, [id number] colon cancer with 2 out of 14 pericolonic lymph nodes  involved and a left lobe liver biopsy positive for metastasis, presenting with  a 25 pound weight loss and diarrhea with some perirectal soreness and had  colonoscopy with Dr. Mcclain in January that found this lesion that was  circumferential high-grade invading into the pericolonic fat, status post  sigmoid colectomy of a poorly differentiated adenocarcinoma.   a) Baseline CEA postop of 0.8 with alkaline phosphatase 111, with normal liver  enzymes and creatinine of 0.8. Baseline white count 9820 with a hemoglobin  13.8, platelets 339,000, and CT with contrast showing a right lobe liver dome  lesion as well as an anastomotic change in the bowel.   b) Began CapeOx 03/15/2016.  c) Added in Avastin to CapeOx 04/05/2016, second cycle. (This was 8 weeks out  from surgery).  d) KRAS mutation is negative.  E.) CAT scan in August 2016 shows resolution of disease in the liver and colon and a stable lung nodule.  Hence Avastin, Xeloda, and oxaliplatin continued.  F) oxaliplatin discontinued October 2016 due to worsening peripheral neuropathy.  G.) CTs of February 2017 showed no evidence of metastasis and stable bibasilar nodular scar.  Persistent neuropathy not worsening.  CEA 1.4.  Continuing Avastin and Xeloda without oxaliplatin.  Having some problems with irritation of his eyes on Xeloda.  2.  Peripheral neuropathy, chemotherapy induced        Malignant neoplasm of sigmoid colon (CMS/HCC)    5/20/2016 Initial Diagnosis     Malignant neoplasm of sigmoid colon      5/2/2017 Imaging     CT chest, abdomen, pelvis IMPRESSION:  1. No disease recurrence.  2. Minimal areas of nodular thickening in the right lower lobe, stable.      8/2/2017 Imaging     CT  chest/abdomen/pelvis IMPRESSION:  Stable examination with no evidence of acute intrathoracic,  intra-abdominal or pelvic abnormality. There is no evidence of  progression of disease. No metastatic disease.       11/13/2017 Imaging     CT chest/abdomen/pelvis IMPRESSION:  Stable examination without evidence of acute intrathoracic  intra-abdominal or intrapelvic abnormality. No evidence of progression  of disease.   CEA 1.3.      2/6/2020 -  Chemotherapy     OP COLON Capecitabine 1,250 mg/m2 BID (8 cycles)         HISTORY OF PRESENT ILLNESS:  The patient is a 69 y.o. male, here for follow up on management of Stage IV a colon cancer.  The patient continues to do well and is tolerating therapy with Xeloda and Avastin without any unusual side effects. His functional status is pretty good.  Clinically no issues with occasional abdominal discomfort.  No constipation or diarrhea.  No hand-foot syndrome.     Past Medical History:   Diagnosis Date   • Hypertension    • Liver disease     mets from colon cancer   • Malignant neoplasm of colon (CMS/HCC)      Past Surgical History:   Procedure Laterality Date   • COLON SURGERY      Sigmoid   • LIVER SURGERY     • PORTACATH PLACEMENT         No Known Allergies    Family History and Social History reviewed and changed as necessary      REVIEW OF SYSTEM:   Review of Systems   Constitutional: Negative for appetite change, chills, diaphoresis, fatigue, fever and unexpected weight change.   HENT:   Negative for mouth sores, sore throat and trouble swallowing.    Eyes: Negative for icterus.   Respiratory: Negative for cough, hemoptysis and shortness of breath.    Cardiovascular: Negative for chest pain, leg swelling and palpitations.   Gastrointestinal: Negative for abdominal distention, abdominal pain, blood in stool, constipation, diarrhea, nausea and vomiting.   Endocrine: Negative for hot flashes.   Genitourinary: Negative for bladder incontinence, difficulty urinating, dysuria,  "frequency and hematuria.    Musculoskeletal: Negative for gait problem, neck pain and neck stiffness.   Skin: Negative for rash.  Positive for dry skin.  Neurological: Positive for peripheral neuropathy in the hands and feet.  Hematological: Negative for adenopathy. Does not bruise/bleed easily.   Psychiatric/Behavioral: Negative for depression. The patient is not nervous/anxious.    All other systems reviewed and are negative.       PHYSICAL EXAM    Vitals:    02/06/20 0916   BP: 175/84   Pulse: 62   Resp: 18   Temp: 98.2 °F (36.8 °C)   Weight: 76.7 kg (169 lb)   Height: 182.9 cm (72\")     Constitutional: Appears well-developed and well-nourished. No distress.   ECOG: (1) Restricted in physically strenuous activity, ambulatory and able to do work of light nature  HENT:   Head: Normocephalic.   Mouth/Throat: Oropharynx is clear and moist.   Eyes: Conjunctivae are normal. Pupils are equal, round, and reactive to light. No scleral icterus.   Neck: Neck supple. No JVD present. No thyromegaly present.   Cardiovascular: Normal rate, regular rhythm and normal heart sounds.    Pulmonary/Chest: Breath sounds normal. No respiratory distress.   Nodes: No cervical, supraclavicular or axillary nodes palpable on exam.  Abdominal: Soft. Exhibits no distension and no mass. There is no hepatosplenomegaly. There is no tenderness. There is no rebound and no guarding.   Musculoskeletal:Exhibits no edema, tenderness or deformity.   Neurological: Alert and oriented to person, place, and time. Exhibits normal muscle tone.   Skin: Dry.  No ecchymosis, no petechiae and no rash noted. Not diaphoretic. No cyanosis.   Psychiatric: Normal mood and affect.   Vitals reviewed.  Laboratory data reviewed along with CT chest abdomen and pelvis and images thereof as outlined above in the oncology history were reviewed at time of visit.    Lab Results   Component Value Date    WBC 4.00 01/20/2020    HGB 14.4 01/20/2020    HCT 42.4 01/20/2020    MCV " 107.9 (H) 01/20/2020     (L) 01/20/2020       Lab Results   Component Value Date    GLUCOSE 98 01/20/2020    BUN 12 01/20/2020    CREATININE 0.85 01/20/2020    EGFRIFNONA 90 01/20/2020    BCR 14.1 01/20/2020    K 4.1 01/20/2020    CO2 23.9 01/20/2020    CALCIUM 9.1 01/20/2020    ALBUMIN 4.20 01/20/2020    LABIL2 1.0 02/18/2016    AST 23 01/20/2020    ALT 16 01/20/2020       Lab Results   Component Value Date    CEA 4.07 01/20/2020    CEA 2.86 12/30/2019    CEA 2.84 11/18/2019    CEA 3.21 10/24/2019     Ct Abdomen Pelvis Without Contrast    Result Date: 8/24/2019  No hydronephrosis or nephrolithiasis.  Cholelithiasis  Retained stool throughout colon, correlate for constipation.  Stable appearance to 2.5 cm right hepatic cyst.     676.89 mGy.cm. 12.60 mGy  This study was performed with techniques to keep radiation doses as low as reasonably achievable (ALARA). Individualized dose reduction techniques using automated exposure control or adjustment of mA and/or kV according to the patient size were employed.  This report was finalized on 8/24/2019 3:29 PM by Roger Segura DO.    Ct Head Without Contrast    Result Date: 8/24/2019  No acute intracranial process.  5 mm hyperdensity in the region of the third ventricle likely secondary to a colloid cyst. Similar to prior  801.02 mGy.cm   This study was performed with techniques to keep radiation doses as low as reasonably achievable (ALARA). Individualized dose reduction techniques using automated exposure control or adjustment of mA and/or kV according to the patient size were employed.  This report was finalized on 8/24/2019 3:21 PM by Roger Segura DO.    Ct Abdomen Pelvis With & Without Contrast    Result Date: 1/27/2020  1. Stable appearing hepatic cysts. 2. Moderate stool burden. 3. Other chronic appearing findings.  This report was finalized on 1/27/2020 12:42 PM by Etienne Olivarez MD.          Assessment/Plan     1. Metastatic colon cancer with Solitary Liver  metastasis initially diagnosed 2- -he has been on Xeloda and Avastin for over 4 years.  I reviewed his scans today which shows no evidence of disease.  At this point I have recommended he discontinue Avastin and continue Xeloda alone.  There is a fair bit of anxiety associated with discontinuing his treatment.  He is uncomfortable discontinuing everything.  However Avastin at this point has limited benefit and has significant toxicity profile that can occur late.  2. Peripheral neuropathy secondary to chemotherapy  3. Cytopenia due chemotherapy: Seems better.    Discussion: At this time he has no measurable disease.  He will return to clinic in 6 months.    Spent 25 minutes on the patient's plan and care with 20 minutes spent on counseling the patient regarding the plan going forward and reviewing his scans with him.    Santi Abad MD    02/06/20

## 2020-02-07 ENCOUNTER — SPECIALTY PHARMACY (OUTPATIENT)
Dept: ONCOLOGY | Facility: HOSPITAL | Age: 70
End: 2020-02-07

## 2020-02-07 DIAGNOSIS — C18.7 MALIGNANT NEOPLASM OF SIGMOID COLON (HCC): ICD-10-CM

## 2020-02-07 RX ORDER — CAPECITABINE 500 MG/1
1000 TABLET, FILM COATED ORAL 2 TIMES DAILY
Qty: 56 TABLET | Refills: 5 | Status: SHIPPED | OUTPATIENT
Start: 2020-02-07 | End: 2020-05-15 | Stop reason: SDUPTHER

## 2020-02-07 NOTE — PROGRESS NOTES
Oral Chemotherapy Teaching      Patient Name/:  Gordon Avila   1950  Oral Chemotherapy Regimen:  Xeloda 1000mg PO BID x 7  Days, followed by 7 days off      Additional Notes: Received refill request from Joesph FERGUSON for Xeloda. Reviewed most recent oncology office visit note dated 2020. Plan for continuation of Xeloda with no dose adjustments. Plan for continuation of single agent Xeloda. Released script for Xeloda 1000mg PO BID x 7 days, followed by 7 days off #56 with 5 RF [facility] pharmacy for continuation of treatment.

## 2020-02-13 NOTE — TELEPHONE ENCOUNTER
----- Message from Ansley Stevenson RN sent at 11/9/2018  1:25 PM EST -----  Patient called and spoke with Neo herrera. Requested refill on escitalopram 10mg to [facility]. Not sure if Dr Mercado writes this or not. If he does not, please call the patient and let them know they need to contact the prescribing doctors office.   
Called patient and informed him that we would send in refills for Lexapro.  
Negative

## 2020-02-19 ENCOUNTER — TELEPHONE (OUTPATIENT)
Dept: ONCOLOGY | Facility: CLINIC | Age: 70
End: 2020-02-19

## 2020-02-19 RX ORDER — AZITHROMYCIN 250 MG/1
TABLET, FILM COATED ORAL
Qty: 6 TABLET | Refills: 0 | Status: SHIPPED | OUTPATIENT
Start: 2020-02-19 | End: 2020-09-28

## 2020-02-19 NOTE — TELEPHONE ENCOUNTER
Returned call to patient after discussing with Dr. Mercado. Informing patient that Dr. Mercado had nurse send patient in Z-pack to help with sinus drainage. Patient stating he understood.

## 2020-03-01 DIAGNOSIS — C18.7 MALIGNANT NEOPLASM OF SIGMOID COLON (HCC): Primary | ICD-10-CM

## 2020-03-02 RX ORDER — CLONAZEPAM 1 MG/1
TABLET ORAL
Qty: 30 TABLET | Refills: 3 | Status: SHIPPED | OUTPATIENT
Start: 2020-03-02 | End: 2020-07-06

## 2020-03-19 ENCOUNTER — INFUSION (OUTPATIENT)
Dept: ONCOLOGY | Facility: HOSPITAL | Age: 70
End: 2020-03-19

## 2020-03-19 VITALS
SYSTOLIC BLOOD PRESSURE: 142 MMHG | DIASTOLIC BLOOD PRESSURE: 74 MMHG | WEIGHT: 168 LBS | BODY MASS INDEX: 22.78 KG/M2 | HEART RATE: 56 BPM | RESPIRATION RATE: 16 BRPM | TEMPERATURE: 99.2 F

## 2020-03-19 DIAGNOSIS — C18.7 MALIGNANT NEOPLASM OF SIGMOID COLON (HCC): Primary | ICD-10-CM

## 2020-03-19 LAB
ALBUMIN SERPL-MCNC: 4.3 G/DL (ref 3.5–5.2)
ALBUMIN/GLOB SERPL: 1.8 G/DL
ALP SERPL-CCNC: 57 U/L (ref 39–117)
ALT SERPL W P-5'-P-CCNC: 17 U/L (ref 1–41)
ANION GAP SERPL CALCULATED.3IONS-SCNC: 10.8 MMOL/L (ref 5–15)
ANISOCYTOSIS BLD QL: NORMAL
AST SERPL-CCNC: 25 U/L (ref 1–40)
BASOPHILS # BLD AUTO: 0.01 10*3/MM3 (ref 0–0.2)
BASOPHILS NFR BLD AUTO: 0.3 % (ref 0–1.5)
BILIRUB SERPL-MCNC: 1 MG/DL (ref 0.2–1.2)
BUN BLD-MCNC: 13 MG/DL (ref 8–23)
BUN/CREAT SERPL: 17.6 (ref 7–25)
CALCIUM SPEC-SCNC: 9.1 MG/DL (ref 8.6–10.5)
CHLORIDE SERPL-SCNC: 103 MMOL/L (ref 98–107)
CO2 SERPL-SCNC: 25.2 MMOL/L (ref 22–29)
CREAT BLD-MCNC: 0.74 MG/DL (ref 0.76–1.27)
DEPRECATED RDW RBC AUTO: 60.1 FL (ref 37–54)
EOSINOPHIL # BLD AUTO: 0.05 10*3/MM3 (ref 0–0.4)
EOSINOPHIL NFR BLD AUTO: 1.4 % (ref 0.3–6.2)
ERYTHROCYTE [DISTWIDTH] IN BLOOD BY AUTOMATED COUNT: 14.9 % (ref 12.3–15.4)
GFR SERPL CREATININE-BSD FRML MDRD: 105 ML/MIN/1.73
GLOBULIN UR ELPH-MCNC: 2.4 GM/DL
GLUCOSE BLD-MCNC: 88 MG/DL (ref 65–99)
HCT VFR BLD AUTO: 40.8 % (ref 37.5–51)
HGB BLD-MCNC: 13.7 G/DL (ref 13–17.7)
IMM GRANULOCYTES # BLD AUTO: 0.02 10*3/MM3 (ref 0–0.05)
IMM GRANULOCYTES NFR BLD AUTO: 0.5 % (ref 0–0.5)
LYMPHOCYTES # BLD AUTO: 1.02 10*3/MM3 (ref 0.7–3.1)
LYMPHOCYTES NFR BLD AUTO: 27.6 % (ref 19.6–45.3)
MACROCYTES BLD QL SMEAR: NORMAL
MCH RBC QN AUTO: 36.7 PG (ref 26.6–33)
MCHC RBC AUTO-ENTMCNC: 33.6 G/DL (ref 31.5–35.7)
MCV RBC AUTO: 109.4 FL (ref 79–97)
MONOCYTES # BLD AUTO: 0.39 10*3/MM3 (ref 0.1–0.9)
MONOCYTES NFR BLD AUTO: 10.5 % (ref 5–12)
NEUTROPHILS # BLD AUTO: 2.21 10*3/MM3 (ref 1.7–7)
NEUTROPHILS NFR BLD AUTO: 59.7 % (ref 42.7–76)
NRBC BLD AUTO-RTO: 0 /100 WBC (ref 0–0.2)
PLATELET # BLD AUTO: 146 10*3/MM3 (ref 140–450)
PMV BLD AUTO: 10.7 FL (ref 6–12)
POIKILOCYTOSIS BLD QL SMEAR: NORMAL
POTASSIUM BLD-SCNC: 4.4 MMOL/L (ref 3.5–5.2)
PROT SERPL-MCNC: 6.7 G/DL (ref 6–8.5)
RBC # BLD AUTO: 3.73 10*6/MM3 (ref 4.14–5.8)
SMALL PLATELETS BLD QL SMEAR: ADEQUATE
SODIUM BLD-SCNC: 139 MMOL/L (ref 136–145)
WBC MORPH BLD: NORMAL
WBC NRBC COR # BLD: 3.7 10*3/MM3 (ref 3.4–10.8)

## 2020-03-19 PROCEDURE — 85007 BL SMEAR W/DIFF WBC COUNT: CPT

## 2020-03-19 PROCEDURE — 25010000003 HEPARIN LOCK FLUCH PER 10 UNITS: Performed by: INTERNAL MEDICINE

## 2020-03-19 PROCEDURE — 36415 COLL VENOUS BLD VENIPUNCTURE: CPT

## 2020-03-19 PROCEDURE — 36591 DRAW BLOOD OFF VENOUS DEVICE: CPT

## 2020-03-19 PROCEDURE — 80053 COMPREHEN METABOLIC PANEL: CPT

## 2020-03-19 PROCEDURE — 85025 COMPLETE CBC W/AUTO DIFF WBC: CPT

## 2020-03-19 RX ORDER — HEPARIN SODIUM (PORCINE) LOCK FLUSH IV SOLN 100 UNIT/ML 100 UNIT/ML
500 SOLUTION INTRAVENOUS AS NEEDED
Status: CANCELLED | OUTPATIENT
Start: 2020-03-19

## 2020-03-19 RX ORDER — SODIUM CHLORIDE 0.9 % (FLUSH) 0.9 %
10 SYRINGE (ML) INJECTION AS NEEDED
Status: DISCONTINUED | OUTPATIENT
Start: 2020-03-19 | End: 2020-03-19 | Stop reason: HOSPADM

## 2020-03-19 RX ORDER — SODIUM CHLORIDE 0.9 % (FLUSH) 0.9 %
10 SYRINGE (ML) INJECTION AS NEEDED
Status: CANCELLED | OUTPATIENT
Start: 2020-03-19

## 2020-03-19 RX ORDER — HEPARIN SODIUM (PORCINE) LOCK FLUSH IV SOLN 100 UNIT/ML 100 UNIT/ML
500 SOLUTION INTRAVENOUS AS NEEDED
Status: DISCONTINUED | OUTPATIENT
Start: 2020-03-19 | End: 2020-03-19 | Stop reason: HOSPADM

## 2020-03-19 RX ADMIN — SODIUM CHLORIDE, PRESERVATIVE FREE 10 ML: 5 INJECTION INTRAVENOUS at 08:52

## 2020-03-19 RX ADMIN — HEPARIN 500 UNITS: 100 SYRINGE at 08:52

## 2020-05-07 ENCOUNTER — INFUSION (OUTPATIENT)
Dept: ONCOLOGY | Facility: HOSPITAL | Age: 70
End: 2020-05-07

## 2020-05-07 VITALS
WEIGHT: 167 LBS | RESPIRATION RATE: 16 BRPM | SYSTOLIC BLOOD PRESSURE: 155 MMHG | TEMPERATURE: 98.6 F | DIASTOLIC BLOOD PRESSURE: 83 MMHG | HEART RATE: 54 BPM | BODY MASS INDEX: 22.65 KG/M2

## 2020-05-07 DIAGNOSIS — C18.7 MALIGNANT NEOPLASM OF SIGMOID COLON (HCC): Primary | ICD-10-CM

## 2020-05-07 PROCEDURE — 96523 IRRIG DRUG DELIVERY DEVICE: CPT

## 2020-05-07 PROCEDURE — 25010000003 HEPARIN LOCK FLUSH PER 10 UNITS: Performed by: INTERNAL MEDICINE

## 2020-05-07 RX ORDER — HEPARIN SODIUM (PORCINE) LOCK FLUSH IV SOLN 100 UNIT/ML 100 UNIT/ML
500 SOLUTION INTRAVENOUS AS NEEDED
Status: CANCELLED | OUTPATIENT
Start: 2020-05-07

## 2020-05-07 RX ORDER — SODIUM CHLORIDE 0.9 % (FLUSH) 0.9 %
10 SYRINGE (ML) INJECTION AS NEEDED
Status: DISCONTINUED | OUTPATIENT
Start: 2020-05-07 | End: 2020-05-07 | Stop reason: HOSPADM

## 2020-05-07 RX ORDER — HEPARIN SODIUM (PORCINE) LOCK FLUSH IV SOLN 100 UNIT/ML 100 UNIT/ML
500 SOLUTION INTRAVENOUS AS NEEDED
Status: DISCONTINUED | OUTPATIENT
Start: 2020-05-07 | End: 2020-05-07 | Stop reason: HOSPADM

## 2020-05-07 RX ORDER — SODIUM CHLORIDE 0.9 % (FLUSH) 0.9 %
10 SYRINGE (ML) INJECTION AS NEEDED
Status: CANCELLED | OUTPATIENT
Start: 2020-05-07

## 2020-05-07 RX ADMIN — SODIUM CHLORIDE, PRESERVATIVE FREE 10 ML: 5 INJECTION INTRAVENOUS at 09:06

## 2020-05-07 RX ADMIN — HEPARIN 500 UNITS: 100 SYRINGE at 09:06

## 2020-05-15 ENCOUNTER — SPECIALTY PHARMACY (OUTPATIENT)
Dept: ONCOLOGY | Facility: HOSPITAL | Age: 70
End: 2020-05-15

## 2020-05-15 DIAGNOSIS — C18.7 MALIGNANT NEOPLASM OF SIGMOID COLON (HCC): ICD-10-CM

## 2020-05-15 RX ORDER — CAPECITABINE 500 MG/1
1000 TABLET, FILM COATED ORAL 2 TIMES DAILY
Qty: 56 TABLET | Refills: 5 | Status: SHIPPED | OUTPATIENT
Start: 2020-05-15 | End: 2021-03-01

## 2020-05-26 DIAGNOSIS — T45.1X5A PERIPHERAL NEUROPATHY DUE TO CHEMOTHERAPY (HCC): Primary | ICD-10-CM

## 2020-05-26 DIAGNOSIS — G62.0 PERIPHERAL NEUROPATHY DUE TO CHEMOTHERAPY (HCC): Primary | ICD-10-CM

## 2020-05-27 RX ORDER — GABAPENTIN 100 MG/1
CAPSULE ORAL
Qty: 90 CAPSULE | Refills: 5 | Status: SHIPPED | OUTPATIENT
Start: 2020-05-27 | End: 2020-09-28 | Stop reason: SDUPTHER

## 2020-06-15 ENCOUNTER — TELEPHONE (OUTPATIENT)
Dept: ONCOLOGY | Facility: CLINIC | Age: 70
End: 2020-06-15

## 2020-06-15 NOTE — TELEPHONE ENCOUNTER
Patient called triage line to report that he was hot to touch and that he had a cough and sinus drainage.  He stated he had been burning a large tree and feels like he may have inhaled a bunch of smoke.  He denies n/v and is eating drinking okay.  No issues with bm or voiding.  I called to ask what his fever is but he does not have a thermometer but reports that he feels hot.  He finished the tree burning Friday night but has suffered all weekend with cough and sinus drainage.  I talked with DEISY Lin and we will try some over the counter interventions for now and patient can call back if symptoms worsen.  He is currently on treatment using single agent capecitabine.  He was instructed to use tylenol 500mg q 6 hours, to start otc allergy med such as claritin or zyrtec and to get some ocean spray nasal spray to moisten his nares.  Patient asked if I would care to call his neighbor Sally as she helps him out and is retired nurse.  He said Sally was expecting my call.  I called Sally and gave her the above information and she will make sure the patient starts the meds and will make sure he calls back if no improvement in a day or two.

## 2020-06-25 ENCOUNTER — INFUSION (OUTPATIENT)
Dept: ONCOLOGY | Facility: HOSPITAL | Age: 70
End: 2020-06-25

## 2020-06-25 VITALS
DIASTOLIC BLOOD PRESSURE: 81 MMHG | HEART RATE: 66 BPM | SYSTOLIC BLOOD PRESSURE: 129 MMHG | WEIGHT: 163 LBS | RESPIRATION RATE: 14 BRPM | TEMPERATURE: 98.6 F | BODY MASS INDEX: 22.11 KG/M2

## 2020-06-25 DIAGNOSIS — C18.7 MALIGNANT NEOPLASM OF SIGMOID COLON (HCC): Primary | ICD-10-CM

## 2020-06-25 PROCEDURE — 25010000003 HEPARIN LOCK FLUSH PER 10 UNITS: Performed by: INTERNAL MEDICINE

## 2020-06-25 PROCEDURE — 96523 IRRIG DRUG DELIVERY DEVICE: CPT

## 2020-06-25 RX ORDER — HEPARIN SODIUM (PORCINE) LOCK FLUSH IV SOLN 100 UNIT/ML 100 UNIT/ML
500 SOLUTION INTRAVENOUS AS NEEDED
Status: DISCONTINUED | OUTPATIENT
Start: 2020-06-25 | End: 2020-06-25 | Stop reason: HOSPADM

## 2020-06-25 RX ORDER — HEPARIN SODIUM (PORCINE) LOCK FLUSH IV SOLN 100 UNIT/ML 100 UNIT/ML
500 SOLUTION INTRAVENOUS AS NEEDED
Status: CANCELLED | OUTPATIENT
Start: 2020-06-25

## 2020-06-25 RX ORDER — SODIUM CHLORIDE 0.9 % (FLUSH) 0.9 %
10 SYRINGE (ML) INJECTION AS NEEDED
Status: CANCELLED | OUTPATIENT
Start: 2020-06-25

## 2020-06-25 RX ADMIN — HEPARIN 500 UNITS: 100 SYRINGE at 08:40

## 2020-06-29 ENCOUNTER — TELEPHONE (OUTPATIENT)
Dept: ONCOLOGY | Facility: CLINIC | Age: 70
End: 2020-06-29

## 2020-06-29 NOTE — TELEPHONE ENCOUNTER
Patient calling in to the triage line regarding summer cold symptoms. States he was out working in the yard and now has some congestion. Patient is requesting an antibiotic. No cough, pain, fever. No other symptoms noted. Discussed with Renée OLIVAS. Patient advised to try OTC allergy medications first. Discussed the issues of taking antibiotics frequently. Patient verbalized understanding and will try allergy meds first.

## 2020-07-06 DIAGNOSIS — C18.7 MALIGNANT NEOPLASM OF SIGMOID COLON (HCC): ICD-10-CM

## 2020-07-06 RX ORDER — CLONAZEPAM 1 MG/1
TABLET ORAL
Qty: 30 TABLET | Refills: 5 | Status: SHIPPED | OUTPATIENT
Start: 2020-07-06 | End: 2020-09-28 | Stop reason: SDUPTHER

## 2020-07-24 RX ORDER — LISINOPRIL 20 MG/1
20 TABLET ORAL DAILY
Qty: 30 TABLET | Refills: 0 | Status: SHIPPED | OUTPATIENT
Start: 2020-07-24 | End: 2020-09-09 | Stop reason: SDUPTHER

## 2020-08-04 ENCOUNTER — HOSPITAL ENCOUNTER (OUTPATIENT)
Dept: CT IMAGING | Facility: HOSPITAL | Age: 70
Discharge: HOME | End: 2020-08-04
Admitting: INTERNAL MEDICINE

## 2020-08-04 DIAGNOSIS — C18.7 MALIGNANT NEOPLASM OF SIGMOID COLON (HCC): ICD-10-CM

## 2020-08-04 LAB — CREAT BLDA-MCNC: 1.2 MG/DL (ref 0.6–1.3)

## 2020-08-04 PROCEDURE — 74177 CT ABD & PELVIS W/CONTRAST: CPT

## 2020-08-04 PROCEDURE — 82565 ASSAY OF CREATININE: CPT

## 2020-08-04 PROCEDURE — 71260 CT THORAX DX C+: CPT

## 2020-08-04 PROCEDURE — 25010000002 IOPAMIDOL 61 % SOLUTION: Performed by: INTERNAL MEDICINE

## 2020-08-04 RX ADMIN — IOPAMIDOL 85 ML: 612 INJECTION, SOLUTION INTRAVENOUS at 11:32

## 2020-08-05 LAB
ALBUMIN SERPL-MCNC: 5 G/DL (ref 3.5–5.2)
ALBUMIN/GLOB SERPL: 2.6 G/DL
ALP SERPL-CCNC: 73 U/L (ref 39–117)
ALT SERPL-CCNC: 15 U/L (ref 1–41)
AST SERPL-CCNC: 22 U/L (ref 1–40)
BASOPHILS # BLD AUTO: 0.01 10*3/MM3 (ref 0–0.2)
BASOPHILS NFR BLD AUTO: 0.2 % (ref 0–1.5)
BILIRUB SERPL-MCNC: 0.8 MG/DL (ref 0–1.2)
BUN SERPL-MCNC: 22 MG/DL (ref 8–23)
BUN/CREAT SERPL: 20 (ref 7–25)
CALCIUM SERPL-MCNC: 9.5 MG/DL (ref 8.6–10.5)
CEA SERPL-MCNC: 2.6 NG/ML
CHLORIDE SERPL-SCNC: 102 MMOL/L (ref 98–107)
CO2 SERPL-SCNC: 26 MMOL/L (ref 22–29)
CREAT SERPL-MCNC: 1.1 MG/DL (ref 0.76–1.27)
EOSINOPHIL # BLD AUTO: 0.02 10*3/MM3 (ref 0–0.4)
EOSINOPHIL NFR BLD AUTO: 0.4 % (ref 0.3–6.2)
ERYTHROCYTE [DISTWIDTH] IN BLOOD BY AUTOMATED COUNT: 14.3 % (ref 12.3–15.4)
GLOBULIN SER CALC-MCNC: 1.9 GM/DL
GLUCOSE SERPL-MCNC: 87 MG/DL (ref 65–99)
HCT VFR BLD AUTO: 41.3 % (ref 37.5–51)
HGB BLD-MCNC: 14.1 G/DL (ref 13–17.7)
IMM GRANULOCYTES # BLD AUTO: 0.01 10*3/MM3 (ref 0–0.05)
IMM GRANULOCYTES NFR BLD AUTO: 0.2 % (ref 0–0.5)
LYMPHOCYTES # BLD AUTO: 1.11 10*3/MM3 (ref 0.7–3.1)
LYMPHOCYTES NFR BLD AUTO: 20.4 % (ref 19.6–45.3)
MCH RBC QN AUTO: 35.3 PG (ref 26.6–33)
MCHC RBC AUTO-ENTMCNC: 34.1 G/DL (ref 31.5–35.7)
MCV RBC AUTO: 103.5 FL (ref 79–97)
MONOCYTES # BLD AUTO: 0.51 10*3/MM3 (ref 0.1–0.9)
MONOCYTES NFR BLD AUTO: 9.4 % (ref 5–12)
NEUTROPHILS # BLD AUTO: 3.78 10*3/MM3 (ref 1.7–7)
NEUTROPHILS NFR BLD AUTO: 69.4 % (ref 42.7–76)
NRBC BLD AUTO-RTO: 0 /100 WBC (ref 0–0.2)
PLATELET # BLD AUTO: 172 10*3/MM3 (ref 140–450)
POTASSIUM SERPL-SCNC: 5.4 MMOL/L (ref 3.5–5.2)
PROT SERPL-MCNC: 6.9 G/DL (ref 6–8.5)
RBC # BLD AUTO: 3.99 10*6/MM3 (ref 4.14–5.8)
SODIUM SERPL-SCNC: 139 MMOL/L (ref 136–145)
WBC # BLD AUTO: 5.44 10*3/MM3 (ref 3.4–10.8)

## 2020-08-06 ENCOUNTER — APPOINTMENT (OUTPATIENT)
Dept: CT IMAGING | Facility: HOSPITAL | Age: 70
End: 2020-08-06

## 2020-08-07 ENCOUNTER — INFUSION (OUTPATIENT)
Dept: ONCOLOGY | Facility: HOSPITAL | Age: 70
End: 2020-08-07

## 2020-08-07 ENCOUNTER — OFFICE VISIT (OUTPATIENT)
Dept: ONCOLOGY | Facility: CLINIC | Age: 70
End: 2020-08-07

## 2020-08-07 VITALS
OXYGEN SATURATION: 98 % | TEMPERATURE: 98.4 F | SYSTOLIC BLOOD PRESSURE: 120 MMHG | RESPIRATION RATE: 16 BRPM | HEIGHT: 72 IN | DIASTOLIC BLOOD PRESSURE: 72 MMHG | WEIGHT: 162 LBS | HEART RATE: 80 BPM | BODY MASS INDEX: 21.94 KG/M2

## 2020-08-07 DIAGNOSIS — C18.7 MALIGNANT NEOPLASM OF SIGMOID COLON (HCC): Primary | ICD-10-CM

## 2020-08-07 PROCEDURE — 25010000003 HEPARIN LOCK FLUSH PER 10 UNITS: Performed by: INTERNAL MEDICINE

## 2020-08-07 PROCEDURE — 96523 IRRIG DRUG DELIVERY DEVICE: CPT

## 2020-08-07 PROCEDURE — 99214 OFFICE O/P EST MOD 30 MIN: CPT | Performed by: INTERNAL MEDICINE

## 2020-08-07 RX ORDER — SODIUM CHLORIDE 0.9 % (FLUSH) 0.9 %
10 SYRINGE (ML) INJECTION AS NEEDED
Status: DISCONTINUED | OUTPATIENT
Start: 2020-08-07 | End: 2020-08-07 | Stop reason: HOSPADM

## 2020-08-07 RX ORDER — HEPARIN SODIUM (PORCINE) LOCK FLUSH IV SOLN 100 UNIT/ML 100 UNIT/ML
500 SOLUTION INTRAVENOUS AS NEEDED
Status: CANCELLED | OUTPATIENT
Start: 2020-08-07

## 2020-08-07 RX ORDER — HEPARIN SODIUM (PORCINE) LOCK FLUSH IV SOLN 100 UNIT/ML 100 UNIT/ML
500 SOLUTION INTRAVENOUS AS NEEDED
Status: DISCONTINUED | OUTPATIENT
Start: 2020-08-07 | End: 2020-08-07 | Stop reason: HOSPADM

## 2020-08-07 RX ORDER — SODIUM CHLORIDE 0.9 % (FLUSH) 0.9 %
10 SYRINGE (ML) INJECTION AS NEEDED
Status: CANCELLED | OUTPATIENT
Start: 2020-08-07

## 2020-08-07 RX ADMIN — HEPARIN 500 UNITS: 100 SYRINGE at 09:16

## 2020-08-07 RX ADMIN — SODIUM CHLORIDE, PRESERVATIVE FREE 10 ML: 5 INJECTION INTRAVENOUS at 09:16

## 2020-08-07 NOTE — PROGRESS NOTES
CHIEF COMPLAINT: 1.  Peripheral neuropathy of the hands and feet, worse in the feet                                       2.  Follow-up for colon cancer    Problem List:  Oncology/Hematology History    1. Stage CARLOS, [id number] colon cancer with 2 out of 14 pericolonic lymph nodes  involved and a left lobe liver biopsy positive for metastasis, presenting with  a 25 pound weight loss and diarrhea with some perirectal soreness and had  colonoscopy with Dr. Mcclain in January that found this lesion that was  circumferential high-grade invading into the pericolonic fat, status post  sigmoid colectomy of a poorly differentiated adenocarcinoma.   a) Baseline CEA postop of 0.8 with alkaline phosphatase 111, with normal liver  enzymes and creatinine of 0.8. Baseline white count 9820 with a hemoglobin  13.8, platelets 339,000, and CT with contrast showing a right lobe liver dome  lesion as well as an anastomotic change in the bowel.   b) Began CapeOx 03/15/2016.  c) Added in Avastin to CapeOx 04/05/2016, second cycle. (This was 8 weeks out  from surgery).  d) KRAS mutation is negative.  E.) CAT scan in August 2016 shows resolution of disease in the liver and colon and a stable lung nodule.  Hence Avastin, Xeloda, and oxaliplatin continued.  F) oxaliplatin discontinued October 2016 due to worsening peripheral neuropathy.  G.) CTs of February 2017 showed no evidence of metastasis and stable bibasilar nodular scar.  Persistent neuropathy not worsening.  CEA 1.4.  Continuing Avastin and Xeloda without oxaliplatin.  Having some problems with irritation of his eyes on Xeloda.  2.  Peripheral neuropathy, chemotherapy induced        Malignant neoplasm of sigmoid colon (CMS/HCC)    5/20/2016 Initial Diagnosis     Malignant neoplasm of sigmoid colon      5/2/2017 Imaging     CT chest, abdomen, pelvis IMPRESSION:  1. No disease recurrence.  2. Minimal areas of nodular thickening in the right lower lobe, stable.      8/2/2017 Imaging     CT  chest/abdomen/pelvis IMPRESSION:  Stable examination with no evidence of acute intrathoracic,  intra-abdominal or pelvic abnormality. There is no evidence of  progression of disease. No metastatic disease.       11/13/2017 Imaging     CT chest/abdomen/pelvis IMPRESSION:  Stable examination without evidence of acute intrathoracic  intra-abdominal or intrapelvic abnormality. No evidence of progression  of disease.   CEA 1.3.      2/6/2020 -  Chemotherapy     OP COLON Capecitabine 1,250 mg/m2 BID (8 cycles)         HISTORY OF PRESENT ILLNESS:  The patient is a 69 y.o. male, here for follow up on management of Stage IV a colon cancer.  The patient continues to do well and is tolerating therapy with Xeloda.  I had stopped his Avastin in February 2020.  His functional status is pretty good.  Clinically no issues with occasional abdominal discomfort.  No constipation or diarrhea.  No hand-foot syndrome.     Past Medical History:   Diagnosis Date   • Hypertension    • Liver disease     mets from colon cancer   • Malignant neoplasm of colon (CMS/HCC)      Past Surgical History:   Procedure Laterality Date   • COLON SURGERY      Sigmoid   • LIVER SURGERY     • PORTACATH PLACEMENT         No Known Allergies    Family History and Social History reviewed and changed as necessary      REVIEW OF SYSTEM:   Review of Systems   Constitutional: Negative for appetite change, chills, diaphoresis, fatigue, fever and unexpected weight change.   HENT:   Negative for mouth sores, sore throat and trouble swallowing.    Eyes: Negative for icterus.   Respiratory: Negative for cough, hemoptysis and shortness of breath.    Cardiovascular: Negative for chest pain, leg swelling and palpitations.   Gastrointestinal: Negative for abdominal distention, abdominal pain, blood in stool, constipation, diarrhea, nausea and vomiting.   Endocrine: Negative for hot flashes.   Genitourinary: Negative for bladder incontinence, difficulty urinating, dysuria,  "frequency and hematuria.    Musculoskeletal: Negative for gait problem, neck pain and neck stiffness.   Skin: Negative for rash.  Positive for dry skin.  Neurological: Positive for peripheral neuropathy in the hands and feet.  Hematological: Negative for adenopathy. Does not bruise/bleed easily.   Psychiatric/Behavioral: Negative for depression. The patient is not nervous/anxious.    All other systems reviewed and are negative.       PHYSICAL EXAM    Vitals:    08/07/20 0849   BP: 120/72   Pulse: 80   Resp: 16   Temp: 98.4 °F (36.9 °C)   TempSrc: Temporal   SpO2: 98%   Weight: 73.5 kg (162 lb)   Height: 182.9 cm (72\")     Constitutional: Appears well-developed and well-nourished. No distress.   ECOG: (1) Restricted in physically strenuous activity, ambulatory and able to do work of light nature  HENT:   Head: Normocephalic.   Mouth/Throat: Oropharynx is clear and moist.   Eyes: Conjunctivae are normal. Pupils are equal, round, and reactive to light. No scleral icterus.   Neck: Neck supple. No JVD present. No thyromegaly present.   Cardiovascular: Normal rate, regular rhythm and normal heart sounds.    Pulmonary/Chest: Breath sounds normal. No respiratory distress.   Nodes: No cervical, supraclavicular or axillary nodes palpable on exam.  Abdominal: Soft. Exhibits no distension and no mass. There is no hepatosplenomegaly. There is no tenderness. There is no rebound and no guarding.   Musculoskeletal:Exhibits no edema, tenderness or deformity.   Neurological: Alert and oriented to person, place, and time. Exhibits normal muscle tone.   Skin: Dry.  No ecchymosis, no petechiae and no rash noted. Not diaphoretic. No cyanosis.   Psychiatric: Normal mood and affect.   Vitals reviewed.  Laboratory data reviewed along with CT chest abdomen and pelvis and images thereof as outlined above in the oncology history were reviewed at time of visit.    Lab Results   Component Value Date    WBC 5.44 08/04/2020    HGB 14.1 08/04/2020 "    HCT 41.3 08/04/2020    .5 (H) 08/04/2020     08/04/2020       Lab Results   Component Value Date    GLUCOSE 88 03/19/2020    BUN 22 08/04/2020    CREATININE 1.10 08/04/2020    EGFRIFNONA 67 08/04/2020    EGFRIFAFRI 81 08/04/2020    BCR 20.0 08/04/2020    K 5.4 (H) 08/04/2020    CO2 26.0 08/04/2020    CALCIUM 9.5 08/04/2020    PROTENTOTREF 6.9 08/04/2020    ALBUMIN 5.00 08/04/2020    LABIL2 2.6 08/04/2020    AST 22 08/04/2020    ALT 15 08/04/2020       Lab Results   Component Value Date    CEA 2.6 08/04/2020    CEA 4.07 01/20/2020    CEA 2.86 12/30/2019    CEA 2.84 11/18/2019     Ct Chest With Contrast    Result Date: 8/5/2020  1. Stable appearance of the chest with right lung scarring, partially calcified and unchanged from prior without new nodule or mass.  2. Stable appearance of right hepatic cystic focus with no evidence of enhancement or complex features/interval growth from multiple prior comparisons. No new focal liver lesion or metastasis within the abdomen/pelvis.  3. No acute pathology within the abdomen and pelvis otherwise noted.  D:  08/04/2020 E:  08/05/2020  This report was finalized on 8/5/2020 4:27 PM by Dr. Pratik Juan.      Ct Abdomen Pelvis With Contrast    Result Date: 8/5/2020  1. Stable appearance of the chest with right lung scarring, partially calcified and unchanged from prior without new nodule or mass.  2. Stable appearance of right hepatic cystic focus with no evidence of enhancement or complex features/interval growth from multiple prior comparisons. No new focal liver lesion or metastasis within the abdomen/pelvis.  3. No acute pathology within the abdomen and pelvis otherwise noted.  D:  08/04/2020 E:  08/05/2020  This report was finalized on 8/5/2020 4:27 PM by Dr. Pratik Juan.            Assessment/Plan     1. Metastatic colon cancer with Solitary Liver metastasis initially diagnosed 2- -he has been on Xeloda and Avastin for 5 years.  I reviewed his scans  today which shows no evidence of disease.  At this point I have recommended he discontinue Xeloda.  At this point there is limited benefit of continuing chemotherapy.  He is likely going to have a worse outcome being on chemotherapy in the midst of a pandemic.  I will check on him in 3 months to make sure he is doing well.    2. Peripheral neuropathy secondary to chemotherapy  3. Cytopenia due chemotherapy: Seems better.    Discussion: At this time he has no measurable disease.  He will return to clinic in 3 months.    Spent 25 minutes on the patient's plan and care with 20 minutes spent on counseling the patient regarding the plan going forward and reviewing his scans with him.    Santi Abad MD    08/07/20

## 2020-09-04 DIAGNOSIS — C18.7 MALIGNANT NEOPLASM OF SIGMOID COLON (HCC): ICD-10-CM

## 2020-09-04 RX ORDER — CAPECITABINE 500 MG/1
TABLET, FILM COATED ORAL
OUTPATIENT
Start: 2020-09-04

## 2020-09-04 NOTE — TELEPHONE ENCOUNTER
"Reviewed most recent oncology office visit note \"at this point I have recommended he discontinue Xeloda.\"  "

## 2020-09-08 ENCOUNTER — TELEPHONE (OUTPATIENT)
Dept: ONCOLOGY | Facility: CLINIC | Age: 70
End: 2020-09-08

## 2020-09-08 NOTE — TELEPHONE ENCOUNTER
Patient instructed to call Dr. Abraham's office who prescribes the Lisinopril.  Patient stated understanding.

## 2020-09-09 RX ORDER — LISINOPRIL 20 MG/1
20 TABLET ORAL DAILY
Qty: 15 TABLET | Refills: 0 | Status: SHIPPED | OUTPATIENT
Start: 2020-09-09 | End: 2020-09-28 | Stop reason: SDUPTHER

## 2020-09-09 NOTE — TELEPHONE ENCOUNTER
Caller: Gordon Avila    Relationship: Self    Best call back number: [phone]  Medication needed:   Requested Prescriptions     Pending Prescriptions Disp Refills   • lisinopril (PRINIVIL,ZESTRIL) 20 MG tablet 30 tablet 0     Sig: Take 1 tablet by mouth Daily. APPOINTMENT NEEDED FOR ADDITIONAL REFILLS       When do you need the refill by: SOON    What details did the patient provide when requesting the medication: PATIENT HAS ENOUGH MEDICATION TO GET HIM THROUGH Saturday.    Does the patient have less than a 3 day supply:  [] Yes  [x] No    What is the patient's preferred pharmacy: [id number] - [place], [place] - [address] AT [facility] [address] - [phone]  - [phone] FX

## 2020-09-25 ENCOUNTER — INFUSION (OUTPATIENT)
Dept: ONCOLOGY | Facility: HOSPITAL | Age: 70
End: 2020-09-25

## 2020-09-25 VITALS
SYSTOLIC BLOOD PRESSURE: 122 MMHG | BODY MASS INDEX: 22.38 KG/M2 | HEART RATE: 57 BPM | TEMPERATURE: 98.8 F | WEIGHT: 165 LBS | RESPIRATION RATE: 12 BRPM | DIASTOLIC BLOOD PRESSURE: 71 MMHG

## 2020-09-25 DIAGNOSIS — C18.7 MALIGNANT NEOPLASM OF SIGMOID COLON (HCC): Primary | ICD-10-CM

## 2020-09-25 PROCEDURE — 96523 IRRIG DRUG DELIVERY DEVICE: CPT

## 2020-09-25 PROCEDURE — 25010000003 HEPARIN LOCK FLUSH PER 10 UNITS: Performed by: INTERNAL MEDICINE

## 2020-09-25 RX ORDER — SODIUM CHLORIDE 0.9 % (FLUSH) 0.9 %
10 SYRINGE (ML) INJECTION AS NEEDED
Status: CANCELLED | OUTPATIENT
Start: 2020-09-25

## 2020-09-25 RX ORDER — HEPARIN SODIUM (PORCINE) LOCK FLUSH IV SOLN 100 UNIT/ML 100 UNIT/ML
500 SOLUTION INTRAVENOUS AS NEEDED
Status: DISCONTINUED | OUTPATIENT
Start: 2020-09-25 | End: 2020-09-25 | Stop reason: HOSPADM

## 2020-09-25 RX ORDER — SODIUM CHLORIDE 0.9 % (FLUSH) 0.9 %
10 SYRINGE (ML) INJECTION AS NEEDED
Status: DISCONTINUED | OUTPATIENT
Start: 2020-09-25 | End: 2020-09-25 | Stop reason: HOSPADM

## 2020-09-25 RX ORDER — HEPARIN SODIUM (PORCINE) LOCK FLUSH IV SOLN 100 UNIT/ML 100 UNIT/ML
500 SOLUTION INTRAVENOUS AS NEEDED
Status: CANCELLED | OUTPATIENT
Start: 2020-09-25

## 2020-09-25 RX ADMIN — HEPARIN 500 UNITS: 100 SYRINGE at 13:10

## 2020-09-25 RX ADMIN — SODIUM CHLORIDE, PRESERVATIVE FREE 10 ML: 5 INJECTION INTRAVENOUS at 13:10

## 2020-09-28 ENCOUNTER — OFFICE VISIT (OUTPATIENT)
Dept: INTERNAL MEDICINE | Facility: CLINIC | Age: 70
End: 2020-09-28

## 2020-09-28 VITALS
OXYGEN SATURATION: 98 % | SYSTOLIC BLOOD PRESSURE: 100 MMHG | DIASTOLIC BLOOD PRESSURE: 60 MMHG | WEIGHT: 167.8 LBS | BODY MASS INDEX: 22.73 KG/M2 | HEART RATE: 57 BPM | HEIGHT: 72 IN | TEMPERATURE: 98.6 F

## 2020-09-28 DIAGNOSIS — I10 HTN (HYPERTENSION), BENIGN: ICD-10-CM

## 2020-09-28 DIAGNOSIS — T45.1X5A PERIPHERAL NEUROPATHY DUE TO CHEMOTHERAPY (HCC): ICD-10-CM

## 2020-09-28 DIAGNOSIS — C18.7 MALIGNANT NEOPLASM OF SIGMOID COLON (HCC): Primary | ICD-10-CM

## 2020-09-28 DIAGNOSIS — G62.0 PERIPHERAL NEUROPATHY DUE TO CHEMOTHERAPY (HCC): ICD-10-CM

## 2020-09-28 PROCEDURE — 96160 PT-FOCUSED HLTH RISK ASSMT: CPT | Performed by: INTERNAL MEDICINE

## 2020-09-28 PROCEDURE — G0439 PPPS, SUBSEQ VISIT: HCPCS | Performed by: INTERNAL MEDICINE

## 2020-09-28 PROCEDURE — 99397 PER PM REEVAL EST PAT 65+ YR: CPT | Performed by: INTERNAL MEDICINE

## 2020-09-28 RX ORDER — LISINOPRIL 20 MG/1
20 TABLET ORAL DAILY
Qty: 15 TABLET | Refills: 0 | Status: SHIPPED | OUTPATIENT
Start: 2020-09-28 | End: 2020-10-13 | Stop reason: SDUPTHER

## 2020-09-28 RX ORDER — ESCITALOPRAM OXALATE 10 MG/1
10 TABLET ORAL DAILY
Qty: 90 TABLET | Refills: 3 | Status: SHIPPED | OUTPATIENT
Start: 2020-09-28 | End: 2021-10-15

## 2020-09-28 RX ORDER — CLONAZEPAM 1 MG/1
1 TABLET ORAL NIGHTLY
Qty: 45 TABLET | Refills: 0 | Status: SHIPPED | OUTPATIENT
Start: 2020-09-28 | End: 2020-10-13 | Stop reason: SDUPTHER

## 2020-09-28 RX ORDER — GABAPENTIN 300 MG/1
300 CAPSULE ORAL
Qty: 30 CAPSULE | Refills: 5 | Status: SHIPPED | OUTPATIENT
Start: 2020-09-28 | End: 2021-04-29 | Stop reason: SDUPTHER

## 2020-09-28 RX ORDER — CLONAZEPAM 1 MG/1
1 TABLET ORAL 2 TIMES DAILY PRN
COMMUNITY
End: 2020-09-28 | Stop reason: SDUPTHER

## 2020-09-28 NOTE — PROGRESS NOTES
The ABCs of the Annual Wellness Visit  Subsequent Medicare Wellness Visit    Chief Complaint   Patient presents with   • Medicare Wellness-subsequent   • Hypertension       Subjective   History of Present Illness:  Gordon Avila is a 69 y.o. male who presents for a Subsequent Medicare Wellness Visit.    HEALTH RISK ASSESSMENT    Recent Hospitalizations:  No hospitalization(s) within the last year.    Current Medical Providers:  Patient Care Team:  Ed Abraham MD as PCP - General (Internal Medicine)    Smoking Status:  Social History     Tobacco Use   Smoking Status Former Smoker   • Packs/day: 2.00   • Quit date:    • Years since quittin.7   Smokeless Tobacco Never Used       Alcohol Consumption:  Social History     Substance and Sexual Activity   Alcohol Use Yes       Depression Screen:   PHQ-2/PHQ-9 Depression Screening 2020   Little interest or pleasure in doing things 0   Feeling down, depressed, or hopeless 0   Total Score 0       Fall Risk Screen:  MARV Fall Risk Assessment was completed, and patient is at LOW risk for falls.Assessment completed on:2020    Health Habits and Functional and Cognitive Screening:  Functional & Cognitive Status 2020   Do you have difficulty preparing food and eating? No   Do you have difficulty bathing yourself, getting dressed or grooming yourself? No   Do you have difficulty using the toilet? No   Do you have difficulty moving around from place to place? No   Do you have trouble with steps or getting out of a bed or a chair? No   Current Diet Well Balanced Diet   Dental Exam Up to date   Eye Exam Up to date   Exercise (times per week) 0 times per week   Current Exercise Activities Include No Regular Exercise   Do you need help using the phone?  No   Are you deaf or do you have serious difficulty hearing?  No   Do you need help with transportation? No   Do you need help shopping? No   Do you need help preparing meals?  No   Do you need help with  housework?  No   Do you need help with laundry? No   Do you need help taking your medications? No   Do you need help managing money? No   Do you ever drive or ride in a car without wearing a seat belt? No   Have you felt unusual stress, anger or loneliness in the last month? No   Who do you live with? Spouse   If you need help, do you have trouble finding someone available to you? No   Have you been bothered in the last four weeks by sexual problems? No   Do you have difficulty concentrating, remembering or making decisions? No         Does the patient have evidence of cognitive impairment? No    Asprin use counseling:Does not need ASA (and currently is not on it)    Age-appropriate Screening Schedule:  Refer to the list below for future screening recommendations based on patient's age, sex and/or medical conditions. Orders for these recommended tests are listed in the plan section. The patient has been provided with a written plan.    Health Maintenance   Topic Date Due   • TDAP/TD VACCINES (1 - Tdap) 12/07/1970   • ZOSTER VACCINE (1 of 2) 07/08/2029 (Originally 12/7/2001)   • COLONOSCOPY  02/11/2026   • INFLUENZA VACCINE  Completed          The following portions of the patient's history were reviewed and updated as appropriate: allergies, current medications, past family history, past medical history, past social history, past surgical history and problem list.    Outpatient Medications Prior to Visit   Medication Sig Dispense Refill   • capecitabine (XELODA) 500 MG chemo tablet Take 2 tablets by mouth 2 (Two) Times a Day. For 14 days on and 7 days off every 21 day cycle 56 tablet 5   • clonazePAM (KlonoPIN) 1 MG tablet Take 1 mg by mouth 2 (Two) Times a Day As Needed.     • escitalopram (LEXAPRO) 10 MG tablet Take 1 tablet by mouth Daily. 90 tablet 3   • gabapentin (NEURONTIN) 100 MG capsule TAKE 1 CAPSULE BY MOUTH THREE TIMES A DAY 90 capsule 5   • lisinopril (PRINIVIL,ZESTRIL) 20 MG tablet Take 1 tablet by  mouth Daily. APPOINTMENT NEEDED FOR ADDITIONAL REFILLS 15 tablet 0   • SSD 1 % cream Apply  topically to the appropriate area as directed Take As Directed. USE WITH PORT 20 g 3   • azithromycin (ZITHROMAX) 250 MG tablet Take 2 tablets the first day, then 1 tablet daily for 4 days. 6 tablet 0   • doxycycline (VIBRAMYICN) 100 MG tablet Take 1 tablet by mouth 2 (Two) Times a Day. For 7 days 14 tablet 0   • HYDROcodone-acetaminophen (NORCO) 5-325 MG per tablet Take 1 tablet by mouth Every 6 (Six) Hours As Needed for Severe Pain . 10 tablet 0   • clonazePAM (KlonoPIN) 1 MG tablet TAKE 1 TABLET BY MOUTH EVERY DAY 30 tablet 5   • doxycycline (VIBRAMYICN) 100 MG tablet TAKE 1 TABLET TWICE DAILY 14 tablet 0   • influenza vac split quad (Flulaval Quadrivalent) 0.5 ML suspension prefilled syringe injection Inject as directed per protocol 0.5 mL 0   • Influenza Vac Subunit Quad (FLUCELVAX QUADRIVALENT) 0.5 ML suspension prefilled syringe injection Inject  into the appropriate muscle as directed by protocol 0.5 mL 0     No facility-administered medications prior to visit.        Patient Active Problem List   Diagnosis   • Malignant neoplasm of sigmoid colon (CMS/HCC)   • Peripheral neuropathy due to chemotherapy (CMS/HCC)   • Sexual dysfunction   • Skin lesion of scalp   • History of known metastasis to liver   • HTN (hypertension), benign       Advanced Care Planning:  ACP discussion was held with the patient during this visit. Patient has an advance directive (not in EMR), copy requested.    Review of Systems   Constitutional: Negative.  Negative for activity change, appetite change, fatigue and fever.   HENT: Negative for congestion, ear discharge, ear pain and trouble swallowing.    Eyes: Negative for photophobia and visual disturbance.   Respiratory: Negative for cough and shortness of breath.    Cardiovascular: Negative for chest pain and palpitations.   Gastrointestinal: Negative for abdominal distention, abdominal pain,  "constipation, diarrhea, nausea and vomiting.   Endocrine: Negative.    Genitourinary: Negative for dysuria, hematuria and urgency.   Musculoskeletal: Positive for arthralgias. Negative for back pain, joint swelling and myalgias.   Skin: Negative for color change and rash.   Allergic/Immunologic: Negative.    Neurological: Negative for dizziness, weakness, light-headedness and headaches.   Hematological: Negative for adenopathy. Does not bruise/bleed easily.   Psychiatric/Behavioral: Positive for sleep disturbance. Negative for agitation, confusion and dysphoric mood. The patient is not nervous/anxious.        Compared to one year ago, the patient feels his physical health is better.  Compared to one year ago, the patient feels his mental health is better.    Reviewed chart for potential of high risk medication in the elderly: yes  Reviewed chart for potential of harmful drug interactions in the elderly:yes    Objective         Vitals:    09/28/20 1348   BP: 100/60   Pulse: 57   Temp: 98.6 °F (37 °C)   TempSrc: Temporal   SpO2: 98%   Weight: 76.1 kg (167 lb 12.8 oz)   Height: 182.9 cm (72\")   PainSc: 0-No pain       Body mass index is 22.76 kg/m².  Discussed the patient's BMI with him. The BMI is in the acceptable range.    Physical Exam  Constitutional:       General: He is not in acute distress.     Appearance: He is well-developed.   HENT:      Nose: Nose normal.   Eyes:      General: No scleral icterus.     Conjunctiva/sclera: Conjunctivae normal.   Neck:      Thyroid: No thyromegaly.      Trachea: No tracheal deviation.   Cardiovascular:      Rate and Rhythm: Normal rate and regular rhythm.      Heart sounds: No murmur. No friction rub.   Pulmonary:      Effort: No respiratory distress.      Breath sounds: No wheezing or rales.   Abdominal:      General: There is no distension.      Palpations: Abdomen is soft. There is no mass.      Tenderness: There is no abdominal tenderness. There is no guarding. "   Musculoskeletal: Normal range of motion.         General: No deformity.      Comments: Port on anterior chest wall   Lymphadenopathy:      Cervical: No cervical adenopathy.   Skin:     General: Skin is warm and dry.      Findings: No erythema or rash.   Neurological:      Mental Status: He is alert and oriented to person, place, and time.      Cranial Nerves: No cranial nerve deficit.      Coordination: Coordination normal.      Deep Tendon Reflexes: Reflexes are normal and symmetric.   Psychiatric:         Behavior: Behavior normal.         Thought Content: Thought content normal.         Judgment: Judgment normal.         Lab Results   Component Value Date    GLU 87 08/04/2020        Assessment/Plan   Medicare Risks and Personalized Health Plan  CMS Preventative Services Quick Reference  Advance Directive Discussion  Polypharmacy    The above risks/problems have been discussed with the patient.  Pertinent information has been shared with the patient in the After Visit Summary.  Follow up plans and orders are seen below in the Assessment/Plan Section.    Diagnoses and all orders for this visit:    1. Malignant neoplasm of sigmoid colon (CMS/HCC) (Primary) stable no change in bowel habits following up with oncology.  Will need repeat colonoscopy will set this up with surgery    2. Peripheral neuropathy due to chemotherapy (CMS/HCC) stable I have changed dosing so that he gets gabapentin at nighttime counseled about gradually weaning off clonazepam counseled about sleep hygiene    3. HTN (hypertension), benign stable with current meds continue with low-salt diet cardiovascular exercise    Other orders  -     clonazePAM (KlonoPIN) 1 MG tablet; Take 1 tablet by mouth Every Night.  Dispense: 90 tablet; Refill: 0  -     escitalopram (LEXAPRO) 10 MG tablet; Take 1 tablet by mouth Daily.  Dispense: 90 tablet; Refill: 3  -     lisinopril (PRINIVIL,ZESTRIL) 20 MG tablet; Take 1 tablet by mouth Daily. APPOINTMENT NEEDED FOR  ADDITIONAL REFILLS  Dispense: 15 tablet; Refill: 0      Follow Up:  No follow-ups on file.     An After Visit Summary and PPPS were given to the patient.

## 2020-09-30 ENCOUNTER — TELEPHONE (OUTPATIENT)
Dept: SURGERY | Facility: CLINIC | Age: 70
End: 2020-09-30

## 2020-09-30 ENCOUNTER — TELEPHONE (OUTPATIENT)
Dept: ONCOLOGY | Facility: CLINIC | Age: 70
End: 2020-09-30

## 2020-09-30 DIAGNOSIS — Z01.818 PREOP TESTING: Primary | ICD-10-CM

## 2020-09-30 RX ORDER — POLYETHYLENE GLYCOL 3350 17 G/17G
POWDER, FOR SOLUTION ORAL
Qty: 238 G | Refills: 0 | Status: SHIPPED | OUTPATIENT
Start: 2020-09-30 | End: 2021-06-28

## 2020-09-30 RX ORDER — BISACODYL 5 MG
TABLET, DELAYED RELEASE (ENTERIC COATED) ORAL
Qty: 4 TABLET | Refills: 0 | Status: SHIPPED | OUTPATIENT
Start: 2020-09-30 | End: 2021-06-28

## 2020-09-30 NOTE — TELEPHONE ENCOUNTER
----- Message from Sharif Subramanian sent at 9/29/2020 11:30 AM EDT -----  Regarding: Colonoscopy  Contact: [phone]  Pt called saying that his PCP wanted to order a colonoscopy but he wanted to check with  to see if he needs to get one or not.   Thanks   Gautam

## 2020-09-30 NOTE — TELEPHONE ENCOUNTER
BS 80   CBC and xray complete   Called patient after discussing with Dr. Mercado. Informing patient that Dr. Mercado was fine with patient to get colonoscopy. Informing him that he would need to hold medication a week before colonoscopy.

## 2020-10-01 ENCOUNTER — PREP FOR SURGERY (OUTPATIENT)
Dept: OTHER | Facility: HOSPITAL | Age: 70
End: 2020-10-01

## 2020-10-01 DIAGNOSIS — Z12.11 COLON CANCER SCREENING: Primary | ICD-10-CM

## 2020-10-08 PROBLEM — Z12.11 COLON CANCER SCREENING: Status: ACTIVE | Noted: 2020-10-08

## 2020-10-13 NOTE — TELEPHONE ENCOUNTER
Caller: Gordon Avila    Relationship: Self    Best call back number: [phone]    Medication needed:   Requested Prescriptions     Pending Prescriptions Disp Refills   • lisinopril (PRINIVIL,ZESTRIL) 20 MG tablet 15 tablet 0     Sig: Take 1 tablet by mouth Daily. APPOINTMENT NEEDED FOR ADDITIONAL REFILLS   • clonazePAM (KlonoPIN) 1 MG tablet 45 tablet 0     Sig: Take 1 tablet by mouth Every Night.       When do you need the refill by: 10/13/20    What details did the patient provide when requesting the medication: PATIENT STATED HE IS COMPLETELY OUT OF THE MEDICATION    Does the patient have less than a 3 day supply:  [x] Yes  [] No    [facility]

## 2020-10-13 NOTE — TELEPHONE ENCOUNTER
PATIENT CALLED AND IS CONCERNED BECAUSE HE IS COMPLETELY OUT OF MEDICATION AND HAS NOT HEARD ANYTHING REGARDING HIS REFILL REQUEST FOR   clonazePAM (KlonoPIN) 1 MG tablet  lisinopril (PRINIVIL,ZESTRIL) 20 MG tablet    [id number] - [place], [place] - [address] AT [facility] [place] [address] - [phone]        PLEASE CALL PATIENT AND ADVISE:[phone] PATIENT SAID IF NOT AVAILABLE TO CALL AND SPEAK WITH  CAMILO -[phone]

## 2020-10-14 RX ORDER — CLONAZEPAM 1 MG/1
1 TABLET ORAL NIGHTLY
Qty: 45 TABLET | Refills: 0 | Status: SHIPPED | OUTPATIENT
Start: 2020-10-14 | End: 2021-03-23

## 2020-10-14 RX ORDER — LISINOPRIL 20 MG/1
20 TABLET ORAL DAILY
Qty: 90 TABLET | Refills: 3 | Status: SHIPPED | OUTPATIENT
Start: 2020-10-14 | End: 2021-07-09

## 2020-10-22 ENCOUNTER — LAB (OUTPATIENT)
Dept: LAB | Facility: HOSPITAL | Age: 70
End: 2020-10-22

## 2020-10-22 DIAGNOSIS — Z01.818 PREOP TESTING: ICD-10-CM

## 2020-10-22 PROCEDURE — U0004 COV-19 TEST NON-CDC HGH THRU: HCPCS | Performed by: SURGERY

## 2020-10-22 PROCEDURE — C9803 HOPD COVID-19 SPEC COLLECT: HCPCS

## 2020-10-23 LAB — SARS-COV-2 RNA RESP QL NAA+PROBE: NOT DETECTED

## 2020-10-26 ENCOUNTER — ANESTHESIA (OUTPATIENT)
Dept: GASTROENTEROLOGY | Facility: HOSPITAL | Age: 70
End: 2020-10-26

## 2020-10-26 ENCOUNTER — ANESTHESIA EVENT (OUTPATIENT)
Dept: GASTROENTEROLOGY | Facility: HOSPITAL | Age: 70
End: 2020-10-26

## 2020-10-26 ENCOUNTER — HOSPITAL ENCOUNTER (OUTPATIENT)
Facility: HOSPITAL | Age: 70
Setting detail: HOSPITAL OUTPATIENT SURGERY
Discharge: HOME | End: 2020-10-26
Attending: SURGERY | Admitting: SURGERY

## 2020-10-26 VITALS
TEMPERATURE: 97.8 F | OXYGEN SATURATION: 100 % | RESPIRATION RATE: 16 BRPM | SYSTOLIC BLOOD PRESSURE: 126 MMHG | HEIGHT: 73 IN | DIASTOLIC BLOOD PRESSURE: 69 MMHG | HEART RATE: 68 BPM | WEIGHT: 166 LBS | BODY MASS INDEX: 22 KG/M2

## 2020-10-26 DIAGNOSIS — Z12.11 COLON CANCER SCREENING: ICD-10-CM

## 2020-10-26 PROCEDURE — 25010000003 HEPARIN LOCK FLUSH PER 10 UNITS: Performed by: NURSE ANESTHETIST, CERTIFIED REGISTERED

## 2020-10-26 PROCEDURE — 45384 COLONOSCOPY W/LESION REMOVAL: CPT | Performed by: SURGERY

## 2020-10-26 PROCEDURE — 88305 TISSUE EXAM BY PATHOLOGIST: CPT | Performed by: SURGERY

## 2020-10-26 PROCEDURE — S0260 H&P FOR SURGERY: HCPCS | Performed by: SURGERY

## 2020-10-26 PROCEDURE — 25010000002 PROPOFOL 200 MG/20ML EMULSION: Performed by: NURSE ANESTHETIST, CERTIFIED REGISTERED

## 2020-10-26 RX ORDER — HEPARIN SODIUM (PORCINE) LOCK FLUSH IV SOLN 100 UNIT/ML 100 UNIT/ML
500 SOLUTION INTRAVENOUS ONCE
Status: COMPLETED | OUTPATIENT
Start: 2020-10-26 | End: 2020-10-26

## 2020-10-26 RX ORDER — ONDANSETRON 2 MG/ML
4 INJECTION INTRAMUSCULAR; INTRAVENOUS ONCE AS NEEDED
Status: DISCONTINUED | OUTPATIENT
Start: 2020-10-26 | End: 2020-10-26 | Stop reason: HOSPADM

## 2020-10-26 RX ORDER — LIDOCAINE HYDROCHLORIDE 20 MG/ML
INJECTION, SOLUTION INTRAVENOUS AS NEEDED
Status: DISCONTINUED | OUTPATIENT
Start: 2020-10-26 | End: 2020-10-26 | Stop reason: SURG

## 2020-10-26 RX ORDER — SODIUM CHLORIDE, SODIUM LACTATE, POTASSIUM CHLORIDE, CALCIUM CHLORIDE 600; 310; 30; 20 MG/100ML; MG/100ML; MG/100ML; MG/100ML
1000 INJECTION, SOLUTION INTRAVENOUS CONTINUOUS
Status: DISCONTINUED | OUTPATIENT
Start: 2020-10-26 | End: 2020-10-26 | Stop reason: HOSPADM

## 2020-10-26 RX ORDER — SODIUM CHLORIDE 0.9 % (FLUSH) 0.9 %
10 SYRINGE (ML) INJECTION AS NEEDED
Status: DISCONTINUED | OUTPATIENT
Start: 2020-10-26 | End: 2020-10-26 | Stop reason: HOSPADM

## 2020-10-26 RX ORDER — PROPOFOL 10 MG/ML
INJECTION, EMULSION INTRAVENOUS AS NEEDED
Status: DISCONTINUED | OUTPATIENT
Start: 2020-10-26 | End: 2020-10-26 | Stop reason: SURG

## 2020-10-26 RX ADMIN — SODIUM CHLORIDE, POTASSIUM CHLORIDE, SODIUM LACTATE AND CALCIUM CHLORIDE 1000 ML: 600; 310; 30; 20 INJECTION, SOLUTION INTRAVENOUS at 09:36

## 2020-10-26 RX ADMIN — LIDOCAINE HYDROCHLORIDE 60 MG: 20 INJECTION, SOLUTION INTRAVENOUS at 11:00

## 2020-10-26 RX ADMIN — PROPOFOL 50 MG: 10 INJECTION, EMULSION INTRAVENOUS at 11:03

## 2020-10-26 RX ADMIN — PROPOFOL 50 MG: 10 INJECTION, EMULSION INTRAVENOUS at 11:06

## 2020-10-26 RX ADMIN — PROPOFOL 50 MG: 10 INJECTION, EMULSION INTRAVENOUS at 11:07

## 2020-10-26 RX ADMIN — HEPARIN 500 UNITS: 100 SYRINGE at 12:13

## 2020-10-26 RX ADMIN — PROPOFOL 50 MG: 10 INJECTION, EMULSION INTRAVENOUS at 11:12

## 2020-10-26 NOTE — ANESTHESIA PREPROCEDURE EVALUATION
Anesthesia Evaluation     Patient summary reviewed and Nursing notes reviewed   NPO Solid Status: > 8 hours  NPO Liquid Status: > 8 hours           Airway   Mallampati: II  TM distance: >3 FB  Neck ROM: full  No difficulty expected  Dental - normal exam     Pulmonary - negative pulmonary ROS and normal exam   (-) not a smoker    ROS comment: Former smokeless tobacco user - QUIT 2009  Cardiovascular - normal exam  Exercise tolerance: good (4-7 METS)    ECG reviewed    (+) hypertension less than 2 medications,     ROS comment: 2/2016 - ECG NSR    Neuro/Psych  (+) seizures, numbness,     GI/Hepatic/Renal/Endo    (+)   liver disease,     Musculoskeletal (-) negative ROS    Abdominal  - normal exam   Substance History - negative use  (-) alcohol use, drug use     OB/GYN negative ob/gyn ROS         Other      history of cancer    ROS/Med Hx Other: Malignant neoplasm of sigmoid colon (CMS/HCC)  Peripheral neuropathy due to chemotherapy (CMS/HCC)  Sexual dysfunction  Skin lesion of scalp  History of known metastasis to liver  HTN (hypertension), benign  Colon cancer screening    Wears dental bridge    Portacath                  Anesthesia Plan    ASA 3     MAC   (Patient advised that intravenous sedation would be utilized as primary anesthetic technique. Every effort will be made to make sure patient is comfortable. Patient advised that they may experience recall of events of the procedure. Patient verbalized understanding and agreed to plan. )  intravenous induction     Anesthetic plan, all risks, benefits, and alternatives have been provided, discussed and informed consent has been obtained with: patient.    Plan discussed with CRNA.

## 2020-10-26 NOTE — DISCHARGE INSTRUCTIONS
Please follow all post op instructions and follow up appointment time from your physician's office included in your discharge packet.    Rest today  No pushing,pulling,tugging,heavy lifting, or strenuous activity   No major decision making,driving,or drinking alcoholic beverages for 24 hours due to the sedation you received  Always use good hand hygiene/washing technique  No driving on pain medication.    To assist you in voiding:  Drink plenty of fluids  Listen to running water while attempting to void.    If you are unable to urinate and you have an uncomfortable urge to void or it has been   6 hours since you were discharged, return to the Emergency Room.

## 2020-10-26 NOTE — H&P
10:59 EDT       [facility]   HISTORY AND PHYSICAL      Name:  Gordon Avila   Age:  69 y.o.  Sex:  male  :  1950  MRN:  [id number]   Visit Number:  [id number]  Admission Date:  10/26/2020  Date Of Service:  10/26/20  Primary Care Physician:  Ed Abraham MD    Chief Complaint:     History of colon cancer    History Of Presenting Illness:      Patient for colonoscopy, last one 5 years ago.  Patient has stage IV colon cancer, underwent colectomy and partial liver resection.  Comes in for surveillance and reevaluation.    Review Of Systems:     General ROS: Patient denies any fevers, chills or loss of consciousness.  No complaints of generalized weakness  Psychological ROS: Denies any hallucinations and delusions.  Ophthalmic ROS: no transient loss of vision.  ENT ROS: Denies sore throat, nasal congestion or ear pain.   Allergy and Immunology ROS: Denies rash or itching.  Hematological and Lymphatic ROS: Denies neck swelling or easy bleeding.  Endocrine ROS: Denies any recent unintentional weight gain or loss.  Breast ROS: Denies any pain or swelling.  Respiratory ROS: Denies cough or shortness of breath.   Cardiovascular ROS: Denies chest pain or palpitations. No history of exertional chest pain.   Gastrointestinal ROS: Denies nausea and vomiting. Denies any abdominal pain. No diarrhea.   Genito-Urinary ROS: Denies dysuria or hematuria.  Musculoskeletal ROS: no back pain. No muscle pain. No calf pain.   Neurological ROS: Denies any focal weakness. No loss of consciousness. Denies any numbness.   Dermatological ROS: Denies any redness or pruritis.     Past Medical History:    Past Medical History:   Diagnosis Date   • H/O exercise stress test     Patient states it was normal   • Hypertension    • Liver disease     mets from colon cancer   • Malignant neoplasm of colon (CMS/HCC)    • Seizure (CMS/HCC) 2019   • Wears dentures     bridge   • Wears glasses        Past Surgical  history:    Past Surgical History:   Procedure Laterality Date   • COLON SURGERY      Sigmoid   • LIVER SURGERY     • PORTACATH PLACEMENT         Social History:    Social History     Socioeconomic History   • Marital status:      Spouse name: Not on file   • Number of children: Not on file   • Years of education: Not on file   • Highest education level: Not on file   Tobacco Use   • Smoking status: Never Smoker   • Smokeless tobacco: Former User     Types: Chew   Substance and Sexual Activity   • Alcohol use: Not Currently     Frequency: Never   • Drug use: No   • Sexual activity: Defer       Family History:    Family History   Problem Relation Age of Onset   • COPD Mother    • Heart disease Father    • COPD Other        Allergies:      Patient has no known allergies.    Home Medications:    Prior to Admission Medications     Prescriptions Last Dose Informant Patient Reported? Taking?    bisacodyl (bisacodyl) 5 MG EC tablet 10/25/2020  No Yes    Take 2 at 3pm and 2 at 7pm the day prior to colonoscopy.    clonazePAM (KlonoPIN) 1 MG tablet 10/25/2020  No Yes    Take 1 tablet by mouth Every Night.    escitalopram (LEXAPRO) 10 MG tablet 10/25/2020  No Yes    Take 1 tablet by mouth Daily.    gabapentin (NEURONTIN) 300 MG capsule 10/25/2020  No Yes    Take 1 capsule by mouth every night at bedtime.    lisinopril (PRINIVIL,ZESTRIL) 20 MG tablet 10/25/2020  No Yes    Take 1 tablet by mouth Daily.    polyethylene glycol (MIRALAX) 17 GM/SCOOP powder 10/25/2020  No Yes    Mix 238g powder with 64 oz of clear liquid. Starting at 5pm drink 80z every 10-15 minutes until consumed.    SSD 1 % cream 10/25/2020  No Yes    Apply  topically to the appropriate area as directed Take As Directed. USE WITH PORT    capecitabine (XELODA) 500 MG chemo tablet More than a month  No No    Take 2 tablets by mouth 2 (Two) Times a Day. For 14 days on and 7 days off every 21 day cycle             ED Medications:    Medications   sodium  chloride 0.9 % flush 10 mL (has no administration in time range)   lactated ringers infusion 1,000 mL (1,000 mL Intravenous New Bag 10/26/20 0936)   EPINEPHrine (ADRENALIN) 1 MG/10ML injection  - ADS Override Pull (has no administration in time range)   simethicone (MYLICON) 40 MG/0.6ML drops  - ADS Override Pull (has no administration in time range)       Vital Signs:    Temp:  [97.9 °F (36.6 °C)] 97.9 °F (36.6 °C)  Heart Rate:  [70] 70  Resp:  [18] 18  BP: (102)/(68) 102/68        10/23/20  1607   Weight: 75.3 kg (166 lb)       Body mass index is 21.9 kg/m².    Physical Exam:      General Appearance:  Alert and cooperative, not in any acute distress.   Head:  Atraumatic and normocephalic, without obvious abnormality.   Eyes:          PERRLA, conjunctivae and sclerae normal, no Icterus. No pallor. Extraocular movements are within normal limits.   Ears:  Ears appear intact with no abnormalities noted.   Throat: No oral lesions, no thrush, oral mucosa moist.   Neck: Supple, trachea midline, no thyromegaly, no carotid bruit.   Back:   No kyphoscoliosis present. No tenderness to palpation,   range of motion normal.   Respiratory/Lungs:   Breath sounds heard bilaterally equally.  No crackles or wheezing. No Pleural rub or bronchial breathing. Normal respiratory effort.    Cardiovascular/Heart:  Normal S1 and S2, no murmur. No edema   GI/Abdomen:   No masses, no hepatosplenomegaly. Soft, non-tender, non-distended, no hernia                 Musculoskeletal/ Extremities:   Moves all extremities well   Pulses: Pulses palpable and equal bilaterally   Skin: No bleeding, bruising or rash, no induration   Lymph nodes: No palpable adenopathy   Psychiatric : Alert and oriented ×3.  No depression or anxiety    Neurologic: Cranial nerves 2 - 12 grossly intact, sensation intact, Motor power is normal and equal bilaterally.       EKG:      None    Labs:    Lab Results (last 24 hours)     ** No results found for the last 24 hours. **           Radiology:    Imaging Results (Last 72 Hours)     ** No results found for the last 72 hours. **          Assessment:    History of colon cancer    Plan:     Colonoscopy today, his risk of bleeding and perforation discussed and patient agreeable    Rodrigo Lambert MD  10/26/20  10:59 EDT

## 2020-10-26 NOTE — ANESTHESIA POSTPROCEDURE EVALUATION
Patient: Gordon Avila    Procedure Summary     Date: 10/26/20 [facility]    Anesthesia Start: 1100 Anesthesia Stop: 1116    Procedure: COLONOSCOPY (N/A ) Diagnosis:       Colon cancer screening      (Colon cancer screening [Z12.11])    Surgeon: Rodrigo Lambert MD Provider: Ravi Gardner CRNA    Anesthesia Type: MAC ASA Status: 3          Anesthesia Type: MAC    Vitals  No vitals data found for the desired time range.          Post Anesthesia Care and Evaluation    Patient location during evaluation: bedside  Patient participation: complete - patient participated  Level of consciousness: awake and alert  Pain score: 0  Pain management: adequate  Airway patency: patent  Anesthetic complications: No anesthetic complications  PONV Status: none  Cardiovascular status: acceptable  Respiratory status: acceptable  Hydration status: acceptable

## 2020-10-28 LAB
LAB AP CASE REPORT: NORMAL
PATH REPORT.FINAL DX SPEC: NORMAL

## 2020-11-02 ENCOUNTER — TELEPHONE (OUTPATIENT)
Dept: SURGERY | Facility: CLINIC | Age: 70
End: 2020-11-02

## 2020-11-30 ENCOUNTER — INFUSION (OUTPATIENT)
Dept: ONCOLOGY | Facility: HOSPITAL | Age: 70
End: 2020-11-30

## 2020-11-30 VITALS
WEIGHT: 165.7 LBS | SYSTOLIC BLOOD PRESSURE: 118 MMHG | DIASTOLIC BLOOD PRESSURE: 67 MMHG | TEMPERATURE: 98.7 F | HEART RATE: 55 BPM | BODY MASS INDEX: 21.86 KG/M2

## 2020-11-30 DIAGNOSIS — C18.7 MALIGNANT NEOPLASM OF SIGMOID COLON (HCC): ICD-10-CM

## 2020-11-30 LAB
ALBUMIN SERPL-MCNC: 4.2 G/DL (ref 3.5–5.2)
ALBUMIN/GLOB SERPL: 1.8 G/DL
ALP SERPL-CCNC: 83 U/L (ref 39–117)
ALT SERPL W P-5'-P-CCNC: 14 U/L (ref 1–41)
ANION GAP SERPL CALCULATED.3IONS-SCNC: 9.5 MMOL/L (ref 5–15)
AST SERPL-CCNC: 18 U/L (ref 1–40)
BASOPHILS # BLD AUTO: 0.02 10*3/MM3 (ref 0–0.2)
BASOPHILS NFR BLD AUTO: 0.4 % (ref 0–1.5)
BILIRUB SERPL-MCNC: 0.8 MG/DL (ref 0–1.2)
BUN SERPL-MCNC: 12 MG/DL (ref 8–23)
BUN/CREAT SERPL: 13.3 (ref 7–25)
CALCIUM SPEC-SCNC: 9.5 MG/DL (ref 8.6–10.5)
CEA SERPL-MCNC: 1.97 NG/ML
CHLORIDE SERPL-SCNC: 103 MMOL/L (ref 98–107)
CO2 SERPL-SCNC: 25.5 MMOL/L (ref 22–29)
CREAT SERPL-MCNC: 0.9 MG/DL (ref 0.76–1.27)
DEPRECATED RDW RBC AUTO: 42.9 FL (ref 37–54)
EOSINOPHIL # BLD AUTO: 0.07 10*3/MM3 (ref 0–0.4)
EOSINOPHIL NFR BLD AUTO: 1.5 % (ref 0.3–6.2)
ERYTHROCYTE [DISTWIDTH] IN BLOOD BY AUTOMATED COUNT: 12.3 % (ref 12.3–15.4)
GFR SERPL CREATININE-BSD FRML MDRD: 84 ML/MIN/1.73
GLOBULIN UR ELPH-MCNC: 2.4 GM/DL
GLUCOSE SERPL-MCNC: 92 MG/DL (ref 65–99)
HCT VFR BLD AUTO: 40.1 % (ref 37.5–51)
HGB BLD-MCNC: 13.4 G/DL (ref 13–17.7)
IMM GRANULOCYTES # BLD AUTO: 0.02 10*3/MM3 (ref 0–0.05)
IMM GRANULOCYTES NFR BLD AUTO: 0.4 % (ref 0–0.5)
LYMPHOCYTES # BLD AUTO: 1.34 10*3/MM3 (ref 0.7–3.1)
LYMPHOCYTES NFR BLD AUTO: 27.9 % (ref 19.6–45.3)
MCH RBC QN AUTO: 31.8 PG (ref 26.6–33)
MCHC RBC AUTO-ENTMCNC: 33.4 G/DL (ref 31.5–35.7)
MCV RBC AUTO: 95 FL (ref 79–97)
MONOCYTES # BLD AUTO: 0.44 10*3/MM3 (ref 0.1–0.9)
MONOCYTES NFR BLD AUTO: 9.2 % (ref 5–12)
NEUTROPHILS NFR BLD AUTO: 2.91 10*3/MM3 (ref 1.7–7)
NEUTROPHILS NFR BLD AUTO: 60.6 % (ref 42.7–76)
NRBC BLD AUTO-RTO: 0 /100 WBC (ref 0–0.2)
PLATELET # BLD AUTO: 161 10*3/MM3 (ref 140–450)
PMV BLD AUTO: 9.9 FL (ref 6–12)
POTASSIUM SERPL-SCNC: 4.4 MMOL/L (ref 3.5–5.2)
PROT SERPL-MCNC: 6.6 G/DL (ref 6–8.5)
RBC # BLD AUTO: 4.22 10*6/MM3 (ref 4.14–5.8)
SODIUM SERPL-SCNC: 138 MMOL/L (ref 136–145)
WBC # BLD AUTO: 4.8 10*3/MM3 (ref 3.4–10.8)

## 2020-11-30 PROCEDURE — 36591 DRAW BLOOD OFF VENOUS DEVICE: CPT

## 2020-11-30 PROCEDURE — 80053 COMPREHEN METABOLIC PANEL: CPT

## 2020-11-30 PROCEDURE — 36415 COLL VENOUS BLD VENIPUNCTURE: CPT

## 2020-11-30 PROCEDURE — 82378 CARCINOEMBRYONIC ANTIGEN: CPT

## 2020-11-30 PROCEDURE — 85025 COMPLETE CBC W/AUTO DIFF WBC: CPT

## 2020-11-30 PROCEDURE — 25010000003 HEPARIN LOCK FLUSH PER 10 UNITS: Performed by: NURSE PRACTITIONER

## 2020-11-30 RX ORDER — HEPARIN SODIUM (PORCINE) LOCK FLUSH IV SOLN 100 UNIT/ML 100 UNIT/ML
500 SOLUTION INTRAVENOUS EVERY 8 HOURS PRN
Status: DISCONTINUED | OUTPATIENT
Start: 2020-11-30 | End: 2022-08-29

## 2020-11-30 RX ADMIN — SODIUM CHLORIDE, PRESERVATIVE FREE 500 UNITS: 5 INJECTION INTRAVENOUS at 11:06

## 2020-12-03 ENCOUNTER — OFFICE VISIT (OUTPATIENT)
Dept: ONCOLOGY | Facility: CLINIC | Age: 70
End: 2020-12-03

## 2020-12-03 VITALS
OXYGEN SATURATION: 99 % | WEIGHT: 165 LBS | HEART RATE: 55 BPM | TEMPERATURE: 98.4 F | SYSTOLIC BLOOD PRESSURE: 139 MMHG | RESPIRATION RATE: 12 BRPM | HEIGHT: 73 IN | BODY MASS INDEX: 21.87 KG/M2 | DIASTOLIC BLOOD PRESSURE: 71 MMHG

## 2020-12-03 DIAGNOSIS — C18.7 MALIGNANT NEOPLASM OF SIGMOID COLON (HCC): Primary | ICD-10-CM

## 2020-12-03 DIAGNOSIS — Z85.89 HISTORY OF KNOWN METASTASIS TO LIVER: ICD-10-CM

## 2020-12-03 PROCEDURE — 99214 OFFICE O/P EST MOD 30 MIN: CPT | Performed by: NURSE PRACTITIONER

## 2020-12-03 RX ORDER — HEPARIN SODIUM (PORCINE) LOCK FLUSH IV SOLN 100 UNIT/ML 100 UNIT/ML
500 SOLUTION INTRAVENOUS AS NEEDED
Status: CANCELLED | OUTPATIENT
Start: 2020-12-03

## 2020-12-03 RX ORDER — SODIUM CHLORIDE 0.9 % (FLUSH) 0.9 %
10 SYRINGE (ML) INJECTION AS NEEDED
Status: CANCELLED | OUTPATIENT
Start: 2020-12-03

## 2020-12-03 RX ORDER — GABAPENTIN 100 MG/1
CAPSULE ORAL
COMMUNITY
Start: 2020-10-29 | End: 2020-12-23

## 2020-12-03 NOTE — PROGRESS NOTES
CHIEF COMPLAINT: 1.  Peripheral neuropathy of the hands and feet, worse in the feet                                       2.  Follow-up for colon cancer    Problem List:  Oncology/Hematology History Overview Note   1. Stage CARLOS, [id number] colon cancer with 2 out of 14 pericolonic lymph nodes  involved and a left lobe liver biopsy positive for metastasis, presenting with  a 25 pound weight loss and diarrhea with some perirectal soreness and had  colonoscopy with Dr. Mcclain in January that found this lesion that was  circumferential high-grade invading into the pericolonic fat, status post  sigmoid colectomy of a poorly differentiated adenocarcinoma.   a) Baseline CEA postop of 0.8 with alkaline phosphatase 111, with normal liver  enzymes and creatinine of 0.8. Baseline white count 9820 with a hemoglobin  13.8, platelets 339,000, and CT with contrast showing a right lobe liver dome  lesion as well as an anastomotic change in the bowel.   b) Began CapeOx 03/15/2016.  c) Added in Avastin to CapeOx 04/05/2016, second cycle. (This was 8 weeks out  from surgery).  d) KRAS mutation is negative.  E.) CAT scan in August 2016 shows resolution of disease in the liver and colon and a stable lung nodule.  Hence Avastin, Xeloda, and oxaliplatin continued.  F) oxaliplatin discontinued October 2016 due to worsening peripheral neuropathy.  G.) CTs of February 2017 showed no evidence of metastasis and stable bibasilar nodular scar.  Persistent neuropathy not worsening.  CEA 1.4.  Continuing Avastin and Xeloda without oxaliplatin.  Having some problems with irritation of his eyes on Xeloda.  2.  Peripheral neuropathy, chemotherapy induced     Malignant neoplasm of sigmoid colon (CMS/HCC)   5/20/2016 Initial Diagnosis    Malignant neoplasm of sigmoid colon     5/2/2017 Imaging    CT chest, abdomen, pelvis IMPRESSION:  1. No disease recurrence.  2. Minimal areas of nodular thickening in the right lower lobe, stable.     8/2/2017 Imaging     CT chest/abdomen/pelvis IMPRESSION:  Stable examination with no evidence of acute intrathoracic,  intra-abdominal or pelvic abnormality. There is no evidence of  progression of disease. No metastatic disease.      11/13/2017 Imaging    CT chest/abdomen/pelvis IMPRESSION:  Stable examination without evidence of acute intrathoracic  intra-abdominal or intrapelvic abnormality. No evidence of progression  of disease.   CEA 1.3.     2/6/2020 -  Chemotherapy    OP COLON Capecitabine 1,250 mg/m2 BID (8 cycles)         HISTORY OF PRESENT ILLNESS:  The patient is a 69 y.o. male, here for follow up on management of Stage IV a colon cancer.  The patient continues to do well. He is not on therapy currently.  Avastin was stopped in February 2020. Xeloda was stopped in  February.  His functional status remains  good.  Clinically, he continues to have no issues with occasional abdominal discomfort.  No constipation or diarrhea.  No hand-foot syndrome. He has noted some pain in the left side of his head. It occurs occasionally. It is ongoing for about 2 weeks. It only lasts a few seconds but happens daily. He thinks it may be related to his glasses and has an opthalmology appointment today. PCP started him on gabapentin for neuropathy and it has improved significantly.       Past Medical History:   Diagnosis Date   • H/O exercise stress test     Patient states it was normal   • Hypertension    • Liver disease     mets from colon cancer   • Malignant neoplasm of colon (CMS/HCC)    • Seizure (CMS/HCC) 2019   • Wears dentures     bridge   • Wears glasses      Past Surgical History:   Procedure Laterality Date   • COLON SURGERY      Sigmoid   • COLONOSCOPY N/A 10/26/2020    Procedure: COLONOSCOPY;  Surgeon: Rodrigo Lambert MD;  Location: [facility] ENDOSCOPY;  Service: Gastroenterology;  Laterality: N/A;   • LIVER SURGERY     • PORTACATH PLACEMENT         No Known Allergies    Family History and Social History reviewed and changed as  "necessary      REVIEW OF SYSTEM:   Review of Systems   Constitutional: Negative for appetite change, chills, diaphoresis, fatigue, fever and unexpected weight change.   HENT:   Negative for mouth sores, sore throat and trouble swallowing.    Eyes: Negative for icterus.   Respiratory: Negative for cough, hemoptysis and shortness of breath.    Cardiovascular: Negative for chest pain, leg swelling and palpitations.   Gastrointestinal: Negative for abdominal distention, abdominal pain, blood in stool, constipation, diarrhea, nausea and vomiting.   Endocrine: Negative for hot flashes.   Genitourinary: Negative for bladder incontinence, difficulty urinating, dysuria, frequency and hematuria.    Musculoskeletal: Negative for gait problem, neck pain and neck stiffness.   Skin: Negative for rash.  Positive for dry skin.  Neurological: Positive for peripheral neuropathy in the hands and feet.  Hematological: Negative for adenopathy. Does not bruise/bleed easily.   Psychiatric/Behavioral: Negative for depression. The patient is not nervous/anxious.    All other systems reviewed and are negative.       PHYSICAL EXAM    Vitals:    12/03/20 1008   BP: 139/71   Pulse: 55   Resp: 12   Temp: 98.4 °F (36.9 °C)   TempSrc: Temporal   SpO2: 99%   Weight: 74.8 kg (165 lb)   Height: 185.4 cm (73\")     Constitutional: Appears well-developed and well-nourished. No distress.   ECOG: (1) Restricted in physically strenuous activity, ambulatory and able to do work of light nature  HENT:   Head: Normocephalic.   Mouth/Throat: Oropharynx is clear and moist.   Eyes: Conjunctivae are normal. Pupils are equal, round, and reactive to light. No scleral icterus.   Neck: Neck supple. No JVD present. No thyromegaly present.   Cardiovascular: Normal rate, regular rhythm and normal heart sounds.    Pulmonary/Chest: Breath sounds normal. No respiratory distress.   Nodes: No cervical, supraclavicular or axillary nodes palpable on exam.  Abdominal: Soft. " Exhibits no distension and no mass. There is no hepatosplenomegaly. There is no tenderness. There is no rebound and no guarding.   Musculoskeletal:Exhibits no edema, tenderness or deformity.   Neurological: Alert and oriented to person, place, and time. Exhibits normal muscle tone.   Skin: Dry.  No ecchymosis, no petechiae and no rash noted. Not diaphoretic. No cyanosis.   Psychiatric: Normal mood and affect.   Vitals reviewed.  Laboratory data reviewed along with CT chest abdomen and pelvis and images thereof as outlined above in the oncology history were reviewed at time of visit.    Lab Results   Component Value Date    WBC 4.80 11/30/2020    HGB 13.4 11/30/2020    HCT 40.1 11/30/2020    MCV 95.0 11/30/2020     11/30/2020       Lab Results   Component Value Date    GLUCOSE 92 11/30/2020    BUN 12 11/30/2020    CREATININE 0.90 11/30/2020    EGFRIFNONA 84 11/30/2020    EGFRIFAFRI 81 08/04/2020    BCR 13.3 11/30/2020    K 4.4 11/30/2020    CO2 25.5 11/30/2020    CALCIUM 9.5 11/30/2020    PROTENTOTREF 6.9 08/04/2020    ALBUMIN 4.20 11/30/2020    LABIL2 2.6 08/04/2020    AST 18 11/30/2020    ALT 14 11/30/2020       Lab Results   Component Value Date    CEA 1.97 11/30/2020    CEA 2.6 08/04/2020    CEA 4.07 01/20/2020    CEA 2.86 12/30/2019     Ct Chest With Contrast    Result Date: 8/5/2020  1. Stable appearance of the chest with right lung scarring, partially calcified and unchanged from prior without new nodule or mass.  2. Stable appearance of right hepatic cystic focus with no evidence of enhancement or complex features/interval growth from multiple prior comparisons. No new focal liver lesion or metastasis within the abdomen/pelvis.  3. No acute pathology within the abdomen and pelvis otherwise noted.  D:  08/04/2020 E:  08/05/2020  This report was finalized on 8/5/2020 4:27 PM by Dr. Pratik Juan.      Ct Abdomen Pelvis With Contrast    Result Date: 8/5/2020  1. Stable appearance of the chest with right  lung scarring, partially calcified and unchanged from prior without new nodule or mass.  2. Stable appearance of right hepatic cystic focus with no evidence of enhancement or complex features/interval growth from multiple prior comparisons. No new focal liver lesion or metastasis within the abdomen/pelvis.  3. No acute pathology within the abdomen and pelvis otherwise noted.  D:  08/04/2020 E:  08/05/2020  This report was finalized on 8/5/2020 4:27 PM by Dr. Pratik Juan.            Assessment/Plan     1.  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  Clinically, he continues to have no evidence of recurrent disease. We discussed cbc, CMP, and CEA was WNL.  Scans showed stable appearance of the chest with right lung scarring, partially calcified and unchanged from prior without new nodule or mass.  Stable appearance of right hepatic cystic focus with no evidence of  enhancement or complex features/interval growth from multiple prior comparisons. No new focal liver lesion or metastasis within the abdomen/pelvis.  I reviewed the films myself.  He will follow up in 3 months with repeat scans and labs.     2. Peripheral neuropathy secondary to chemotherapy. Improved. Continue gabapentin as prescribed by PCP.      3. Cytopenia due chemotherapy: Resolved.    4. Left sided head pain. He will keep scheduled follow up with opthalmology to rule out dysfunction in glasses. If pain persists he will contact office and we will do MRI to rule out metastatic disease.       I spent a total of  25 minutes in direct patient care, greater than  20   minutes (greater than 50%) were spent in coordination of care, and counseling the patient regarding metastatic colon cancer .  I answered any questions patient had with medication and plan.     Adeola Sunshine, APRN    12/03/20

## 2020-12-21 DIAGNOSIS — C18.7 MALIGNANT NEOPLASM OF SIGMOID COLON (HCC): Primary | ICD-10-CM

## 2020-12-22 DIAGNOSIS — C18.7 MALIGNANT NEOPLASM OF SIGMOID COLON (HCC): ICD-10-CM

## 2020-12-22 RX ORDER — CAPECITABINE 500 MG/1
1000 TABLET, FILM COATED ORAL 2 TIMES DAILY
Qty: 56 TABLET | Refills: 5 | Status: CANCELLED | OUTPATIENT
Start: 2020-12-22

## 2020-12-23 RX ORDER — GABAPENTIN 100 MG/1
CAPSULE ORAL
Qty: 90 CAPSULE | Refills: 0 | Status: SHIPPED | OUTPATIENT
Start: 2020-12-23 | End: 2021-01-26

## 2020-12-31 DIAGNOSIS — C18.7 MALIGNANT NEOPLASM OF SIGMOID COLON (HCC): ICD-10-CM

## 2021-01-04 RX ORDER — CAPECITABINE 500 MG/1
1000 TABLET, FILM COATED ORAL 2 TIMES DAILY
Qty: 56 TABLET | Refills: 5 | OUTPATIENT
Start: 2021-01-04

## 2021-01-05 NOTE — TELEPHONE ENCOUNTER
Called patient to inform that Dr. Abraham denied the refill on Xeloda (chemo tablet)    Ok for [facility] to deliver

## 2021-01-25 ENCOUNTER — INFUSION (OUTPATIENT)
Dept: ONCOLOGY | Facility: HOSPITAL | Age: 71
End: 2021-01-25

## 2021-01-25 DIAGNOSIS — C18.7 MALIGNANT NEOPLASM OF SIGMOID COLON (HCC): Primary | ICD-10-CM

## 2021-01-25 PROCEDURE — 25010000003 HEPARIN LOCK FLUSH PER 10 UNITS: Performed by: NURSE PRACTITIONER

## 2021-01-25 PROCEDURE — 96523 IRRIG DRUG DELIVERY DEVICE: CPT

## 2021-01-25 RX ORDER — SODIUM CHLORIDE 0.9 % (FLUSH) 0.9 %
10 SYRINGE (ML) INJECTION AS NEEDED
Status: CANCELLED | OUTPATIENT
Start: 2021-01-25

## 2021-01-25 RX ORDER — HEPARIN SODIUM (PORCINE) LOCK FLUSH IV SOLN 100 UNIT/ML 100 UNIT/ML
500 SOLUTION INTRAVENOUS AS NEEDED
Status: DISCONTINUED | OUTPATIENT
Start: 2021-01-25 | End: 2021-01-25 | Stop reason: HOSPADM

## 2021-01-25 RX ORDER — SODIUM CHLORIDE 0.9 % (FLUSH) 0.9 %
10 SYRINGE (ML) INJECTION AS NEEDED
Status: DISCONTINUED | OUTPATIENT
Start: 2021-01-25 | End: 2021-01-25 | Stop reason: HOSPADM

## 2021-01-25 RX ORDER — HEPARIN SODIUM (PORCINE) LOCK FLUSH IV SOLN 100 UNIT/ML 100 UNIT/ML
500 SOLUTION INTRAVENOUS AS NEEDED
Status: CANCELLED | OUTPATIENT
Start: 2021-01-25

## 2021-01-25 RX ADMIN — HEPARIN 500 UNITS: 100 SYRINGE at 13:02

## 2021-01-25 RX ADMIN — SODIUM CHLORIDE, PRESERVATIVE FREE 10 ML: 5 INJECTION INTRAVENOUS at 13:01

## 2021-01-26 DIAGNOSIS — C18.7 MALIGNANT NEOPLASM OF SIGMOID COLON (HCC): ICD-10-CM

## 2021-01-27 RX ORDER — GABAPENTIN 100 MG/1
CAPSULE ORAL
Qty: 90 CAPSULE | Refills: 0 | Status: SHIPPED | OUTPATIENT
Start: 2021-01-27 | End: 2021-02-23

## 2021-02-23 DIAGNOSIS — C18.7 MALIGNANT NEOPLASM OF SIGMOID COLON (HCC): ICD-10-CM

## 2021-02-23 RX ORDER — GABAPENTIN 100 MG/1
CAPSULE ORAL
Qty: 90 CAPSULE | Refills: 1 | Status: SHIPPED | OUTPATIENT
Start: 2021-02-23 | End: 2021-10-04 | Stop reason: SDUPTHER

## 2021-03-01 ENCOUNTER — HOSPITAL ENCOUNTER (OUTPATIENT)
Dept: CT IMAGING | Facility: HOSPITAL | Age: 71
Discharge: HOME | End: 2021-03-01

## 2021-03-01 DIAGNOSIS — C18.7 MALIGNANT NEOPLASM OF SIGMOID COLON (HCC): ICD-10-CM

## 2021-03-01 DIAGNOSIS — Z85.89 HISTORY OF KNOWN METASTASIS TO LIVER: ICD-10-CM

## 2021-03-01 PROCEDURE — 25010000002 IOPAMIDOL 61 % SOLUTION: Performed by: NURSE PRACTITIONER

## 2021-03-01 PROCEDURE — 71260 CT THORAX DX C+: CPT

## 2021-03-01 PROCEDURE — 74177 CT ABD & PELVIS W/CONTRAST: CPT

## 2021-03-01 RX ORDER — HEPARIN SODIUM (PORCINE) LOCK FLUSH IV SOLN 100 UNIT/ML 100 UNIT/ML
100 SOLUTION INTRAVENOUS EVERY 8 HOURS SCHEDULED
Status: DISCONTINUED | OUTPATIENT
Start: 2021-03-01 | End: 2021-03-02 | Stop reason: HOSPADM

## 2021-03-01 RX ADMIN — IOPAMIDOL 100 ML: 612 INJECTION, SOLUTION INTRAVENOUS at 09:23

## 2021-03-05 ENCOUNTER — APPOINTMENT (OUTPATIENT)
Dept: ONCOLOGY | Facility: HOSPITAL | Age: 71
End: 2021-03-05

## 2021-03-05 ENCOUNTER — OFFICE VISIT (OUTPATIENT)
Dept: ONCOLOGY | Facility: CLINIC | Age: 71
End: 2021-03-05

## 2021-03-05 VITALS
WEIGHT: 165 LBS | HEART RATE: 59 BPM | TEMPERATURE: 97.1 F | DIASTOLIC BLOOD PRESSURE: 69 MMHG | OXYGEN SATURATION: 100 % | HEIGHT: 73 IN | SYSTOLIC BLOOD PRESSURE: 133 MMHG | RESPIRATION RATE: 16 BRPM | BODY MASS INDEX: 21.87 KG/M2

## 2021-03-05 DIAGNOSIS — C18.7 MALIGNANT NEOPLASM OF SIGMOID COLON (HCC): Primary | ICD-10-CM

## 2021-03-05 PROCEDURE — 99214 OFFICE O/P EST MOD 30 MIN: CPT | Performed by: INTERNAL MEDICINE

## 2021-03-08 ENCOUNTER — SPECIALTY PHARMACY (OUTPATIENT)
Dept: ONCOLOGY | Facility: HOSPITAL | Age: 71
End: 2021-03-08

## 2021-03-08 NOTE — PROGRESS NOTES
Received notification from , plan for patient to restart Xeloda 1500 mg PO BID x 7days on, followed  By 7days off. Pt reports having 3 unopened bottles remaining at home from his prior use of Xeloda which was held in August 2020. Called to discuss with patient, patient aware of dosing. Confirms medication on hand. Instructed patient to call our office when he has 2 weeks worth of medication remaining so that we can submit new refills to prevent lapse in treatment. Pt verbalized understanding.

## 2021-03-23 RX ORDER — CLONAZEPAM 1 MG/1
1 TABLET ORAL NIGHTLY
Qty: 30 TABLET | Refills: 0 | Status: SHIPPED | OUTPATIENT
Start: 2021-03-23 | End: 2021-06-14

## 2021-04-26 ENCOUNTER — INFUSION (OUTPATIENT)
Dept: ONCOLOGY | Facility: HOSPITAL | Age: 71
End: 2021-04-26

## 2021-04-26 ENCOUNTER — TELEPHONE (OUTPATIENT)
Dept: ONCOLOGY | Facility: CLINIC | Age: 71
End: 2021-04-26

## 2021-04-26 VITALS — WEIGHT: 162.8 LBS | BODY MASS INDEX: 21.48 KG/M2

## 2021-04-26 DIAGNOSIS — C18.7 MALIGNANT NEOPLASM OF SIGMOID COLON (HCC): Primary | ICD-10-CM

## 2021-04-26 LAB
ALBUMIN SERPL-MCNC: 4.2 G/DL (ref 3.5–5.2)
ALBUMIN/GLOB SERPL: 1.8 G/DL
ALP SERPL-CCNC: 84 U/L (ref 39–117)
ALT SERPL W P-5'-P-CCNC: 10 U/L (ref 1–41)
ANION GAP SERPL CALCULATED.3IONS-SCNC: 9.2 MMOL/L (ref 5–15)
AST SERPL-CCNC: 15 U/L (ref 1–40)
BASOPHILS # BLD AUTO: 0.01 10*3/MM3 (ref 0–0.2)
BASOPHILS NFR BLD AUTO: 0.3 % (ref 0–1.5)
BILIRUB SERPL-MCNC: 1 MG/DL (ref 0–1.2)
BUN SERPL-MCNC: 18 MG/DL (ref 8–23)
BUN/CREAT SERPL: 17.1 (ref 7–25)
CALCIUM SPEC-SCNC: 9.3 MG/DL (ref 8.6–10.5)
CEA SERPL-MCNC: 1.99 NG/ML
CHLORIDE SERPL-SCNC: 103 MMOL/L (ref 98–107)
CO2 SERPL-SCNC: 25.8 MMOL/L (ref 22–29)
CREAT SERPL-MCNC: 1.05 MG/DL (ref 0.76–1.27)
DEPRECATED RDW RBC AUTO: 54.9 FL (ref 37–54)
EOSINOPHIL # BLD AUTO: 0.03 10*3/MM3 (ref 0–0.4)
EOSINOPHIL NFR BLD AUTO: 0.8 % (ref 0.3–6.2)
ERYTHROCYTE [DISTWIDTH] IN BLOOD BY AUTOMATED COUNT: 15.4 % (ref 12.3–15.4)
GFR SERPL CREATININE-BSD FRML MDRD: 70 ML/MIN/1.73
GLOBULIN UR ELPH-MCNC: 2.3 GM/DL
GLUCOSE SERPL-MCNC: 99 MG/DL (ref 65–99)
HCT VFR BLD AUTO: 39.2 % (ref 37.5–51)
HGB BLD-MCNC: 13.1 G/DL (ref 13–17.7)
IMM GRANULOCYTES # BLD AUTO: 0.01 10*3/MM3 (ref 0–0.05)
IMM GRANULOCYTES NFR BLD AUTO: 0.3 % (ref 0–0.5)
LYMPHOCYTES # BLD AUTO: 1.14 10*3/MM3 (ref 0.7–3.1)
LYMPHOCYTES NFR BLD AUTO: 29 % (ref 19.6–45.3)
MCH RBC QN AUTO: 32.8 PG (ref 26.6–33)
MCHC RBC AUTO-ENTMCNC: 33.4 G/DL (ref 31.5–35.7)
MCV RBC AUTO: 98.2 FL (ref 79–97)
MONOCYTES # BLD AUTO: 0.32 10*3/MM3 (ref 0.1–0.9)
MONOCYTES NFR BLD AUTO: 8.1 % (ref 5–12)
NEUTROPHILS NFR BLD AUTO: 2.42 10*3/MM3 (ref 1.7–7)
NEUTROPHILS NFR BLD AUTO: 61.5 % (ref 42.7–76)
NRBC BLD AUTO-RTO: 0 /100 WBC (ref 0–0.2)
PLATELET # BLD AUTO: 139 10*3/MM3 (ref 140–450)
PMV BLD AUTO: 10.2 FL (ref 6–12)
POTASSIUM SERPL-SCNC: 4.1 MMOL/L (ref 3.5–5.2)
PROT SERPL-MCNC: 6.5 G/DL (ref 6–8.5)
RBC # BLD AUTO: 3.99 10*6/MM3 (ref 4.14–5.8)
SODIUM SERPL-SCNC: 138 MMOL/L (ref 136–145)
WBC # BLD AUTO: 3.93 10*3/MM3 (ref 3.4–10.8)

## 2021-04-26 PROCEDURE — 80053 COMPREHEN METABOLIC PANEL: CPT

## 2021-04-26 PROCEDURE — 36415 COLL VENOUS BLD VENIPUNCTURE: CPT

## 2021-04-26 PROCEDURE — 25010000002 HEPARIN LOCK FLUSH PER 10 UNITS: Performed by: NURSE PRACTITIONER

## 2021-04-26 PROCEDURE — 36591 DRAW BLOOD OFF VENOUS DEVICE: CPT

## 2021-04-26 PROCEDURE — 82378 CARCINOEMBRYONIC ANTIGEN: CPT

## 2021-04-26 PROCEDURE — 85025 COMPLETE CBC W/AUTO DIFF WBC: CPT

## 2021-04-26 RX ORDER — HEPARIN SODIUM (PORCINE) LOCK FLUSH IV SOLN 100 UNIT/ML 100 UNIT/ML
500 SOLUTION INTRAVENOUS AS NEEDED
Status: CANCELLED | OUTPATIENT
Start: 2021-04-26

## 2021-04-26 RX ORDER — SODIUM CHLORIDE 0.9 % (FLUSH) 0.9 %
10 SYRINGE (ML) INJECTION AS NEEDED
Status: CANCELLED | OUTPATIENT
Start: 2021-04-26

## 2021-04-26 RX ORDER — HEPARIN SODIUM (PORCINE) LOCK FLUSH IV SOLN 100 UNIT/ML 100 UNIT/ML
500 SOLUTION INTRAVENOUS AS NEEDED
Status: DISCONTINUED | OUTPATIENT
Start: 2021-04-26 | End: 2021-04-26 | Stop reason: HOSPADM

## 2021-04-26 RX ORDER — SODIUM CHLORIDE 0.9 % (FLUSH) 0.9 %
10 SYRINGE (ML) INJECTION AS NEEDED
Status: DISCONTINUED | OUTPATIENT
Start: 2021-04-26 | End: 2021-04-26 | Stop reason: HOSPADM

## 2021-04-26 RX ADMIN — HEPARIN 500 UNITS: 100 SYRINGE at 09:29

## 2021-04-26 RX ADMIN — SODIUM CHLORIDE, PRESERVATIVE FREE 10 ML: 5 INJECTION INTRAVENOUS at 09:29

## 2021-04-26 NOTE — TELEPHONE ENCOUNTER
"----- Message from Danisha Wong RN sent at 4/26/2021 10:40 AM EDT -----  Regarding: APRIL-pt in Infusion today for port flush and labs--had some issues and questions  Pt restarted Xeloda 6 wks ago, 1wk on-1wk off.  He is beginning his week off today.  He reported since starting, he has not felt well.  He gets dizzy, his heart races and he gets chest tightness.  \"Just feels wiped out the week he is on the pills and don't like it\".  He reports his appetite is great, however has lost 2 pounds.  He also asked questions about why he started back on the Xeloda.  I read Dr. Mercado's last note and tried to explain to him.  I think he better understood that after our conversation.  Since he is on his week off, I thought I had time to notify you all and see if you would like to change anything with the Xeloda.  I told him to expect a call from one of you.  Please let me know if I can help in anyway or answer more questions.  Thanks    "

## 2021-04-29 ENCOUNTER — OFFICE VISIT (OUTPATIENT)
Dept: ONCOLOGY | Facility: CLINIC | Age: 71
End: 2021-04-29

## 2021-04-29 VITALS
OXYGEN SATURATION: 99 % | HEART RATE: 56 BPM | BODY MASS INDEX: 22.13 KG/M2 | SYSTOLIC BLOOD PRESSURE: 128 MMHG | TEMPERATURE: 98.4 F | WEIGHT: 167 LBS | HEIGHT: 73 IN | RESPIRATION RATE: 12 BRPM | DIASTOLIC BLOOD PRESSURE: 69 MMHG

## 2021-04-29 DIAGNOSIS — Z12.11 COLON CANCER SCREENING: ICD-10-CM

## 2021-04-29 DIAGNOSIS — C18.7 MALIGNANT NEOPLASM OF SIGMOID COLON (HCC): ICD-10-CM

## 2021-04-29 DIAGNOSIS — D61.810 ANTINEOPLASTIC CHEMOTHERAPY INDUCED PANCYTOPENIA (CODE) (HCC): ICD-10-CM

## 2021-04-29 DIAGNOSIS — C78.7 SECONDARY MALIGNANT NEOPLASM OF LIVER AND INTRAHEPATIC BILE DUCT (HCC): ICD-10-CM

## 2021-04-29 DIAGNOSIS — R55 SYNCOPE, UNSPECIFIED SYNCOPE TYPE: ICD-10-CM

## 2021-04-29 DIAGNOSIS — R42 DIZZINESS: Primary | ICD-10-CM

## 2021-04-29 DIAGNOSIS — G62.0 PERIPHERAL NEUROPATHY DUE TO CHEMOTHERAPY (HCC): ICD-10-CM

## 2021-04-29 DIAGNOSIS — F39 MOOD DISORDER (HCC): ICD-10-CM

## 2021-04-29 DIAGNOSIS — T45.1X5A PERIPHERAL NEUROPATHY DUE TO CHEMOTHERAPY (HCC): ICD-10-CM

## 2021-04-29 PROCEDURE — 99215 OFFICE O/P EST HI 40 MIN: CPT | Performed by: NURSE PRACTITIONER

## 2021-04-29 RX ORDER — GABAPENTIN 300 MG/1
300 CAPSULE ORAL
Qty: 30 CAPSULE | Refills: 5 | Status: SHIPPED | OUTPATIENT
Start: 2021-04-29 | End: 2021-10-07

## 2021-04-29 RX ORDER — GABAPENTIN 300 MG/1
300 CAPSULE ORAL
Qty: 30 CAPSULE | Refills: 5 | Status: SHIPPED | OUTPATIENT
Start: 2021-04-29 | End: 2021-04-29 | Stop reason: SDUPTHER

## 2021-04-29 NOTE — PROGRESS NOTES
CHIEF COMPLAINT: 1.  Peripheral neuropathy of the hands and feet, worse in the feet                                       2.  Follow-up for colon cancer    Problem List:  Oncology/Hematology History Overview Note   1. Stage CARLOS, [id number] colon cancer with 2 out of 14 pericolonic lymph nodes  involved and a left lobe liver biopsy positive for metastasis, presenting with  a 25 pound weight loss and diarrhea with some perirectal soreness and had  colonoscopy with Dr. Mcclain in January that found this lesion that was  circumferential high-grade invading into the pericolonic fat, status post  sigmoid colectomy of a poorly differentiated adenocarcinoma.   a) Baseline CEA postop of 0.8 with alkaline phosphatase 111, with normal liver  enzymes and creatinine of 0.8. Baseline white count 9820 with a hemoglobin  13.8, platelets 339,000, and CT with contrast showing a right lobe liver dome  lesion as well as an anastomotic change in the bowel.   b) Began CapeOx 03/15/2016.  c) Added in Avastin to CapeOx 04/05/2016, second cycle. (This was 8 weeks out  from surgery).  d) KRAS mutation is negative.  E.) CAT scan in August 2016 shows resolution of disease in the liver and colon and a stable lung nodule.  Hence Avastin, Xeloda, and oxaliplatin continued.  F) oxaliplatin discontinued October 2016 due to worsening peripheral neuropathy.  G.) CTs of February 2017 showed no evidence of metastasis and stable bibasilar nodular scar.  Persistent neuropathy not worsening.  CEA 1.4.  Continuing Avastin and Xeloda without oxaliplatin.  Having some problems with irritation of his eyes on Xeloda.  2.  Peripheral neuropathy, chemotherapy induced     Malignant neoplasm of sigmoid colon (CMS/HCC)   5/20/2016 Initial Diagnosis    Malignant neoplasm of sigmoid colon     5/2/2017 Imaging    CT chest, abdomen, pelvis IMPRESSION:  1. No disease recurrence.  2. Minimal areas of nodular thickening in the right lower lobe, stable.     8/2/2017 Imaging     CT chest/abdomen/pelvis IMPRESSION:  Stable examination with no evidence of acute intrathoracic,  intra-abdominal or pelvic abnormality. There is no evidence of  progression of disease. No metastatic disease.      11/13/2017 Imaging    CT chest/abdomen/pelvis IMPRESSION:  Stable examination without evidence of acute intrathoracic  intra-abdominal or intrapelvic abnormality. No evidence of progression  of disease.   CEA 1.3.     2/6/2020 -  Chemotherapy    OP COLON Capecitabine 1,250 mg/m2 BID (8 cycles)         HISTORY OF PRESENT ILLNESS:  The patient is a 70 y.o. male, here for acute visit for Stage IV a colon cancer.  He was restarted on Xeloda after a break. CT scans showed possible reoccurrence.  Since starting chemotherapy he has been experiencing dizziness and what he says is fainting-like spells.  He has never lost consciousness.  He has had to sit down in the yard due to his dizziness.  He is taking capecitabine 3 pills in the morning and 3 pills at night on 7 days off 7 days.  He reports he is also taking gabapentin 3 pills twice a day during the week of chemotherapy and other times he takes 3 pills daily.  He he says he thought somebody told him to increase the dosage when he started his chemotherapy.    Past Medical History:   Diagnosis Date   • H/O exercise stress test     Patient states it was normal   • Hypertension    • Liver disease     mets from colon cancer   • Malignant neoplasm of colon (CMS/HCC)    • Seizure (CMS/HCC) 2019   • Wears dentures     bridge   • Wears glasses      Past Surgical History:   Procedure Laterality Date   • COLON SURGERY      Sigmoid   • COLONOSCOPY N/A 10/26/2020    Procedure: COLONOSCOPY;  Surgeon: Rodrigo Lambert MD;  Location: [facility] ENDOSCOPY;  Service: Gastroenterology;  Laterality: N/A;   • LIVER SURGERY     • PORTACATH PLACEMENT         No Known Allergies    Family History and Social History reviewed and changed as necessary      REVIEW OF SYSTEM:   Review of  "Systems   Constitutional: Negative for appetite change, chills, diaphoresis, fatigue, fever and unexpected weight change.   HENT:   Negative for mouth sores, sore throat and trouble swallowing.    Eyes: Negative for icterus.   Respiratory: Negative for cough, hemoptysis and shortness of breath.    Cardiovascular: Negative for chest pain, leg swelling and palpitations.   Gastrointestinal: Negative for abdominal distention, abdominal pain, blood in stool, constipation, diarrhea, nausea and vomiting.   Endocrine: Negative for hot flashes.   Genitourinary: Negative for bladder incontinence, difficulty urinating, dysuria, frequency and hematuria.    Musculoskeletal: Negative for gait problem, neck pain and neck stiffness.   Skin: Negative for rash.  Positive for dry skin.  Neurological: Positive for peripheral neuropathy in the hands and feet.  Hematological: Negative for adenopathy. Does not bruise/bleed easily.   Psychiatric/Behavioral: Negative for depression. The patient is not nervous/anxious.    All other systems reviewed and are negative.       PHYSICAL EXAM    Vitals:    04/29/21 0912   BP: 128/69   Pulse: 56   Resp: 12   Temp: 98.4 °F (36.9 °C)   TempSrc: Temporal   SpO2: 99%   Weight: 75.8 kg (167 lb)   Height: 185.4 cm (73\")     Constitutional: Appears well-developed and well-nourished. No distress.   ECOG: (1) Restricted in physically strenuous activity, ambulatory and able to do work of light nature  HENT:   Head: Normocephalic.   Mouth/Throat: Oropharynx is clear and moist.   Eyes: Conjunctivae are normal. Pupils are equal, round, and reactive to light. No scleral icterus.   Neck: Neck supple. No JVD present. No thyromegaly present.   Cardiovascular: Normal rate, regular rhythm and normal heart sounds.    Pulmonary/Chest: Breath sounds normal. No respiratory distress.   Nodes: No cervical, supraclavicular or axillary nodes palpable on exam.  Abdominal: Soft. Exhibits no distension and no mass. There is no " hepatosplenomegaly. There is no tenderness. There is no rebound and no guarding.   Musculoskeletal:Exhibits no edema, tenderness or deformity.   Neurological: Alert and oriented to person, place, and time. Exhibits normal muscle tone.   Skin: Dry.  No ecchymosis, no petechiae and no rash noted. Not diaphoretic. No cyanosis.   Psychiatric: Normal mood and affect.   Vitals reviewed.  Laboratory data reviewed along with CT chest abdomen and pelvis and images thereof as outlined above in the oncology history were reviewed at time of visit.    Lab Results   Component Value Date    WBC 3.93 04/26/2021    HGB 13.1 04/26/2021    HCT 39.2 04/26/2021    MCV 98.2 (H) 04/26/2021     (L) 04/26/2021       Lab Results   Component Value Date    GLUCOSE 99 04/26/2021    BUN 18 04/26/2021    CREATININE 1.05 04/26/2021    EGFRIFNONA 70 04/26/2021    EGFRIFAFRI 81 08/04/2020    BCR 17.1 04/26/2021    K 4.1 04/26/2021    CO2 25.8 04/26/2021    CALCIUM 9.3 04/26/2021    PROTENTOTREF 6.9 08/04/2020    ALBUMIN 4.20 04/26/2021    LABIL2 2.6 08/04/2020    AST 15 04/26/2021    ALT 10 04/26/2021       Lab Results   Component Value Date    CEA 1.99 04/26/2021    CEA 1.97 11/30/2020    CEA 2.6 08/04/2020    CEA 4.07 01/20/2020     Ct Chest With Contrast    Result Date: 8/5/2020  1. Stable appearance of the chest with right lung scarring, partially calcified and unchanged from prior without new nodule or mass.  2. Stable appearance of right hepatic cystic focus with no evidence of enhancement or complex features/interval growth from multiple prior comparisons. No new focal liver lesion or metastasis within the abdomen/pelvis.  3. No acute pathology within the abdomen and pelvis otherwise noted.  D:  08/04/2020 E:  08/05/2020  This report was finalized on 8/5/2020 4:27 PM by Dr. Pratik Juan.      Ct Abdomen Pelvis With Contrast    Result Date: 8/5/2020  1. Stable appearance of the chest with right lung scarring, partially calcified and  unchanged from prior without new nodule or mass.  2. Stable appearance of right hepatic cystic focus with no evidence of enhancement or complex features/interval growth from multiple prior comparisons. No new focal liver lesion or metastasis within the abdomen/pelvis.  3. No acute pathology within the abdomen and pelvis otherwise noted.  D:  08/04/2020 E:  08/05/2020  This report was finalized on 8/5/2020 4:27 PM by Dr. Pratik Juan.        Ct Chest With Contrast Diagnostic    Result Date: 3/1/2021  1. Enlarging soft tissue in the right upper lobe peripheral to a coarse calcification. Given clinical history this may be neoplastic or less likely inflammatory. PET/CT correlation may be helpful. 2. Interval development of new small irregular nodular density left lower lobe and ground glass opacity left upper lobe, both nonspecific. Favor inflammatory over neoplastic. 3. If PET/CT is not obtained for evaluation of the right upper lobe density, short-term chest CT follow-up recommended in 3 months.   This study was performed with techniques to keep radiation doses as low as reasonably achievable (ALARA). Individualized dose reduction techniques using automated exposure control or adjustment of vA and/or kV according to the patient size were employed.  This report was finalized on 3/1/2021 11:03 AM by Omer Hunter MD.    Ct Abdomen Pelvis With Contrast    Result Date: 3/1/2021  Stable exam without evidence of metastatic disease.   This study was performed with techniques to keep radiation doses as low as reasonably achievable (ALARA). Individualized dose reduction techniques using automated exposure control or adjustment of vA and/or kV according to the patient size were employed.  This report was finalized on 3/1/2021 11:03 AM by Omer Hunter MD.        Assessment/Plan     1. 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   Currently, this is his off week.  We will continue with Xeloda 1500 mg twice daily 1 week on and 1 week off starting on Sunday.    2. Peripheral neuropathy secondary to chemotherapy.  Stable.  We had a long discussion concerning gabapentin and medication management.  We will continue with gabapentin 300 mg once a day at night.  I explained to the patient that he should not be taking 3 pills twice a day nor 3 times a day.  We discussed that this could be causing some of his syncopal episodes and dizziness. We will do an MRI of his brain to rule out metastatic disease. We discussed that this is unlikely but we want to rule out.     Patient will call back to clinic once he gets home to to review his medication and to confirm that the current dose of gabapentin is 300 mg at night and until dizziness and syncopal episodes resolve we will keep this at the 1 pill at night.    Total time of patient care including time prior to, face to face with patient, and following visit spent in reviewing records, lab results, imaging studies, discussion with patient, and documentation/charting was > 40 minutes      Adeola Sunshine, APRN    04/29/21

## 2021-05-05 ENCOUNTER — HOSPITAL ENCOUNTER (OUTPATIENT)
Dept: MRI IMAGING | Facility: HOSPITAL | Age: 71
Discharge: HOME | End: 2021-05-05
Admitting: NURSE PRACTITIONER

## 2021-05-05 DIAGNOSIS — R42 DIZZINESS: ICD-10-CM

## 2021-05-05 DIAGNOSIS — R55 SYNCOPE, UNSPECIFIED SYNCOPE TYPE: ICD-10-CM

## 2021-05-05 DIAGNOSIS — C18.7 MALIGNANT NEOPLASM OF SIGMOID COLON (HCC): ICD-10-CM

## 2021-05-05 PROCEDURE — 0 GADOBENATE DIMEGLUMINE 529 MG/ML SOLUTION: Performed by: NURSE PRACTITIONER

## 2021-05-05 PROCEDURE — A9577 INJ MULTIHANCE: HCPCS | Performed by: NURSE PRACTITIONER

## 2021-05-05 PROCEDURE — 70553 MRI BRAIN STEM W/O & W/DYE: CPT

## 2021-05-05 RX ADMIN — GADOBENATE DIMEGLUMINE 15 ML: 529 INJECTION, SOLUTION INTRAVENOUS at 13:27

## 2021-06-02 ENCOUNTER — APPOINTMENT (OUTPATIENT)
Dept: CT IMAGING | Facility: HOSPITAL | Age: 71
End: 2021-06-02

## 2021-06-09 ENCOUNTER — HOSPITAL ENCOUNTER (OUTPATIENT)
Dept: CT IMAGING | Facility: HOSPITAL | Age: 71
Discharge: HOME | End: 2021-06-09

## 2021-06-09 DIAGNOSIS — C18.7 MALIGNANT NEOPLASM OF SIGMOID COLON (HCC): ICD-10-CM

## 2021-06-09 LAB — CREAT BLDA-MCNC: 1 MG/DL (ref 0.6–1.3)

## 2021-06-09 PROCEDURE — 74178 CT ABD&PLV WO CNTR FLWD CNTR: CPT

## 2021-06-09 PROCEDURE — 0 DIATRIZOATE MEGLUMINE & SODIUM PER 1 ML: Performed by: INTERNAL MEDICINE

## 2021-06-09 PROCEDURE — 25010000002 IOPAMIDOL 61 % SOLUTION: Performed by: INTERNAL MEDICINE

## 2021-06-09 PROCEDURE — 82565 ASSAY OF CREATININE: CPT

## 2021-06-09 PROCEDURE — 71260 CT THORAX DX C+: CPT

## 2021-06-09 RX ADMIN — DIATRIZOATE MEGLUMINE AND DIATRIZOATE SODIUM 30 ML: 660; 100 LIQUID ORAL; RECTAL at 10:07

## 2021-06-09 RX ADMIN — IOPAMIDOL 100 ML: 612 INJECTION, SOLUTION INTRAVENOUS at 10:08

## 2021-06-14 NOTE — TELEPHONE ENCOUNTER
Caller: Avila, Gordon E    Relationship: Self    Best call back number: [phone]    Medication needed:   Requested Prescriptions     Pending Prescriptions Disp Refills   • clonazePAM (KlonoPIN) 1 MG tablet [Pharmacy Med Name: CLONAZEPAM 1MG TABLETS] 30 tablet      Sig: TAKE 1 TABLET BY MOUTH EVERY NIGHT       When do you need the refill by: 06/14/21    What additional details did the patient provide when requesting the medication: PATIENT IS COMPLETELY OUT OF THE MEDICATION. HE STATES THAT HE HAS CANCER AND IS UNABLE TO SLEEP WITHOUT THE MEDICATION. PATIENT IS ASKING TO BE CONTACTED ONCE THE MEDICATION HAS BEEN CALLED IN. HE DOESN'T LIKE TO DRIVE AFTER 6PM.     Does the patient have less than a 3 day supply:  [x] Yes  [] No    What is the patient's preferred pharmacy: [id number]  [place] - [address] AT [facility] [address] - [phone]  - [phone] FX

## 2021-06-14 NOTE — TELEPHONE ENCOUNTER
PATIENT CALLED AND STATED HE IS COMPLETELY OUT OF MEDICATION.  HE IS REQUESTING THIS BE FILLED ASAP.    CALLBACK:  [phone]

## 2021-06-15 RX ORDER — CLONAZEPAM 1 MG/1
1 TABLET ORAL NIGHTLY
Qty: 30 TABLET | Refills: 0 | Status: SHIPPED | OUTPATIENT
Start: 2021-06-15 | End: 2021-06-28 | Stop reason: SDUPTHER

## 2021-06-16 ENCOUNTER — TELEPHONE (OUTPATIENT)
Dept: ONCOLOGY | Facility: CLINIC | Age: 71
End: 2021-06-16

## 2021-06-16 NOTE — TELEPHONE ENCOUNTER
PATIENT CALLED AND WANTED TO SPEAK WITH SOMEONE ABOUT HIS RECENT IMAGING AND RESULTS AS HE'S NOT YET RECEIVED THE RESULTS. PLEASE ADVISE.

## 2021-06-16 NOTE — TELEPHONE ENCOUNTER
Per DEISY Lopez, patient missed appt.  DEISY Lopez, instructed patient to be r/s on Dr. Mercado's schedule Jose 6/18/21 morning.  Kelly at front office will call patient to schedule.

## 2021-06-18 ENCOUNTER — OFFICE VISIT (OUTPATIENT)
Dept: ONCOLOGY | Facility: CLINIC | Age: 71
End: 2021-06-18

## 2021-06-18 ENCOUNTER — SPECIALTY PHARMACY (OUTPATIENT)
Dept: ONCOLOGY | Facility: HOSPITAL | Age: 71
End: 2021-06-18

## 2021-06-18 VITALS
BODY MASS INDEX: 21.74 KG/M2 | HEIGHT: 73 IN | TEMPERATURE: 98.9 F | WEIGHT: 164 LBS | SYSTOLIC BLOOD PRESSURE: 110 MMHG | DIASTOLIC BLOOD PRESSURE: 67 MMHG | OXYGEN SATURATION: 99 % | RESPIRATION RATE: 16 BRPM | HEART RATE: 65 BPM

## 2021-06-18 DIAGNOSIS — C18.7 MALIGNANT NEOPLASM OF SIGMOID COLON (HCC): Primary | ICD-10-CM

## 2021-06-18 PROCEDURE — 99214 OFFICE O/P EST MOD 30 MIN: CPT | Performed by: INTERNAL MEDICINE

## 2021-06-18 RX ORDER — CAPECITABINE 500 MG/1
1500 TABLET, FILM COATED ORAL 2 TIMES DAILY
Qty: 84 TABLET | Refills: 3 | Status: SHIPPED | OUTPATIENT
Start: 2021-06-18 | End: 2021-10-29 | Stop reason: SDUPTHER

## 2021-06-18 NOTE — PROGRESS NOTES
Oral Chemotherapy Teaching      Patient Name/:  Gordon Avila   1950  Oral Chemotherapy Regimen:  Xeloda 1500mg PO BID x 7  Days, followed by 7 days off    Additional Notes: Received refill request from patient for Xeloda. Reviewed most recent oncology office visit note dated 21. Plan for continuation of Xeloda with no dose adjustments. Released script for Xeloda to [facility]  for continuation of treatment.

## 2021-06-23 ENCOUNTER — INFUSION (OUTPATIENT)
Dept: ONCOLOGY | Facility: HOSPITAL | Age: 71
End: 2021-06-23

## 2021-06-23 VITALS
RESPIRATION RATE: 12 BRPM | HEART RATE: 53 BPM | DIASTOLIC BLOOD PRESSURE: 74 MMHG | WEIGHT: 164.3 LBS | OXYGEN SATURATION: 99 % | BODY MASS INDEX: 21.68 KG/M2 | SYSTOLIC BLOOD PRESSURE: 139 MMHG | TEMPERATURE: 97.7 F

## 2021-06-23 DIAGNOSIS — C18.7 MALIGNANT NEOPLASM OF SIGMOID COLON (HCC): Primary | ICD-10-CM

## 2021-06-23 LAB
ALBUMIN SERPL-MCNC: 4.3 G/DL (ref 3.5–5.2)
ALBUMIN/GLOB SERPL: 1.6 G/DL
ALP SERPL-CCNC: 91 U/L (ref 39–117)
ALT SERPL W P-5'-P-CCNC: 7 U/L (ref 1–41)
ANION GAP SERPL CALCULATED.3IONS-SCNC: 9.4 MMOL/L (ref 5–15)
AST SERPL-CCNC: 18 U/L (ref 1–40)
BASOPHILS # BLD AUTO: 0.02 10*3/MM3 (ref 0–0.2)
BASOPHILS NFR BLD AUTO: 0.5 % (ref 0–1.5)
BILIRUB SERPL-MCNC: 0.7 MG/DL (ref 0–1.2)
BUN SERPL-MCNC: 15 MG/DL (ref 8–23)
BUN/CREAT SERPL: 16.9 (ref 7–25)
CALCIUM SPEC-SCNC: 8.9 MG/DL (ref 8.6–10.5)
CEA SERPL-MCNC: 2.27 NG/ML
CHLORIDE SERPL-SCNC: 102 MMOL/L (ref 98–107)
CO2 SERPL-SCNC: 24.6 MMOL/L (ref 22–29)
CREAT SERPL-MCNC: 0.89 MG/DL (ref 0.76–1.27)
DEPRECATED RDW RBC AUTO: 58.4 FL (ref 37–54)
EOSINOPHIL # BLD AUTO: 0.05 10*3/MM3 (ref 0–0.4)
EOSINOPHIL NFR BLD AUTO: 1.1 % (ref 0.3–6.2)
ERYTHROCYTE [DISTWIDTH] IN BLOOD BY AUTOMATED COUNT: 15.5 % (ref 12.3–15.4)
GFR SERPL CREATININE-BSD FRML MDRD: 85 ML/MIN/1.73
GLOBULIN UR ELPH-MCNC: 2.7 GM/DL
GLUCOSE SERPL-MCNC: 111 MG/DL (ref 65–99)
HCT VFR BLD AUTO: 39.8 % (ref 37.5–51)
HGB BLD-MCNC: 13.7 G/DL (ref 13–17.7)
IMM GRANULOCYTES # BLD AUTO: 0.02 10*3/MM3 (ref 0–0.05)
IMM GRANULOCYTES NFR BLD AUTO: 0.5 % (ref 0–0.5)
LYMPHOCYTES # BLD AUTO: 1.12 10*3/MM3 (ref 0.7–3.1)
LYMPHOCYTES NFR BLD AUTO: 25.7 % (ref 19.6–45.3)
MCH RBC QN AUTO: 34.8 PG (ref 26.6–33)
MCHC RBC AUTO-ENTMCNC: 34.4 G/DL (ref 31.5–35.7)
MCV RBC AUTO: 101 FL (ref 79–97)
MONOCYTES # BLD AUTO: 0.34 10*3/MM3 (ref 0.1–0.9)
MONOCYTES NFR BLD AUTO: 7.8 % (ref 5–12)
NEUTROPHILS NFR BLD AUTO: 2.8 10*3/MM3 (ref 1.7–7)
NEUTROPHILS NFR BLD AUTO: 64.4 % (ref 42.7–76)
NRBC BLD AUTO-RTO: 0 /100 WBC (ref 0–0.2)
PLATELET # BLD AUTO: 158 10*3/MM3 (ref 140–450)
PMV BLD AUTO: 10.2 FL (ref 6–12)
POTASSIUM SERPL-SCNC: 4.2 MMOL/L (ref 3.5–5.2)
PROT SERPL-MCNC: 7 G/DL (ref 6–8.5)
RBC # BLD AUTO: 3.94 10*6/MM3 (ref 4.14–5.8)
SODIUM SERPL-SCNC: 136 MMOL/L (ref 136–145)
WBC # BLD AUTO: 4.35 10*3/MM3 (ref 3.4–10.8)

## 2021-06-23 PROCEDURE — 25010000002 HEPARIN LOCK FLUSH PER 10 UNITS: Performed by: NURSE PRACTITIONER

## 2021-06-23 PROCEDURE — 82378 CARCINOEMBRYONIC ANTIGEN: CPT

## 2021-06-23 PROCEDURE — 80053 COMPREHEN METABOLIC PANEL: CPT

## 2021-06-23 PROCEDURE — 85025 COMPLETE CBC W/AUTO DIFF WBC: CPT

## 2021-06-23 PROCEDURE — 36591 DRAW BLOOD OFF VENOUS DEVICE: CPT

## 2021-06-23 PROCEDURE — 36415 COLL VENOUS BLD VENIPUNCTURE: CPT

## 2021-06-23 RX ORDER — SODIUM CHLORIDE 0.9 % (FLUSH) 0.9 %
10 SYRINGE (ML) INJECTION AS NEEDED
Status: CANCELLED | OUTPATIENT
Start: 2021-06-23

## 2021-06-23 RX ORDER — HEPARIN SODIUM (PORCINE) LOCK FLUSH IV SOLN 100 UNIT/ML 100 UNIT/ML
500 SOLUTION INTRAVENOUS AS NEEDED
Status: CANCELLED | OUTPATIENT
Start: 2021-06-23

## 2021-06-23 RX ORDER — SODIUM CHLORIDE 0.9 % (FLUSH) 0.9 %
10 SYRINGE (ML) INJECTION AS NEEDED
Status: DISCONTINUED | OUTPATIENT
Start: 2021-06-23 | End: 2021-06-23 | Stop reason: HOSPADM

## 2021-06-23 RX ORDER — HEPARIN SODIUM (PORCINE) LOCK FLUSH IV SOLN 100 UNIT/ML 100 UNIT/ML
500 SOLUTION INTRAVENOUS AS NEEDED
Status: DISCONTINUED | OUTPATIENT
Start: 2021-06-23 | End: 2021-06-23 | Stop reason: HOSPADM

## 2021-06-23 RX ADMIN — HEPARIN 500 UNITS: 100 SYRINGE at 08:40

## 2021-06-23 RX ADMIN — SODIUM CHLORIDE, PRESERVATIVE FREE 10 ML: 5 INJECTION INTRAVENOUS at 08:40

## 2021-06-28 ENCOUNTER — OFFICE VISIT (OUTPATIENT)
Dept: INTERNAL MEDICINE | Facility: CLINIC | Age: 71
End: 2021-06-28

## 2021-06-28 VITALS
HEART RATE: 57 BPM | DIASTOLIC BLOOD PRESSURE: 70 MMHG | HEIGHT: 73 IN | WEIGHT: 162.8 LBS | SYSTOLIC BLOOD PRESSURE: 118 MMHG | OXYGEN SATURATION: 98 % | TEMPERATURE: 97.5 F | BODY MASS INDEX: 21.57 KG/M2

## 2021-06-28 DIAGNOSIS — IMO0001 LUNG NODULE < 6CM ON CT: ICD-10-CM

## 2021-06-28 DIAGNOSIS — C18.7 MALIGNANT NEOPLASM OF SIGMOID COLON (HCC): Primary | ICD-10-CM

## 2021-06-28 DIAGNOSIS — G62.0 PERIPHERAL NEUROPATHY DUE TO CHEMOTHERAPY (HCC): ICD-10-CM

## 2021-06-28 DIAGNOSIS — T45.1X5A PERIPHERAL NEUROPATHY DUE TO CHEMOTHERAPY (HCC): ICD-10-CM

## 2021-06-28 DIAGNOSIS — I10 HTN (HYPERTENSION), BENIGN: ICD-10-CM

## 2021-06-28 PROBLEM — Z12.11 COLON CANCER SCREENING: Status: RESOLVED | Noted: 2020-10-08 | Resolved: 2021-06-28

## 2021-06-28 PROBLEM — L98.9 SKIN LESION OF SCALP: Status: RESOLVED | Noted: 2017-08-03 | Resolved: 2021-06-28

## 2021-06-28 PROCEDURE — 99214 OFFICE O/P EST MOD 30 MIN: CPT | Performed by: INTERNAL MEDICINE

## 2021-06-28 RX ORDER — CLONAZEPAM 1 MG/1
0.5 TABLET ORAL NIGHTLY
Qty: 30 TABLET | Refills: 0
Start: 2021-06-28 | End: 2021-08-11

## 2021-06-28 NOTE — PROGRESS NOTES
"Subjective  Gordon Avila is a 70 y.o. male    HPI coming in for follow-up patient with a history of sigmoid colon cancer status post resection for which he is following up with oncology and recent CT of his abdomen and lung did not show any recurrence of disease however a new 5 mm nodule was noted in his right lower lung.  He denies cough or hemoptysis currently does not smoke history of peripheral neuropathy and hypertension.    The following portions of the patient's history were reviewed and updated as appropriate: allergies, current medications, past family history, past medical history, past social history, past surgical history, and problem list.     Review of Systems   Constitutional: Negative.  Negative for activity change, appetite change, fatigue and fever.   HENT: Negative for congestion, ear discharge, ear pain and trouble swallowing.    Eyes: Negative for photophobia and visual disturbance.   Respiratory: Negative for cough and shortness of breath.    Cardiovascular: Negative for chest pain and palpitations.   Gastrointestinal: Negative for abdominal distention, abdominal pain, constipation, diarrhea, nausea and vomiting.   Endocrine: Negative.    Genitourinary: Negative for dysuria, hematuria and urgency.   Musculoskeletal: Positive for arthralgias. Negative for back pain, joint swelling and myalgias.   Skin: Negative for color change and rash.   Allergic/Immunologic: Negative.    Neurological: Negative for dizziness, weakness, light-headedness and headaches.   Hematological: Negative for adenopathy. Does not bruise/bleed easily.   Psychiatric/Behavioral: Positive for sleep disturbance. Negative for agitation, confusion and dysphoric mood. The patient is not nervous/anxious.        Visit Vitals  /70   Pulse 57   Temp 97.5 °F (36.4 °C) (Infrared)   Ht 185.4 cm (73\")   Wt 73.8 kg (162 lb 12.8 oz)   SpO2 98%   BMI 21.48 kg/m²       Objective  Physical Exam  Constitutional:       General: He is " not in acute distress.     Appearance: He is well-developed.   HENT:      Nose: Nose normal.   Eyes:      General: No scleral icterus.     Conjunctiva/sclera: Conjunctivae normal.   Neck:      Thyroid: No thyromegaly.      Trachea: No tracheal deviation.   Cardiovascular:      Rate and Rhythm: Normal rate and regular rhythm.      Heart sounds: No murmur heard.   No friction rub.   Pulmonary:      Effort: No respiratory distress.      Breath sounds: No wheezing or rales.   Abdominal:      General: There is no distension.      Palpations: Abdomen is soft. There is no mass.      Tenderness: There is no abdominal tenderness. There is no guarding.   Musculoskeletal:         General: No deformity. Normal range of motion.   Lymphadenopathy:      Cervical: No cervical adenopathy.   Skin:     General: Skin is warm and dry.      Findings: No erythema or rash.   Neurological:      Mental Status: He is alert and oriented to person, place, and time.      Cranial Nerves: No cranial nerve deficit.      Coordination: Coordination normal.      Deep Tendon Reflexes: Reflexes are normal and symmetric.   Psychiatric:         Behavior: Behavior normal.         Thought Content: Thought content normal.         Judgment: Judgment normal.         Diagnoses and all orders for this visit:    Malignant neoplasm of sigmoid colon (CMS/HCC) stable.  Status post chemotherapy    HTN (hypertension), benign stable with current meds and low-salt diet    Peripheral neuropathy due to chemotherapy (CMS/HCC) continue gabapentin at nighttime low-dose clonazepam    Repeat CT scan to follow-up on his lung nodule as per radiology suggestion

## 2021-07-01 ENCOUNTER — TELEPHONE (OUTPATIENT)
Dept: ONCOLOGY | Facility: HOSPITAL | Age: 71
End: 2021-07-01

## 2021-07-01 NOTE — TELEPHONE ENCOUNTER
Reached out to patient on 6/29/21 and 6/30/21 about xeloda refill that was ready at [facility]. Patient stated he would  on 6/29/21 before dinner. Reached out again on 6/30/21, after [facility] contacted me to see if patient was picking up. Patient stated he had picked up medication at this time. When checking with [facility] patient had not picked up medication. I alerted [facility] to reverse xeloda because of insurance purposes.

## 2021-07-09 RX ORDER — LISINOPRIL 20 MG/1
20 TABLET ORAL DAILY
Qty: 90 TABLET | Refills: 3 | Status: SHIPPED | OUTPATIENT
Start: 2021-07-09 | End: 2022-07-12

## 2021-07-13 ENCOUNTER — HOSPITAL ENCOUNTER (OUTPATIENT)
Dept: CT IMAGING | Facility: HOSPITAL | Age: 71
Discharge: HOME | End: 2021-07-13
Admitting: INTERNAL MEDICINE

## 2021-07-13 DIAGNOSIS — IMO0001 LUNG NODULE < 6CM ON CT: ICD-10-CM

## 2021-07-13 PROCEDURE — 71250 CT THORAX DX C-: CPT

## 2021-07-14 ENCOUNTER — TELEPHONE (OUTPATIENT)
Dept: ONCOLOGY | Facility: HOSPITAL | Age: 71
End: 2021-07-14

## 2021-07-14 NOTE — TELEPHONE ENCOUNTER
[facility] Refill Outreach    Called regarding refill of medication: Capecitabine 1500mg po BID for 7 days, then off for 7 days  Spoke with patient.    Assessment  • It looks like it is almost time for your refill, how many doses do you have left? 3 days left.   • How are you doing on the medication, are you experiencing any side effects? Patient denies side effects   • How many doses have you missed since you last filled your prescription? 0 doses missed.    Shipment   • We can go ahead and coordinate your next refill, would you like to pick this up at the pharmacy/[facility], or have this delivered to you vs. the clinic? Patient request medication to be mailed to patients home address. Verified in Epic.  o If government plan (Medicaid and Medicare B), delivery will require signature -> identify a day the patient will be home for delivery set up (note the package should arrive prior to noon as pharmacy will ship priority overnight)  • Patient was advised medication will be shipped via [employer] (standard overnight) on 7/15/21 with expected delivery on 7/16/21. Shipping comments patient is requesting of [employer] : .. .   • Shipping address confirmed: [address] [address]  • Patient is aware and willing to pay copay of $3.42, CCOF.   • Do you have any questions for the pharmacist? No.  • Ensured patient has clinic contact information for questions.     Follow up: Plan to reach out to patient on/around 8/11/21    Loren Tiwari, Pharmacy Technician  7/14/2021  09:08 EDT

## 2021-08-11 ENCOUNTER — TELEPHONE (OUTPATIENT)
Dept: ONCOLOGY | Facility: HOSPITAL | Age: 71
End: 2021-08-11

## 2021-08-11 NOTE — TELEPHONE ENCOUNTER
[facility]    Called regarding refill of medication: Capecitabine 1500mg PO AM and 1500MG PO PM for 7 days, then off 7 days  Spoke with patient.    Assessment  • It looks like it is almost time for your refill, how many doses do you have left? 3 days left  • How are you doing on the medication, are you experiencing any side effects? Patient denies side effects   • How many doses have you missed since you last filled your prescription? 0 doses missed.  • Have you stopped or started any medications since we last spoke? Patient has had no medication changes since last month.     Shipment   • We can go ahead and coordinate your next refill, would you like to pick this up at the pharmacy/curbside, or have this delivered to you vs. the clinic? Patient request medication to be mailed to home address. Verified in Epic.   o If government plan (Medicaid and Medicare B), delivery will require signature -> identify a day the patient will be home for delivery set up (note the package should arrive prior to noon as pharmacy will ship priority overnight)  • Patient was advised medication will be shipped via [employer] (standard overnight) on 8/12/21 with expected delivery on 8/13/21. Shipping comments patient is requesting of [employer] : .. .   • Shipping address confirmed: [address] [address]  • Patient is aware and willing to pay copay of $3.42, CCOF.   • Do you have any questions for the pharmacist? No.  • Ensured patient has clinic contact information for questions.     Follow up: Plan to reach out to patient on/around 9/8/21      Loren Tiwari, Pharmacy Technician  8/11/2021  08:07 EDT

## 2021-08-11 NOTE — TELEPHONE ENCOUNTER
Rx Refill Note  Requested Prescriptions     Pending Prescriptions Disp Refills   • clonazePAM (KlonoPIN) 1 MG tablet [Pharmacy Med Name: CLONAZEPAM 1MG TABLETS] 30 tablet 5     Sig: TAKE 1 TABLET BY MOUTH EVERY NIGHT      Last office visit with prescribing clinician: 6/28/2021   Next office visit with prescribing clinician: 10/4/2021           RJ BARBOSA MA  08/11/21, 10:02 EDT

## 2021-08-12 RX ORDER — CLONAZEPAM 1 MG/1
TABLET ORAL
Qty: 30 TABLET | Refills: 5 | Status: SHIPPED | OUTPATIENT
Start: 2021-08-12 | End: 2022-04-05

## 2021-08-12 RX ORDER — CLONAZEPAM 1 MG/1
TABLET ORAL
Qty: 30 TABLET | OUTPATIENT
Start: 2021-08-12

## 2021-08-23 ENCOUNTER — INFUSION (OUTPATIENT)
Dept: ONCOLOGY | Facility: HOSPITAL | Age: 71
End: 2021-08-23

## 2021-08-23 ENCOUNTER — OFFICE VISIT (OUTPATIENT)
Dept: ONCOLOGY | Facility: CLINIC | Age: 71
End: 2021-08-23

## 2021-08-23 VITALS
RESPIRATION RATE: 12 BRPM | HEIGHT: 73 IN | WEIGHT: 161 LBS | SYSTOLIC BLOOD PRESSURE: 137 MMHG | DIASTOLIC BLOOD PRESSURE: 74 MMHG | BODY MASS INDEX: 21.34 KG/M2 | OXYGEN SATURATION: 99 % | TEMPERATURE: 98 F | HEART RATE: 54 BPM

## 2021-08-23 DIAGNOSIS — C78.7 SECONDARY MALIGNANT NEOPLASM OF LIVER AND INTRAHEPATIC BILE DUCT (HCC): ICD-10-CM

## 2021-08-23 DIAGNOSIS — C18.7 MALIGNANT NEOPLASM OF SIGMOID COLON (HCC): Primary | ICD-10-CM

## 2021-08-23 LAB
ALBUMIN SERPL-MCNC: 4.2 G/DL (ref 3.5–5.2)
ALBUMIN/GLOB SERPL: 1.8 G/DL
ALP SERPL-CCNC: 82 U/L (ref 39–117)
ALT SERPL W P-5'-P-CCNC: 11 U/L (ref 1–41)
ANION GAP SERPL CALCULATED.3IONS-SCNC: 9.7 MMOL/L (ref 5–15)
AST SERPL-CCNC: 17 U/L (ref 1–40)
BASOPHILS # BLD AUTO: 0.01 10*3/MM3 (ref 0–0.2)
BASOPHILS NFR BLD AUTO: 0.3 % (ref 0–1.5)
BILIRUB SERPL-MCNC: 0.9 MG/DL (ref 0–1.2)
BUN SERPL-MCNC: 13 MG/DL (ref 8–23)
BUN/CREAT SERPL: 14.4 (ref 7–25)
CALCIUM SPEC-SCNC: 9.2 MG/DL (ref 8.6–10.5)
CEA SERPL-MCNC: 2.23 NG/ML
CHLORIDE SERPL-SCNC: 102 MMOL/L (ref 98–107)
CO2 SERPL-SCNC: 23.3 MMOL/L (ref 22–29)
CREAT SERPL-MCNC: 0.9 MG/DL (ref 0.76–1.27)
DEPRECATED RDW RBC AUTO: 56.8 FL (ref 37–54)
EOSINOPHIL # BLD AUTO: 0.05 10*3/MM3 (ref 0–0.4)
EOSINOPHIL NFR BLD AUTO: 1.5 % (ref 0.3–6.2)
ERYTHROCYTE [DISTWIDTH] IN BLOOD BY AUTOMATED COUNT: 14.7 % (ref 12.3–15.4)
GFR SERPL CREATININE-BSD FRML MDRD: 84 ML/MIN/1.73
GLOBULIN UR ELPH-MCNC: 2.4 GM/DL
GLUCOSE SERPL-MCNC: 92 MG/DL (ref 65–99)
HCT VFR BLD AUTO: 37.4 % (ref 37.5–51)
HGB BLD-MCNC: 12.8 G/DL (ref 13–17.7)
IMM GRANULOCYTES # BLD AUTO: 0.01 10*3/MM3 (ref 0–0.05)
IMM GRANULOCYTES NFR BLD AUTO: 0.3 % (ref 0–0.5)
LYMPHOCYTES # BLD AUTO: 0.97 10*3/MM3 (ref 0.7–3.1)
LYMPHOCYTES NFR BLD AUTO: 28.4 % (ref 19.6–45.3)
MCH RBC QN AUTO: 35.5 PG (ref 26.6–33)
MCHC RBC AUTO-ENTMCNC: 34.2 G/DL (ref 31.5–35.7)
MCV RBC AUTO: 103.6 FL (ref 79–97)
MONOCYTES # BLD AUTO: 0.36 10*3/MM3 (ref 0.1–0.9)
MONOCYTES NFR BLD AUTO: 10.5 % (ref 5–12)
NEUTROPHILS NFR BLD AUTO: 2.02 10*3/MM3 (ref 1.7–7)
NEUTROPHILS NFR BLD AUTO: 59 % (ref 42.7–76)
NRBC BLD AUTO-RTO: 0 /100 WBC (ref 0–0.2)
PLATELET # BLD AUTO: 149 10*3/MM3 (ref 140–450)
PMV BLD AUTO: 10.4 FL (ref 6–12)
POTASSIUM SERPL-SCNC: 4.3 MMOL/L (ref 3.5–5.2)
PROT SERPL-MCNC: 6.6 G/DL (ref 6–8.5)
RBC # BLD AUTO: 3.61 10*6/MM3 (ref 4.14–5.8)
SODIUM SERPL-SCNC: 135 MMOL/L (ref 136–145)
WBC # BLD AUTO: 3.42 10*3/MM3 (ref 3.4–10.8)

## 2021-08-23 PROCEDURE — 25010000002 HEPARIN LOCK FLUSH PER 10 UNITS: Performed by: NURSE PRACTITIONER

## 2021-08-23 PROCEDURE — 85025 COMPLETE CBC W/AUTO DIFF WBC: CPT | Performed by: NURSE PRACTITIONER

## 2021-08-23 PROCEDURE — 99214 OFFICE O/P EST MOD 30 MIN: CPT | Performed by: NURSE PRACTITIONER

## 2021-08-23 PROCEDURE — 80053 COMPREHEN METABOLIC PANEL: CPT | Performed by: NURSE PRACTITIONER

## 2021-08-23 PROCEDURE — 96523 IRRIG DRUG DELIVERY DEVICE: CPT

## 2021-08-23 PROCEDURE — 36415 COLL VENOUS BLD VENIPUNCTURE: CPT | Performed by: NURSE PRACTITIONER

## 2021-08-23 PROCEDURE — 36591 DRAW BLOOD OFF VENOUS DEVICE: CPT

## 2021-08-23 PROCEDURE — 82378 CARCINOEMBRYONIC ANTIGEN: CPT | Performed by: NURSE PRACTITIONER

## 2021-08-23 RX ORDER — HEPARIN SODIUM (PORCINE) LOCK FLUSH IV SOLN 100 UNIT/ML 100 UNIT/ML
500 SOLUTION INTRAVENOUS AS NEEDED
Status: DISCONTINUED | OUTPATIENT
Start: 2021-08-23 | End: 2021-08-23 | Stop reason: HOSPADM

## 2021-08-23 RX ORDER — HEPARIN SODIUM (PORCINE) LOCK FLUSH IV SOLN 100 UNIT/ML 100 UNIT/ML
500 SOLUTION INTRAVENOUS AS NEEDED
Status: CANCELLED | OUTPATIENT
Start: 2021-08-23

## 2021-08-23 RX ORDER — SODIUM CHLORIDE 0.9 % (FLUSH) 0.9 %
10 SYRINGE (ML) INJECTION AS NEEDED
Status: DISCONTINUED | OUTPATIENT
Start: 2021-08-23 | End: 2021-08-23 | Stop reason: HOSPADM

## 2021-08-23 RX ORDER — SODIUM CHLORIDE 0.9 % (FLUSH) 0.9 %
10 SYRINGE (ML) INJECTION AS NEEDED
Status: CANCELLED | OUTPATIENT
Start: 2021-08-23

## 2021-08-23 RX ADMIN — SODIUM CHLORIDE, PRESERVATIVE FREE 10 ML: 5 INJECTION INTRAVENOUS at 11:40

## 2021-08-23 RX ADMIN — HEPARIN 500 UNITS: 100 SYRINGE at 11:40

## 2021-08-23 NOTE — PROGRESS NOTES
CHIEF COMPLAINT: 1.  Peripheral neuropathy of the hands and feet, worse in the feet                                       2.  Follow-up for colon cancer    Problem List:  Oncology/Hematology History Overview Note   1. Stage CARLOS, [id number] colon cancer with 2 out of 14 pericolonic lymph nodes  involved and a left lobe liver biopsy positive for metastasis, presenting with  a 25 pound weight loss and diarrhea with some perirectal soreness and had  colonoscopy with Dr. Mcclain in January that found this lesion that was  circumferential high-grade invading into the pericolonic fat, status post  sigmoid colectomy of a poorly differentiated adenocarcinoma.   a) Baseline CEA postop of 0.8 with alkaline phosphatase 111, with normal liver  enzymes and creatinine of 0.8. Baseline white count 9820 with a hemoglobin  13.8, platelets 339,000, and CT with contrast showing a right lobe liver dome  lesion as well as an anastomotic change in the bowel.   b) Began CapeOx 03/15/2016.  c) Added in Avastin to CapeOx 04/05/2016, second cycle. (This was 8 weeks out  from surgery).  d) KRAS mutation is negative.  E.) CAT scan in August 2016 shows resolution of disease in the liver and colon and a stable lung nodule.  Hence Avastin, Xeloda, and oxaliplatin continued.  F) oxaliplatin discontinued October 2016 due to worsening peripheral neuropathy.  G.) CTs of February 2017 showed no evidence of metastasis and stable bibasilar nodular scar.  Persistent neuropathy not worsening.  CEA 1.4.  Continuing Avastin and Xeloda without oxaliplatin.  Having some problems with irritation of his eyes on Xeloda.  2.  Peripheral neuropathy, chemotherapy induced     Malignant neoplasm of sigmoid colon (CMS/HCC)   5/20/2016 Initial Diagnosis    Malignant neoplasm of sigmoid colon     5/2/2017 Imaging    CT chest, abdomen, pelvis IMPRESSION:  1. No disease recurrence.  2. Minimal areas of nodular thickening in the right lower lobe, stable.     8/2/2017 Imaging     CT chest/abdomen/pelvis IMPRESSION:  Stable examination with no evidence of acute intrathoracic,  intra-abdominal or pelvic abnormality. There is no evidence of  progression of disease. No metastatic disease.      11/13/2017 Imaging    CT chest/abdomen/pelvis IMPRESSION:  Stable examination without evidence of acute intrathoracic  intra-abdominal or intrapelvic abnormality. No evidence of progression  of disease.   CEA 1.3.     2/6/2020 -  Chemotherapy    OP COLON Capecitabine 1,250 mg/m2 BID (8 cycles)         HISTORY OF PRESENT ILLNESS:  The patient is a 70 y.o. male, here for follow up on management of Stage IV a colon cancer.  He is currently on Xeloda alone.  He continues to do fairly well.  He is on 1 week and off 1 week.  He is able to do his normal activities such as golfing.   Denies any nausea vomiting.  No significant fatigue.    Past Medical History:   Diagnosis Date   • H/O exercise stress test     Patient states it was normal   • Hypertension    • Liver disease     mets from colon cancer   • Malignant neoplasm of colon (CMS/HCC)    • Seizure (CMS/HCC) 2019   • Wears dentures     bridge   • Wears glasses      Past Surgical History:   Procedure Laterality Date   • COLON SURGERY      Sigmoid   • COLONOSCOPY N/A 10/26/2020    Procedure: COLONOSCOPY;  Surgeon: Rodrigo Lambert MD;  Location: [facility];  Service: Gastroenterology;  Laterality: N/A;   • LIVER SURGERY     • PORTACATH PLACEMENT         No Known Allergies    Family History and Social History reviewed and changed as necessary      REVIEW OF SYSTEM:   Review of Systems   Constitutional: Negative for appetite change, chills, diaphoresis, fatigue, fever and unexpected weight change.   HENT:   Negative for mouth sores, sore throat and trouble swallowing.    Eyes: Negative for icterus.   Respiratory: Negative for cough, hemoptysis and shortness of breath.    Cardiovascular: Negative for chest pain, leg swelling and palpitations.  "  Gastrointestinal: Negative for abdominal distention, abdominal pain, blood in stool, constipation, diarrhea, nausea and vomiting.   Endocrine: Negative for hot flashes.   Genitourinary: Negative for bladder incontinence, difficulty urinating, dysuria, frequency and hematuria.    Musculoskeletal: Negative for gait problem, neck pain and neck stiffness.   Skin: Negative for rash.  Positive for dry skin.  Neurological: Positive for peripheral neuropathy in the hands and feet.  Hematological: Negative for adenopathy. Does not bruise/bleed easily.   Psychiatric/Behavioral: Negative for depression. The patient is not nervous/anxious.    All other systems reviewed and are negative.       PHYSICAL EXAM    Vitals:    08/23/21 1109   BP: 137/74   Pulse: 54   Resp: 12   Temp: 98 °F (36.7 °C)   TempSrc: Temporal   SpO2: 99%   Weight: 73 kg (161 lb)   Height: 185.4 cm (73\")     Constitutional: Appears well-developed and well-nourished. No distress.   ECOG: (1) Restricted in physically strenuous activity, ambulatory and able to do work of light nature  HENT:   Head: Normocephalic.   Mouth/Throat: Oropharynx is clear and moist.   Eyes: Conjunctivae are normal. Pupils are equal, round, and reactive to light. No scleral icterus.   Neck: Neck supple. No JVD present. No thyromegaly present.   Cardiovascular: Normal rate, regular rhythm and normal heart sounds.    Pulmonary/Chest: Breath sounds normal. No respiratory distress.   Nodes: No cervical, supraclavicular or axillary nodes palpable on exam.  Abdominal: Soft. Exhibits no distension and no mass. There is no hepatosplenomegaly. There is no tenderness. There is no rebound and no guarding.   Musculoskeletal:Exhibits no edema, tenderness or deformity.   Neurological: Alert and oriented to person, place, and time. Exhibits normal muscle tone.   Skin: Dry.  No ecchymosis, no petechiae and no rash noted. Not diaphoretic. No cyanosis.   Psychiatric: Normal mood and affect.   Vitals " reviewed.  Laboratory data reviewed along with CT chest abdomen and pelvis and images thereof as outlined above in the oncology history were reviewed at time of visit.    Lab Results   Component Value Date    WBC 4.35 06/23/2021    HGB 13.7 06/23/2021    HCT 39.8 06/23/2021    .0 (H) 06/23/2021     06/23/2021       Lab Results   Component Value Date    GLUCOSE 111 (H) 06/23/2021    BUN 15 06/23/2021    CREATININE 0.89 06/23/2021    EGFRIFNONA 85 06/23/2021    EGFRIFAFRI 81 08/04/2020    BCR 16.9 06/23/2021    K 4.2 06/23/2021    CO2 24.6 06/23/2021    CALCIUM 8.9 06/23/2021    PROTENTOTREF 6.9 08/04/2020    ALBUMIN 4.30 06/23/2021    LABIL2 2.6 08/04/2020    AST 18 06/23/2021    ALT 7 06/23/2021       Lab Results   Component Value Date    CEA 2.27 06/23/2021    CEA 1.99 04/26/2021    CEA 1.97 11/30/2020    CEA 2.6 08/04/2020     CT Abdomen Pelvis With & Without Contrast    Result Date: 6/9/2021  No evidence of metastatic disease within the abdomen or pelvis.  2386.69 mGy.cm 9.20 mGy  This study was performed with techniques to keep radiation doses as low as reasonably achievable (ALARA). Individualized dose reduction techniques using automated exposure control or adjustment of mA and/or kV according to the patient size were employed.  This report was finalized on 6/9/2021 10:47 AM by David Ingram M.D..    CT Chest With Contrast Diagnostic    Result Date: 6/9/2021  New 5 mm in the right lower lobe which is nonspecific. Follow-up in three months with a noncontrast chest CT is recommended.   This study was performed with techniques to keep radiation doses as low as reasonably achievable (ALARA). Individualized dose reduction techniques using automated exposure control or adjustment of mA and/or kV according to the patient size were employed.  This report was finalized on 6/9/2021 10:44 AM by Shardan Radmanesh, M.D..    CT Chest With Contrast Diagnostic    Result Date: 3/1/2021  1. Enlarging soft  tissue in the right upper lobe peripheral to a coarse calcification. Given clinical history this may be neoplastic or less likely inflammatory. PET/CT correlation may be helpful. 2. Interval development of new small irregular nodular density left lower lobe and ground glass opacity left upper lobe, both nonspecific. Favor inflammatory over neoplastic. 3. If PET/CT is not obtained for evaluation of the right upper lobe density, short-term chest CT follow-up recommended in 3 months.   This study was performed with techniques to keep radiation doses as low as reasonably achievable (ALARA). Individualized dose reduction techniques using automated exposure control or adjustment of vA and/or kV according to the patient size were employed.  This report was finalized on 3/1/2021 11:03 AM by Omer Hunter MD.    MRI Brain With & Without Contrast    Result Date: 5/5/2021  No evidence of metastatic disease within the brain.    This report was finalized on 5/5/2021 1:37 PM by David Ingram M.D..    CT Abdomen Pelvis With Contrast    Result Date: 3/1/2021  Stable exam without evidence of metastatic disease.   This study was performed with techniques to keep radiation doses as low as reasonably achievable (ALARA). Individualized dose reduction techniques using automated exposure control or adjustment of vA and/or kV according to the patient size were employed.  This report was finalized on 3/1/2021 11:03 AM by Omer Hunter MD.          Assessment/Plan     1. Metastatic colon cancer with Solitary Liver metastasis initially diagnosed 2- -he has been on Xeloda and Avastin for 5 years until I took him off Avastin in January 2020 and Xeloda in August 2020.  Xeloda restarted in March 2021 due to concern of progression.      We discussed CT scan done without contrast in July showed interval resolution of the right lower lobe.  We will continue to monitor.  We will plan to check a scan prior to return in 2 months.    2.  Peripheral neuropathy secondary to chemotherapy.  Stable.  We will continue gabapentin 1 capsule by mouth 3 times a day.    3. Port. We will continue port care every 2 months with clinic appointments. We will check CBC, CMP, CEA with each port flush.       Total time of patient care on day of service including time prior to, face to face with patient, and following visit spent in reviewing records, lab results, imaging studies, discussion with patient, and documentation/charting was > 30 minutes.        Adeola Sunshine, APRN    08/23/21

## 2021-08-27 ENCOUNTER — SPECIALTY PHARMACY (OUTPATIENT)
Dept: ONCOLOGY | Facility: HOSPITAL | Age: 71
End: 2021-08-27

## 2021-08-27 NOTE — PLAN OF CARE
Specialty Pharmacy Assessment    Capecitabine (Xeloda)  Dose: 1500 mg  Frequency: Twice a day for 7 days followed by 7 days off  Indication: Colon cancer  Goals of therapy: Other  Other goals of therapy: control  Follow-Up: Yes  Dispensing pharmacy: [facility]  Side effect assessment: No/minimal side effects  Intervention?: No  Compliance: Patient reports taking tablets whole twice daily, Patient reports taking around the same time every day  Additional notes: Last CBC, CMP, and CEA on 8/23/21. Discussed aforementioned material. Reminded patient of the pharmacist's contact information and instructions to call should additional questions arise. All questions and concerns addressed. Completed a medication reconciliation. Patient to continue on treatment with no disease progression or unacceptable toxicity at this time.        Medication Adherence    Any gaps in refill history greater than 2 weeks in the last 3 months: no  Demonstrates understanding of importance of adherence: yes  Informant: patient  Reliability of informant: reliable  Estimated medication adherence level: %  Reasons for non-adherence: no problems identified  Adherence tools used: calendar       Drug Interactions    Drug interactions evaluated: yes  Clinically relevant drug interactions identified: no  Provided the patient with educational material regarding drug interactions: not applicable       Patient Counseling    Counseled the patient on the following: doses and administration discussed, safe handling, storage, and disposal discussed, possible adverse effects and management discussed, lab monitoring and follow-up discussed, therapeutic rationale discussed, cost of medications and cost implications discussed, adherence and missed doses discussed, pharmacy contact information discussed

## 2021-09-03 ENCOUNTER — TELEPHONE (OUTPATIENT)
Dept: ONCOLOGY | Facility: HOSPITAL | Age: 71
End: 2021-09-03

## 2021-09-03 NOTE — TELEPHONE ENCOUNTER
[facility] Refill Outreach    Called regarding refill of medication: Capecitabine 1500mg daily for 7 days, then off 7  Spoke with patient.    Assessment  • It looks like it is almost time for your refill, how many doses do you have left? 7  • How are you doing on the medication, are you experiencing any side effects? Patient denies side effects   • How many doses have you missed since you last filled your prescription? 0 doses missed.  • Have you stopped or started any medications since we last spoke? Patient has had no medication changes since last month.     Shipment   • We can go ahead and coordinate your next refill, would you like to pick this up at the pharmacy/curbside, or have this delivered to you vs. the clinic? Patient request medication be mailed to home address.  o If government plan (Medicaid and Medicare B), delivery will require signature -> identify a day the patient will be home for delivery set up (note the package should arrive prior to noon as pharmacy will ship priority overnight)  • Patient was advised medication will be shipped via [employer] (standard overnight) on 9/7 with expected delivery on 9/8. Shipping comments patient is requesting of [employer] : None. .   • Shipping address confirmed: [address] [facility] [facility] [address]  • Patient is aware and willing to pay copay of 3.42, CCOF.   • Do you have any questions for the pharmacist? No.  • Ensured patient has clinic contact information for questions.     Follow up: Plan to reach out to patient around 10/1 for refill    Celine Marr, Pharmacy Technician  9/3/2021  13:01 EDT

## 2021-10-04 ENCOUNTER — OFFICE VISIT (OUTPATIENT)
Dept: INTERNAL MEDICINE | Facility: CLINIC | Age: 71
End: 2021-10-04

## 2021-10-04 VITALS
SYSTOLIC BLOOD PRESSURE: 108 MMHG | OXYGEN SATURATION: 99 % | DIASTOLIC BLOOD PRESSURE: 60 MMHG | TEMPERATURE: 98.4 F | HEIGHT: 73 IN | BODY MASS INDEX: 21.26 KG/M2 | WEIGHT: 160.4 LBS | HEART RATE: 61 BPM

## 2021-10-04 DIAGNOSIS — Z23 NEED FOR VACCINATION: ICD-10-CM

## 2021-10-04 DIAGNOSIS — C18.7 MALIGNANT NEOPLASM OF SIGMOID COLON (HCC): Primary | ICD-10-CM

## 2021-10-04 DIAGNOSIS — D53.1 MEGALOBLASTIC ANEMIA: ICD-10-CM

## 2021-10-04 DIAGNOSIS — I10 HTN (HYPERTENSION), BENIGN: ICD-10-CM

## 2021-10-04 DIAGNOSIS — G62.0 PERIPHERAL NEUROPATHY DUE TO CHEMOTHERAPY (HCC): ICD-10-CM

## 2021-10-04 DIAGNOSIS — T45.1X5A PERIPHERAL NEUROPATHY DUE TO CHEMOTHERAPY (HCC): ICD-10-CM

## 2021-10-04 DIAGNOSIS — C78.7 SECONDARY MALIGNANT NEOPLASM OF LIVER AND INTRAHEPATIC BILE DUCT (HCC): ICD-10-CM

## 2021-10-04 PROCEDURE — G0009 ADMIN PNEUMOCOCCAL VACCINE: HCPCS | Performed by: INTERNAL MEDICINE

## 2021-10-04 PROCEDURE — G0439 PPPS, SUBSEQ VISIT: HCPCS | Performed by: INTERNAL MEDICINE

## 2021-10-04 PROCEDURE — 96160 PT-FOCUSED HLTH RISK ASSMT: CPT | Performed by: INTERNAL MEDICINE

## 2021-10-04 PROCEDURE — 1170F FXNL STATUS ASSESSED: CPT | Performed by: INTERNAL MEDICINE

## 2021-10-04 PROCEDURE — 1159F MED LIST DOCD IN RCRD: CPT | Performed by: INTERNAL MEDICINE

## 2021-10-04 PROCEDURE — 90670 PCV13 VACCINE IM: CPT | Performed by: INTERNAL MEDICINE

## 2021-10-04 PROCEDURE — 99397 PER PM REEVAL EST PAT 65+ YR: CPT | Performed by: INTERNAL MEDICINE

## 2021-10-04 PROCEDURE — 1126F AMNT PAIN NOTED NONE PRSNT: CPT | Performed by: INTERNAL MEDICINE

## 2021-10-04 NOTE — PROGRESS NOTES
The ABCs of the Annual Wellness Visit  Subsequent Medicare Wellness Visit    Chief Complaint   Patient presents with   • Medicare Wellness-subsequent     discuss booster for covid and pneumonia vaccination       Subjective    History of Present Illness:  Gordon Avila is a 70 y.o. male who presents for a Subsequent Medicare Wellness Visit.    The following portions of the patient's history were reviewed and   updated as appropriate: allergies, current medications, past family history, past medical history, past social history, past surgical history and problem list.    Compared to one year ago, the patient feels his physical   health is the same.    Compared to one year ago, the patient feels his mental   health is the same.    Recent Hospitalizations:  This patient has had a [facility] admission record on file within the last 365 days.    Current Medical Providers:  Patient Care Team:  Ed Abraham MD as PCP - General (Internal Medicine)    Outpatient Medications Prior to Visit   Medication Sig Dispense Refill   • capecitabine (XELODA) 500 MG chemo tablet Take 3 tablets by mouth in the AM and 3 tablets by mouth in the PM on days 1-7, then off for 7 days. 84 tablet 3   • clonazePAM (KlonoPIN) 1 MG tablet TAKE 1 TABLET BY MOUTH EVERY NIGHT 30 tablet 5   • escitalopram (LEXAPRO) 10 MG tablet Take 1 tablet by mouth Daily. 90 tablet 3   • gabapentin (NEURONTIN) 300 MG capsule Take 1 capsule by mouth every night at bedtime. 30 capsule 5   • lisinopril (PRINIVIL,ZESTRIL) 20 MG tablet TAKE 1 TABLET BY MOUTH DAILY 90 tablet 3   • SSD 1 % cream Apply  topically to the appropriate area as directed Take As Directed. USE WITH PORT 20 g 3   • gabapentin (NEURONTIN) 100 MG capsule TAKE 1 CAPSULE BY MOUTH THREE TIMES DAILY 90 capsule 1     Facility-Administered Medications Prior to Visit   Medication Dose Route Frequency Provider Last Rate Last Admin   • heparin injection 500 Units  500 Units Intracatheter Q8H PRN  Adeola Sunshine, APRN   500 Units at 11/30/20 1106       No opioid medication identified on active medication list. I have reviewed chart for other potential  high risk medication/s and harmful drug interactions in the elderly.          Aspirin is not on active medication list.  Aspirin use is not indicated based on review of current medical condition/s. Risk of harm outweighs potential benefits.  .    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  ACP discussion was held with the patient during this visit. Patient has an advance directive (not in EMR), copy requested.    Review of Systems   Constitutional: Negative for activity change, appetite change, fatigue and fever.   HENT: Negative for congestion, ear discharge, ear pain and trouble swallowing.    Eyes: Negative for photophobia and visual disturbance.   Respiratory: Negative for cough and shortness of breath.    Cardiovascular: Negative for chest pain and palpitations.   Gastrointestinal: Negative for abdominal distention, constipation, diarrhea, nausea and vomiting.   Genitourinary: Negative for dysuria, hematuria and urgency.   Musculoskeletal: Positive for arthralgias. Negative for back pain, joint swelling and myalgias.   Skin: Negative for color change and rash.   Neurological: Positive for numbness. Negative for dizziness, weakness, light-headedness and confusion.   Hematological: Negative for adenopathy. Does not bruise/bleed easily.   Psychiatric/Behavioral: Positive for sleep disturbance. Negative for agitation and dysphoric mood. The patient is not nervous/anxious.   "       Objective    Vitals:    10/04/21 0956   BP: 108/60   Pulse: 61   Temp: 98.4 °F (36.9 °C)   TempSrc: Infrared   SpO2: 99%   Weight: 72.8 kg (160 lb 6.4 oz)   Height: 185.4 cm (73\")   PainSc: 0-No pain     BMI Readings from Last 1 Encounters:   10/04/21 21.16 kg/m²   BMI is within normal parameters. No follow-up required.    Does the patient have evidence of cognitive impairment? No    Physical Exam  Constitutional:       General: He is not in acute distress.     Appearance: He is well-developed.   HENT:      Nose: Nose normal.   Eyes:      General: No scleral icterus.     Conjunctiva/sclera: Conjunctivae normal.   Neck:      Thyroid: No thyromegaly.      Trachea: No tracheal deviation.   Cardiovascular:      Rate and Rhythm: Normal rate and regular rhythm.      Heart sounds: No murmur heard.   No friction rub.   Pulmonary:      Effort: No respiratory distress.      Breath sounds: No wheezing or rales.   Abdominal:      General: There is no distension.      Palpations: Abdomen is soft. There is no mass.      Tenderness: There is no abdominal tenderness. There is no guarding.   Musculoskeletal:         General: Deformity present. Normal range of motion.   Lymphadenopathy:      Cervical: No cervical adenopathy.   Skin:     General: Skin is warm and dry.      Findings: No erythema or rash.   Neurological:      Mental Status: He is alert and oriented to person, place, and time.      Cranial Nerves: No cranial nerve deficit.      Coordination: Coordination normal.      Deep Tendon Reflexes: Reflexes are normal and symmetric.   Psychiatric:         Behavior: Behavior normal.         Thought Content: Thought content normal.         Judgment: Judgment normal.                 HEALTH RISK ASSESSMENT    Smoking Status:  Social History     Tobacco Use   Smoking Status Never Smoker   Smokeless Tobacco Former User   • Types: Chew     Alcohol Consumption:  Social History     Substance and Sexual Activity   Alcohol Use Not " Currently     Fall Risk Screen:    STEADI Fall Risk Assessment was completed, and patient is at LOW risk for falls.Assessment completed on:10/4/2021    Depression Screening:  PHQ-2/PHQ-9 Depression Screening 10/4/2021   Little interest or pleasure in doing things 0   Feeling down, depressed, or hopeless 0   Total Score 0       Health Habits and Functional and Cognitive Screening:  Functional & Cognitive Status 10/4/2021   Do you have difficulty preparing food and eating? No   Do you have difficulty bathing yourself, getting dressed or grooming yourself? No   Do you have difficulty using the toilet? No   Do you have difficulty moving around from place to place? No   Do you have trouble with steps or getting out of a bed or a chair? No   Current Diet Well Balanced Diet   Dental Exam Up to date   Eye Exam Up to date   Exercise (times per week) 4 times per week   Current Exercises Include Walking;Yard Work        Exercise Comment Very Active    Current Exercise Activities Include -   Do you need help using the phone?  No   Are you deaf or do you have serious difficulty hearing?  No   Do you need help with transportation? No   Do you need help shopping? No   Do you need help preparing meals?  No   Do you need help with housework?  No   Do you need help with laundry? No   Do you need help taking your medications? No   Do you need help managing money? No   Do you ever drive or ride in a car without wearing a seat belt? No   Have you felt unusual stress, anger or loneliness in the last month? No   Who do you live with? Spouse   If you need help, do you have trouble finding someone available to you? No   Have you been bothered in the last four weeks by sexual problems? No   Do you have difficulty concentrating, remembering or making decisions? No       Age-appropriate Screening Schedule:  Refer to the list below for future screening recommendations based on patient's age, sex and/or medical conditions. Orders for these  recommended tests are listed in the plan section. The patient has been provided with a written plan.    Health Maintenance   Topic Date Due   • TDAP/TD VACCINES (1 - Tdap) Never done   • INFLUENZA VACCINE  10/01/2021   • ZOSTER VACCINE (1 of 2) 07/08/2029 (Originally 12/7/2001)   • COLONOSCOPY  10/26/2030              Assessment/Plan   CMS Preventative Services Quick Reference  Risk Factors Identified During Encounter  Polypharmacy  The above risks/problems have been discussed with the patient.  Follow up actions/plans if indicated are seen below in the Assessment/Plan Section.  Pertinent information has been shared with the patient in the After Visit Summary.    Diagnoses and all orders for this visit:    1. Malignant neoplasm of sigmoid colon (HCC) (Primary) stable undergoing chemotherapy with Xeloda    2. Peripheral neuropathy due to chemotherapy (HCC) has had some relief with gabapentin    3. HTN (hypertension), benign stable with current meds and low-salt diet    4. 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     An After Visit Summary and PPPS were made available to the patient.

## 2021-10-07 DIAGNOSIS — C18.7 MALIGNANT NEOPLASM OF SIGMOID COLON (HCC): ICD-10-CM

## 2021-10-07 DIAGNOSIS — R42 DIZZINESS: ICD-10-CM

## 2021-10-07 DIAGNOSIS — T45.1X5A PERIPHERAL NEUROPATHY DUE TO CHEMOTHERAPY (HCC): ICD-10-CM

## 2021-10-07 DIAGNOSIS — R55 SYNCOPE, UNSPECIFIED SYNCOPE TYPE: ICD-10-CM

## 2021-10-07 DIAGNOSIS — G62.0 PERIPHERAL NEUROPATHY DUE TO CHEMOTHERAPY (HCC): ICD-10-CM

## 2021-10-07 RX ORDER — GABAPENTIN 300 MG/1
300 CAPSULE ORAL
Qty: 30 CAPSULE | Refills: 5 | Status: SHIPPED | OUTPATIENT
Start: 2021-10-07 | End: 2022-04-25

## 2021-10-08 ENCOUNTER — SPECIALTY PHARMACY (OUTPATIENT)
Dept: ONCOLOGY | Facility: HOSPITAL | Age: 71
End: 2021-10-08

## 2021-10-08 NOTE — PROGRESS NOTES
Specialty Pharmacy Assessment    Capecitabine (Xeloda)  Dose: 1500 mg  Frequency: Twice a day for 7 days followed by 7 days off  Indication: Colon cancer  Goals of therapy: Palliative  Follow-Up: Yes  Dispensing pharmacy: [facility]  Refill status: Current  Dispense status: Mail  Side effect assessment: Diarrhea, Nausea, Rash  Intervention?: No  Compliance: Patient reports taking tablets whole twice daily, Patient reports taking around the same time every day  Additional notes: Thankfully, the patient is tolerating this well and denies diarrhea, nausea, rash, hand-foot syndrome, mouth sores, or any other side effects.  He said he is ready for a refill and has about 10-12 pills left in the bottle. He denies missing any doses.    His current Rx has 1 fill left so I asked [facility] to process that to mail out to him on Monday.  The patient's address on file is current.

## 2021-10-15 RX ORDER — ESCITALOPRAM OXALATE 10 MG/1
10 TABLET ORAL DAILY
Qty: 90 TABLET | Refills: 3 | Status: SHIPPED | OUTPATIENT
Start: 2021-10-15 | End: 2022-10-10

## 2021-10-18 ENCOUNTER — APPOINTMENT (OUTPATIENT)
Dept: CT IMAGING | Facility: HOSPITAL | Age: 71
End: 2021-10-18

## 2021-10-27 ENCOUNTER — HOSPITAL ENCOUNTER (OUTPATIENT)
Dept: CT IMAGING | Facility: HOSPITAL | Age: 71
Discharge: HOME | End: 2021-10-27

## 2021-10-27 DIAGNOSIS — C18.7 MALIGNANT NEOPLASM OF SIGMOID COLON (HCC): ICD-10-CM

## 2021-10-27 DIAGNOSIS — C78.7 SECONDARY MALIGNANT NEOPLASM OF LIVER AND INTRAHEPATIC BILE DUCT (HCC): ICD-10-CM

## 2021-10-27 LAB — CREAT BLDA-MCNC: 1 MG/DL (ref 0.6–1.3)

## 2021-10-27 PROCEDURE — 74177 CT ABD & PELVIS W/CONTRAST: CPT

## 2021-10-27 PROCEDURE — 71260 CT THORAX DX C+: CPT

## 2021-10-27 PROCEDURE — 25010000002 IOPAMIDOL 61 % SOLUTION: Performed by: NURSE PRACTITIONER

## 2021-10-27 PROCEDURE — 82565 ASSAY OF CREATININE: CPT

## 2021-10-27 RX ADMIN — IOPAMIDOL 100 ML: 612 INJECTION, SOLUTION INTRAVENOUS at 08:05

## 2021-10-29 ENCOUNTER — TELEPHONE (OUTPATIENT)
Dept: ONCOLOGY | Facility: HOSPITAL | Age: 71
End: 2021-10-29

## 2021-10-29 ENCOUNTER — OFFICE VISIT (OUTPATIENT)
Dept: ONCOLOGY | Facility: CLINIC | Age: 71
End: 2021-10-29

## 2021-10-29 ENCOUNTER — SPECIALTY PHARMACY (OUTPATIENT)
Dept: ONCOLOGY | Facility: HOSPITAL | Age: 71
End: 2021-10-29

## 2021-10-29 ENCOUNTER — INFUSION (OUTPATIENT)
Dept: ONCOLOGY | Facility: HOSPITAL | Age: 71
End: 2021-10-29

## 2021-10-29 VITALS
RESPIRATION RATE: 16 BRPM | SYSTOLIC BLOOD PRESSURE: 125 MMHG | BODY MASS INDEX: 20.67 KG/M2 | WEIGHT: 156 LBS | DIASTOLIC BLOOD PRESSURE: 70 MMHG | HEIGHT: 73 IN | HEART RATE: 62 BPM | TEMPERATURE: 98.4 F | OXYGEN SATURATION: 99 %

## 2021-10-29 DIAGNOSIS — C18.7 MALIGNANT NEOPLASM OF SIGMOID COLON (HCC): Primary | ICD-10-CM

## 2021-10-29 DIAGNOSIS — C78.7 SECONDARY MALIGNANT NEOPLASM OF LIVER AND INTRAHEPATIC BILE DUCT (HCC): ICD-10-CM

## 2021-10-29 DIAGNOSIS — C18.7 MALIGNANT NEOPLASM OF SIGMOID COLON (HCC): ICD-10-CM

## 2021-10-29 DIAGNOSIS — D61.810 ANTINEOPLASTIC CHEMOTHERAPY INDUCED PANCYTOPENIA (CODE) (HCC): ICD-10-CM

## 2021-10-29 LAB
ALBUMIN SERPL-MCNC: 4.4 G/DL (ref 3.5–5.2)
ALBUMIN/GLOB SERPL: 1.8 G/DL
ALP SERPL-CCNC: 91 U/L (ref 39–117)
ALT SERPL W P-5'-P-CCNC: 7 U/L (ref 1–41)
ANION GAP SERPL CALCULATED.3IONS-SCNC: 9.9 MMOL/L (ref 5–15)
AST SERPL-CCNC: 17 U/L (ref 1–40)
BASOPHILS # BLD AUTO: 0.02 10*3/MM3 (ref 0–0.2)
BASOPHILS NFR BLD AUTO: 0.4 % (ref 0–1.5)
BILIRUB SERPL-MCNC: 0.8 MG/DL (ref 0–1.2)
BUN SERPL-MCNC: 15 MG/DL (ref 8–23)
BUN/CREAT SERPL: 18.1 (ref 7–25)
CALCIUM SPEC-SCNC: 9.6 MG/DL (ref 8.6–10.5)
CEA SERPL-MCNC: 1.6 NG/ML
CHLORIDE SERPL-SCNC: 102 MMOL/L (ref 98–107)
CO2 SERPL-SCNC: 25.1 MMOL/L (ref 22–29)
CREAT SERPL-MCNC: 0.83 MG/DL (ref 0.76–1.27)
DEPRECATED RDW RBC AUTO: 54.8 FL (ref 37–54)
EOSINOPHIL # BLD AUTO: 0.04 10*3/MM3 (ref 0–0.4)
EOSINOPHIL NFR BLD AUTO: 0.9 % (ref 0.3–6.2)
ERYTHROCYTE [DISTWIDTH] IN BLOOD BY AUTOMATED COUNT: 14.4 % (ref 12.3–15.4)
GFR SERPL CREATININE-BSD FRML MDRD: 92 ML/MIN/1.73
GLOBULIN UR ELPH-MCNC: 2.4 GM/DL
GLUCOSE SERPL-MCNC: 100 MG/DL (ref 65–99)
HCT VFR BLD AUTO: 36.6 % (ref 37.5–51)
HGB BLD-MCNC: 12.7 G/DL (ref 13–17.7)
IMM GRANULOCYTES # BLD AUTO: 0.02 10*3/MM3 (ref 0–0.05)
IMM GRANULOCYTES NFR BLD AUTO: 0.4 % (ref 0–0.5)
LYMPHOCYTES # BLD AUTO: 1.26 10*3/MM3 (ref 0.7–3.1)
LYMPHOCYTES NFR BLD AUTO: 27.5 % (ref 19.6–45.3)
MCH RBC QN AUTO: 35.7 PG (ref 26.6–33)
MCHC RBC AUTO-ENTMCNC: 34.7 G/DL (ref 31.5–35.7)
MCV RBC AUTO: 102.8 FL (ref 79–97)
MONOCYTES # BLD AUTO: 0.45 10*3/MM3 (ref 0.1–0.9)
MONOCYTES NFR BLD AUTO: 9.8 % (ref 5–12)
NEUTROPHILS NFR BLD AUTO: 2.79 10*3/MM3 (ref 1.7–7)
NEUTROPHILS NFR BLD AUTO: 61 % (ref 42.7–76)
NRBC BLD AUTO-RTO: 0 /100 WBC (ref 0–0.2)
PLATELET # BLD AUTO: 164 10*3/MM3 (ref 140–450)
PMV BLD AUTO: 10 FL (ref 6–12)
POTASSIUM SERPL-SCNC: 4.4 MMOL/L (ref 3.5–5.2)
PROT SERPL-MCNC: 6.8 G/DL (ref 6–8.5)
RBC # BLD AUTO: 3.56 10*6/MM3 (ref 4.14–5.8)
SODIUM SERPL-SCNC: 137 MMOL/L (ref 136–145)
WBC # BLD AUTO: 4.58 10*3/MM3 (ref 3.4–10.8)

## 2021-10-29 PROCEDURE — 82378 CARCINOEMBRYONIC ANTIGEN: CPT

## 2021-10-29 PROCEDURE — 80053 COMPREHEN METABOLIC PANEL: CPT

## 2021-10-29 PROCEDURE — 36591 DRAW BLOOD OFF VENOUS DEVICE: CPT

## 2021-10-29 PROCEDURE — 36415 COLL VENOUS BLD VENIPUNCTURE: CPT

## 2021-10-29 PROCEDURE — 96523 IRRIG DRUG DELIVERY DEVICE: CPT

## 2021-10-29 PROCEDURE — 99214 OFFICE O/P EST MOD 30 MIN: CPT | Performed by: INTERNAL MEDICINE

## 2021-10-29 PROCEDURE — 25010000002 HEPARIN LOCK FLUSH PER 10 UNITS

## 2021-10-29 PROCEDURE — 85025 COMPLETE CBC W/AUTO DIFF WBC: CPT

## 2021-10-29 RX ORDER — HEPARIN SODIUM (PORCINE) LOCK FLUSH IV SOLN 100 UNIT/ML 100 UNIT/ML
500 SOLUTION INTRAVENOUS AS NEEDED
Status: CANCELLED | OUTPATIENT
Start: 2021-10-29

## 2021-10-29 RX ORDER — CAPECITABINE 500 MG/1
1500 TABLET, FILM COATED ORAL 2 TIMES DAILY
Qty: 84 TABLET | Refills: 5 | Status: SHIPPED | OUTPATIENT
Start: 2021-10-29 | End: 2022-06-09 | Stop reason: SDUPTHER

## 2021-10-29 RX ORDER — HEPARIN SODIUM (PORCINE) LOCK FLUSH IV SOLN 100 UNIT/ML 100 UNIT/ML
500 SOLUTION INTRAVENOUS AS NEEDED
Status: DISCONTINUED | OUTPATIENT
Start: 2021-10-29 | End: 2021-10-29 | Stop reason: HOSPADM

## 2021-10-29 RX ORDER — SODIUM CHLORIDE 0.9 % (FLUSH) 0.9 %
10 SYRINGE (ML) INJECTION AS NEEDED
Status: CANCELLED | OUTPATIENT
Start: 2021-10-29

## 2021-10-29 RX ORDER — HEPARIN SODIUM (PORCINE) LOCK FLUSH IV SOLN 100 UNIT/ML 100 UNIT/ML
SOLUTION INTRAVENOUS
Status: COMPLETED
Start: 2021-10-29 | End: 2021-10-29

## 2021-10-29 RX ORDER — SODIUM CHLORIDE 0.9 % (FLUSH) 0.9 %
10 SYRINGE (ML) INJECTION AS NEEDED
Status: DISCONTINUED | OUTPATIENT
Start: 2021-10-29 | End: 2021-10-29 | Stop reason: HOSPADM

## 2021-10-29 RX ADMIN — SODIUM CHLORIDE, PRESERVATIVE FREE 10 ML: 5 INJECTION INTRAVENOUS at 11:05

## 2021-10-29 RX ADMIN — HEPARIN SODIUM (PORCINE) LOCK FLUSH IV SOLN 100 UNIT/ML 500 UNITS: 100 SOLUTION at 11:06

## 2021-10-29 RX ADMIN — HEPARIN 500 UNITS: 100 SYRINGE at 11:06

## 2021-10-29 NOTE — PROGRESS NOTES
CHIEF COMPLAINT: 1.  Peripheral neuropathy of the hands and feet, worse in the feet                                       2.  Follow-up for colon cancer    Problem List:  Oncology/Hematology History Overview Note   1. Stage CARLOS, [id number] colon cancer with 2 out of 14 pericolonic lymph nodes  involved and a left lobe liver biopsy positive for metastasis, presenting with  a 25 pound weight loss and diarrhea with some perirectal soreness and had  colonoscopy with Dr. Mcclain in January that found this lesion that was  circumferential high-grade invading into the pericolonic fat, status post  sigmoid colectomy of a poorly differentiated adenocarcinoma.   a) Baseline CEA postop of 0.8 with alkaline phosphatase 111, with normal liver  enzymes and creatinine of 0.8. Baseline white count 9820 with a hemoglobin  13.8, platelets 339,000, and CT with contrast showing a right lobe liver dome  lesion as well as an anastomotic change in the bowel.   b) Began CapeOx 03/15/2016.  c) Added in Avastin to CapeOx 04/05/2016, second cycle. (This was 8 weeks out  from surgery).  d) KRAS mutation is negative.  E.) CAT scan in August 2016 shows resolution of disease in the liver and colon and a stable lung nodule.  Hence Avastin, Xeloda, and oxaliplatin continued.  F) oxaliplatin discontinued October 2016 due to worsening peripheral neuropathy.  G.) CTs of February 2017 showed no evidence of metastasis and stable bibasilar nodular scar.  Persistent neuropathy not worsening.  CEA 1.4.  Continuing Avastin and Xeloda without oxaliplatin.  Having some problems with irritation of his eyes on Xeloda.  2.  Peripheral neuropathy, chemotherapy induced     Malignant neoplasm of sigmoid colon (HCC)   5/20/2016 Initial Diagnosis    Malignant neoplasm of sigmoid colon     5/2/2017 Imaging    CT chest, abdomen, pelvis IMPRESSION:  1. No disease recurrence.  2. Minimal areas of nodular thickening in the right lower lobe, stable.     8/2/2017 Imaging    CT  chest/abdomen/pelvis IMPRESSION:  Stable examination with no evidence of acute intrathoracic,  intra-abdominal or pelvic abnormality. There is no evidence of  progression of disease. No metastatic disease.      11/13/2017 Imaging    CT chest/abdomen/pelvis IMPRESSION:  Stable examination without evidence of acute intrathoracic  intra-abdominal or intrapelvic abnormality. No evidence of progression  of disease.   CEA 1.3.     2/6/2020 -  Chemotherapy    OP COLON Capecitabine 1,250 mg/m2 BID (8 cycles)         HISTORY OF PRESENT ILLNESS:  The patient is a 70 y.o. male, here for follow up on management of Stage IV a colon cancer.  He is currently on Xeloda alone.  He continues to do fairly well.  He is on 1 week and off 1 week.  He is able to do his normal activities such as golfing.   Denies any nausea vomiting.  No significant fatigue.    Past Medical History:   Diagnosis Date   • H/O exercise stress test     Patient states it was normal   • Hypertension    • Liver disease     mets from colon cancer   • Malignant neoplasm of colon (HCC)    • Seizure (HCC) 2019   • Wears dentures     bridge   • Wears glasses      Past Surgical History:   Procedure Laterality Date   • COLON SURGERY      Sigmoid   • COLONOSCOPY N/A 10/26/2020    Procedure: COLONOSCOPY;  Surgeon: Rodrigo Lambert MD;  Location: [facility] ENDOSCOPY;  Service: Gastroenterology;  Laterality: N/A;   • LIVER SURGERY     • PORTACATH PLACEMENT         No Known Allergies    Family History and Social History reviewed and changed as necessary      REVIEW OF SYSTEM:   Review of Systems   Constitutional: Negative for appetite change, chills, diaphoresis, fatigue, fever and unexpected weight change.   HENT:   Negative for mouth sores, sore throat and trouble swallowing.    Eyes: Negative for icterus.   Respiratory: Negative for cough, hemoptysis and shortness of breath.    Cardiovascular: Negative for chest pain, leg swelling and palpitations.   Gastrointestinal:  "Negative for abdominal distention, abdominal pain, blood in stool, constipation, diarrhea, nausea and vomiting.   Endocrine: Negative for hot flashes.   Genitourinary: Negative for bladder incontinence, difficulty urinating, dysuria, frequency and hematuria.    Musculoskeletal: Negative for gait problem, neck pain and neck stiffness.   Skin: Negative for rash.  Positive for dry skin.  Neurological: Positive for peripheral neuropathy in the hands and feet.  Hematological: Negative for adenopathy. Does not bruise/bleed easily.   Psychiatric/Behavioral: Negative for depression. The patient is not nervous/anxious.    All other systems reviewed and are negative.       PHYSICAL EXAM    Vitals:    10/29/21 1041   BP: 125/70   Pulse: 62   Resp: 16   Temp: 98.4 °F (36.9 °C)   TempSrc: Temporal   SpO2: 99%   Weight: 70.8 kg (156 lb)   Height: 185.4 cm (73\")     Constitutional: Appears well-developed and well-nourished. No distress.   ECOG: (1) Restricted in physically strenuous activity, ambulatory and able to do work of light nature  HENT:   Head: Normocephalic.   Mouth/Throat: Oropharynx is clear and moist.   Eyes: Conjunctivae are normal. Pupils are equal, round, and reactive to light. No scleral icterus.   Neck: Neck supple. No JVD present. No thyromegaly present.   Cardiovascular: Normal rate, regular rhythm and normal heart sounds.    Pulmonary/Chest: Breath sounds normal. No respiratory distress.   Nodes: No cervical, supraclavicular or axillary nodes palpable on exam.  Abdominal: Soft. Exhibits no distension and no mass. There is no hepatosplenomegaly. There is no tenderness. There is no rebound and no guarding.   Musculoskeletal:Exhibits no edema, tenderness or deformity.   Neurological: Alert and oriented to person, place, and time. Exhibits normal muscle tone.   Skin: Dry.  No ecchymosis, no petechiae and no rash noted. Not diaphoretic. No cyanosis.   Psychiatric: Normal mood and affect.   Vitals reviewed.  " Laboratory data reviewed along with CT chest abdomen and pelvis and images thereof as outlined above in the oncology history were reviewed at time of visit.    Lab Results   Component Value Date    WBC 3.42 08/23/2021    HGB 12.8 (L) 08/23/2021    HCT 37.4 (L) 08/23/2021    .6 (H) 08/23/2021     08/23/2021       Lab Results   Component Value Date    GLUCOSE 92 08/23/2021    BUN 13 08/23/2021    CREATININE 1.00 10/27/2021    EGFRIFNONA 84 08/23/2021    EGFRIFAFRI 81 08/04/2020    BCR 14.4 08/23/2021    K 4.3 08/23/2021    CO2 23.3 08/23/2021    CALCIUM 9.2 08/23/2021    PROTENTOTREF 6.9 08/04/2020    ALBUMIN 4.20 08/23/2021    LABIL2 2.6 08/04/2020    AST 17 08/23/2021    ALT 11 08/23/2021       Lab Results   Component Value Date    CEA 2.23 08/23/2021    CEA 2.27 06/23/2021    CEA 1.99 04/26/2021    CEA 1.97 11/30/2020     CT Chest Without Contrast Diagnostic    Result Date: 7/13/2021  Interval resolution of the nodular opacity in the right lower lobe which was likely infectious or inflammatory in nature.  318.12 mGy.cm   This study was performed with techniques to keep radiation doses as low as reasonably achievable (ALARA). Individualized dose reduction techniques using automated exposure control or adjustment of mA and/or kV according to the patient size were employed.  This report was finalized on 7/13/2021 3:44 PM by David Ingram M.D..    CT Chest With Contrast Diagnostic    Result Date: 10/27/2021  No evidence of metastatic disease involving the chest, abdomen or pelvis.  This study was performed with techniques to keep radiation doses as low as reasonably achievable (ALARA). Individualized dose reduction techniques using automated exposure control or adjustment of mA and/or kV according to the patient size were employed.  This report was finalized on 10/27/2021 9:11 AM by David Ingram M.D..    CT Abdomen Pelvis With Contrast    Result Date: 10/27/2021  No evidence of metastatic  disease involving the chest, abdomen or pelvis.  This study was performed with techniques to keep radiation doses as low as reasonably achievable (ALARA). Individualized dose reduction techniques using automated exposure control or adjustment of mA and/or kV according to the patient size were employed.  This report was finalized on 10/27/2021 9:11 AM by David Ingram M.D..            Assessment/Plan     1. Metastatic colon cancer with Solitary Liver metastasis initially diagnosed 2- - he has been on Xeloda and Avastin for 5 years until I took him off Avastin in January 2020 and Xeloda in August 2020.  Xeloda restarted in March 2021 due to concern of progression.  His imaging today shows no sign of recurrent disease.  I like to continue his Xeloda for now.  I will see how he looks in January.  Plan to rescan him in 6 months.      2. Peripheral neuropathy secondary to chemotherapy.  Stable.  We will continue gabapentin 1 capsule by mouth 3 times a day.    3. Port. We will continue port care every 2 months with clinic appointments. We will check CBC, CMP, CEA with each port flush.       Total time of patient care on day of service including time prior to, face to face with patient, and following visit spent in reviewing records, lab results, imaging studies, discussion with patient, and documentation/charting was > 25 minutes.        Santi Abad MD    10/29/21

## 2021-10-29 NOTE — TELEPHONE ENCOUNTER
[facility] Refill Outreach    Called regarding refill of medication: Capecitabine 1500mg PO AM and 1500mg PO PM for 7 days, then off 7 days  Spoke with patient.    Assessment  • It looks like it is almost time for your refill, how many doses do you have left? Out of medication; was waiting to see doctor before refilling  • How are you doing on the medication, are you experiencing any side effects? Patient denies side effects   • How many doses have you missed since you last filled your prescription? 0 doses missed.  • Have you stopped or started any medications since we last spoke? Patient has had no medication changes since last month.     Shipment   • We can go ahead and coordinate your next refill, would you like to pick this up at the pharmacy/curbside, or have this delivered to you vs. the clinic? Patient plans to  at [facility].  • Do you have any questions for the pharmacist? No.  • Ensured patient has clinic contact information for questions.     Follow up: Plan to reach out to patient on/around 11/24/21    Loren Tiwari, Pharmacy Technician  10/29/2021  12:11 EDT

## 2021-10-29 NOTE — PROGRESS NOTES
Specialty Pharmacy Assessment    Capecitabine (Xeloda)  Dose: 1500 mg  Frequency: Twice a day for 7 days followed by 7 days off  Indication: Colon cancer  Goals of therapy: Palliative  Follow-Up: Yes  Dispensing pharmacy: [facility]  Refill status: Refills submitted  Additional notes: I reviewed the most recent labs, note, current medication list, and allergies.  The patient will continue on capecitabine without any dose changes, which is appropriate.    I sent refills to [facility] for capecitabine 1500 mg PO BID on days 1-7, off 7 days of each cycle, #84, 5 refills.

## 2021-11-02 ENCOUNTER — TELEPHONE (OUTPATIENT)
Dept: INTERNAL MEDICINE | Facility: CLINIC | Age: 71
End: 2021-11-02

## 2021-11-02 ENCOUNTER — TELEPHONE (OUTPATIENT)
Dept: ONCOLOGY | Facility: CLINIC | Age: 71
End: 2021-11-02

## 2021-11-02 DIAGNOSIS — C18.7 MALIGNANT NEOPLASM OF SIGMOID COLON (HCC): Primary | ICD-10-CM

## 2021-11-02 DIAGNOSIS — L02.91 ABSCESS: ICD-10-CM

## 2021-11-02 RX ORDER — AMOXICILLIN AND CLAVULANATE POTASSIUM 875; 125 MG/1; MG/1
1 TABLET, FILM COATED ORAL 2 TIMES DAILY
Qty: 20 TABLET | Refills: 0 | Status: SHIPPED | OUTPATIENT
Start: 2021-11-02 | End: 2022-01-21

## 2021-11-02 NOTE — TELEPHONE ENCOUNTER
I spoke with Bernardo - he is going to check with Adeola Law before starting any new medications before chemo

## 2021-11-02 NOTE — TELEPHONE ENCOUNTER
Caller: Gordon Avila    Relationship: Self    Best call back number: [phone]    What medication are you requesting: ANTIBIOTIC    What are your current symptoms: ABSCESS TOOTH     How long have you been experiencing symptoms: NA    Have you had these symptoms before:    [] Yes  [] No    Have you been treated for these symptoms before:   [] Yes  [] No    If a prescription is needed, what is your preferred pharmacy and phone number: [id number] [place], [place] - [address] AT [facility] [address] - [phone]  - [phone] FX     Additional notes: PATIENT STATES HE CHIPPED A TOOTH AND IT HAS BECOME VERY PAINFUL AND BELIEVES TO BE ABSCESSED. PATIENT STATES HE CONTACTED THE DENTIST BUT IS UNABLE TO GET IN FOR ATLEAST 1 MONTH. PATIENT STATES HE WAS UP ALL NIGHT WITH PAIN FROM THE TOOTH. PATIENT WOULD LIKE A CALL BACK ASAP

## 2021-11-02 NOTE — TELEPHONE ENCOUNTER
Caller: Gordon Avila    Relationship: Self    Best call back number: [phone]    What is the best time to reach you: ASAP    Who are you requesting to speak with (clinical staff, provider,  specific staff member): CLINICAL      What was the call regarding: PATIENT CALLED IN HE IS HAVING SEVERE PAIN FROM AN ACSCESS TOOTH. WANTED TO KNOW IF YOU CAN CALL IN ANTIBIOTIC AND CAN HE TAKE IBUPROFEN FOR THE PAIN. DOUBLE CHECKED PHARMACY AND HE DOES GO TO [facility].    Do you require a callback: PLEASE CALL BACK ASAP TO ADVISE

## 2021-11-02 NOTE — TELEPHONE ENCOUNTER
I talked with patient and he said he had most of his teeth pulled a long time ago but he still has a few and this one is broken and giving him a lot of discomfort.  I advised that he should follow with  Dentist. He said he needs some relief now.  I talked with DEISY Lopez and she is going to send in Augmentin bid x 10 days. Gisel wants patient to follow up with dentist asap.  I called patient back and he will contact a dentist to evaluate the tooth.  I told him to start the augmentin and take the entire script.  Patient verbalized understanding.

## 2021-11-02 NOTE — TELEPHONE ENCOUNTER
Pt called back in stated excruciating pain, stated he had chemo later this week and wanted to check if he could take tylenol or ibuprofen in the meantime. Phone disconnected before I could get ahold of nurse

## 2021-11-10 ENCOUNTER — SPECIALTY PHARMACY (OUTPATIENT)
Dept: ONCOLOGY | Facility: HOSPITAL | Age: 71
End: 2021-11-10

## 2021-12-10 ENCOUNTER — SPECIALTY PHARMACY (OUTPATIENT)
Dept: ONCOLOGY | Facility: HOSPITAL | Age: 71
End: 2021-12-10

## 2021-12-10 NOTE — PROGRESS NOTES
Specialty Pharmacy Refill Coordination Note     Gordon is a 71 y.o. male contacted today regarding refills of  Capecitabine 1500mg PO BID for 7 days, then off for 7 days specialty medication(s).    Reviewed and verified with patient:      Specialty medication(s) and dose(s) confirmed: yes    Refill Questions      Most Recent Value   Changes to allergies? No   Changes to medications? No   New conditions since last clinic visit No   Unplanned office visit, urgent care, ED, or hospital admission in the last 4 weeks  No   How does patient/caregiver feel medication is working? Good   Financial problems or insurance changes  No   How many doses of your specialty medications were missed in the last 4 weeks? 0          Delivery Questions      Most Recent Value   Delivery method [employer]   Delivery address correct? Yes   Delivery phone number [phone]   Preferred delivery time? Anytime   Number of medications in delivery 1   Medication being filled and delivered capecitabine   Doses left of specialty medications 3 days left   Is there any medication that is due not being filled? No   Cooler needed? No   Do any medications need mixed or dated? No   Copay form of payment Credit card on file   Additional comments $6.85   Questions or concerns for the pharmacist? No   Are any medications first time fills? No            Medication Adherence    Adherence tools used: calendar          Follow-up: 28 day(s)     Loren Tiwari, Pharmacy Technician  Specialty Pharmacy Technician

## 2022-01-06 ENCOUNTER — SPECIALTY PHARMACY (OUTPATIENT)
Dept: ONCOLOGY | Facility: HOSPITAL | Age: 72
End: 2022-01-06

## 2022-01-21 ENCOUNTER — OFFICE VISIT (OUTPATIENT)
Dept: ONCOLOGY | Facility: CLINIC | Age: 72
End: 2022-01-21

## 2022-01-21 ENCOUNTER — INFUSION (OUTPATIENT)
Dept: ONCOLOGY | Facility: HOSPITAL | Age: 72
End: 2022-01-21

## 2022-01-21 VITALS
BODY MASS INDEX: 21.07 KG/M2 | WEIGHT: 159 LBS | RESPIRATION RATE: 12 BRPM | HEIGHT: 73 IN | HEART RATE: 54 BPM | DIASTOLIC BLOOD PRESSURE: 74 MMHG | OXYGEN SATURATION: 100 % | TEMPERATURE: 98.6 F | SYSTOLIC BLOOD PRESSURE: 129 MMHG

## 2022-01-21 DIAGNOSIS — D61.810 ANTINEOPLASTIC CHEMOTHERAPY INDUCED PANCYTOPENIA (CODE): ICD-10-CM

## 2022-01-21 DIAGNOSIS — G62.0 PERIPHERAL NEUROPATHY DUE TO CHEMOTHERAPY: ICD-10-CM

## 2022-01-21 DIAGNOSIS — C18.7 MALIGNANT NEOPLASM OF SIGMOID COLON: Primary | ICD-10-CM

## 2022-01-21 DIAGNOSIS — C78.7 SECONDARY MALIGNANT NEOPLASM OF LIVER AND INTRAHEPATIC BILE DUCT: ICD-10-CM

## 2022-01-21 DIAGNOSIS — T45.1X5A PERIPHERAL NEUROPATHY DUE TO CHEMOTHERAPY: ICD-10-CM

## 2022-01-21 LAB
ALBUMIN SERPL-MCNC: 4.5 G/DL (ref 3.5–5.2)
ALBUMIN/GLOB SERPL: 2.1 G/DL
ALP SERPL-CCNC: 72 U/L (ref 39–117)
ALT SERPL W P-5'-P-CCNC: 12 U/L (ref 1–41)
ANION GAP SERPL CALCULATED.3IONS-SCNC: 10.6 MMOL/L (ref 5–15)
AST SERPL-CCNC: 16 U/L (ref 1–40)
BASOPHILS # BLD AUTO: 0.01 10*3/MM3 (ref 0–0.2)
BASOPHILS NFR BLD AUTO: 0.2 % (ref 0–1.5)
BILIRUB SERPL-MCNC: 0.8 MG/DL (ref 0–1.2)
BUN SERPL-MCNC: 12 MG/DL (ref 8–23)
BUN/CREAT SERPL: 14.1 (ref 7–25)
CALCIUM SPEC-SCNC: 9.3 MG/DL (ref 8.6–10.5)
CEA SERPL-MCNC: 1.51 NG/ML
CHLORIDE SERPL-SCNC: 106 MMOL/L (ref 98–107)
CO2 SERPL-SCNC: 23.4 MMOL/L (ref 22–29)
CREAT SERPL-MCNC: 0.85 MG/DL (ref 0.76–1.27)
DEPRECATED RDW RBC AUTO: 58.1 FL (ref 37–54)
EOSINOPHIL # BLD AUTO: 0.05 10*3/MM3 (ref 0–0.4)
EOSINOPHIL NFR BLD AUTO: 1.1 % (ref 0.3–6.2)
ERYTHROCYTE [DISTWIDTH] IN BLOOD BY AUTOMATED COUNT: 14.9 % (ref 12.3–15.4)
GFR SERPL CREATININE-BSD FRML MDRD: 89 ML/MIN/1.73
GLOBULIN UR ELPH-MCNC: 2.1 GM/DL
GLUCOSE SERPL-MCNC: 98 MG/DL (ref 65–99)
HCT VFR BLD AUTO: 37.5 % (ref 37.5–51)
HGB BLD-MCNC: 12.6 G/DL (ref 13–17.7)
IMM GRANULOCYTES # BLD AUTO: 0.02 10*3/MM3 (ref 0–0.05)
IMM GRANULOCYTES NFR BLD AUTO: 0.4 % (ref 0–0.5)
LYMPHOCYTES # BLD AUTO: 1.1 10*3/MM3 (ref 0.7–3.1)
LYMPHOCYTES NFR BLD AUTO: 23.5 % (ref 19.6–45.3)
MCH RBC QN AUTO: 35.1 PG (ref 26.6–33)
MCHC RBC AUTO-ENTMCNC: 33.6 G/DL (ref 31.5–35.7)
MCV RBC AUTO: 104.5 FL (ref 79–97)
MONOCYTES # BLD AUTO: 0.29 10*3/MM3 (ref 0.1–0.9)
MONOCYTES NFR BLD AUTO: 6.2 % (ref 5–12)
NEUTROPHILS NFR BLD AUTO: 3.21 10*3/MM3 (ref 1.7–7)
NEUTROPHILS NFR BLD AUTO: 68.6 % (ref 42.7–76)
NRBC BLD AUTO-RTO: 0 /100 WBC (ref 0–0.2)
PLATELET # BLD AUTO: 148 10*3/MM3 (ref 140–450)
PMV BLD AUTO: 10.5 FL (ref 6–12)
POTASSIUM SERPL-SCNC: 4.2 MMOL/L (ref 3.5–5.2)
PROT SERPL-MCNC: 6.6 G/DL (ref 6–8.5)
RBC # BLD AUTO: 3.59 10*6/MM3 (ref 4.14–5.8)
SODIUM SERPL-SCNC: 140 MMOL/L (ref 136–145)
WBC NRBC COR # BLD: 4.68 10*3/MM3 (ref 3.4–10.8)

## 2022-01-21 PROCEDURE — 99214 OFFICE O/P EST MOD 30 MIN: CPT | Performed by: NURSE PRACTITIONER

## 2022-01-21 PROCEDURE — 82378 CARCINOEMBRYONIC ANTIGEN: CPT | Performed by: NURSE PRACTITIONER

## 2022-01-21 PROCEDURE — 85025 COMPLETE CBC W/AUTO DIFF WBC: CPT

## 2022-01-21 PROCEDURE — 25010000002 HEPARIN LOCK FLUSH PER 10 UNITS: Performed by: NURSE PRACTITIONER

## 2022-01-21 PROCEDURE — 80053 COMPREHEN METABOLIC PANEL: CPT | Performed by: NURSE PRACTITIONER

## 2022-01-21 PROCEDURE — 36591 DRAW BLOOD OFF VENOUS DEVICE: CPT

## 2022-01-21 PROCEDURE — 36415 COLL VENOUS BLD VENIPUNCTURE: CPT | Performed by: NURSE PRACTITIONER

## 2022-01-21 RX ORDER — HEPARIN SODIUM (PORCINE) LOCK FLUSH IV SOLN 100 UNIT/ML 100 UNIT/ML
500 SOLUTION INTRAVENOUS AS NEEDED
Status: DISCONTINUED | OUTPATIENT
Start: 2022-01-21 | End: 2022-01-21 | Stop reason: HOSPADM

## 2022-01-21 RX ORDER — SODIUM CHLORIDE 0.9 % (FLUSH) 0.9 %
10 SYRINGE (ML) INJECTION AS NEEDED
Status: DISCONTINUED | OUTPATIENT
Start: 2022-01-21 | End: 2022-01-21 | Stop reason: HOSPADM

## 2022-01-21 RX ORDER — SODIUM CHLORIDE 0.9 % (FLUSH) 0.9 %
10 SYRINGE (ML) INJECTION AS NEEDED
Status: CANCELLED | OUTPATIENT
Start: 2022-01-21

## 2022-01-21 RX ORDER — HEPARIN SODIUM (PORCINE) LOCK FLUSH IV SOLN 100 UNIT/ML 100 UNIT/ML
500 SOLUTION INTRAVENOUS AS NEEDED
Status: CANCELLED | OUTPATIENT
Start: 2022-01-21

## 2022-01-21 RX ADMIN — HEPARIN 500 UNITS: 100 SYRINGE at 11:01

## 2022-01-21 RX ADMIN — Medication 10 ML: at 11:01

## 2022-01-21 NOTE — PROGRESS NOTES
CHIEF COMPLAINT: 1.  Peripheral neuropathy of the hands and feet, worse in the feet                                       2.  Follow-up for colon cancer    Problem List:  Oncology/Hematology History Overview Note   1. Stage CARLOS, [id number] colon cancer with 2 out of 14 pericolonic lymph nodes  involved and a left lobe liver biopsy positive for metastasis, presenting with  a 25 pound weight loss and diarrhea with some perirectal soreness and had  colonoscopy with Dr. Mcclain in January that found this lesion that was  circumferential high-grade invading into the pericolonic fat, status post  sigmoid colectomy of a poorly differentiated adenocarcinoma.   a) Baseline CEA postop of 0.8 with alkaline phosphatase 111, with normal liver  enzymes and creatinine of 0.8. Baseline white count 9820 with a hemoglobin  13.8, platelets 339,000, and CT with contrast showing a right lobe liver dome  lesion as well as an anastomotic change in the bowel.   b) Began CapeOx 03/15/2016.  c) Added in Avastin to CapeOx 04/05/2016, second cycle. (This was 8 weeks out  from surgery).  d) KRAS mutation is negative.  E.) CAT scan in August 2016 shows resolution of disease in the liver and colon and a stable lung nodule.  Hence Avastin, Xeloda, and oxaliplatin continued.  F) oxaliplatin discontinued October 2016 due to worsening peripheral neuropathy.  G.) CTs of February 2017 showed no evidence of metastasis and stable bibasilar nodular scar.  Persistent neuropathy not worsening.  CEA 1.4.  Continuing Avastin and Xeloda without oxaliplatin.  Having some problems with irritation of his eyes on Xeloda.  2.  Peripheral neuropathy, chemotherapy induced     Malignant neoplasm of sigmoid colon (HCC)   5/20/2016 Initial Diagnosis    Malignant neoplasm of sigmoid colon     5/2/2017 Imaging    CT chest, abdomen, pelvis IMPRESSION:  1. No disease recurrence.  2. Minimal areas of nodular thickening in the right lower lobe, stable.     8/2/2017 Imaging    CT  chest/abdomen/pelvis IMPRESSION:  Stable examination with no evidence of acute intrathoracic,  intra-abdominal or pelvic abnormality. There is no evidence of  progression of disease. No metastatic disease.      11/13/2017 Imaging    CT chest/abdomen/pelvis IMPRESSION:  Stable examination without evidence of acute intrathoracic  intra-abdominal or intrapelvic abnormality. No evidence of progression  of disease.   CEA 1.3.     2/6/2020 -  Chemotherapy    OP COLON Capecitabine 1,250 mg/m2 BID (8 cycles)         HISTORY OF PRESENT ILLNESS:  The patient is a 71 y.o. male, here for follow up on management of Stage IV a colon cancer.  He is currently on Xeloda alone.  He continues to do fairly well with minimal side effects.  He is on 1 week and off 1 week.  He is able to do his normal activities.  He tries to stay as active as possible. He misses golfing lately. Denies any nausea vomiting.  No significant fatigue.    Past Medical History:   Diagnosis Date   • H/O exercise stress test     Patient states it was normal   • Hypertension    • Liver disease     mets from colon cancer   • Malignant neoplasm of colon (HCC)    • Seizure (HCC) 2019   • Wears dentures     bridge   • Wears glasses      Past Surgical History:   Procedure Laterality Date   • COLON SURGERY      Sigmoid   • COLONOSCOPY N/A 10/26/2020    Procedure: COLONOSCOPY;  Surgeon: Rodrigo Lambert MD;  Location: [facility] ENDOSCOPY;  Service: Gastroenterology;  Laterality: N/A;   • LIVER SURGERY     • PORTACATH PLACEMENT         No Known Allergies    Family History and Social History reviewed and changed as necessary      REVIEW OF SYSTEM:   Review of Systems   Constitutional: Negative for appetite change, chills, diaphoresis, fatigue, fever and unexpected weight change.   HENT:   Negative for mouth sores, sore throat and trouble swallowing.    Eyes: Negative for icterus.   Respiratory: Negative for cough, hemoptysis and shortness of breath.    Cardiovascular:  "Negative for chest pain, leg swelling and palpitations.   Gastrointestinal: Negative for abdominal distention, abdominal pain, blood in stool, constipation, diarrhea, nausea and vomiting.   Endocrine: Negative for hot flashes.   Genitourinary: Negative for bladder incontinence, difficulty urinating, dysuria, frequency and hematuria.    Musculoskeletal: Negative for gait problem, neck pain and neck stiffness.   Skin: Negative for rash.  Positive for dry skin.  Neurological: Positive for peripheral neuropathy in the hands and feet.  Hematological: Negative for adenopathy. Does not bruise/bleed easily.   Psychiatric/Behavioral: Negative for depression. The patient is not nervous/anxious.    All other systems reviewed and are negative.       PHYSICAL EXAM    Vitals:    01/21/22 1024   BP: 129/74   Pulse: 54   Resp: 12   Temp: 98.6 °F (37 °C)   TempSrc: Temporal   SpO2: 100%   Weight: 72.1 kg (159 lb)   Height: 185.4 cm (73\")     General: well appearing male in no acute distress  Neuro: alert and oriented  HEENT: sclera anicteric, oropharynx clear  Lymphatics: no cervical, supraclavicular, or axillary adenopathy  Cardiovascular: regular rate and rhythm, no murmurs  Lungs: clear to auscultation bilaterally  Abdomen: soft, nontender, nondistended.  No palpable organomegaly  Extremeties: no lower extremity edema  Skin: no rashes, lesions, bruising, or petechiae  Psych: mood and affect appropriate      Vitals reviewed.  Laboratory data reviewed along with CT chest abdomen and pelvis and images thereof as outlined above in the oncology history were reviewed at time of visit.    Lab Results   Component Value Date    WBC 4.58 10/29/2021    HGB 12.7 (L) 10/29/2021    HCT 36.6 (L) 10/29/2021    .8 (H) 10/29/2021     10/29/2021       Lab Results   Component Value Date    GLUCOSE 100 (H) 10/29/2021    BUN 15 10/29/2021    CREATININE 0.83 10/29/2021    EGFRIFNONA 92 10/29/2021    EGFRIFAFRI 81 08/04/2020    BCR 18.1 " 10/29/2021    K 4.4 10/29/2021    CO2 25.1 10/29/2021    CALCIUM 9.6 10/29/2021    PROTENTOTREF 6.9 08/04/2020    ALBUMIN 4.40 10/29/2021    LABIL2 2.6 08/04/2020    AST 17 10/29/2021    ALT 7 10/29/2021       Lab Results   Component Value Date    CEA 1.60 10/29/2021    CEA 2.23 08/23/2021    CEA 2.27 06/23/2021    CEA 1.99 04/26/2021     CT Chest Without Contrast Diagnostic    Result Date: 7/13/2021  Interval resolution of the nodular opacity in the right lower lobe which was likely infectious or inflammatory in nature.  318.12 mGy.cm   This study was performed with techniques to keep radiation doses as low as reasonably achievable (ALARA). Individualized dose reduction techniques using automated exposure control or adjustment of mA and/or kV according to the patient size were employed.  This report was finalized on 7/13/2021 3:44 PM by David Ingram M.D..    CT Chest With Contrast Diagnostic    Result Date: 10/27/2021  No evidence of metastatic disease involving the chest, abdomen or pelvis.  This study was performed with techniques to keep radiation doses as low as reasonably achievable (ALARA). Individualized dose reduction techniques using automated exposure control or adjustment of mA and/or kV according to the patient size were employed.  This report was finalized on 10/27/2021 9:11 AM by David Ingram M.D..    CT Abdomen Pelvis With Contrast    Result Date: 10/27/2021  No evidence of metastatic disease involving the chest, abdomen or pelvis.  This study was performed with techniques to keep radiation doses as low as reasonably achievable (ALARA). Individualized dose reduction techniques using automated exposure control or adjustment of mA and/or kV according to the patient size were employed.  This report was finalized on 10/27/2021 9:11 AM by David Ingram M.D..            Assessment/Plan     1. Metastatic colon cancer with Solitary Liver metastasis initially diagnosed 2- - he has been  on Xeloda and Avastin for 5 years until Avastin was stopped in January 2020 and Xeloda in August 2020.  Xeloda restarted in March 2021 due to concern of progression.  We will continue his Xeloda for now. He will follow up with us in 3 months with plans for 6 month scans. I will order scans prior to return.       2. Peripheral neuropathy secondary to chemotherapy.  Stable.  We will continue gabapentin 1 capsule by mouth 3 times a day.    3. Port. We will continue port care every 3 months with clinic appointments. We will check CBC, CMP, CEA with each port flush.     4. Pancytopenia. WBC stable. Labs from 10/29/2021 showed slight anemia. It is stable overall at 12.7. Platelets are normal at 164. No recent labs. We will recheck today and I will call him with results.       Total time of patient care including time prior to, face to face with patient, and following visit spent in reviewing records, lab results, imaging studies, discussion with patient, and documentation/charting was > 30 minutes          Adeola Sunshine, APRN    01/21/22

## 2022-02-25 ENCOUNTER — SPECIALTY PHARMACY (OUTPATIENT)
Dept: ONCOLOGY | Facility: HOSPITAL | Age: 72
End: 2022-02-25

## 2022-03-15 ENCOUNTER — SPECIALTY PHARMACY (OUTPATIENT)
Dept: ONCOLOGY | Facility: HOSPITAL | Age: 72
End: 2022-03-15

## 2022-03-15 NOTE — PROGRESS NOTES
Re: Xeloda (capecitabine)    Loren Tiwari, Pharmacy Care Coordinator, obtained a $6,000 philipp for this patient which can cover the cost of his Xeloda copays.  That should cover the cost of over 1 year of therapy.    This will allow [facility] pharmacy to dispense the Xeloda and Loren will reach out to him monthly for refill and toxicity checks.    Stephanie Gloria, PharmD, BCOP - Oncology Clinical Pharmacist [phone]

## 2022-03-16 ENCOUNTER — SPECIALTY PHARMACY (OUTPATIENT)
Dept: ONCOLOGY | Facility: HOSPITAL | Age: 72
End: 2022-03-16

## 2022-03-16 NOTE — PROGRESS NOTES
Specialty Pharmacy Patient Management Program  Oncology 6-Month Clinical Assessment       Gordon Avila is a 71 y.o. male with metastatic colon cancer seen today to assess adherence and side effects.    Regimen: Xeloda (capecitabine) 500 mg tablets - Take 3 tablets PO BID on for 7 days, off for 7 days    Reason for Outreach: Routine medication check-in .       Problem list reviewed by Waleska Gloria [facility] on 3/16/2022 at  1:18 PM  Medicines reviewed by Waleska Gloria [facility] on 3/16/2022 at  1:18 PM  Allergies reviewed by Waleska Gloria [facility] on 3/16/2022 at  1:18 PM    Quality of Life Assessment  Quality of Life Improvement Scale: Somewhat better     Goals     •  Specialty Pharmacy General Goal (pt-stated)       Patient-identified goal of therapy: Patient will adhere to medication regimen  Clinical goal/therapeutic target: Patient will adhere to medication regimen              Medication Adherence    Patient reported X missed doses in the last 7 days: 0  Any gaps in refill history greater than 2 weeks in the last 3 months: no  Demonstrates understanding of importance of adherence: yes  Informant: patient  Reliability of informant: reliable  Estimated medication adherence level: %  Reasons for non-adherence: no problems identified  Adherence tools used: patient uses a pill box to manage medications         Assessments:  • Medication Assessment  o Medication Tolerability: Patient denies side effects, aside from neuropathy (he said it happened before he started Xeloda, felt it was more related to the cancer).  He is on gabapentin and felt it works well for him., which is manageable and not bothersome to patient. Advised patient to alert office if this changes. .  o Therapeutically Appropriate: Yes  o Administration: Patient is taking every day at the same time , with food  and twice daily.   o Patient can self administer medications: Yes  o Medication Follow-Up Plan: 30 day toxicity and refill  outreach  • Drug-drug interactions  o Completed medication reconciliation today to assess for drug interactions. Patient denies starting or stopping any medications.    o Assessed medication list for interactions, no significant drug interactions noted.   o Advised patient to call the clinic if any new medications are started so we can assess for drug-drug interactions.  • Vaccines are coordinated by the patient's oncologist and primary care provider.  • Quality of Life: 7/10  • Adherence:  o Missed doses: Patient reports missing 0 doses in the last 30 days..  o Reasons for missed doses: N/A  • Medication availability/affordability: Patient has had no issues obtaining medication from pharmacy.  • Questions/concerns about medications: None    All questions addressed and patient had no additional concerns. Patient has pharmacy contact information    Stephanie Gloria, PharmD, [facility]  Oncology Clinical Pharmacist   [phone]

## 2022-04-05 RX ORDER — CLONAZEPAM 1 MG/1
TABLET ORAL
Qty: 30 TABLET | Refills: 0 | Status: SHIPPED | OUTPATIENT
Start: 2022-04-05 | End: 2022-06-06

## 2022-04-13 ENCOUNTER — HOSPITAL ENCOUNTER (OUTPATIENT)
Dept: CT IMAGING | Facility: HOSPITAL | Age: 72
Discharge: HOME | End: 2022-04-13

## 2022-04-13 DIAGNOSIS — C78.7 SECONDARY MALIGNANT NEOPLASM OF LIVER AND INTRAHEPATIC BILE DUCT: ICD-10-CM

## 2022-04-13 DIAGNOSIS — C18.7 MALIGNANT NEOPLASM OF SIGMOID COLON: ICD-10-CM

## 2022-04-13 LAB — CREAT BLDA-MCNC: 0.9 MG/DL (ref 0.6–1.3)

## 2022-04-13 PROCEDURE — 71260 CT THORAX DX C+: CPT

## 2022-04-13 PROCEDURE — 25010000002 IOPAMIDOL 61 % SOLUTION: Performed by: NURSE PRACTITIONER

## 2022-04-13 PROCEDURE — 82565 ASSAY OF CREATININE: CPT

## 2022-04-13 PROCEDURE — 74177 CT ABD & PELVIS W/CONTRAST: CPT

## 2022-04-13 RX ADMIN — IOPAMIDOL 100 ML: 612 INJECTION, SOLUTION INTRAVENOUS at 12:21

## 2022-04-15 ENCOUNTER — SPECIALTY PHARMACY (OUTPATIENT)
Dept: ONCOLOGY | Facility: HOSPITAL | Age: 72
End: 2022-04-15

## 2022-04-15 ENCOUNTER — OFFICE VISIT (OUTPATIENT)
Dept: ONCOLOGY | Facility: CLINIC | Age: 72
End: 2022-04-15

## 2022-04-15 ENCOUNTER — INFUSION (OUTPATIENT)
Dept: ONCOLOGY | Facility: HOSPITAL | Age: 72
End: 2022-04-15

## 2022-04-15 VITALS
TEMPERATURE: 98.2 F | DIASTOLIC BLOOD PRESSURE: 69 MMHG | RESPIRATION RATE: 12 BRPM | OXYGEN SATURATION: 99 % | HEIGHT: 73 IN | SYSTOLIC BLOOD PRESSURE: 116 MMHG | WEIGHT: 153 LBS | BODY MASS INDEX: 20.28 KG/M2 | HEART RATE: 61 BPM

## 2022-04-15 DIAGNOSIS — C18.7 MALIGNANT NEOPLASM OF SIGMOID COLON: Primary | ICD-10-CM

## 2022-04-15 DIAGNOSIS — G62.0 PERIPHERAL NEUROPATHY DUE TO CHEMOTHERAPY: ICD-10-CM

## 2022-04-15 DIAGNOSIS — C78.7 SECONDARY MALIGNANT NEOPLASM OF LIVER AND INTRAHEPATIC BILE DUCT: ICD-10-CM

## 2022-04-15 DIAGNOSIS — D69.6 PLATELETS DECREASED: ICD-10-CM

## 2022-04-15 DIAGNOSIS — T45.1X5A PERIPHERAL NEUROPATHY DUE TO CHEMOTHERAPY: ICD-10-CM

## 2022-04-15 LAB
ALBUMIN SERPL-MCNC: 4.4 G/DL (ref 3.5–5.2)
ALBUMIN/GLOB SERPL: 2.3 G/DL
ALP SERPL-CCNC: 70 U/L (ref 39–117)
ALT SERPL W P-5'-P-CCNC: 11 U/L (ref 1–41)
ANION GAP SERPL CALCULATED.3IONS-SCNC: 9.7 MMOL/L (ref 5–15)
AST SERPL-CCNC: 18 U/L (ref 1–40)
BASOPHILS # BLD AUTO: 0.01 10*3/MM3 (ref 0–0.2)
BASOPHILS NFR BLD AUTO: 0.3 % (ref 0–1.5)
BILIRUB SERPL-MCNC: 0.9 MG/DL (ref 0–1.2)
BUN SERPL-MCNC: 12 MG/DL (ref 8–23)
BUN/CREAT SERPL: 15 (ref 7–25)
CALCIUM SPEC-SCNC: 8.8 MG/DL (ref 8.6–10.5)
CEA SERPL-MCNC: 1.33 NG/ML
CHLORIDE SERPL-SCNC: 103 MMOL/L (ref 98–107)
CO2 SERPL-SCNC: 23.3 MMOL/L (ref 22–29)
CREAT SERPL-MCNC: 0.8 MG/DL (ref 0.76–1.27)
DEPRECATED RDW RBC AUTO: 55.4 FL (ref 37–54)
EGFRCR SERPLBLD CKD-EPI 2021: 95.2 ML/MIN/1.73
EOSINOPHIL # BLD AUTO: 0.02 10*3/MM3 (ref 0–0.4)
EOSINOPHIL NFR BLD AUTO: 0.5 % (ref 0.3–6.2)
ERYTHROCYTE [DISTWIDTH] IN BLOOD BY AUTOMATED COUNT: 14.7 % (ref 12.3–15.4)
GLOBULIN UR ELPH-MCNC: 1.9 GM/DL
GLUCOSE SERPL-MCNC: 88 MG/DL (ref 65–99)
HCT VFR BLD AUTO: 32.7 % (ref 37.5–51)
HGB BLD-MCNC: 11.3 G/DL (ref 13–17.7)
IMM GRANULOCYTES # BLD AUTO: 0.01 10*3/MM3 (ref 0–0.05)
IMM GRANULOCYTES NFR BLD AUTO: 0.3 % (ref 0–0.5)
LYMPHOCYTES # BLD AUTO: 1.16 10*3/MM3 (ref 0.7–3.1)
LYMPHOCYTES NFR BLD AUTO: 30.1 % (ref 19.6–45.3)
MCH RBC QN AUTO: 35.5 PG (ref 26.6–33)
MCHC RBC AUTO-ENTMCNC: 34.6 G/DL (ref 31.5–35.7)
MCV RBC AUTO: 102.8 FL (ref 79–97)
MONOCYTES # BLD AUTO: 0.32 10*3/MM3 (ref 0.1–0.9)
MONOCYTES NFR BLD AUTO: 8.3 % (ref 5–12)
NEUTROPHILS NFR BLD AUTO: 2.33 10*3/MM3 (ref 1.7–7)
NEUTROPHILS NFR BLD AUTO: 60.5 % (ref 42.7–76)
NRBC BLD AUTO-RTO: 0 /100 WBC (ref 0–0.2)
PLATELET # BLD AUTO: 163 10*3/MM3 (ref 140–450)
PMV BLD AUTO: 10 FL (ref 6–12)
POTASSIUM SERPL-SCNC: 4.2 MMOL/L (ref 3.5–5.2)
PROT SERPL-MCNC: 6.3 G/DL (ref 6–8.5)
RBC # BLD AUTO: 3.18 10*6/MM3 (ref 4.14–5.8)
SODIUM SERPL-SCNC: 136 MMOL/L (ref 136–145)
WBC NRBC COR # BLD: 3.85 10*3/MM3 (ref 3.4–10.8)

## 2022-04-15 PROCEDURE — 82378 CARCINOEMBRYONIC ANTIGEN: CPT

## 2022-04-15 PROCEDURE — 99214 OFFICE O/P EST MOD 30 MIN: CPT | Performed by: NURSE PRACTITIONER

## 2022-04-15 PROCEDURE — 96523 IRRIG DRUG DELIVERY DEVICE: CPT

## 2022-04-15 PROCEDURE — 25010000002 HEPARIN LOCK FLUSH PER 10 UNITS: Performed by: NURSE PRACTITIONER

## 2022-04-15 PROCEDURE — 80053 COMPREHEN METABOLIC PANEL: CPT

## 2022-04-15 PROCEDURE — 36591 DRAW BLOOD OFF VENOUS DEVICE: CPT

## 2022-04-15 PROCEDURE — 36415 COLL VENOUS BLD VENIPUNCTURE: CPT

## 2022-04-15 PROCEDURE — 85025 COMPLETE CBC W/AUTO DIFF WBC: CPT

## 2022-04-15 RX ORDER — SODIUM CHLORIDE 0.9 % (FLUSH) 0.9 %
10 SYRINGE (ML) INJECTION AS NEEDED
Status: DISCONTINUED | OUTPATIENT
Start: 2022-04-15 | End: 2022-04-15 | Stop reason: HOSPADM

## 2022-04-15 RX ORDER — SODIUM CHLORIDE 0.9 % (FLUSH) 0.9 %
10 SYRINGE (ML) INJECTION AS NEEDED
Status: CANCELLED | OUTPATIENT
Start: 2022-04-15

## 2022-04-15 RX ORDER — HEPARIN SODIUM (PORCINE) LOCK FLUSH IV SOLN 100 UNIT/ML 100 UNIT/ML
500 SOLUTION INTRAVENOUS AS NEEDED
Status: CANCELLED | OUTPATIENT
Start: 2022-04-15

## 2022-04-15 RX ORDER — HEPARIN SODIUM (PORCINE) LOCK FLUSH IV SOLN 100 UNIT/ML 100 UNIT/ML
500 SOLUTION INTRAVENOUS AS NEEDED
Status: DISCONTINUED | OUTPATIENT
Start: 2022-04-15 | End: 2022-04-15 | Stop reason: HOSPADM

## 2022-04-15 RX ADMIN — HEPARIN 500 UNITS: 100 SYRINGE at 13:51

## 2022-04-15 RX ADMIN — Medication 10 ML: at 13:51

## 2022-04-15 NOTE — PROGRESS NOTES
CHIEF COMPLAINT: 1.  Peripheral neuropathy of the hands and feet, worse in the feet                                       2.  Follow-up for colon cancer    Problem List:  Oncology/Hematology History Overview Note   1. Stage CARLOS, [id number] colon cancer with 2 out of 14 pericolonic lymph nodes  involved and a left lobe liver biopsy positive for metastasis, presenting with  a 25 pound weight loss and diarrhea with some perirectal soreness and had  colonoscopy with Dr. Mcclain in January that found this lesion that was  circumferential high-grade invading into the pericolonic fat, status post  sigmoid colectomy of a poorly differentiated adenocarcinoma.   a) Baseline CEA postop of 0.8 with alkaline phosphatase 111, with normal liver  enzymes and creatinine of 0.8. Baseline white count 9820 with a hemoglobin  13.8, platelets 339,000, and CT with contrast showing a right lobe liver dome  lesion as well as an anastomotic change in the bowel.   b) Began CapeOx 03/15/2016.  c) Added in Avastin to CapeOx 04/05/2016, second cycle. (This was 8 weeks out  from surgery).  d) KRAS mutation is negative.  E.) CAT scan in August 2016 shows resolution of disease in the liver and colon and a stable lung nodule.  Hence Avastin, Xeloda, and oxaliplatin continued.  F) oxaliplatin discontinued October 2016 due to worsening peripheral neuropathy.  G.) CTs of February 2017 showed no evidence of metastasis and stable bibasilar nodular scar.  Persistent neuropathy not worsening.  CEA 1.4.  Continuing Avastin and Xeloda without oxaliplatin.  Having some problems with irritation of his eyes on Xeloda.  2.  Peripheral neuropathy, chemotherapy induced     Malignant neoplasm of sigmoid colon (HCC)   5/20/2016 Initial Diagnosis    Malignant neoplasm of sigmoid colon     5/2/2017 Imaging    CT chest, abdomen, pelvis IMPRESSION:  1. No disease recurrence.  2. Minimal areas of nodular thickening in the right lower lobe, stable.     8/2/2017 Imaging    CT  chest/abdomen/pelvis IMPRESSION:  Stable examination with no evidence of acute intrathoracic,  intra-abdominal or pelvic abnormality. There is no evidence of  progression of disease. No metastatic disease.      11/13/2017 Imaging    CT chest/abdomen/pelvis IMPRESSION:  Stable examination without evidence of acute intrathoracic  intra-abdominal or intrapelvic abnormality. No evidence of progression  of disease.   CEA 1.3.     2/6/2020 -  Chemotherapy    OP COLON Capecitabine 1,250 mg/m2 BID (8 cycles)         HISTORY OF PRESENT ILLNESS:  The patient is a 71 y.o. male, here for follow up on management of Stage IV a colon cancer.  He is currently on Xeloda alone.  He continues to do fairly well with minimal side effects.  He is on 1 week and off 1 week.  He is tolerating well. He is able to do his normal activities.  He continues staying as active as possible. He has started golfing. Denies any nausea vomiting.  No significant fatigue.    Past Medical History:   Diagnosis Date   • H/O exercise stress test     Patient states it was normal   • Hypertension    • Liver disease     mets from colon cancer   • Malignant neoplasm of colon (HCC)    • Seizure (HCC) 2019   • Wears dentures     bridge   • Wears glasses      Past Surgical History:   Procedure Laterality Date   • COLON SURGERY      Sigmoid   • COLONOSCOPY N/A 10/26/2020    Procedure: COLONOSCOPY;  Surgeon: Rodrigo Lambert MD;  Location: [facility] ENDOSCOPY;  Service: Gastroenterology;  Laterality: N/A;   • LIVER SURGERY     • PORTACATH PLACEMENT         No Known Allergies    Family History and Social History reviewed and changed as necessary      REVIEW OF SYSTEM:   Review of Systems   Constitutional: Negative for appetite change, chills, diaphoresis, fatigue, fever and unexpected weight change.   HENT:   Negative for mouth sores, sore throat and trouble swallowing.    Eyes: Negative for icterus.   Respiratory: Negative for cough, hemoptysis and shortness of breath.   "  Cardiovascular: Negative for chest pain, leg swelling and palpitations.   Gastrointestinal: Negative for abdominal distention, abdominal pain, blood in stool, constipation, diarrhea, nausea and vomiting.   Endocrine: Negative for hot flashes.   Genitourinary: Negative for bladder incontinence, difficulty urinating, dysuria, frequency and hematuria.    Musculoskeletal: Negative for gait problem, neck pain and neck stiffness.   Skin: Negative for rash.  Positive for dry skin.  Neurological: Positive for peripheral neuropathy in the hands and feet.  Hematological: Negative for adenopathy. Does not bruise/bleed easily.   Psychiatric/Behavioral: Negative for depression. The patient is not nervous/anxious.    All other systems reviewed and are negative.       PHYSICAL EXAM    Vitals:    04/15/22 1310   BP: 116/69   Pulse: 61   Resp: 12   Temp: 98.2 °F (36.8 °C)   TempSrc: Temporal   SpO2: 99%   Weight: 69.4 kg (153 lb)   Height: 185.4 cm (73\")     General: well appearing male in no acute distress  Neuro: alert and oriented  HEENT: sclera anicteric, oropharynx clear  Lymphatics: no cervical, supraclavicular, or axillary adenopathy  Cardiovascular: regular rate and rhythm, no murmurs  Lungs: clear to auscultation bilaterally  Abdomen: soft, nontender, nondistended.  No palpable organomegaly  Extremeties: no lower extremity edema  Skin: no rashes, lesions, bruising, or petechiae  Psych: mood and affect appropriate      Vitals reviewed.  Laboratory data reviewed along with CT chest abdomen and pelvis and images thereof as outlined above in the oncology history were reviewed at time of visit.    Lab Results   Component Value Date    WBC 4.68 01/21/2022    HGB 12.6 (L) 01/21/2022    HCT 37.5 01/21/2022    .5 (H) 01/21/2022     01/21/2022       Lab Results   Component Value Date    GLUCOSE 98 01/21/2022    BUN 12 01/21/2022    CREATININE 0.90 04/13/2022    EGFRIFNONA 89 01/21/2022    EGFRIFAFRI 81 08/04/2020    " BCR 14.1 01/21/2022    K 4.2 01/21/2022    CO2 23.4 01/21/2022    CALCIUM 9.3 01/21/2022    PROTENTOTREF 6.9 08/04/2020    ALBUMIN 4.50 01/21/2022    LABIL2 2.6 08/04/2020    AST 16 01/21/2022    ALT 12 01/21/2022       Lab Results   Component Value Date    CEA 1.51 01/21/2022    CEA 1.60 10/29/2021    CEA 2.23 08/23/2021    CEA 2.27 06/23/2021     CT Chest Without Contrast Diagnostic    Result Date: 7/13/2021  Interval resolution of the nodular opacity in the right lower lobe which was likely infectious or inflammatory in nature.  318.12 mGy.cm   This study was performed with techniques to keep radiation doses as low as reasonably achievable (ALARA). Individualized dose reduction techniques using automated exposure control or adjustment of mA and/or kV according to the patient size were employed.  This report was finalized on 7/13/2021 3:44 PM by David Ingram M.D..    CT Chest With Contrast Diagnostic    Result Date: 10/27/2021  No evidence of metastatic disease involving the chest, abdomen or pelvis.  This study was performed with techniques to keep radiation doses as low as reasonably achievable (ALARA). Individualized dose reduction techniques using automated exposure control or adjustment of mA and/or kV according to the patient size were employed.  This report was finalized on 10/27/2021 9:11 AM by David Ingram M.D..    CT Abdomen Pelvis With Contrast    Result Date: 10/27/2021  No evidence of metastatic disease involving the chest, abdomen or pelvis.  This study was performed with techniques to keep radiation doses as low as reasonably achievable (ALARA). Individualized dose reduction techniques using automated exposure control or adjustment of mA and/or kV according to the patient size were employed.  This report was finalized on 10/27/2021 9:11 AM by David Ingram M.D..            Assessment/Plan     1. Metastatic colon cancer with Solitary Liver metastasis initially diagnosed 2- -  he had been on Xeloda and Avastin for 5 years until Avastin was stopped in January 2020 and Xeloda in August 2020.  Xeloda restarted in March 2021 due to concern of progression.  We will continue his Xeloda for now.     We discussed that scans showed No evidence of metastatic disease involving the chest, abdomen or pelvis. We will continue with current regimen and plan to rescan in 6 months.     2. Peripheral neuropathy secondary to chemotherapy.  Stable.  We will continue gabapentin 1 capsule by mouth 3 times a day.    3. Port. We will continue port care every 3 months with clinic appointments. We discussed labs from 01/21 showed stable CBC and CMP. We will check CBC, CMP, CEA today with each port flush.     4. Pancytopenia. WBC stable. Labs from 01/21/22 showed slight anemia. It is stable overall at 12.6. Platelets are normal at 148. No recent labs. We will recheck today and I will call him with results.         Adeola Sunshine, DEISY    04/15/22

## 2022-04-24 DIAGNOSIS — R55 SYNCOPE, UNSPECIFIED SYNCOPE TYPE: ICD-10-CM

## 2022-04-24 DIAGNOSIS — C18.7 MALIGNANT NEOPLASM OF SIGMOID COLON: ICD-10-CM

## 2022-04-24 DIAGNOSIS — R42 DIZZINESS: ICD-10-CM

## 2022-04-24 DIAGNOSIS — T45.1X5A PERIPHERAL NEUROPATHY DUE TO CHEMOTHERAPY: ICD-10-CM

## 2022-04-24 DIAGNOSIS — G62.0 PERIPHERAL NEUROPATHY DUE TO CHEMOTHERAPY: ICD-10-CM

## 2022-04-25 RX ORDER — GABAPENTIN 300 MG/1
CAPSULE ORAL
Qty: 90 CAPSULE | Refills: 1 | Status: SHIPPED | OUTPATIENT
Start: 2022-04-25 | End: 2022-10-20

## 2022-04-26 ENCOUNTER — SPECIALTY PHARMACY (OUTPATIENT)
Dept: ONCOLOGY | Facility: HOSPITAL | Age: 72
End: 2022-04-26

## 2022-04-26 NOTE — PROGRESS NOTES
Specialty Pharmacy Refill Coordination Note     Gordon is a 71 y.o. male contacted today regarding refills of  Capecitabine 1500mg PO AM and 1500mg PO PM on days 1-7 then off 7 days specialty medication(s).      Specialty medication(s) and dose(s) confirmed: yes    Refill Questions    Flowsheet Row Most Recent Value   Changes to allergies? No   Changes to medications? No   New conditions since last clinic visit No   Unplanned office visit, urgent care, ED, or hospital admission in the last 4 weeks  No   How does patient/caregiver feel medication is working? Very good   Financial problems or insurance changes  No   If yes, describe changes in insurance or financial issues. n/a   How many doses of your specialty medications were missed in the last 4 weeks? 0   Why were doses missed? n/a   Does this patient require a clinical escalation to a pharmacist? No          Delivery Questions    Flowsheet Row Most Recent Value   Delivery method [employer]Ex   Delivery address correct? Yes   Delivery phone number [phone]   Preferred delivery time? Anytime   Number of medications in delivery 1   Medication being filled and delivered capecitabine   Doses left of specialty medications patient on off week   Is there any medication that is due not being filled? No   Supplies needed? No supplies needed   Cooler needed? No   Do any medications need mixed or dated? No   Additional comments $5   Questions or concerns for the pharmacist? No   Explain any questions or concerns for the pharmacist n/a   Are any medications first time fills? No            Medication Adherence    Adherence tools used: patient uses a pill box to manage medications          Follow-up: 28 day(s)     Loren Tiwari, Pharmacy Technician  Specialty Pharmacy Technician

## 2022-05-13 ENCOUNTER — SPECIALTY PHARMACY (OUTPATIENT)
Dept: ONCOLOGY | Facility: HOSPITAL | Age: 72
End: 2022-05-13

## 2022-05-13 NOTE — PROGRESS NOTES
Specialty Pharmacy Refill Coordination Note     Gordon is a 71 y.o. male contacted today regarding refills of  Capecitabine 1500mg PO AM and 1500mg PO PM on days 1-7, then off 7 days specialty medication(s).      Specialty medication(s) and dose(s) confirmed: yes    Refill Questions    Flowsheet Row Most Recent Value   Changes to allergies? No   Changes to medications? No   New conditions since last clinic visit No   Unplanned office visit, urgent care, ED, or hospital admission in the last 4 weeks  No   How does patient/caregiver feel medication is working? Good   Financial problems or insurance changes  No   Since the previous refill, were any specialty medication doses or scheduled injections missed or delayed?  No   Does this patient require a clinical escalation to a pharmacist? No          Delivery Questions    Flowsheet Row Most Recent Value   Delivery method [employer]Ex   Delivery address correct? Yes   Delivery phone number [phone]   Preferred delivery time? Anytime   Number of medications in delivery 1   Medication being filled and delivered capecitabine   Doses left of specialty medications patient currently on off week then has 2 week supply   Is there any medication that is due not being filled? No   Supplies needed? No supplies needed   Cooler needed? No   Do any medications need mixed or dated? No   Copay form of payment Credit card on file   Additional comments $5   Questions or concerns for the pharmacist? No   Explain any questions or concerns for the pharmacist n/a   Are any medications first time fills? No            Medication Adherence    Adherence tools used: patient uses a pill box to manage medications          Follow-up: 28 day(s)     Loren Tiwari, Pharmacy Technician  Specialty Pharmacy Technician

## 2022-06-06 RX ORDER — CLONAZEPAM 1 MG/1
1 TABLET ORAL NIGHTLY PRN
Qty: 25 TABLET | Refills: 1 | Status: SHIPPED | OUTPATIENT
Start: 2022-06-06 | End: 2022-09-13

## 2022-06-09 ENCOUNTER — SPECIALTY PHARMACY (OUTPATIENT)
Dept: ONCOLOGY | Facility: HOSPITAL | Age: 72
End: 2022-06-09

## 2022-06-09 DIAGNOSIS — C18.7 MALIGNANT NEOPLASM OF SIGMOID COLON: ICD-10-CM

## 2022-06-09 RX ORDER — CAPECITABINE 500 MG/1
1500 TABLET, FILM COATED ORAL 2 TIMES DAILY
Qty: 84 TABLET | Refills: 5 | Status: SHIPPED | OUTPATIENT
Start: 2022-06-09 | End: 2022-12-08 | Stop reason: SDUPTHER

## 2022-06-09 NOTE — PROGRESS NOTES
Re: Refills of Oral Specialty Medication - Xeloda (capecitabine)    • Drug-Drug Interactions: The current medication list was reviewed and there are some relevant drug-drug interactions with the oral specialty medication, including capecitabine can increase the QTc prolonging effect of citalopram, but he's already stable on both of these medications, so no chagnes will be made.  • Medication Allergies: The patient has NKDA  • Review of Labs/Dose Adjustments: NO DOSE CHANGE - I reviewed the most recent note and labs and the patient will continue without any dose changes.  I sent refills as described below.    Drug: Xeloda (capecitabine)  Strength: 500 mg  Directions: Take 3 tablet by mouth BID - on for 7 days, then off for 7 days, then on for 7 days, then off for 7 days in a 28-day cycle   Quantity: 56  Refills: 5  Pharmacy prescription sent to: [facility]    Stephanie Gloria, PharmD, [facility]  Oncology Clinical Pharmacist  6/9/2022  10:10 EDT

## 2022-06-23 ENCOUNTER — SPECIALTY PHARMACY (OUTPATIENT)
Dept: ONCOLOGY | Facility: HOSPITAL | Age: 72
End: 2022-06-23

## 2022-06-23 NOTE — PROGRESS NOTES
Specialty Pharmacy Refill Coordination Note     Gordon is a 71 y.o. male contacted today regarding refills of  Capecitabine 1500mg PO BID for 7 days, then off 7 days, then on 7 days, then off 7 days specialty medication(s).    Patient had some over stock from when he was getting free drug. He wanted to finish taking that before we did another refill. So this refill is a little delayed for that reason.     Specialty medication(s) and dose(s) confirmed: yes    Refill Questions    Flowsheet Row Most Recent Value   Changes to allergies? No   Changes to medications? No   New conditions since last clinic visit No   Unplanned office visit, urgent care, ED, or hospital admission in the last 4 weeks  No   How does patient/caregiver feel medication is working? Good   Financial problems or insurance changes  No   Since the previous refill, were any specialty medication doses or scheduled injections missed or delayed?  No   Does this patient require a clinical escalation to a pharmacist? No          Delivery Questions    Flowsheet Row Most Recent Value   Delivery method FedEx   Delivery address correct? Yes   Delivery phone number [phone]   Preferred delivery time? Anytime   Number of medications in delivery 1   Medication being filled and delivered capecitabine   Doses left of specialty medications patient on off week   Is there any medication that is due not being filled? No   Supplies needed? No supplies needed   Cooler needed? No   Do any medications need mixed or dated? No   Copay form of payment Credit card on file   Additional comments $3.42   Questions or concerns for the pharmacist? No   Explain any questions or concerns for the pharmacist n/a   Are any medications first time fills? No            Medication Adherence    Adherence tools used: patient uses a pill box to manage medications          Follow-up: 28 day(s)     Loren Tiwari, Pharmacy Technician  Specialty Pharmacy Technician

## 2022-07-12 RX ORDER — LISINOPRIL 20 MG/1
20 TABLET ORAL DAILY
Qty: 90 TABLET | Refills: 3 | Status: SHIPPED | OUTPATIENT
Start: 2022-07-12

## 2022-07-15 ENCOUNTER — OFFICE VISIT (OUTPATIENT)
Dept: ONCOLOGY | Facility: CLINIC | Age: 72
End: 2022-07-15

## 2022-07-15 ENCOUNTER — INFUSION (OUTPATIENT)
Dept: ONCOLOGY | Facility: HOSPITAL | Age: 72
End: 2022-07-15

## 2022-07-15 VITALS
HEART RATE: 52 BPM | DIASTOLIC BLOOD PRESSURE: 71 MMHG | TEMPERATURE: 97.3 F | BODY MASS INDEX: 20.28 KG/M2 | HEIGHT: 73 IN | SYSTOLIC BLOOD PRESSURE: 149 MMHG | RESPIRATION RATE: 16 BRPM | OXYGEN SATURATION: 99 % | WEIGHT: 153 LBS

## 2022-07-15 DIAGNOSIS — C78.7 SECONDARY MALIGNANT NEOPLASM OF LIVER AND INTRAHEPATIC BILE DUCT: ICD-10-CM

## 2022-07-15 DIAGNOSIS — K04.7 TOOTH ABSCESS: ICD-10-CM

## 2022-07-15 DIAGNOSIS — C18.7 MALIGNANT NEOPLASM OF SIGMOID COLON: Primary | ICD-10-CM

## 2022-07-15 LAB
ALBUMIN SERPL-MCNC: 4.4 G/DL (ref 3.5–5.2)
ALBUMIN/GLOB SERPL: 2.3 G/DL
ALP SERPL-CCNC: 72 U/L (ref 39–117)
ALT SERPL W P-5'-P-CCNC: 11 U/L (ref 1–41)
ANION GAP SERPL CALCULATED.3IONS-SCNC: 12.6 MMOL/L (ref 5–15)
AST SERPL-CCNC: 17 U/L (ref 1–40)
BASOPHILS # BLD AUTO: 0.02 10*3/MM3 (ref 0–0.2)
BASOPHILS NFR BLD AUTO: 0.5 % (ref 0–1.5)
BILIRUB SERPL-MCNC: 1.1 MG/DL (ref 0–1.2)
BUN SERPL-MCNC: 11 MG/DL (ref 8–23)
BUN/CREAT SERPL: 12.1 (ref 7–25)
CALCIUM SPEC-SCNC: 9 MG/DL (ref 8.6–10.5)
CEA SERPL-MCNC: 1.67 NG/ML
CHLORIDE SERPL-SCNC: 104 MMOL/L (ref 98–107)
CO2 SERPL-SCNC: 23.4 MMOL/L (ref 22–29)
CREAT SERPL-MCNC: 0.91 MG/DL (ref 0.76–1.27)
DEPRECATED RDW RBC AUTO: 56.2 FL (ref 37–54)
EGFRCR SERPLBLD CKD-EPI 2021: 90.7 ML/MIN/1.73
EOSINOPHIL # BLD AUTO: 0.04 10*3/MM3 (ref 0–0.4)
EOSINOPHIL NFR BLD AUTO: 1 % (ref 0.3–6.2)
ERYTHROCYTE [DISTWIDTH] IN BLOOD BY AUTOMATED COUNT: 14.7 % (ref 12.3–15.4)
GLOBULIN UR ELPH-MCNC: 1.9 GM/DL
GLUCOSE SERPL-MCNC: 93 MG/DL (ref 65–99)
HCT VFR BLD AUTO: 33.9 % (ref 37.5–51)
HGB BLD-MCNC: 11.7 G/DL (ref 13–17.7)
IMM GRANULOCYTES # BLD AUTO: 0.01 10*3/MM3 (ref 0–0.05)
IMM GRANULOCYTES NFR BLD AUTO: 0.2 % (ref 0–0.5)
LYMPHOCYTES # BLD AUTO: 1.18 10*3/MM3 (ref 0.7–3.1)
LYMPHOCYTES NFR BLD AUTO: 28.6 % (ref 19.6–45.3)
MCH RBC QN AUTO: 35.7 PG (ref 26.6–33)
MCHC RBC AUTO-ENTMCNC: 34.5 G/DL (ref 31.5–35.7)
MCV RBC AUTO: 103.4 FL (ref 79–97)
MONOCYTES # BLD AUTO: 0.34 10*3/MM3 (ref 0.1–0.9)
MONOCYTES NFR BLD AUTO: 8.2 % (ref 5–12)
NEUTROPHILS NFR BLD AUTO: 2.54 10*3/MM3 (ref 1.7–7)
NEUTROPHILS NFR BLD AUTO: 61.5 % (ref 42.7–76)
NRBC BLD AUTO-RTO: 0 /100 WBC (ref 0–0.2)
PLATELET # BLD AUTO: 141 10*3/MM3 (ref 140–450)
PMV BLD AUTO: 10.3 FL (ref 6–12)
POTASSIUM SERPL-SCNC: 4.3 MMOL/L (ref 3.5–5.2)
PROT SERPL-MCNC: 6.3 G/DL (ref 6–8.5)
RBC # BLD AUTO: 3.28 10*6/MM3 (ref 4.14–5.8)
SODIUM SERPL-SCNC: 140 MMOL/L (ref 136–145)
WBC NRBC COR # BLD: 4.13 10*3/MM3 (ref 3.4–10.8)

## 2022-07-15 PROCEDURE — 85025 COMPLETE CBC W/AUTO DIFF WBC: CPT

## 2022-07-15 PROCEDURE — 99214 OFFICE O/P EST MOD 30 MIN: CPT | Performed by: INTERNAL MEDICINE

## 2022-07-15 PROCEDURE — 82378 CARCINOEMBRYONIC ANTIGEN: CPT

## 2022-07-15 PROCEDURE — 80053 COMPREHEN METABOLIC PANEL: CPT

## 2022-07-15 PROCEDURE — 36591 DRAW BLOOD OFF VENOUS DEVICE: CPT

## 2022-07-15 PROCEDURE — 25010000002 HEPARIN LOCK FLUSH PER 10 UNITS

## 2022-07-15 RX ORDER — HEPARIN SODIUM (PORCINE) LOCK FLUSH IV SOLN 100 UNIT/ML 100 UNIT/ML
500 SOLUTION INTRAVENOUS AS NEEDED
Status: CANCELLED | OUTPATIENT
Start: 2022-07-15

## 2022-07-15 RX ORDER — SODIUM CHLORIDE 0.9 % (FLUSH) 0.9 %
10 SYRINGE (ML) INJECTION AS NEEDED
Status: DISCONTINUED | OUTPATIENT
Start: 2022-07-15 | End: 2022-07-15 | Stop reason: HOSPADM

## 2022-07-15 RX ORDER — SODIUM CHLORIDE 0.9 % (FLUSH) 0.9 %
10 SYRINGE (ML) INJECTION AS NEEDED
Status: CANCELLED | OUTPATIENT
Start: 2022-07-15

## 2022-07-15 RX ORDER — HEPARIN SODIUM (PORCINE) LOCK FLUSH IV SOLN 100 UNIT/ML 100 UNIT/ML
500 SOLUTION INTRAVENOUS AS NEEDED
Status: DISCONTINUED | OUTPATIENT
Start: 2022-07-15 | End: 2022-07-15 | Stop reason: HOSPADM

## 2022-07-15 RX ORDER — AMOXICILLIN 500 MG/1
1000 CAPSULE ORAL 3 TIMES DAILY
Qty: 21 CAPSULE | Refills: 0 | Status: SHIPPED | OUTPATIENT
Start: 2022-07-15 | End: 2022-07-20 | Stop reason: SDUPTHER

## 2022-07-15 RX ORDER — HEPARIN SODIUM (PORCINE) LOCK FLUSH IV SOLN 100 UNIT/ML 100 UNIT/ML
SOLUTION INTRAVENOUS
Status: COMPLETED
Start: 2022-07-15 | End: 2022-07-15

## 2022-07-15 RX ADMIN — HEPARIN SODIUM (PORCINE) LOCK FLUSH IV SOLN 100 UNIT/ML 500 UNITS: 100 SOLUTION at 13:27

## 2022-07-15 RX ADMIN — Medication 10 ML: at 13:27

## 2022-07-15 RX ADMIN — HEPARIN 500 UNITS: 100 SYRINGE at 13:27

## 2022-07-15 NOTE — PROGRESS NOTES
CHIEF COMPLAINT: 1.  Peripheral neuropathy of the hands and feet, worse in the feet                                       2.  Follow-up for colon cancer    Problem List:  Oncology/Hematology History Overview Note   1. Stage CARLOS, [id number] colon cancer with 2 out of 14 pericolonic lymph nodes  involved and a left lobe liver biopsy positive for metastasis, presenting with  a 25 pound weight loss and diarrhea with some perirectal soreness and had  colonoscopy with Dr. Mcclain in January that found this lesion that was  circumferential high-grade invading into the pericolonic fat, status post  sigmoid colectomy of a poorly differentiated adenocarcinoma.   a) Baseline CEA postop of 0.8 with alkaline phosphatase 111, with normal liver  enzymes and creatinine of 0.8. Baseline white count 9820 with a hemoglobin  13.8, platelets 339,000, and CT with contrast showing a right lobe liver dome  lesion as well as an anastomotic change in the bowel.   b) Began CapeOx 03/15/2016.  c) Added in Avastin to CapeOx 04/05/2016, second cycle. (This was 8 weeks out  from surgery).  d) KRAS mutation is negative.  E.) CAT scan in August 2016 shows resolution of disease in the liver and colon and a stable lung nodule.  Hence Avastin, Xeloda, and oxaliplatin continued.  F) oxaliplatin discontinued October 2016 due to worsening peripheral neuropathy.  G.) CTs of February 2017 showed no evidence of metastasis and stable bibasilar nodular scar.  Persistent neuropathy not worsening.  CEA 1.4.  Continuing Avastin and Xeloda without oxaliplatin.  Having some problems with irritation of his eyes on Xeloda.  2.  Peripheral neuropathy, chemotherapy induced     Malignant neoplasm of sigmoid colon (HCC)   5/20/2016 Initial Diagnosis    Malignant neoplasm of sigmoid colon     5/2/2017 Imaging    CT chest, abdomen, pelvis IMPRESSION:  1. No disease recurrence.  2. Minimal areas of nodular thickening in the right lower lobe, stable.     8/2/2017 Imaging    CT  chest/abdomen/pelvis IMPRESSION:  Stable examination with no evidence of acute intrathoracic,  intra-abdominal or pelvic abnormality. There is no evidence of  progression of disease. No metastatic disease.      11/13/2017 Imaging    CT chest/abdomen/pelvis IMPRESSION:  Stable examination without evidence of acute intrathoracic  intra-abdominal or intrapelvic abnormality. No evidence of progression  of disease.   CEA 1.3.     2/6/2020 -  Chemotherapy    OP COLON Capecitabine 1,250 mg/m2 BID (8 cycles)         HISTORY OF PRESENT ILLNESS:  The patient is a 71 y.o. male, here for follow up on management of Stage IV a colon cancer.  He is currently on Xeloda alone.  He is having a lot of issues with diarrhea recently.  I suspect it is related to his Xeloda.  He is also suffering from a abscess of his tooth.    Past Medical History:   Diagnosis Date   • H/O exercise stress test     Patient states it was normal   • Hypertension    • Liver disease     mets from colon cancer   • Malignant neoplasm of colon (HCC)    • Seizure (HCC) 2019   • Wears dentures     bridge   • Wears glasses      Past Surgical History:   Procedure Laterality Date   • COLON SURGERY      Sigmoid   • COLONOSCOPY N/A 10/26/2020    Procedure: COLONOSCOPY;  Surgeon: Rodrigo Lambert MD;  Location: [facility] ENDOSCOPY;  Service: Gastroenterology;  Laterality: N/A;   • LIVER SURGERY     • PORTACATH PLACEMENT         No Known Allergies    Family History and Social History reviewed and changed as necessary      REVIEW OF SYSTEM:   Review of Systems   Constitutional: Negative for appetite change, chills, diaphoresis, fatigue, fever and unexpected weight change.   HENT:   Negative for mouth sores, sore throat and trouble swallowing.    Eyes: Negative for icterus.   Respiratory: Negative for cough, hemoptysis and shortness of breath.    Cardiovascular: Negative for chest pain, leg swelling and palpitations.   Gastrointestinal: Negative for abdominal distention,  "abdominal pain, blood in stool, constipation, diarrhea, nausea and vomiting.   Endocrine: Negative for hot flashes.   Genitourinary: Negative for bladder incontinence, difficulty urinating, dysuria, frequency and hematuria.    Musculoskeletal: Negative for gait problem, neck pain and neck stiffness.   Skin: Negative for rash.  Positive for dry skin.  Neurological: Positive for peripheral neuropathy in the hands and feet.  Hematological: Negative for adenopathy. Does not bruise/bleed easily.   Psychiatric/Behavioral: Negative for depression. The patient is not nervous/anxious.    All other systems reviewed and are negative.       PHYSICAL EXAM    Vitals:    07/15/22 1250   BP: 149/71   Pulse: 52   Resp: 16   Temp: 97.3 °F (36.3 °C)   TempSrc: Temporal   SpO2: 99%   Weight: 69.4 kg (153 lb)   Height: 185.4 cm (73\")     Constitutional: Appears well-developed and well-nourished. No distress.   ECOG: (1) Restricted in physically strenuous activity, ambulatory and able to do work of light nature  HENT:   Head: Normocephalic.   Mouth/Throat: Oropharynx is clear and moist.   Eyes: Conjunctivae are normal. Pupils are equal, round, and reactive to light. No scleral icterus.   Neck: Neck supple. No JVD present. No thyromegaly present.   Cardiovascular: Normal rate, regular rhythm and normal heart sounds.    Pulmonary/Chest: Breath sounds normal. No respiratory distress.   Nodes: No cervical, supraclavicular or axillary nodes palpable on exam.  Abdominal: Soft. Exhibits no distension and no mass. There is no hepatosplenomegaly. There is no tenderness. There is no rebound and no guarding.   Musculoskeletal:Exhibits no edema, tenderness or deformity.   Neurological: Alert and oriented to person, place, and time. Exhibits normal muscle tone.   Skin: Dry.  No ecchymosis, no petechiae and no rash noted. Not diaphoretic. No cyanosis.   Psychiatric: Normal mood and affect.   Vitals reviewed.  Laboratory data reviewed along with CT " chest abdomen and pelvis and images thereof as outlined above in the oncology history were reviewed at time of visit.    Lab Results   Component Value Date    WBC 3.85 04/15/2022    HGB 11.3 (L) 04/15/2022    HCT 32.7 (L) 04/15/2022    .8 (H) 04/15/2022     04/15/2022       Lab Results   Component Value Date    GLUCOSE 88 04/15/2022    BUN 12 04/15/2022    CREATININE 0.80 04/15/2022    EGFRIFNONA 89 01/21/2022    EGFRIFAFRI 81 08/04/2020    BCR 15.0 04/15/2022    K 4.2 04/15/2022    CO2 23.3 04/15/2022    CALCIUM 8.8 04/15/2022    PROTENTOTREF 6.9 08/04/2020    ALBUMIN 4.40 04/15/2022    LABIL2 2.6 08/04/2020    AST 18 04/15/2022    ALT 11 04/15/2022       Lab Results   Component Value Date    CEA 1.33 04/15/2022    CEA 1.51 01/21/2022    CEA 1.60 10/29/2021    CEA 2.23 08/23/2021     CT Chest Without Contrast Diagnostic    Result Date: 7/13/2021  Interval resolution of the nodular opacity in the right lower lobe which was likely infectious or inflammatory in nature.  318.12 mGy.cm   This study was performed with techniques to keep radiation doses as low as reasonably achievable (ALARA). Individualized dose reduction techniques using automated exposure control or adjustment of mA and/or kV according to the patient size were employed.  This report was finalized on 7/13/2021 3:44 PM by David Ingram M.D..    CT Chest With Contrast Diagnostic    Result Date: 10/27/2021  No evidence of metastatic disease involving the chest, abdomen or pelvis.  This study was performed with techniques to keep radiation doses as low as reasonably achievable (ALARA). Individualized dose reduction techniques using automated exposure control or adjustment of mA and/or kV according to the patient size were employed.  This report was finalized on 10/27/2021 9:11 AM by David Ingram M.D..    CT Abdomen Pelvis With Contrast    Result Date: 10/27/2021  No evidence of metastatic disease involving the chest, abdomen or  pelvis.  This study was performed with techniques to keep radiation doses as low as reasonably achievable (ALARA). Individualized dose reduction techniques using automated exposure control or adjustment of mA and/or kV according to the patient size were employed.  This report was finalized on 10/27/2021 9:11 AM by David Ingram M.D..            Assessment/Plan     1. Metastatic colon cancer with Solitary Liver metastasis initially diagnosed 2- -   I am going to hold his Xeloda for the next 6 weeks till he feels better.  I want to see if the diarrhea improves off Xeloda.      2. Peripheral neuropathy secondary to chemotherapy.  Stable.  We will continue gabapentin 1 capsule by mouth 3 times a day.    3. Port. We will continue port care every 2 months with clinic appointments. We will check CBC, CMP, CEA with each port flush.     4.  Dental abscess.  We will place him on amoxicillin to see if it helps clear it.            Santi Abad MD    07/15/22

## 2022-07-19 ENCOUNTER — SPECIALTY PHARMACY (OUTPATIENT)
Dept: ONCOLOGY | Facility: HOSPITAL | Age: 72
End: 2022-07-19

## 2022-07-19 ENCOUNTER — TELEPHONE (OUTPATIENT)
Dept: ONCOLOGY | Facility: CLINIC | Age: 72
End: 2022-07-19

## 2022-07-19 NOTE — TELEPHONE ENCOUNTER
----- Message from ZEHRA Lowry sent at 7/19/2022  8:04 AM EDT -----  Patient is calling saying his antibiotic is out for his abscess tooth is out and it is still hurting and is wanting to see if Dr. Mercado can call in another script.    Amoxicillin 500mg 2 tab 3x a day     Any questions call Sergo Chang  [phone]

## 2022-07-19 NOTE — PROGRESS NOTES
Specialty Pharmacy Refill Coordination Note     Gordon is a 71 y.o. male contacted today regarding refills of  Capecitabine 1500mg PO BID for 7 days, the off 7 days, then on 7 days, then off 7 days in a 28 day cycle specialty medication(s).      Specialty medication(s) and dose(s) confirmed: yes    Refill Questions    Flowsheet Row Most Recent Value   Changes to allergies? No   Changes to medications? No   New conditions since last clinic visit No   Unplanned office visit, urgent care, ED, or hospital admission in the last 4 weeks  No   How does patient/caregiver feel medication is working? Good   Financial problems or insurance changes  No   Since the previous refill, were any specialty medication doses or scheduled injections missed or delayed?  No   Does this patient require a clinical escalation to a pharmacist? No          Delivery Questions    Flowsheet Row Most Recent Value   Delivery method FedEx   Delivery address correct? Yes   Delivery phone number [phone]   Preferred delivery time? Anytime   Number of medications in delivery 1   Medication being filled and delivered capecitabine   Doses left of specialty medications patient on off week   Is there any medication that is due not being filled? No   Supplies needed? No supplies needed   Cooler needed? No   Do any medications need mixed or dated? No   Copay form of payment Credit card on file   Additional comments $3.42   Questions or concerns for the pharmacist? No   Explain any questions or concerns for the pharmacist n/a   Are any medications first time fills? No            Medication Adherence    Adherence tools used: patient uses a pill box to manage medications          Follow-up: 28 day(s)     Loren Tiwari, Pharmacy Technician  Specialty Pharmacy Technician

## 2022-07-19 NOTE — TELEPHONE ENCOUNTER
Called and left message that Dr. Mercado had sent patient in his Amoxicillin on Friday. Informing him that it should be ready for .

## 2022-07-20 RX ORDER — AMOXICILLIN 500 MG/1
1000 CAPSULE ORAL 3 TIMES DAILY
Qty: 21 CAPSULE | Refills: 0 | Status: SHIPPED | OUTPATIENT
Start: 2022-07-20 | End: 2022-08-29

## 2022-07-20 NOTE — TELEPHONE ENCOUNTER
----- Message from Sharif Subramanian sent at 7/20/2022  8:32 AM EDT -----  Regarding: RE:PHONE CALL  Pt called stating that berta does not have a script for him. Can you send this again?  Thanks  Gautam

## 2022-08-18 ENCOUNTER — SPECIALTY PHARMACY (OUTPATIENT)
Dept: ONCOLOGY | Facility: HOSPITAL | Age: 72
End: 2022-08-18

## 2022-08-18 NOTE — PROGRESS NOTES
Specialty Pharmacy Refill Coordination Note     Gordon is a 71 y.o. male contacted today regarding refills of  Capecitabine 1500mg PO BID on for 7 days, then off for 7 days, then on for 7 days, then off for 7 days in a 28 day cycle specialty medication(s).    Specialty medication(s) and dose(s) confirmed: yes    Refill Questions    Flowsheet Row Most Recent Value   Changes to allergies? No   Changes to medications? No   New conditions since last clinic visit No   Unplanned office visit, urgent care, ED, or hospital admission in the last 4 weeks  No   How does patient/caregiver feel medication is working? Good   Financial problems or insurance changes  No   Since the previous refill, were any specialty medication doses or scheduled injections missed or delayed?  No   Does this patient require a clinical escalation to a pharmacist? No          Delivery Questions    Flowsheet Row Most Recent Value   Delivery method FedEx   Delivery address correct? Yes   Delivery phone number [phone]   Preferred delivery time? Anytime   Number of medications in delivery 1   Medication being filled and delivered capecitabine   Doses left of specialty medications patient on off week   Is there any medication that is due not being filled? No   Supplies needed? No supplies needed   Cooler needed? No   Do any medications need mixed or dated? No   Copay form of payment Credit card on file   Additional comments $3.42   Questions or concerns for the pharmacist? No   Explain any questions or concerns for the pharmacist n/a   Are any medications first time fills? No            Medication Adherence    Adherence tools used: patient uses a pill box to manage medications          Follow-up: 28 day(s)     Loren Tiwari, Pharmacy Technician  Specialty Pharmacy Technician

## 2022-08-29 ENCOUNTER — SPECIALTY PHARMACY (OUTPATIENT)
Dept: ONCOLOGY | Facility: HOSPITAL | Age: 72
End: 2022-08-29

## 2022-08-29 NOTE — PROGRESS NOTES
Will see Dr. Mercado 9/2/22     Specialty Pharmacy Patient Management Program  Oncology 6-Month Clinical Assessment       Gordon Avila is a 71 y.o. male with metastatic colon cancer seen today to assess adherence and side effects.    Regimen: Capecitabine 1500 mg PO BID on 1 week, off 1 week, on 1 week, off 1 week in 28-day cycle    Reason for Outreach: Routine medication check-in .       Problem list reviewed by Waleska Gloria [facility] on 8/29/2022 at  8:27 AM  Medicines reviewed by Waleska Gloria [facility] on 8/29/2022 at  8:27 AM  Medicines reviewed by Waleska Gloria [facility] on 8/29/2022 at  8:27 AM  Allergies reviewed by Waleska Gloria [facility] on 8/29/2022 at  8:23 AM    Quality of Life Assessment  Quality of Life Improvement Scale: Somewhat better     Goals     •  Specialty Pharmacy General Goal (pt-stated)       Patient-identified goal of therapy: Patient will adhere to medication regimen by %  Clinical goal/therapeutic target: Patient will adhere to medication regimen by %              Adherence Questions  Medication(s) assessed: Capecitabine  On average, how many doses/injections does the patient miss per month?: 0  What are the identified reasons for non-adherence or missed doses? : no problems identfied  What is the estimated medication adherence level?: % (Uses a pillbox to remind himself, takes at 8 am and 6 pm with food.)  Based on the patient/caregiver response and refill history, does this patient require an [facility] to track adherence improvements?: no    Assessments:  • Medication Assessment  o Medication Tolerability: Patient is experiencing side effects   - Diarrhea-mild, occassional, controlled by Imodium  - HFS - burning sensation in fingers and toes, feels it most in feet after he walks a lot - twice last week after walking 2-3 miles. Finger tips get numb, sometimes drops things and has trouble buttoning shirt. Only taking gabapentin 300 mg PO QHS right now, there's room  to go up on the dose.  - After speaking with Dr. Mercado, I recommended the patient increase his gabapentin to 300 mg PO TID, the patient started this today 8/29/22.  - I will mail him handouts on managing diarrhea and minimizing/treating HFS  o Therapeutically Appropriate: Yes  o Administration: Patient is taking every day at the same time , with food  and twice daily.   o Patient can self administer medications: Yes  o Medication Follow-Up Plan: 2 week [facility] follow-up, next refill outreach is 9/10/22.  • Drug-drug interactions  o Completed medication reconciliation today to assess for drug interactions. Patient denies starting or stopping any medications.    o Assessed medication list for interactions, no significant drug interactions noted.   o Advised patient to call the clinic if any new medications are started so we can assess for drug-drug interactions.  • Vaccines are coordinated by the patient's oncologist and primary care provider.  • Quality of Life: 7/10  • Adherence:  o Missed doses: Patient reports missing 0 doses in the last 30 days..  o Reasons for missed doses: N/A  • Medication availability/affordability: Patient has had no issues obtaining medication from pharmacy.  • Questions/concerns about medications: None    All questions addressed and patient had no additional concerns. Patient has pharmacy contact information.    Stephanie Gloria, PharmD, [facility]  Oncology Clinical Pharmacist   [phone]

## 2022-09-12 ENCOUNTER — SPECIALTY PHARMACY (OUTPATIENT)
Dept: ONCOLOGY | Facility: HOSPITAL | Age: 72
End: 2022-09-12

## 2022-09-12 NOTE — PROGRESS NOTES
Follow-Up on MTP - Nerve Pain/HFS with Capecitabine    The patient has increased his gabapentin to BID dosing and this has resolved the burning nerve pain he was having.  He doesn't have any other concerns or needs at this point.    Stephanie Gloria, PharmD, [facility] - Oncology Clinical Pharmacist [phone]    9/12/22  9:34 EDT

## 2022-09-13 RX ORDER — CLONAZEPAM 1 MG/1
1 TABLET ORAL NIGHTLY PRN
Qty: 20 TABLET | Refills: 0 | Status: SHIPPED | OUTPATIENT
Start: 2022-09-13 | End: 2022-10-24

## 2022-09-22 ENCOUNTER — SPECIALTY PHARMACY (OUTPATIENT)
Dept: ONCOLOGY | Facility: HOSPITAL | Age: 72
End: 2022-09-22

## 2022-09-22 NOTE — PROGRESS NOTES
Specialty Pharmacy Refill Coordination Note     Gordon is a 71 y.o. male contacted today regarding refills of  Capecitabine 1500mg PO BID on for 7 days, the off 7 days, then on 7 days, then off 7 days of 28 day cycle specialty medication(s).    Spoke with patient on 9/8/22 and they had a month supply on hand. They had received an extra shipment from free drug before we took over filling here at [facility]. So patient had a surplus on hand. They wanted to finish the medication they had on hand before filling a new order.     Specialty medication(s) and dose(s) confirmed: yes    Refill Questions    Flowsheet Row Most Recent Value   Changes to allergies? No   Changes to medications? No   New conditions since last clinic visit No   Unplanned office visit, urgent care, ED, or hospital admission in the last 4 weeks  No   How does patient/caregiver feel medication is working? Very good   Financial problems or insurance changes  No   Since the previous refill, were any specialty medication doses or scheduled injections missed or delayed?  No   Does this patient require a clinical escalation to a pharmacist? No          Delivery Questions    Flowsheet Row Most Recent Value   Delivery method FedEx   Delivery address correct? Yes   Delivery phone number [phone]   Preferred delivery time? Anytime   Number of medications in delivery 1   Medication being filled and delivered capecitabine   Doses left of specialty medications 14 days left   Is there any medication that is due not being filled? No   Supplies needed? No supplies needed   Cooler needed? No   Do any medications need mixed or dated? No   Copay form of payment Credit card on file   Additional comments $3.42   Questions or concerns for the pharmacist? No   Explain any questions or concerns for the pharmacist n/a   Are any medications first time fills? No            Medication Adherence    Adherence tools used: patient uses a pill box to manage medications           Follow-up: 28 day(s)     Loren Tiwari, Pharmacy Technician  Specialty Pharmacy Technician

## 2022-09-30 ENCOUNTER — INFUSION (OUTPATIENT)
Dept: ONCOLOGY | Facility: HOSPITAL | Age: 72
End: 2022-09-30

## 2022-09-30 ENCOUNTER — OFFICE VISIT (OUTPATIENT)
Dept: ONCOLOGY | Facility: CLINIC | Age: 72
End: 2022-09-30

## 2022-09-30 VITALS
DIASTOLIC BLOOD PRESSURE: 69 MMHG | SYSTOLIC BLOOD PRESSURE: 151 MMHG | HEART RATE: 62 BPM | BODY MASS INDEX: 20.41 KG/M2 | OXYGEN SATURATION: 99 % | TEMPERATURE: 98.5 F | HEIGHT: 73 IN | WEIGHT: 154 LBS

## 2022-09-30 DIAGNOSIS — C18.7 MALIGNANT NEOPLASM OF SIGMOID COLON: Primary | ICD-10-CM

## 2022-09-30 LAB
ALBUMIN SERPL-MCNC: 4.2 G/DL (ref 3.5–5.2)
ALBUMIN/GLOB SERPL: 1.7 G/DL
ALP SERPL-CCNC: 67 U/L (ref 39–117)
ALT SERPL W P-5'-P-CCNC: 10 U/L (ref 1–41)
ANION GAP SERPL CALCULATED.3IONS-SCNC: 8.6 MMOL/L (ref 5–15)
AST SERPL-CCNC: 17 U/L (ref 1–40)
BASOPHILS # BLD AUTO: 0.01 10*3/MM3 (ref 0–0.2)
BASOPHILS NFR BLD AUTO: 0.3 % (ref 0–1.5)
BILIRUB SERPL-MCNC: 0.6 MG/DL (ref 0–1.2)
BUN SERPL-MCNC: 12 MG/DL (ref 8–23)
BUN/CREAT SERPL: 15 (ref 7–25)
CALCIUM SPEC-SCNC: 9.2 MG/DL (ref 8.6–10.5)
CEA SERPL-MCNC: 2.37 NG/ML
CHLORIDE SERPL-SCNC: 106 MMOL/L (ref 98–107)
CO2 SERPL-SCNC: 25.4 MMOL/L (ref 22–29)
CREAT SERPL-MCNC: 0.8 MG/DL (ref 0.76–1.27)
DEPRECATED RDW RBC AUTO: 56.8 FL (ref 37–54)
EGFRCR SERPLBLD CKD-EPI 2021: 95.2 ML/MIN/1.73
EOSINOPHIL # BLD AUTO: 0.06 10*3/MM3 (ref 0–0.4)
EOSINOPHIL NFR BLD AUTO: 1.7 % (ref 0.3–6.2)
ERYTHROCYTE [DISTWIDTH] IN BLOOD BY AUTOMATED COUNT: 15.4 % (ref 12.3–15.4)
GLOBULIN UR ELPH-MCNC: 2.5 GM/DL
GLUCOSE SERPL-MCNC: 100 MG/DL (ref 65–99)
HCT VFR BLD AUTO: 34.8 % (ref 37.5–51)
HGB BLD-MCNC: 12.5 G/DL (ref 13–17.7)
IMM GRANULOCYTES # BLD AUTO: 0.01 10*3/MM3 (ref 0–0.05)
IMM GRANULOCYTES NFR BLD AUTO: 0.3 % (ref 0–0.5)
LYMPHOCYTES # BLD AUTO: 1 10*3/MM3 (ref 0.7–3.1)
LYMPHOCYTES NFR BLD AUTO: 27.9 % (ref 19.6–45.3)
MCH RBC QN AUTO: 36.1 PG (ref 26.6–33)
MCHC RBC AUTO-ENTMCNC: 35.9 G/DL (ref 31.5–35.7)
MCV RBC AUTO: 100.6 FL (ref 79–97)
MONOCYTES # BLD AUTO: 0.33 10*3/MM3 (ref 0.1–0.9)
MONOCYTES NFR BLD AUTO: 9.2 % (ref 5–12)
NEUTROPHILS NFR BLD AUTO: 2.18 10*3/MM3 (ref 1.7–7)
NEUTROPHILS NFR BLD AUTO: 60.6 % (ref 42.7–76)
NRBC BLD AUTO-RTO: 0 /100 WBC (ref 0–0.2)
PLATELET # BLD AUTO: 149 10*3/MM3 (ref 140–450)
PMV BLD AUTO: 10.4 FL (ref 6–12)
POTASSIUM SERPL-SCNC: 4.3 MMOL/L (ref 3.5–5.2)
PROT SERPL-MCNC: 6.7 G/DL (ref 6–8.5)
RBC # BLD AUTO: 3.46 10*6/MM3 (ref 4.14–5.8)
SODIUM SERPL-SCNC: 140 MMOL/L (ref 136–145)
WBC NRBC COR # BLD: 3.59 10*3/MM3 (ref 3.4–10.8)

## 2022-09-30 PROCEDURE — 86803 HEPATITIS C AB TEST: CPT | Performed by: INTERNAL MEDICINE

## 2022-09-30 PROCEDURE — 82378 CARCINOEMBRYONIC ANTIGEN: CPT

## 2022-09-30 PROCEDURE — 96523 IRRIG DRUG DELIVERY DEVICE: CPT

## 2022-09-30 PROCEDURE — 36591 DRAW BLOOD OFF VENOUS DEVICE: CPT

## 2022-09-30 PROCEDURE — 25010000002 HEPARIN LOCK FLUSH PER 10 UNITS: Performed by: NURSE PRACTITIONER

## 2022-09-30 PROCEDURE — 82746 ASSAY OF FOLIC ACID SERUM: CPT | Performed by: INTERNAL MEDICINE

## 2022-09-30 PROCEDURE — 99214 OFFICE O/P EST MOD 30 MIN: CPT | Performed by: INTERNAL MEDICINE

## 2022-09-30 PROCEDURE — 36415 COLL VENOUS BLD VENIPUNCTURE: CPT

## 2022-09-30 PROCEDURE — 85025 COMPLETE CBC W/AUTO DIFF WBC: CPT

## 2022-09-30 PROCEDURE — 82607 VITAMIN B-12: CPT | Performed by: INTERNAL MEDICINE

## 2022-09-30 PROCEDURE — 80053 COMPREHEN METABOLIC PANEL: CPT

## 2022-09-30 RX ORDER — HEPARIN SODIUM (PORCINE) LOCK FLUSH IV SOLN 100 UNIT/ML 100 UNIT/ML
500 SOLUTION INTRAVENOUS AS NEEDED
Status: CANCELLED | OUTPATIENT
Start: 2022-09-30

## 2022-09-30 RX ORDER — HEPARIN SODIUM (PORCINE) LOCK FLUSH IV SOLN 100 UNIT/ML 100 UNIT/ML
500 SOLUTION INTRAVENOUS AS NEEDED
Status: DISCONTINUED | OUTPATIENT
Start: 2022-09-30 | End: 2022-09-30 | Stop reason: HOSPADM

## 2022-09-30 RX ORDER — SODIUM CHLORIDE 0.9 % (FLUSH) 0.9 %
10 SYRINGE (ML) INJECTION AS NEEDED
Status: CANCELLED | OUTPATIENT
Start: 2022-09-30

## 2022-09-30 RX ORDER — CAPECITABINE 500 MG/1
TABLET, FILM COATED ORAL
COMMUNITY
Start: 2022-08-18 | End: 2022-09-30

## 2022-09-30 RX ORDER — SODIUM CHLORIDE 0.9 % (FLUSH) 0.9 %
10 SYRINGE (ML) INJECTION AS NEEDED
Status: DISCONTINUED | OUTPATIENT
Start: 2022-09-30 | End: 2022-09-30 | Stop reason: HOSPADM

## 2022-09-30 RX ADMIN — Medication 10 ML: at 09:39

## 2022-09-30 RX ADMIN — HEPARIN 500 UNITS: 100 SYRINGE at 09:39

## 2022-09-30 NOTE — PROGRESS NOTES
CHIEF COMPLAINT: 1.  Peripheral neuropathy of the hands and feet, worse in the feet                                       2.  Follow-up for colon cancer    Problem List:  Oncology/Hematology History Overview Note   1. Stage CARLOS, [id number] colon cancer with 2 out of 14 pericolonic lymph nodes  involved and a left lobe liver biopsy positive for metastasis, presenting with  a 25 pound weight loss and diarrhea with some perirectal soreness and had  colonoscopy with Dr. Mcclain in January that found this lesion that was  circumferential high-grade invading into the pericolonic fat, status post  sigmoid colectomy of a poorly differentiated adenocarcinoma.   a) Baseline CEA postop of 0.8 with alkaline phosphatase 111, with normal liver  enzymes and creatinine of 0.8. Baseline white count 9820 with a hemoglobin  13.8, platelets 339,000, and CT with contrast showing a right lobe liver dome  lesion as well as an anastomotic change in the bowel.   b) Began CapeOx 03/15/2016.  c) Added in Avastin to CapeOx 04/05/2016, second cycle. (This was 8 weeks out  from surgery).  d) KRAS mutation is negative.  E.) CAT scan in August 2016 shows resolution of disease in the liver and colon and a stable lung nodule.  Hence Avastin, Xeloda, and oxaliplatin continued.  F) oxaliplatin discontinued October 2016 due to worsening peripheral neuropathy.  G.) CTs of February 2017 showed no evidence of metastasis and stable bibasilar nodular scar.  Persistent neuropathy not worsening.  CEA 1.4.  Continuing Avastin and Xeloda without oxaliplatin.  Having some problems with irritation of his eyes on Xeloda.  2.  Peripheral neuropathy, chemotherapy induced     Malignant neoplasm of sigmoid colon (HCC)   5/20/2016 Initial Diagnosis    Malignant neoplasm of sigmoid colon     5/2/2017 Imaging    CT chest, abdomen, pelvis IMPRESSION:  1. No disease recurrence.  2. Minimal areas of nodular thickening in the right lower lobe, stable.     8/2/2017 Imaging    CT  chest/abdomen/pelvis IMPRESSION:  Stable examination with no evidence of acute intrathoracic,  intra-abdominal or pelvic abnormality. There is no evidence of  progression of disease. No metastatic disease.      11/13/2017 Imaging    CT chest/abdomen/pelvis IMPRESSION:  Stable examination without evidence of acute intrathoracic  intra-abdominal or intrapelvic abnormality. No evidence of progression  of disease.   CEA 1.3.     2/6/2020 -  Chemotherapy    OP COLON Capecitabine 1,250 mg/m2 BID (8 cycles)         HISTORY OF PRESENT ILLNESS:  The patient is a 71 y.o. male, here for follow up on management of Stage IV a colon cancer.  He is currently on Xeloda alone.  I held his Xeloda for short period due to diarrhea.  That seems to have improved.  He is back on Xeloda tolerating it well.  He is on 1500 mg 7 days off and 7 days on.    Past Medical History:   Diagnosis Date   • H/O exercise stress test     Patient states it was normal   • Hypertension    • Liver disease     mets from colon cancer   • Malignant neoplasm of colon (HCC)    • Seizure (HCC) 2019   • Wears dentures     bridge   • Wears glasses      Past Surgical History:   Procedure Laterality Date   • COLON SURGERY      Sigmoid   • COLONOSCOPY N/A 10/26/2020    Procedure: COLONOSCOPY;  Surgeon: Rodrigo Lambert MD;  Location: [facility] ENDOSCOPY;  Service: Gastroenterology;  Laterality: N/A;   • LIVER SURGERY     • PORTACATH PLACEMENT         No Known Allergies    Family History and Social History reviewed and changed as necessary      REVIEW OF SYSTEM:   Review of Systems   Constitutional: Negative for appetite change, chills, diaphoresis, fatigue, fever and unexpected weight change.   HENT:   Negative for mouth sores, sore throat and trouble swallowing.    Eyes: Negative for icterus.   Respiratory: Negative for cough, hemoptysis and shortness of breath.    Cardiovascular: Negative for chest pain, leg swelling and palpitations.   Gastrointestinal: Negative for  "abdominal distention, abdominal pain, blood in stool, constipation, diarrhea, nausea and vomiting.   Endocrine: Negative for hot flashes.   Genitourinary: Negative for bladder incontinence, difficulty urinating, dysuria, frequency and hematuria.    Musculoskeletal: Negative for gait problem, neck pain and neck stiffness.   Skin: Negative for rash.  Positive for dry skin.  Neurological: Positive for peripheral neuropathy in the hands and feet.  Hematological: Negative for adenopathy. Does not bruise/bleed easily.   Psychiatric/Behavioral: Negative for depression. The patient is not nervous/anxious.    All other systems reviewed and are negative.       PHYSICAL EXAM    Vitals:    09/30/22 0903   BP: 151/69   Pulse: 62   Temp: 98.5 °F (36.9 °C)   TempSrc: Temporal   SpO2: 99%   Weight: 69.9 kg (154 lb)   Height: 185.4 cm (73\")     Constitutional: Appears well-developed and well-nourished. No distress.   ECOG: (1) Restricted in physically strenuous activity, ambulatory and able to do work of light nature  HENT:   Head: Normocephalic.   Mouth/Throat: Oropharynx is clear and moist.   Eyes: Conjunctivae are normal. Pupils are equal, round, and reactive to light. No scleral icterus.   Neck: Neck supple. No JVD present. No thyromegaly present.   Cardiovascular: Normal rate, regular rhythm and normal heart sounds.    Pulmonary/Chest: Breath sounds normal. No respiratory distress.   Nodes: No cervical, supraclavicular or axillary nodes palpable on exam.  Abdominal: Soft. Exhibits no distension and no mass. There is no hepatosplenomegaly. There is no tenderness. There is no rebound and no guarding.   Musculoskeletal:Exhibits no edema, tenderness or deformity.   Neurological: Alert and oriented to person, place, and time. Exhibits normal muscle tone.   Skin: Dry.  No ecchymosis, no petechiae and no rash noted. Not diaphoretic. No cyanosis.   Psychiatric: Normal mood and affect.   Vitals reviewed.  Laboratory data reviewed along " with CT chest abdomen and pelvis and images thereof as outlined above in the oncology history were reviewed at time of visit.    Lab Results   Component Value Date    WBC 4.13 07/15/2022    HGB 11.7 (L) 07/15/2022    HCT 33.9 (L) 07/15/2022    .4 (H) 07/15/2022     07/15/2022       Lab Results   Component Value Date    GLUCOSE 93 07/15/2022    BUN 11 07/15/2022    CREATININE 0.91 07/15/2022    EGFRIFNONA 89 01/21/2022    EGFRIFAFRI 81 08/04/2020    BCR 12.1 07/15/2022    K 4.3 07/15/2022    CO2 23.4 07/15/2022    CALCIUM 9.0 07/15/2022    PROTENTOTREF 6.9 08/04/2020    ALBUMIN 4.40 07/15/2022    LABIL2 2.6 08/04/2020    AST 17 07/15/2022    ALT 11 07/15/2022       Lab Results   Component Value Date    CEA 1.67 07/15/2022    CEA 1.33 04/15/2022    CEA 1.51 01/21/2022    CEA 1.60 10/29/2021     CT Chest Without Contrast Diagnostic    Result Date: 7/13/2021  Interval resolution of the nodular opacity in the right lower lobe which was likely infectious or inflammatory in nature.  318.12 mGy.cm   This study was performed with techniques to keep radiation doses as low as reasonably achievable (ALARA). Individualized dose reduction techniques using automated exposure control or adjustment of mA and/or kV according to the patient size were employed.  This report was finalized on 7/13/2021 3:44 PM by David Ingram M.D..    CT Chest With Contrast Diagnostic    Result Date: 10/27/2021  No evidence of metastatic disease involving the chest, abdomen or pelvis.  This study was performed with techniques to keep radiation doses as low as reasonably achievable (ALARA). Individualized dose reduction techniques using automated exposure control or adjustment of mA and/or kV according to the patient size were employed.  This report was finalized on 10/27/2021 9:11 AM by David Ingram M.D..    CT Abdomen Pelvis With Contrast    Result Date: 10/27/2021  No evidence of metastatic disease involving the chest, abdomen  or pelvis.  This study was performed with techniques to keep radiation doses as low as reasonably achievable (ALARA). Individualized dose reduction techniques using automated exposure control or adjustment of mA and/or kV according to the patient size were employed.  This report was finalized on 10/27/2021 9:11 AM by David Ingram M.D..            Assessment/Plan     1. 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  If he has diarrhea resume then I am going to lower his dose to 1000 mg twice daily 7 days on and 7 days off.  Plan for imaging in 1 month.  Its been 6 months since his last imaging.      2. Peripheral neuropathy secondary to chemotherapy.  Stable.  We will continue gabapentin 1 capsule by mouth 3 times a day.    3. Port. We will continue port care every 2 months with clinic appointments. We will check CBC, CMP, CEA with each port flush.     4.  Dental abscess.  Seems better from that.            Santi Abad MD    09/30/22

## 2022-10-01 LAB
FOLATE SERPL-MCNC: 12.8 NG/ML
HCV AB S/CO SERPL IA: <0.1 S/CO RATIO (ref 0–0.9)
VIT B12 SERPL-MCNC: 368 PG/ML (ref 232–1245)

## 2022-10-06 ENCOUNTER — TELEPHONE (OUTPATIENT)
Dept: INTERNAL MEDICINE | Facility: CLINIC | Age: 72
End: 2022-10-06

## 2022-10-06 NOTE — TELEPHONE ENCOUNTER
Left detailed voice mail requesting a call back about the appointment scheduled for today. Is he able to make it in or do we need to reschedule

## 2022-10-10 RX ORDER — ESCITALOPRAM OXALATE 10 MG/1
10 TABLET ORAL DAILY
Qty: 90 TABLET | Refills: 3 | Status: SHIPPED | OUTPATIENT
Start: 2022-10-10

## 2022-10-10 NOTE — TELEPHONE ENCOUNTER
Left detailed voice mail requesting a call back.     I want to get him scheduled for a yearly wellness visit.

## 2022-10-17 ENCOUNTER — SPECIALTY PHARMACY (OUTPATIENT)
Dept: ONCOLOGY | Facility: HOSPITAL | Age: 72
End: 2022-10-17

## 2022-10-17 NOTE — PROGRESS NOTES
Specialty Pharmacy Refill Coordination Note     Gordon is a 71 y.o. male contacted today regarding refills of capecitabine 1500mg PO BID on for 7 days, off 7 days, on 7 days, off 7 days In a 28 day cycle specialty medication(s).    Specialty medication(s) and dose(s) confirmed: yes    Refill Questions    Flowsheet Row Most Recent Value   Changes to allergies? No   Changes to medications? No   New conditions since last clinic visit No   Unplanned office visit, urgent care, ED, or hospital admission in the last 4 weeks  No   How does patient/caregiver feel medication is working? Very good   Financial problems or insurance changes  No   Since the previous refill, were any specialty medication doses or scheduled injections missed or delayed?  No   Does this patient require a clinical escalation to a pharmacist? No          Delivery Questions    Flowsheet Row Most Recent Value   Delivery method FedEx   Delivery address correct? Yes   Delivery phone number [phone]   Preferred delivery time? Anytime   Number of medications in delivery 1   Medication being filled and delivered capecitabine   Doses left of specialty medications patient on off week   Is there any medication that is due not being filled? No   Supplies needed? No supplies needed   Cooler needed? No   Do any medications need mixed or dated? No   Copay form of payment Credit card on file   Additional comments $3.42   Questions or concerns for the pharmacist? No   Explain any questions or concerns for the pharmacist n/a   Are any medications first time fills? No            Medication Adherence    Adherence tools used: patient uses a pill box to manage medications          Follow-up: 28 day(s)     Loren Tiwari, Pharmacy Technician  Specialty Pharmacy Technician

## 2022-10-20 DIAGNOSIS — T45.1X5A PERIPHERAL NEUROPATHY DUE TO CHEMOTHERAPY: ICD-10-CM

## 2022-10-20 DIAGNOSIS — R42 DIZZINESS: ICD-10-CM

## 2022-10-20 DIAGNOSIS — G62.0 PERIPHERAL NEUROPATHY DUE TO CHEMOTHERAPY: ICD-10-CM

## 2022-10-20 DIAGNOSIS — C18.7 MALIGNANT NEOPLASM OF SIGMOID COLON: ICD-10-CM

## 2022-10-20 DIAGNOSIS — R55 SYNCOPE, UNSPECIFIED SYNCOPE TYPE: ICD-10-CM

## 2022-10-20 RX ORDER — GABAPENTIN 300 MG/1
CAPSULE ORAL
Qty: 90 CAPSULE | Refills: 2 | Status: SHIPPED | OUTPATIENT
Start: 2022-10-20 | End: 2023-03-27

## 2022-10-24 RX ORDER — CLONAZEPAM 1 MG/1
1 TABLET ORAL NIGHTLY PRN
Qty: 20 TABLET | Refills: 0 | Status: SHIPPED | OUTPATIENT
Start: 2022-10-24 | End: 2022-10-28

## 2022-10-24 NOTE — TELEPHONE ENCOUNTER
Rx Refill Note  Requested Prescriptions     Pending Prescriptions Disp Refills   • clonazePAM (KlonoPIN) 1 MG tablet [Pharmacy Med Name: CLONAZEPAM 1MG TABLETS] 20 tablet 0     Sig: TAKE 1 TABLET BY MOUTH AT NIGHT AS NEEDED FOR ANXIETY      Last office visit with prescribing clinician: 10/4/2021      Next office visit with prescribing clinician: Visit date not found            Nae Brumfield CMA  10/24/22, 08:13 EDT

## 2022-10-28 ENCOUNTER — OFFICE VISIT (OUTPATIENT)
Dept: INTERNAL MEDICINE | Facility: CLINIC | Age: 72
End: 2022-10-28

## 2022-10-28 VITALS
OXYGEN SATURATION: 99 % | WEIGHT: 155 LBS | DIASTOLIC BLOOD PRESSURE: 70 MMHG | BODY MASS INDEX: 20.54 KG/M2 | TEMPERATURE: 97.7 F | HEART RATE: 65 BPM | HEIGHT: 73 IN | SYSTOLIC BLOOD PRESSURE: 120 MMHG

## 2022-10-28 DIAGNOSIS — C18.7 MALIGNANT NEOPLASM OF SIGMOID COLON: ICD-10-CM

## 2022-10-28 DIAGNOSIS — I10 HTN (HYPERTENSION), BENIGN: Primary | ICD-10-CM

## 2022-10-28 DIAGNOSIS — F39 MOOD DISORDER: ICD-10-CM

## 2022-10-28 PROCEDURE — G0439 PPPS, SUBSEQ VISIT: HCPCS | Performed by: INTERNAL MEDICINE

## 2022-10-28 PROCEDURE — 90662 IIV NO PRSV INCREASED AG IM: CPT | Performed by: INTERNAL MEDICINE

## 2022-10-28 PROCEDURE — 90677 PCV20 VACCINE IM: CPT | Performed by: INTERNAL MEDICINE

## 2022-10-28 PROCEDURE — 1159F MED LIST DOCD IN RCRD: CPT | Performed by: INTERNAL MEDICINE

## 2022-10-28 PROCEDURE — 99397 PER PM REEVAL EST PAT 65+ YR: CPT | Performed by: INTERNAL MEDICINE

## 2022-10-28 PROCEDURE — 96160 PT-FOCUSED HLTH RISK ASSMT: CPT | Performed by: INTERNAL MEDICINE

## 2022-10-28 PROCEDURE — 90714 TD VACC NO PRESV 7 YRS+ IM: CPT | Performed by: INTERNAL MEDICINE

## 2022-10-28 PROCEDURE — 90471 IMMUNIZATION ADMIN: CPT | Performed by: INTERNAL MEDICINE

## 2022-10-28 PROCEDURE — G0009 ADMIN PNEUMOCOCCAL VACCINE: HCPCS | Performed by: INTERNAL MEDICINE

## 2022-10-28 PROCEDURE — G0008 ADMIN INFLUENZA VIRUS VAC: HCPCS | Performed by: INTERNAL MEDICINE

## 2022-10-28 PROCEDURE — 1170F FXNL STATUS ASSESSED: CPT | Performed by: INTERNAL MEDICINE

## 2022-10-28 RX ORDER — TRAZODONE HYDROCHLORIDE 50 MG/1
50 TABLET ORAL NIGHTLY
Qty: 30 TABLET | Refills: 5 | Status: SHIPPED | OUTPATIENT
Start: 2022-10-28 | End: 2022-12-19 | Stop reason: SDUPTHER

## 2022-10-28 NOTE — PROGRESS NOTES
The ABCs of the Annual Wellness Visit  Subsequent Medicare Wellness Visit    Chief Complaint   Patient presents with   • Medicare Wellness-subsequent      Subjective    History of Present Illness:  Gordon Avila is a 71 y.o. male who presents for a Subsequent Medicare Wellness Visit.    The following portions of the patient's history were reviewed and   updated as appropriate: allergies, current medications, past family history, past medical history, past social history, past surgical history and problem list.    Compared to one year ago, the patient feels his physical   health is the same.    Compared to one year ago, the patient feels his mental   health is the same.    Recent Hospitalizations:  He was not admitted to the hospital during the last year.       Current Medical Providers:  Patient Care Team:  Ed Abraham MD as PCP - General (Internal Medicine)  Santi Abad MD as Consulting Physician (Hematology)    Outpatient Medications Prior to Visit   Medication Sig Dispense Refill   • capecitabine (XELODA) 500 MG chemo tablet Take 3 tablets by mouth 2 (Two) Times a Day. On for 7 days, then off for 7 days, then on for 7 days, then off for 7 days in a 28-day cycle. 84 tablet 5   • clonazePAM (KlonoPIN) 1 MG tablet TAKE 1 TABLET BY MOUTH AT NIGHT AS NEEDED FOR ANXIETY 20 tablet 0   • escitalopram (LEXAPRO) 10 MG tablet TAKE 1 TABLET BY MOUTH DAILY 90 tablet 3   • gabapentin (NEURONTIN) 300 MG capsule TAKE 1 CAPSULE(300 MG) BY MOUTH THREE TIMES DAILY AS NEEDED 90 capsule 2   • lisinopril (PRINIVIL,ZESTRIL) 20 MG tablet TAKE 1 TABLET BY MOUTH DAILY 90 tablet 3     No facility-administered medications prior to visit.       No opioid medication identified on active medication list. I have reviewed chart for other potential  high risk medication/s and harmful drug interactions in the elderly.          Aspirin is not on active medication list.  Aspirin use is not indicated based on review of current  "medical condition/s. Risk of harm outweighs potential benefits.  .    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  ACP discussion was held with the patient during this visit. Patient has an advance directive (not in EMR), copy requested.    Review of Systems   Constitutional: Positive for fatigue. Negative for activity change, appetite change and fever.   HENT: Negative for congestion, ear discharge, ear pain and trouble swallowing.    Eyes: Negative for photophobia and visual disturbance.   Respiratory: Negative for cough and shortness of breath.    Cardiovascular: Negative for chest pain and palpitations.   Gastrointestinal: Negative for abdominal distention, constipation, diarrhea, nausea and vomiting.   Genitourinary: Negative for dysuria, hematuria and urgency.   Musculoskeletal: Negative for arthralgias, back pain, joint swelling and myalgias.   Skin: Negative for color change and rash.   Neurological: Negative for dizziness, weakness, light-headedness and confusion.   Hematological: Negative for adenopathy. Does not bruise/bleed easily.   Psychiatric/Behavioral: Positive for sleep disturbance. Negative for agitation and dysphoric mood. The patient is not nervous/anxious.         Objective    Vitals:    10/28/22 0846   BP: 120/70   Pulse: 65   Temp: 97.7 °F (36.5 °C)   TempSrc: Infrared   SpO2: 99%   Weight: 70.3 kg (155 lb)   Height: 185.4 cm (73\")   PainSc: 0-No pain     Estimated body mass index is 20.45 kg/m² as calculated from the following:    Height as of this encounter: 185.4 cm (73\").    Weight as " of this encounter: 70.3 kg (155 lb).    BMI is within normal parameters. No other follow-up for BMI required.      Does the patient have evidence of cognitive impairment? No    Physical Exam  Constitutional:       General: He is not in acute distress.     Appearance: He is well-developed.   HENT:      Nose: Nose normal.   Eyes:      General: No scleral icterus.     Conjunctiva/sclera: Conjunctivae normal.   Neck:      Thyroid: No thyromegaly.      Trachea: No tracheal deviation.   Cardiovascular:      Rate and Rhythm: Normal rate and regular rhythm.      Heart sounds: No murmur heard.    No friction rub.   Pulmonary:      Effort: No respiratory distress.      Breath sounds: No wheezing or rales.   Abdominal:      General: There is no distension.      Palpations: Abdomen is soft. There is no mass.      Tenderness: There is no abdominal tenderness. There is no guarding.   Musculoskeletal:         General: Deformity present. Normal range of motion.   Lymphadenopathy:      Cervical: No cervical adenopathy.   Skin:     General: Skin is warm and dry.      Findings: No erythema or rash.   Neurological:      Mental Status: He is alert and oriented to person, place, and time.      Cranial Nerves: No cranial nerve deficit.      Coordination: Coordination normal.      Deep Tendon Reflexes: Reflexes are normal and symmetric.   Psychiatric:         Behavior: Behavior normal.         Thought Content: Thought content normal.         Judgment: Judgment normal.                 HEALTH RISK ASSESSMENT    Smoking Status:  Social History     Tobacco Use   Smoking Status Never   Smokeless Tobacco Former   • Types: Chew   • Quit date: 2009     Alcohol Consumption:  Social History     Substance and Sexual Activity   Alcohol Use Not Currently     Fall Risk Screen:    [facility] Fall Risk Assessment was completed, and patient is at LOW risk for falls.Assessment completed on:10/28/2022    Depression Screening:  PHQ-2/PHQ-9 Depression Screening  10/28/2022   Retired PHQ-9 Total Score -   Retired Total Score -   Little Interest or Pleasure in Doing Things 0-->not at all   Feeling Down, Depressed or Hopeless 0-->not at all   PHQ-9: Brief Depression Severity Measure Score 0       Health Habits and Functional and Cognitive Screening:  Functional & Cognitive Status 10/28/2022   Do you have difficulty preparing food and eating? No   Do you have difficulty bathing yourself, getting dressed or grooming yourself? No   Do you have difficulty using the toilet? No   Do you have difficulty moving around from place to place? No   Do you have trouble with steps or getting out of a bed or a chair? No   Current Diet Well Balanced Diet   Dental Exam Up to date   Eye Exam Up to date   Exercise (times per week) 7 times per week   Current Exercises Include Walking;Yard Work;House Cleaning        Exercise Comment Golf   Current Exercise Activities Include -   Do you need help using the phone?  No   Are you deaf or do you have serious difficulty hearing?  No   Do you need help with transportation? No   Do you need help shopping? No   Do you need help preparing meals?  No   Do you need help with housework?  No   Do you need help with laundry? No   Do you need help taking your medications? No   Do you need help managing money? No   Do you ever drive or ride in a car without wearing a seat belt? No   Have you felt unusual stress, anger or loneliness in the last month? No   Who do you live with? Spouse   If you need help, do you have trouble finding someone available to you? No   Have you been bothered in the last four weeks by sexual problems? No   Do you have difficulty concentrating, remembering or making decisions? Yes       Age-appropriate Screening Schedule:  Refer to the list below for future screening recommendations based on patient's age, sex and/or medical conditions. Orders for these recommended tests are listed in the plan section. The patient has been provided with a  written plan.    Health Maintenance   Topic Date Due   • TDAP/TD VACCINES (1 - Tdap) Never done   • INFLUENZA VACCINE  08/01/2022   • ZOSTER VACCINE (1 of 2) 07/08/2029 (Originally 12/7/2001)   • COLONOSCOPY  10/26/2030              Assessment & Plan   CMS Preventative Services Quick Reference  Risk Factors Identified During Encounter  Polypharmacy  The above risks/problems have been discussed with the patient.  Follow up actions/plans if indicated are seen below in the Assessment/Plan Section.  Pertinent information has been shared with the patient in the After Visit Summary.    Diagnoses and all orders for this visit:    1. HTN (hypertension), benign (Primary) stable on lisinopril and low-salt diet    2. Malignant neoplasm of sigmoid colon (HCC) stable undergoing treatment with Xeloda.  Following up with oncology    3. Mood disorder (HCC) on Lexapro discussed sleep hygiene will now switch him over to trazodone has been cutting down on clonazepam over the last 3 months    Other orders  -     Fluzone High-Dose 65+yrs ([id number])  -     Pneumococcal Conjugate Vaccine 20-Valent All        Follow Up:   No follow-ups on file.     An After Visit Summary and PPPS were made available to the patient.

## 2022-11-02 ENCOUNTER — HOSPITAL ENCOUNTER (OUTPATIENT)
Dept: CT IMAGING | Facility: HOSPITAL | Age: 72
Discharge: HOME | End: 2022-11-02

## 2022-11-02 DIAGNOSIS — C18.7 MALIGNANT NEOPLASM OF SIGMOID COLON: ICD-10-CM

## 2022-11-02 PROCEDURE — 74177 CT ABD & PELVIS W/CONTRAST: CPT

## 2022-11-02 PROCEDURE — 71260 CT THORAX DX C+: CPT

## 2022-11-02 PROCEDURE — 25010000002 IOPAMIDOL 61 % SOLUTION: Performed by: INTERNAL MEDICINE

## 2022-11-02 RX ADMIN — IOPAMIDOL 100 ML: 612 INJECTION, SOLUTION INTRAVENOUS at 10:03

## 2022-11-15 ENCOUNTER — TELEPHONE (OUTPATIENT)
Dept: INTERNAL MEDICINE | Facility: CLINIC | Age: 72
End: 2022-11-15

## 2022-11-15 NOTE — TELEPHONE ENCOUNTER
Caller: Gordon Avila    Relationship: Self    Best call back number: [phone]    What is the best time to reach you: ANY    Who are you requesting to speak with (clinical staff, provider,  specific staff member): DR. HOLLINS/CLINICAL    What was the call regarding: THE PATIENT STATES THAT HE IS WHEEZING AND FEELING WEAK AND WOULD LIKE A CALL BACK TO DISCUSS THESE SYMPTOMS ALONG WITH HIS VACCINE RECORD AND POSSIBLY TAKING AN ANTIBIOTIC.     [facility] [id number] [place] [id number] [address] AT [facility] - [phone]  - [phone] FX    Do you require a callback: YES, PLEASE CALL BACK AS SOON AS POSSIBLE.     THANK YOU.

## 2022-11-15 NOTE — TELEPHONE ENCOUNTER
I called and spoke with patient who stated he had a friend tell him to start taking a antibiotic he left over for his sx. I advised patient he should not do this. I advised patient to try OTC medication and if this does not help to come in for a evaluation and if a prescribed medication is needed Dr. Abraham will advise him of this. Patient stated understanding.

## 2022-11-17 ENCOUNTER — SPECIALTY PHARMACY (OUTPATIENT)
Dept: ONCOLOGY | Facility: HOSPITAL | Age: 72
End: 2022-11-17

## 2022-11-17 NOTE — PROGRESS NOTES
Specialty Pharmacy Refill Coordination Note     Gordon is a 71 y.o. male contacted today regarding refills of  Capecitabine 1500mg PO BID on for 7 days, then off 7 days, then on 7 days, then off 7 days specialty medication(s).      Specialty medication(s) and dose(s) confirmed: yes    Refill Questions    Flowsheet Row Most Recent Value   Changes to allergies? No   Changes to medications? No   New conditions since last clinic visit No   Unplanned office visit, urgent care, ED, or hospital admission in the last 4 weeks  No   How does patient/caregiver feel medication is working? Good   Financial problems or insurance changes  No   Since the previous refill, were any specialty medication doses or scheduled injections missed or delayed?  No   Does this patient require a clinical escalation to a pharmacist? No          Delivery Questions    Flowsheet Row Most Recent Value   Delivery method FedEx   Delivery address correct? Yes   Delivery phone number [phone]   Preferred delivery time? Anytime   Number of medications in delivery 1   Medication being filled and delivered capecitabine   Doses left of specialty medications 3 days then off week   Is there any medication that is due not being filled? No   Supplies needed? No supplies needed   Cooler needed? No   Do any medications need mixed or dated? No   Copay form of payment Credit card on file   Additional comments $2.52   Questions or concerns for the pharmacist? No   Explain any questions or concerns for the pharmacist n/a   Are any medications first time fills? No            Medication Adherence    Adherence tools used: patient uses a pill box to manage medications          Follow-up: 28 day(s)     Loren Tiwari, Pharmacy Technician  Specialty Pharmacy Technician

## 2022-12-08 ENCOUNTER — SPECIALTY PHARMACY (OUTPATIENT)
Dept: ONCOLOGY | Facility: HOSPITAL | Age: 72
End: 2022-12-08

## 2022-12-08 DIAGNOSIS — C18.7 MALIGNANT NEOPLASM OF SIGMOID COLON: ICD-10-CM

## 2022-12-08 RX ORDER — CAPECITABINE 500 MG/1
1500 TABLET, FILM COATED ORAL 2 TIMES DAILY
Qty: 84 TABLET | Refills: 11 | Status: SHIPPED | OUTPATIENT
Start: 2022-12-08 | End: 2023-03-13 | Stop reason: SDUPTHER

## 2022-12-08 NOTE — PROGRESS NOTES
Re: Refills of Oral Specialty Medication - Xeloda (capecitabine)    • Drug-Drug Interactions: The current medication list was reviewed and there are no relevant drug-drug interactions.  • Medication Allergies: The patient has NKDA  • Review of Labs/Dose Adjustments: NO DOSE CHANGE - I reviewed the most recent note and labs and the patient will continue without any dose changes.  I sent refills as described below.    Drug: Xeloda (capecitabine)  Strength: 500 mg  Directions: Take 3 tablets by mouth twice a day on 7 days, off 7 days, on 7 days, then off 7 days in a 28-day cycle  Quantity: 84  Refills: 11  Pharmacy prescription sent to: [facility]    Stephanie Gloria, PharmD, [facility]  Oncology Clinical Pharmacist  12/8/2022  08:53 EST

## 2022-12-12 ENCOUNTER — OFFICE VISIT (OUTPATIENT)
Dept: INTERNAL MEDICINE | Facility: CLINIC | Age: 72
End: 2022-12-12

## 2022-12-12 ENCOUNTER — TELEPHONE (OUTPATIENT)
Dept: ONCOLOGY | Facility: CLINIC | Age: 72
End: 2022-12-12

## 2022-12-12 ENCOUNTER — CLINICAL SUPPORT (OUTPATIENT)
Dept: INTERNAL MEDICINE | Facility: CLINIC | Age: 72
End: 2022-12-12

## 2022-12-12 VITALS
OXYGEN SATURATION: 99 % | DIASTOLIC BLOOD PRESSURE: 65 MMHG | BODY MASS INDEX: 19.88 KG/M2 | TEMPERATURE: 98.9 F | HEIGHT: 73 IN | WEIGHT: 150 LBS | SYSTOLIC BLOOD PRESSURE: 111 MMHG | HEART RATE: 69 BPM

## 2022-12-12 VITALS — DIASTOLIC BLOOD PRESSURE: 60 MMHG | SYSTOLIC BLOOD PRESSURE: 118 MMHG

## 2022-12-12 DIAGNOSIS — I95.1 ORTHOSTATIC HYPOTENSION: Primary | ICD-10-CM

## 2022-12-12 LAB
BUN SERPL-MCNC: 12 MG/DL (ref 8–23)
BUN/CREAT SERPL: 11.7 (ref 7–25)
CALCIUM SERPL-MCNC: 9.7 MG/DL (ref 8.6–10.5)
CHLORIDE SERPL-SCNC: 102 MMOL/L (ref 98–107)
CO2 SERPL-SCNC: 28.3 MMOL/L (ref 22–29)
CREAT SERPL-MCNC: 1.03 MG/DL (ref 0.76–1.27)
EGFRCR SERPLBLD CKD-EPI 2021: 77.7 ML/MIN/1.73
ERYTHROCYTE [DISTWIDTH] IN BLOOD BY AUTOMATED COUNT: 13.7 % (ref 12.3–15.4)
GLUCOSE SERPL-MCNC: 78 MG/DL (ref 65–99)
HCT VFR BLD AUTO: 35.4 % (ref 37.5–51)
HGB BLD-MCNC: 12.4 G/DL (ref 13–17.7)
MCH RBC QN AUTO: 34.8 PG (ref 26.6–33)
MCHC RBC AUTO-ENTMCNC: 35 G/DL (ref 31.5–35.7)
MCV RBC AUTO: 99.4 FL (ref 79–97)
PLATELET # BLD AUTO: 154 10*3/MM3 (ref 140–450)
POTASSIUM SERPL-SCNC: 4.7 MMOL/L (ref 3.5–5.2)
RBC # BLD AUTO: 3.56 10*6/MM3 (ref 4.14–5.8)
SODIUM SERPL-SCNC: 139 MMOL/L (ref 136–145)
WBC # BLD AUTO: 4.1 10*3/MM3 (ref 3.4–10.8)

## 2022-12-12 PROCEDURE — 99213 OFFICE O/P EST LOW 20 MIN: CPT | Performed by: INTERNAL MEDICINE

## 2022-12-12 NOTE — TELEPHONE ENCOUNTER
----- Message from Sharif Subramanian sent at 12/12/2022  8:42 AM EST -----  Regarding: RE:PHONE CALL  Contact: [phone]  Pt called wanting to let you know that his BP was 71/52 weekend. He asked if you could call him. He said he feels fainty headed.   Thanks   Gautam

## 2022-12-12 NOTE — PROGRESS NOTES
"Subjective  Gordon Avila is a 72 y.o. male    HPI coming in with a recent episode of lightheadedness while he was working out in the yard.  Denies chest pain palpitations he has a history of hypertension for which he is on lisinopril denies recent diarrhea or vomiting.  Since then symptoms have improved significantly today without lightheadedness or dizziness    The following portions of the patient's history were reviewed and updated as appropriate: allergies, current medications, past family history, past medical history, past social history, past surgical history, and problem list.     Review of Systems   Constitutional: Positive for fatigue. Negative for activity change, appetite change and fever.   HENT: Negative for congestion, ear discharge, ear pain and trouble swallowing.    Eyes: Negative for photophobia and visual disturbance.   Respiratory: Negative for cough and shortness of breath.    Cardiovascular: Negative for chest pain and palpitations.   Gastrointestinal: Negative for abdominal distention, abdominal pain, constipation, diarrhea, nausea and vomiting.   Endocrine: Negative.    Genitourinary: Negative for dysuria, hematuria and urgency.   Musculoskeletal: Negative for arthralgias, back pain, joint swelling and myalgias.   Skin: Negative for color change and rash.   Allergic/Immunologic: Negative.    Neurological: Positive for light-headedness. Negative for dizziness, weakness and headaches.   Hematological: Negative for adenopathy. Does not bruise/bleed easily.   Psychiatric/Behavioral: Negative for agitation, confusion and dysphoric mood. The patient is not nervous/anxious.        Visit Vitals  /65 Comment: Automatic   Pulse 69   Temp 98.9 °F (37.2 °C) (Infrared)   Ht 185.4 cm (73\")   Wt 68 kg (150 lb)   SpO2 99%   BMI 19.79 kg/m²       Objective  Physical Exam  Constitutional:       General: He is not in acute distress.     Appearance: He is well-developed.   HENT:      Nose: Nose " normal.   Eyes:      General: No scleral icterus.     Conjunctiva/sclera: Conjunctivae normal.   Neck:      Thyroid: No thyromegaly.      Trachea: No tracheal deviation.   Cardiovascular:      Rate and Rhythm: Normal rate and regular rhythm.      Heart sounds: No murmur heard.    No friction rub.   Pulmonary:      Effort: No respiratory distress.      Breath sounds: No wheezing or rales.   Abdominal:      General: There is no distension.      Palpations: Abdomen is soft. There is no mass.      Tenderness: There is no abdominal tenderness. There is no guarding.   Musculoskeletal:         General: Deformity present. Normal range of motion.   Lymphadenopathy:      Cervical: No cervical adenopathy.   Skin:     General: Skin is warm and dry.      Findings: No erythema or rash.   Neurological:      Mental Status: He is alert and oriented to person, place, and time.      Cranial Nerves: No cranial nerve deficit.      Coordination: Coordination normal.      Deep Tendon Reflexes: Reflexes are normal and symmetric.   Psychiatric:         Behavior: Behavior normal.         Thought Content: Thought content normal.         Judgment: Judgment normal.         Diagnoses and all orders for this visit:    Orthostatic hypotension asymptomatic today encouraged to drink enough fluids check CBC BMP.  Consider adjusting antihypertensive medication dose if he should have recurrence of symptoms

## 2022-12-12 NOTE — TELEPHONE ENCOUNTER
Returned call to patient. Discussing with him to not take bp medication this morning. Patient reporting that he has already took it. Informing him to go ahead and call PCP since they ordered it to see what they may want him to do. Informing him to drink plenty of water to keep his bp up. Patient stating he will follow up with PCP at this time.

## 2022-12-13 ENCOUNTER — SPECIALTY PHARMACY (OUTPATIENT)
Dept: ONCOLOGY | Facility: HOSPITAL | Age: 72
End: 2022-12-13

## 2022-12-13 NOTE — PROGRESS NOTES
Specialty Pharmacy Refill Coordination Note     Gordon is a 72 y.o. male contacted today regarding refills of  Capecitabine 1500mg PO BID on for 7 days, then off 7 days then on 7 days, then off 7 days in a 28 day cycle specialty medication(s).    Specialty medication(s) and dose(s) confirmed: yes    Refill Questions    Flowsheet Row Most Recent Value   Changes to allergies? No   Changes to medications? No   New conditions since last clinic visit No   Unplanned office visit, urgent care, ED, or hospital admission in the last 4 weeks  No   How does patient/caregiver feel medication is working? Good   Financial problems or insurance changes  No   Since the previous refill, were any specialty medication doses or scheduled injections missed or delayed?  No   Does this patient require a clinical escalation to a pharmacist? No          Delivery Questions    Flowsheet Row Most Recent Value   Delivery method FedEx   Delivery address correct? Yes   Delivery phone number [phone]   Preferred delivery time? Anytime   Number of medications in delivery 1   Medication being filled and delivered capecitabine   Doses left of specialty medications patient currently on off week   Is there any medication that is due not being filled? No   Supplies needed? No supplies needed   Cooler needed? No   Do any medications need mixed or dated? No   Copay form of payment Credit card on file   Additional comments $2.52   Questions or concerns for the pharmacist? No   Explain any questions or concerns for the pharmacist n/a   Are any medications first time fills? No            Medication Adherence    Adherence tools used: patient uses a pill box to manage medications          Follow-up: 28 day(s)     Loren Tiwari, Pharmacy Technician  Specialty Pharmacy Technician

## 2022-12-16 DIAGNOSIS — C18.7 MALIGNANT NEOPLASM OF SIGMOID COLON: ICD-10-CM

## 2022-12-16 RX ORDER — TRAZODONE HYDROCHLORIDE 50 MG/1
50 TABLET ORAL NIGHTLY
Qty: 30 TABLET | Refills: 5 | Status: CANCELLED | OUTPATIENT
Start: 2022-12-16

## 2022-12-16 RX ORDER — AMOXICILLIN 500 MG/1
CAPSULE ORAL
Qty: 21 CAPSULE | Refills: 0 | OUTPATIENT
Start: 2022-12-16

## 2022-12-16 NOTE — TELEPHONE ENCOUNTER
Caller: Gordon Avila    Relationship: Self    Best call back number: [phone]    Requested Prescriptions:   Requested Prescriptions     Pending Prescriptions Disp Refills   • traZODone (DESYREL) 50 MG tablet 30 tablet 5     Sig: Take 1 tablet by mouth Every Night.        Pharm[facility] where request should be sent: [id number] - [place] [address] AT [facility] - [phone]  - [phone] FX     Additional details provided by patient: PT STATED THAT HE IS OUT OF THE RX AND WAS NOT AWARE THAT HE WAS SUPPOSE TO TAKE 1 TABLET UNTIL TODAY, PT STATED THAT HE IS OUT OF THE RX AND WAS TOLD THAT RX WILL NOT BE ABLE TO BE REFILLED UNTIL 1/11/2023, PT REQUEST TO HAVE ENOUGH TO LAST HIM UNTIL THAN    Does the patient have less than a 3 day supply:  [x] Yes  [] No    Would you like a call back once the refill request has been completed: [x] Yes [] No    If the office needs to give you a call back, can they leave a voicemail: [x] Yes [] No    Lalo Still Rep   12/16/22 14:26 EST

## 2022-12-16 NOTE — TELEPHONE ENCOUNTER
Spoke with pt, he is not needing Amoxicillin RF but does need his Trazodone RF to help him sleep (he's been out for a couple days).    Notified pt that he has RF remaining and needs to contact his pharmacy.  Pt v/u and appreciation.

## 2022-12-17 ENCOUNTER — TELEPHONE (OUTPATIENT)
Dept: INTERNAL MEDICINE | Facility: CLINIC | Age: 72
End: 2022-12-17
Payer: MEDICARE

## 2022-12-17 NOTE — TELEPHONE ENCOUNTER
Patient called the office today in regard to his refill request for TraZODone. Patient stated that he misread the directions and took 2 tablets at bedtime instead of one and is not out of this medication.     Patient is concerned that if this med wont be refilled what can he do to help him sleep as he has been struggling since running out of med.     Please advise patient on what we can or cannot do at this time.

## 2022-12-19 RX ORDER — TRAZODONE HYDROCHLORIDE 100 MG/1
100 TABLET ORAL NIGHTLY
Qty: 90 TABLET | Refills: 3 | Status: SHIPPED | OUTPATIENT
Start: 2022-12-19

## 2023-01-11 ENCOUNTER — SPECIALTY PHARMACY (OUTPATIENT)
Dept: ONCOLOGY | Facility: HOSPITAL | Age: 73
End: 2023-01-11
Payer: MEDICARE

## 2023-01-11 NOTE — PROGRESS NOTES
Specialty Pharmacy Refill Coordination Note     Gordon is a 72 y.o. male contacted today regarding refills of  Capecitabine 1500mg PO BID on for 7 days, then off for 7 days, then on for 7 days, then off for 7 days in a 28 day cycle specialty medication(s).      Specialty medication(s) and dose(s) confirmed: yes    Refill Questions    Flowsheet Row Most Recent Value   Changes to allergies? No   Changes to medications? No   New conditions since last clinic visit No   Unplanned office visit, urgent care, ED, or hospital admission in the last 4 weeks  No   How does patient/caregiver feel medication is working? Good   Financial problems or insurance changes  No   Since the previous refill, were any specialty medication doses or scheduled injections missed or delayed?  No   Does this patient require a clinical escalation to a pharmacist? No          Delivery Questions    Flowsheet Row Most Recent Value   Delivery method FedEx   Delivery address correct? Yes   Delivery phone number [phone]   Preferred delivery time? Anytime   Number of medications in delivery 1   Medication being filled and delivered capecitabine   Doses left of specialty medications patient currently on office days of cycle   Is there any medication that is due not being filled? No   Supplies needed? No supplies needed   Cooler needed? No   Do any medications need mixed or dated? No   Additional comments no copay   Questions or concerns for the pharmacist? No   Explain any questions or concerns for the pharmacist n/a   Are any medications first time fills? No            Medication Adherence    Adherence tools used: patient uses a pill box to manage medications          Follow-up: 28 day(s)     Loren Tiwari, Pharmacy Technician  Specialty Pharmacy Technician

## 2023-01-13 ENCOUNTER — INFUSION (OUTPATIENT)
Dept: ONCOLOGY | Facility: HOSPITAL | Age: 73
End: 2023-01-13
Payer: MEDICARE

## 2023-01-13 ENCOUNTER — OFFICE VISIT (OUTPATIENT)
Dept: ONCOLOGY | Facility: CLINIC | Age: 73
End: 2023-01-13
Payer: MEDICARE

## 2023-01-13 VITALS
OXYGEN SATURATION: 99 % | BODY MASS INDEX: 19.88 KG/M2 | SYSTOLIC BLOOD PRESSURE: 127 MMHG | HEART RATE: 61 BPM | DIASTOLIC BLOOD PRESSURE: 70 MMHG | WEIGHT: 150 LBS | TEMPERATURE: 98 F | RESPIRATION RATE: 12 BRPM | HEIGHT: 73 IN

## 2023-01-13 DIAGNOSIS — C18.7 MALIGNANT NEOPLASM OF SIGMOID COLON: Primary | ICD-10-CM

## 2023-01-13 DIAGNOSIS — C78.7 SECONDARY MALIGNANT NEOPLASM OF LIVER AND INTRAHEPATIC BILE DUCT: ICD-10-CM

## 2023-01-13 LAB
ALBUMIN SERPL-MCNC: 4.3 G/DL (ref 3.5–5.2)
ALBUMIN/GLOB SERPL: 1.8 G/DL
ALP SERPL-CCNC: 68 U/L (ref 39–117)
ALT SERPL W P-5'-P-CCNC: 11 U/L (ref 1–41)
ANION GAP SERPL CALCULATED.3IONS-SCNC: 7.6 MMOL/L (ref 5–15)
AST SERPL-CCNC: 18 U/L (ref 1–40)
BASOPHILS # BLD AUTO: 0.02 10*3/MM3 (ref 0–0.2)
BASOPHILS NFR BLD AUTO: 0.4 % (ref 0–1.5)
BILIRUB SERPL-MCNC: 0.8 MG/DL (ref 0–1.2)
BUN SERPL-MCNC: 14 MG/DL (ref 8–23)
BUN/CREAT SERPL: 14.9 (ref 7–25)
CALCIUM SPEC-SCNC: 9.4 MG/DL (ref 8.6–10.5)
CEA SERPL-MCNC: 2.9 NG/ML
CHLORIDE SERPL-SCNC: 105 MMOL/L (ref 98–107)
CO2 SERPL-SCNC: 26.4 MMOL/L (ref 22–29)
CREAT SERPL-MCNC: 0.94 MG/DL (ref 0.76–1.27)
DEPRECATED RDW RBC AUTO: 55.8 FL (ref 37–54)
EGFRCR SERPLBLD CKD-EPI 2021: 86.7 ML/MIN/1.73
EOSINOPHIL # BLD AUTO: 0.04 10*3/MM3 (ref 0–0.4)
EOSINOPHIL NFR BLD AUTO: 0.7 % (ref 0.3–6.2)
ERYTHROCYTE [DISTWIDTH] IN BLOOD BY AUTOMATED COUNT: 14.9 % (ref 12.3–15.4)
GLOBULIN UR ELPH-MCNC: 2.4 GM/DL
GLUCOSE SERPL-MCNC: 95 MG/DL (ref 65–99)
HCT VFR BLD AUTO: 36.2 % (ref 37.5–51)
HGB BLD-MCNC: 12.4 G/DL (ref 13–17.7)
IMM GRANULOCYTES # BLD AUTO: 0.02 10*3/MM3 (ref 0–0.05)
IMM GRANULOCYTES NFR BLD AUTO: 0.4 % (ref 0–0.5)
LYMPHOCYTES # BLD AUTO: 0.96 10*3/MM3 (ref 0.7–3.1)
LYMPHOCYTES NFR BLD AUTO: 17.6 % (ref 19.6–45.3)
MCH RBC QN AUTO: 35.2 PG (ref 26.6–33)
MCHC RBC AUTO-ENTMCNC: 34.3 G/DL (ref 31.5–35.7)
MCV RBC AUTO: 102.8 FL (ref 79–97)
MONOCYTES # BLD AUTO: 0.4 10*3/MM3 (ref 0.1–0.9)
MONOCYTES NFR BLD AUTO: 7.3 % (ref 5–12)
NEUTROPHILS NFR BLD AUTO: 4.03 10*3/MM3 (ref 1.7–7)
NEUTROPHILS NFR BLD AUTO: 73.6 % (ref 42.7–76)
NRBC BLD AUTO-RTO: 0 /100 WBC (ref 0–0.2)
PLATELET # BLD AUTO: 148 10*3/MM3 (ref 140–450)
PMV BLD AUTO: 10.3 FL (ref 6–12)
POTASSIUM SERPL-SCNC: 4.2 MMOL/L (ref 3.5–5.2)
PROT SERPL-MCNC: 6.7 G/DL (ref 6–8.5)
RBC # BLD AUTO: 3.52 10*6/MM3 (ref 4.14–5.8)
SODIUM SERPL-SCNC: 139 MMOL/L (ref 136–145)
WBC NRBC COR # BLD: 5.47 10*3/MM3 (ref 3.4–10.8)

## 2023-01-13 PROCEDURE — 82378 CARCINOEMBRYONIC ANTIGEN: CPT | Performed by: NURSE PRACTITIONER

## 2023-01-13 PROCEDURE — 36591 DRAW BLOOD OFF VENOUS DEVICE: CPT

## 2023-01-13 PROCEDURE — 80053 COMPREHEN METABOLIC PANEL: CPT | Performed by: NURSE PRACTITIONER

## 2023-01-13 PROCEDURE — 25010000002 HEPARIN LOCK FLUSH PER 10 UNITS: Performed by: NURSE PRACTITIONER

## 2023-01-13 PROCEDURE — 96523 IRRIG DRUG DELIVERY DEVICE: CPT

## 2023-01-13 PROCEDURE — 85025 COMPLETE CBC W/AUTO DIFF WBC: CPT | Performed by: NURSE PRACTITIONER

## 2023-01-13 PROCEDURE — 99214 OFFICE O/P EST MOD 30 MIN: CPT | Performed by: NURSE PRACTITIONER

## 2023-01-13 RX ORDER — SODIUM CHLORIDE 0.9 % (FLUSH) 0.9 %
10 SYRINGE (ML) INJECTION AS NEEDED
Status: DISCONTINUED | OUTPATIENT
Start: 2023-01-13 | End: 2023-01-13 | Stop reason: HOSPADM

## 2023-01-13 RX ORDER — SODIUM CHLORIDE 0.9 % (FLUSH) 0.9 %
10 SYRINGE (ML) INJECTION AS NEEDED
OUTPATIENT
Start: 2023-01-13

## 2023-01-13 RX ORDER — HEPARIN SODIUM (PORCINE) LOCK FLUSH IV SOLN 100 UNIT/ML 100 UNIT/ML
500 SOLUTION INTRAVENOUS AS NEEDED
OUTPATIENT
Start: 2023-01-13

## 2023-01-13 RX ORDER — HEPARIN SODIUM (PORCINE) LOCK FLUSH IV SOLN 100 UNIT/ML 100 UNIT/ML
500 SOLUTION INTRAVENOUS AS NEEDED
Status: DISCONTINUED | OUTPATIENT
Start: 2023-01-13 | End: 2023-01-13 | Stop reason: HOSPADM

## 2023-01-13 RX ADMIN — HEPARIN 500 UNITS: 100 SYRINGE at 09:45

## 2023-01-13 RX ADMIN — Medication 10 ML: at 09:44

## 2023-01-13 NOTE — PROGRESS NOTES
CHIEF COMPLAINT: 1.  Peripheral neuropathy of the hands and feet, worse in the feet                                       2.  Follow-up for colon cancer    Problem List:  Oncology/Hematology History Overview Note   1. Stage CARLOS, [id number] colon cancer with 2 out of 14 pericolonic lymph nodes  involved and a left lobe liver biopsy positive for metastasis, presenting with  a 25 pound weight loss and diarrhea with some perirectal soreness and had  colonoscopy with Dr. Mcclain in January that found this lesion that was  circumferential high-grade invading into the pericolonic fat, status post  sigmoid colectomy of a poorly differentiated adenocarcinoma.   a) Baseline CEA postop of 0.8 with alkaline phosphatase 111, with normal liver  enzymes and creatinine of 0.8. Baseline white count 9820 with a hemoglobin  13.8, platelets 339,000, and CT with contrast showing a right lobe liver dome  lesion as well as an anastomotic change in the bowel.   b) Began CapeOx 03/15/2016.  c) Added in Avastin to CapeOx 04/05/2016, second cycle. (This was 8 weeks out  from surgery).  d) KRAS mutation is negative.  E.) CAT scan in August 2016 shows resolution of disease in the liver and colon and a stable lung nodule.  Hence Avastin, Xeloda, and oxaliplatin continued.  F) oxaliplatin discontinued October 2016 due to worsening peripheral neuropathy.  G.) CTs of February 2017 showed no evidence of metastasis and stable bibasilar nodular scar.  Persistent neuropathy not worsening.  CEA 1.4.  Continuing Avastin and Xeloda without oxaliplatin.  Having some problems with irritation of his eyes on Xeloda.  2.  Peripheral neuropathy, chemotherapy induced     Malignant neoplasm of sigmoid colon (HCC)   5/20/2016 Initial Diagnosis    Malignant neoplasm of sigmoid colon     5/2/2017 Imaging    CT chest, abdomen, pelvis IMPRESSION:  1. No disease recurrence.  2. Minimal areas of nodular thickening in the right lower lobe, stable.     8/2/2017 Imaging    CT  chest/abdomen/pelvis IMPRESSION:  Stable examination with no evidence of acute intrathoracic,  intra-abdominal or pelvic abnormality. There is no evidence of  progression of disease. No metastatic disease.      11/13/2017 Imaging    CT chest/abdomen/pelvis IMPRESSION:  Stable examination without evidence of acute intrathoracic  intra-abdominal or intrapelvic abnormality. No evidence of progression  of disease.   CEA 1.3.     2/6/2020 -  Chemotherapy    OP COLON Capecitabine 1,250 mg/m2 BID (8 cycles)         HISTORY OF PRESENT ILLNESS:  The patient is a 72 y.o. male, here for follow up on management of Stage IV a colon cancer.  He is currently on Xeloda alone.  He diarrhea has resolved.  He stopped his blood pressure medicine due to a low blood pressure.  He said he went about 2 days with his blood pressure being 70/50 and stopped his blood pressure medicine and his sleeping medication and this has improved his blood pressure significant currently.  He says it is back to normal.  He says he is feeling great.   He is on 1500 mg 7 days off and 7 days on.    Past Medical History:   Diagnosis Date   • H/O exercise stress test     Patient states it was normal   • Hypertension    • Liver disease     mets from colon cancer   • Malignant neoplasm of colon (HCC)    • Seizure (HCC) 2019   • Wears dentures     bridge   • Wears glasses      Past Surgical History:   Procedure Laterality Date   • COLON SURGERY      Sigmoid   • COLONOSCOPY N/A 10/26/2020    Procedure: COLONOSCOPY;  Surgeon: Rodrigo Lambert MD;  Location: [facility] ENDOSCOPY;  Service: Gastroenterology;  Laterality: N/A;   • LIVER SURGERY     • PORTACATH PLACEMENT         No Known Allergies    Family History and Social History reviewed and changed as necessary      REVIEW OF SYSTEM:     Review of Systems   Constitutional: Positive for fatigue.   Musculoskeletal: Positive for arthralgias.   All other systems reviewed and are negative.         PHYSICAL EXAM    There  were no vitals filed for this visit.  ECOG: (1) Restricted in physically strenuous activity, ambulatory and able to do work of light nature  General: well appearing male in no acute distress  Neuro: alert and oriented  HEENT: sclera anicteric, oropharynx clear  Lymphatics: no cervical, supraclavicular, or axillary adenopathy  Cardiovascular: regular rate and rhythm, no murmurs  Lungs: clear to auscultation bilaterally  Abdomen: soft, nontender, nondistended.  No palpable organomegaly  Extremeties: no lower extremity edema  Skin: no rashes, lesions, bruising, or petechiae  Psych: mood and affect appropriate    Vitals reviewed.  Laboratory data reviewed along with CT chest abdomen and pelvis and images thereof as outlined above in the oncology history were reviewed at time of visit.    Lab Results   Component Value Date    WBC 4.10 12/12/2022    HGB 12.4 (L) 12/12/2022    HCT 35.4 (L) 12/12/2022    MCV 99.4 (H) 12/12/2022     12/12/2022       Lab Results   Component Value Date    GLUCOSE 78 12/12/2022    BUN 12 12/12/2022    CREATININE 1.03 12/12/2022    EGFRIFNONA 89 01/21/2022    EGFRIFAFRI 81 08/04/2020    BCR 11.7 12/12/2022    K 4.7 12/12/2022    CO2 28.3 12/12/2022    CALCIUM 9.7 12/12/2022    PROTENTOTREF 6.9 08/04/2020    ALBUMIN 4.20 09/30/2022    LABIL2 2.6 08/04/2020    AST 17 09/30/2022    ALT 10 09/30/2022       Lab Results   Component Value Date    CEA 2.37 09/30/2022    CEA 1.67 07/15/2022    CEA 1.33 04/15/2022    CEA 1.51 01/21/2022     CT Chest Without Contrast Diagnostic    Result Date: 7/13/2021  Interval resolution of the nodular opacity in the right lower lobe which was likely infectious or inflammatory in nature.  318.12 mGy.cm   This study was performed with techniques to keep radiation doses as low as reasonably achievable (ALARA). Individualized dose reduction techniques using automated exposure control or adjustment of mA and/or kV according to the patient size were employed.  This  report was finalized on 7/13/2021 3:44 PM by David Ingram M.D..    CT Chest With Contrast Diagnostic    Result Date: 10/27/2021  No evidence of metastatic disease involving the chest, abdomen or pelvis.  This study was performed with techniques to keep radiation doses as low as reasonably achievable (ALARA). Individualized dose reduction techniques using automated exposure control or adjustment of mA and/or kV according to the patient size were employed.  This report was finalized on 10/27/2021 9:11 AM by David Ingram M.D..    CT Abdomen Pelvis With Contrast    Result Date: 10/27/2021  No evidence of metastatic disease involving the chest, abdomen or pelvis.  This study was performed with techniques to keep radiation doses as low as reasonably achievable (ALARA). Individualized dose reduction techniques using automated exposure control or adjustment of mA and/or kV according to the patient size were employed.  This report was finalized on 10/27/2021 9:11 AM by David Ingram M.D..            Assessment/Plan     1. 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  We discussed that his CT scan showed no significant interval change.  No evidence of disease progression or recurrence.  He does have a stable appearance of diffuse wall thickening of the rectum.  Some small volume ascites but overall stable exam.  We will plan to repeat imaging in 4 to 6 months.      2. Peripheral neuropathy secondary to chemotherapy.  Stable.  We will continue gabapentin 1 capsule by mouth 3 times a day.    3. Port. We will continue port care every 2 months with clinic appointments. We will check CBC, CMP, CEA with each port flush.     4.  Dental abscess.  Seems better from that.            Adeola Sunshine, APRN    01/13/23

## 2023-02-13 ENCOUNTER — SPECIALTY PHARMACY (OUTPATIENT)
Dept: ONCOLOGY | Facility: HOSPITAL | Age: 73
End: 2023-02-13
Payer: MEDICARE

## 2023-02-13 NOTE — PROGRESS NOTES
Specialty Pharmacy Refill Coordination Note     Gordon is a 72 y.o. male contacted today regarding refills of capecitabine (XELODA) 500 MG chemo tablet Take 3 tablets by mouth 2 (Two) Times a Day. On for 7 days, then off for 7 days, then on for 7 days, then off for 7 days in a 28-day cycle specialty medication(s).    Specialty medication(s) and dose(s) confirmed: yes    Refill Questions    Flowsheet Row Most Recent Value   Changes to allergies? No   Changes to medications? No   New conditions since last clinic visit No   Unplanned office visit, urgent care, ED, or hospital admission in the last 4 weeks  No   How does patient/caregiver feel medication is working? Very good   Financial problems or insurance changes  No   Since the previous refill, were any specialty medication doses or scheduled injections missed or delayed?  No   Does this patient require a clinical escalation to a pharmacist? No          Delivery Questions    Flowsheet Row Most Recent Value   Delivery method FedEx   Delivery address correct? Yes   Delivery phone number [phone]   Preferred delivery time? Anytime   Number of medications in delivery 1   Medication being filled and delivered Capecitabine   Doses left of specialty medications Patient is on his on week   Is there any medication that is due not being filled? No   Supplies needed? No supplies needed   Cooler needed? No   Do any medications need mixed or dated? No   Additional comments No copay   Questions or concerns for the pharmacist? No   Explain any questions or concerns for the pharmacist n/a   Are any medications first time fills? No            Medication Adherence    Adherence tools used: patient uses a pill box to manage medications          Follow-up: 28 day(s)     Melo Peters, Pharmacy Technician  Specialty Pharmacy Technician

## 2023-02-16 ENCOUNTER — SPECIALTY PHARMACY (OUTPATIENT)
Dept: ONCOLOGY | Facility: HOSPITAL | Age: 73
End: 2023-02-16
Payer: MEDICARE

## 2023-02-17 NOTE — PROGRESS NOTES
Specialty Pharmacy Patient Management Program  Oncology 6-Month Clinical Assessment       Gordon Avila is a 72 y.o. male with metastatic colon cancer seen today to assess adherence and side effects.    Regimen: Capecitabine 500 mg tablets - Take 3 tablets by mouth twice a day for 1 week on, 1 week off, 1 week on, 1 week off in each 28-day cycle.    Reason for Outreach: Routine medication check-in .       Problem list reviewed by Waleska Gloria [facility] on 2/17/2023 at 10:31 AM  Medicines reviewed by Waleska Gloria [facility] on 2/17/2023 at 10:30 AM  Allergies reviewed by Waleska Gloria [facility] on 2/16/2023 at 12:23 PM  Allergies reviewed by Waleska Gloria [facility] on 2/17/2023 at 10:30 AM     Goals     • Specialty Pharmacy General Goal      Clinical goal/therapeutic target: stable or decreasing CEA and disease control           Medication Assessment:  • Medication Assessment  o Follow Up Clinical Assessment  o Medication(s) assessed: Capecitabine  o Therapeutic appropriateness: Appropriate  o Medication tolerability: Patient reported common adverse drug reaction  o Common ADR(s) experienced: Mild diarrhea, self-limited doesn't need OTC loperamide but has it on hand just in case; HFS - described numbness in his feet, but it's been there for about 9 years and isn't getting worse, so I don't have any concern that Xeloda needs to be dose adjusted.  Denies other common and rare, but serious AEs.  o Medication plan: Continue therapy with normal follow-up  o Quality of Life Improvement Scale: Somewhat better  o Administration: Patient is taking every day at the same time , with food  and twice daily.   o Patient can self administer medications: Yes  o Medication Follow-Up Plan: Refill coordination  • Lab Review: The labs listed below have been reviewed. The following labs are outside of normal limits: RBC, hemoglobin, hematocrit, MCV, MCH, RDW-SD, lymphocyte relative % No dose adjustments are needed for the oral  specialty medication(s) based on the labs.    Lab Results   Component Value Date    GLUCOSE 95 01/13/2023    CALCIUM 9.4 01/13/2023     01/13/2023    K 4.2 01/13/2023    CO2 26.4 01/13/2023     01/13/2023    BUN 14 01/13/2023    CREATININE 0.94 01/13/2023    EGFRIFAFRI 81 08/04/2020    EGFRIFNONA 89 01/21/2022    BCR 14.9 01/13/2023    ANIONGAP 7.6 01/13/2023     Lab Results   Component Value Date    WBC 5.47 01/13/2023    RBC 3.52 (L) 01/13/2023    HGB 12.4 (L) 01/13/2023    HCT 36.2 (L) 01/13/2023    .8 (H) 01/13/2023    MCH 35.2 (H) 01/13/2023    MCHC 34.3 01/13/2023    RDW 14.9 01/13/2023    RDWSD 55.8 (H) 01/13/2023    MPV 10.3 01/13/2023     01/13/2023    NEUTRORELPCT 73.6 01/13/2023    LYMPHORELPCT 17.6 (L) 01/13/2023    MONORELPCT 7.3 01/13/2023    EOSRELPCT 0.7 01/13/2023    BASORELPCT 0.4 01/13/2023    AUTOIGPER 0.4 01/13/2023    NEUTROABS 4.03 01/13/2023    LYMPHSABS 0.96 01/13/2023    MONOSABS 0.40 01/13/2023    EOSABS 0.04 01/13/2023    BASOSABS 0.02 01/13/2023    AUTOIGNUM 0.02 01/13/2023    NRBC 0.0 01/13/2023     • Drug-drug interactions  o Completed medication reconciliation today to assess for drug interactions. Patient denies starting or stopping any medications.    o Assessed medication list for interactions, Capecitabine, trazodone, and escitalopram can prolong the QTc interval but he isn't having any symptoms indicating this and has been stable on these medications for awhile.  No changes are needed.  o Advised patient to call the clinic if any new medications are started so we can assess for drug-drug interactions.  • Drug-food interactions discussed: None  • Vaccines are coordinated by the patient's oncologist and primary care provider.    Allergies  Known allergies and reactions were discussed with the patient. The patient's chart has been reviewed for allergy information and updated as necessary.   No Known Allergies     Hospitalizations and Urgent Care Visits  Since Last Assessment:  • Unplanned hospitalizations or inpatient admissions: 0  • ED Visits: 0  • Urgent Office Visits: 0    Adherence Assessment:  Adherence Questions  Medication(s) assessed: Capecitabine  On average, how many doses/injections does the patient miss per month?: 0 (Takes twice daily with food around 7-7:30 am and then with dinner time.)  What are the identified reasons for non-adherence or missed doses? : no problems identfied  What is the estimated medication adherence level?: %  Based on the patient/caregiver response and refill history, does this patient require an MTP to track adherence improvements?: no    Quality of Life Assessment:  Quality of Life Assessment  Quality of Life Improvement Scale: Somewhat better  -- Quality of Life: 7/10    Financial Assessment:  Medication availability/affordability: Patient has had no issues obtaining medication from pharmacy.      All questions addressed and patient had no additional concerns. Patient has pharmacy contact information.    Stephanie Gloria, PharmD, [facility]  Oncology Clinical Pharmacist   Phone: [phone]    2/17/2023  10:38 EST

## 2023-03-07 ENCOUNTER — TELEPHONE (OUTPATIENT)
Dept: INTERNAL MEDICINE | Facility: CLINIC | Age: 73
End: 2023-03-07
Payer: MEDICARE

## 2023-03-07 NOTE — TELEPHONE ENCOUNTER
Caller: Gordon Avila    Relationship: Self    Best call back number: [phone]    What is the best time to reach you: ANY    Who are you requesting to speak with (clinical staff, provider,  specific staff member): DR HOLLINS     What was the call regarding: HAS KNOT UNDER RT EYE, PELLETIER     Do you require a callback: YES

## 2023-03-07 NOTE — TELEPHONE ENCOUNTER
"Attempted to reach patient, \"voicemailbox has not been set up\".  Needs to be seen.  HUB MAY RELAY.  "

## 2023-03-09 ENCOUNTER — SPECIALTY PHARMACY (OUTPATIENT)
Dept: ONCOLOGY | Facility: HOSPITAL | Age: 73
End: 2023-03-09
Payer: MEDICARE

## 2023-03-09 NOTE — PROGRESS NOTES
Specialty Pharmacy Refill Coordination Note     Gordon is a 72 y.o. male contacted today regarding refills of capecitabine (XELODA) 500 MG chemo tablet Take 3 tablets by mouth 2 (Two) Times a Day. On for 7 days, then off for 7 days, then on for 7 days, then off for 7 days in a 28-day cycle specialty medication(s).    Specialty medication(s) and dose(s) confirmed: yes    Refill Questions    Flowsheet Row Most Recent Value   Changes to allergies? No   Changes to medications? No   New conditions since last clinic visit No   Unplanned office visit, urgent care, ED, or hospital admission in the last 4 weeks  No   How does patient/caregiver feel medication is working? Very good   Financial problems or insurance changes  No   Since the previous refill, were any specialty medication doses or scheduled injections missed or delayed?  No   Does this patient require a clinical escalation to a pharmacist? No          Delivery Questions    Flowsheet Row Most Recent Value   Delivery method [employer]   Delivery address correct? Yes   Delivery phone number [phone]   Preferred delivery time? Anytime   Number of medications in delivery 1   Medication being filled and delivered Capecitabine   Doses left of specialty medications Patient is on off week   Is there any medication that is due not being filled? No   Supplies needed? No supplies needed   Cooler needed? No   Do any medications need mixed or dated? No   Additional comments No copay   Questions or concerns for the pharmacist? No   Explain any questions or concerns for the pharmacist n/a            Medication Adherence    Adherence tools used: patient uses a pill box to manage medications          Follow-up: 28 day(s)     Melo Peters, Pharmacy Technician  Specialty Pharmacy Technician

## 2023-03-13 ENCOUNTER — SPECIALTY PHARMACY (OUTPATIENT)
Dept: ONCOLOGY | Facility: HOSPITAL | Age: 73
End: 2023-03-13
Payer: MEDICARE

## 2023-03-13 DIAGNOSIS — C18.7 MALIGNANT NEOPLASM OF SIGMOID COLON: ICD-10-CM

## 2023-03-13 RX ORDER — CAPECITABINE 500 MG/1
1500 TABLET, FILM COATED ORAL 2 TIMES DAILY
Qty: 84 TABLET | Refills: 11 | Status: SHIPPED | OUTPATIENT
Start: 2023-03-13

## 2023-03-13 RX ORDER — CAPECITABINE 500 MG/1
1500 TABLET, FILM COATED ORAL 2 TIMES DAILY
Qty: 84 TABLET | Refills: 11 | Status: SHIPPED | OUTPATIENT
Start: 2023-03-13 | End: 2023-03-13 | Stop reason: SDUPTHER

## 2023-03-13 NOTE — PROGRESS NOTES
Re: Refills of Oral Specialty Medication - Xeloda (capecitabine)    • Drug-Drug Interactions: The current medication list was reviewed and there are no relevant drug-drug interactions.  • Medication Allergies: The patient has NKDA  • Review of Labs/Dose Adjustments: NO DOSE CHANGE - I reviewed the most recent note and labs and the patient will continue without any dose changes.  I sent refills as described below.    Drug: Xeloda (capecitabine)  Strength: 500 mg  Directions: Take 4 tablets by mouth twice a day ON 7 days, OFF 7 days and repeat for a 28-day cycle  Quantity: 84  Refills: 11  Pharmacy prescription sent to: [id number] (d/t shortage of capecitabine at [facility])    Stephanie Gloria, PharmD, [facility]  Oncology Clinical Pharmacist   Phone: [phone]    3/13/2023  17:28 EDT

## 2023-03-24 DIAGNOSIS — R42 DIZZINESS: ICD-10-CM

## 2023-03-24 DIAGNOSIS — C18.7 MALIGNANT NEOPLASM OF SIGMOID COLON: ICD-10-CM

## 2023-03-24 DIAGNOSIS — R55 SYNCOPE, UNSPECIFIED SYNCOPE TYPE: ICD-10-CM

## 2023-03-24 DIAGNOSIS — T45.1X5A PERIPHERAL NEUROPATHY DUE TO CHEMOTHERAPY: ICD-10-CM

## 2023-03-24 DIAGNOSIS — G62.0 PERIPHERAL NEUROPATHY DUE TO CHEMOTHERAPY: ICD-10-CM

## 2023-03-27 RX ORDER — GABAPENTIN 300 MG/1
CAPSULE ORAL
Qty: 90 CAPSULE | Refills: 0 | Status: SHIPPED | OUTPATIENT
Start: 2023-03-27

## 2023-03-31 DIAGNOSIS — C18.7 MALIGNANT NEOPLASM OF SIGMOID COLON: Primary | ICD-10-CM

## 2023-04-03 ENCOUNTER — TELEPHONE (OUTPATIENT)
Dept: SURGERY | Facility: CLINIC | Age: 73
End: 2023-04-03
Payer: MEDICARE

## 2023-04-03 ENCOUNTER — OFFICE VISIT (OUTPATIENT)
Dept: INTERNAL MEDICINE | Facility: CLINIC | Age: 73
End: 2023-04-03
Payer: MEDICARE

## 2023-04-03 VITALS
TEMPERATURE: 97.3 F | SYSTOLIC BLOOD PRESSURE: 128 MMHG | WEIGHT: 150 LBS | BODY MASS INDEX: 19.88 KG/M2 | DIASTOLIC BLOOD PRESSURE: 72 MMHG | HEART RATE: 55 BPM | HEIGHT: 73 IN | OXYGEN SATURATION: 96 %

## 2023-04-03 DIAGNOSIS — C80.1 NEUROPATHY ASSOCIATED WITH CANCER: ICD-10-CM

## 2023-04-03 DIAGNOSIS — E78.2 MIXED HYPERLIPIDEMIA: ICD-10-CM

## 2023-04-03 DIAGNOSIS — I10 HTN (HYPERTENSION), BENIGN: ICD-10-CM

## 2023-04-03 DIAGNOSIS — G63 NEUROPATHY ASSOCIATED WITH CANCER: ICD-10-CM

## 2023-04-03 DIAGNOSIS — C18.7 MALIGNANT NEOPLASM OF SIGMOID COLON: Primary | ICD-10-CM

## 2023-04-03 LAB
CHOLEST SERPL-MCNC: 207 MG/DL (ref 0–200)
HDLC SERPL-MCNC: 49 MG/DL (ref 40–60)
LDLC SERPL CALC-MCNC: 144 MG/DL (ref 0–100)
TRIGL SERPL-MCNC: 77 MG/DL (ref 0–150)
VLDLC SERPL CALC-MCNC: 14 MG/DL (ref 5–40)

## 2023-04-03 PROCEDURE — 99213 OFFICE O/P EST LOW 20 MIN: CPT | Performed by: INTERNAL MEDICINE

## 2023-04-03 PROCEDURE — 3074F SYST BP LT 130 MM HG: CPT | Performed by: INTERNAL MEDICINE

## 2023-04-03 PROCEDURE — 3078F DIAST BP <80 MM HG: CPT | Performed by: INTERNAL MEDICINE

## 2023-04-03 NOTE — PROGRESS NOTES
"Subjective  Gordon Avila is a 72 y.o. male    HPI coming in for follow-up patient with a history of sigmoid colon malignancy with metastatic disease is undergoing chemotherapy.  Has no new complaints has associated neuropathy from his malignancy and treatment denies alcohol use or tobacco use.    The following portions of the patient's history were reviewed and updated as appropriate: allergies, current medications, past family history, past medical history, past social history, past surgical history, and problem list.     Review of Systems   Constitutional: Negative.  Negative for activity change, appetite change, fatigue and fever.   HENT: Negative for congestion, ear discharge, ear pain and trouble swallowing.    Eyes: Negative for photophobia and visual disturbance.   Respiratory: Negative for cough and shortness of breath.    Cardiovascular: Negative for chest pain and palpitations.   Gastrointestinal: Negative for abdominal distention, abdominal pain, constipation, diarrhea, nausea and vomiting.   Endocrine: Negative.    Genitourinary: Negative for dysuria, hematuria and urgency.   Musculoskeletal: Positive for arthralgias. Negative for back pain, joint swelling and myalgias.   Skin: Negative for color change and rash.   Allergic/Immunologic: Negative.    Neurological: Positive for numbness. Negative for dizziness, weakness, light-headedness and headaches.   Hematological: Negative for adenopathy. Does not bruise/bleed easily.   Psychiatric/Behavioral: Positive for sleep disturbance. Negative for agitation, confusion and dysphoric mood. The patient is not nervous/anxious.        Visit Vitals  /72   Pulse 55   Temp 97.3 °F (36.3 °C)   Ht 185.4 cm (73\")   Wt 68 kg (150 lb)   SpO2 96%   BMI 19.79 kg/m²       Objective  Physical Exam  Constitutional:       General: He is not in acute distress.     Appearance: He is well-developed.   HENT:      Nose: Nose normal.   Eyes:      General: No scleral " icterus.     Conjunctiva/sclera: Conjunctivae normal.   Neck:      Thyroid: No thyromegaly.      Trachea: No tracheal deviation.   Cardiovascular:      Rate and Rhythm: Normal rate and regular rhythm.      Heart sounds: No murmur heard.    No friction rub.   Pulmonary:      Effort: No respiratory distress.      Breath sounds: No wheezing or rales.   Abdominal:      General: There is no distension.      Palpations: Abdomen is soft. There is no mass.      Tenderness: There is no abdominal tenderness. There is no guarding.   Musculoskeletal:         General: Deformity present. Normal range of motion.   Lymphadenopathy:      Cervical: No cervical adenopathy.   Skin:     General: Skin is warm and dry.      Findings: No erythema or rash.   Neurological:      Mental Status: He is alert and oriented to person, place, and time.      Cranial Nerves: No cranial nerve deficit.      Coordination: Coordination normal.      Deep Tendon Reflexes: Reflexes are normal and symmetric.   Psychiatric:         Behavior: Behavior normal.         Thought Content: Thought content normal.         Judgment: Judgment normal.         Diagnoses and all orders for this visit:    Malignant neoplasm of sigmoid colon remains stable continue to follow-up with oncology.  Call placed to oncology team will go ahead and set him up for a screening colonoscopy    HTN (hypertension), benign stable with current meds and low-salt diet discussed exercise program and dietary restrictions stable on gabapentin    Neuropathy associated with cancer

## 2023-04-04 RX ORDER — POLYETHYLENE GLYCOL 3350 17 G/17G
POWDER, FOR SOLUTION ORAL
Qty: 238 G | Refills: 0 | Status: SHIPPED | OUTPATIENT
Start: 2023-04-04

## 2023-04-04 RX ORDER — BISACODYL 5 MG/1
TABLET, DELAYED RELEASE ORAL
Qty: 4 TABLET | Refills: 0 | Status: SHIPPED | OUTPATIENT
Start: 2023-04-04

## 2023-04-04 NOTE — TELEPHONE ENCOUNTER
PRESCREENING FOR OPEN ACCESS SCHEDULING    Gordon Avila, 1950  [id number]    04/04/23    If, the patient answers yes to any of the following questions the provider will be informed prior to scheduling open access for approval and documented in the chart.    [x]  Yes  [] No    1. Have you ever had a colonoscopy in the past?      When:  10 yrs      Where:       Polyps or other:     []  Yes  [x] No    2. Family history of colon cancer?      Relation:       Age of onset:       Do you currently have any of the following?    []  Yes  [x] No  Rectal bleeding, if so, how long?     []  Yes  [x] No  Abdominal pain, if so, how long?    []  Yes  [x] No  Constipation, if so, how long?    []  Yes  [x] No  Diarrhea, if so, how long?    []  Yes  [x] No  Weight loss, is so, how much?    [] Yes  [x] No  Small caliber stool, if so, how long?      Have you ever had any of the following conditions?    [] Yes  [x] No  Heart attack?      When?       Last cardiac workup?     Blood thinners?    [] Yes  [x] No   Lung problems, asthma or COPD?  [] Yes  [x] No  Oxygen required?       [] Yes  [x] No  Stroke?     [] Yes  [x] No  Have you ever had a reaction to anesthesia?

## 2023-04-04 NOTE — TELEPHONE ENCOUNTER
Pt is scheduled at [facility] for colonoscopy on 5/26/23, instructions mailed, prep sent in.

## 2023-04-10 ENCOUNTER — PREP FOR SURGERY (OUTPATIENT)
Dept: OTHER | Facility: HOSPITAL | Age: 73
End: 2023-04-10
Payer: MEDICARE

## 2023-04-10 DIAGNOSIS — Z86.010 HISTORY OF COLON POLYPS: Primary | ICD-10-CM

## 2023-04-13 PROBLEM — Z86.010 HISTORY OF COLON POLYPS: Status: ACTIVE | Noted: 2023-04-13

## 2023-04-13 PROBLEM — Z86.0100 HISTORY OF COLON POLYPS: Status: ACTIVE | Noted: 2023-04-13

## 2023-04-14 ENCOUNTER — HOSPITAL ENCOUNTER (OUTPATIENT)
Dept: CT IMAGING | Facility: HOSPITAL | Age: 73
Discharge: HOME | End: 2023-04-14
Payer: MEDICARE

## 2023-04-14 DIAGNOSIS — C18.7 MALIGNANT NEOPLASM OF SIGMOID COLON: ICD-10-CM

## 2023-04-14 PROCEDURE — 25510000001 IOPAMIDOL 61 % SOLUTION: Performed by: INTERNAL MEDICINE

## 2023-04-14 PROCEDURE — 74177 CT ABD & PELVIS W/CONTRAST: CPT

## 2023-04-14 PROCEDURE — 71260 CT THORAX DX C+: CPT

## 2023-04-14 RX ADMIN — IOPAMIDOL 100 ML: 612 INJECTION, SOLUTION INTRAVENOUS at 08:38

## 2023-04-21 ENCOUNTER — OFFICE VISIT (OUTPATIENT)
Dept: ONCOLOGY | Facility: CLINIC | Age: 73
End: 2023-04-21
Payer: MEDICARE

## 2023-04-21 ENCOUNTER — SPECIALTY PHARMACY (OUTPATIENT)
Dept: ONCOLOGY | Facility: HOSPITAL | Age: 73
End: 2023-04-21
Payer: MEDICARE

## 2023-04-21 ENCOUNTER — INFUSION (OUTPATIENT)
Dept: ONCOLOGY | Facility: HOSPITAL | Age: 73
End: 2023-04-21
Payer: MEDICARE

## 2023-04-21 VITALS
HEIGHT: 73 IN | WEIGHT: 156.9 LBS | SYSTOLIC BLOOD PRESSURE: 125 MMHG | HEART RATE: 103 BPM | TEMPERATURE: 98.4 F | DIASTOLIC BLOOD PRESSURE: 65 MMHG | BODY MASS INDEX: 20.79 KG/M2 | OXYGEN SATURATION: 100 %

## 2023-04-21 DIAGNOSIS — C18.7 MALIGNANT NEOPLASM OF SIGMOID COLON: Primary | ICD-10-CM

## 2023-04-21 DIAGNOSIS — C78.7 SECONDARY MALIGNANT NEOPLASM OF LIVER AND INTRAHEPATIC BILE DUCT: ICD-10-CM

## 2023-04-21 LAB
ALBUMIN SERPL-MCNC: 4.1 G/DL (ref 3.5–5.2)
ALBUMIN/GLOB SERPL: 1.8 G/DL
ALP SERPL-CCNC: 64 U/L (ref 39–117)
ALT SERPL W P-5'-P-CCNC: 12 U/L (ref 1–41)
ANION GAP SERPL CALCULATED.3IONS-SCNC: 7.1 MMOL/L (ref 5–15)
AST SERPL-CCNC: 16 U/L (ref 1–40)
BASOPHILS # BLD AUTO: 0.02 10*3/MM3 (ref 0–0.2)
BASOPHILS NFR BLD AUTO: 0.5 % (ref 0–1.5)
BILIRUB SERPL-MCNC: 0.6 MG/DL (ref 0–1.2)
BUN SERPL-MCNC: 13 MG/DL (ref 8–23)
BUN/CREAT SERPL: 13 (ref 7–25)
CALCIUM SPEC-SCNC: 8.8 MG/DL (ref 8.6–10.5)
CEA SERPL-MCNC: 3.09 NG/ML
CHLORIDE SERPL-SCNC: 103 MMOL/L (ref 98–107)
CO2 SERPL-SCNC: 25.9 MMOL/L (ref 22–29)
CREAT SERPL-MCNC: 1 MG/DL (ref 0.76–1.27)
DEPRECATED RDW RBC AUTO: 56.9 FL (ref 37–54)
EGFRCR SERPLBLD CKD-EPI 2021: 80.5 ML/MIN/1.73
EOSINOPHIL # BLD AUTO: 0.04 10*3/MM3 (ref 0–0.4)
EOSINOPHIL NFR BLD AUTO: 1 % (ref 0.3–6.2)
ERYTHROCYTE [DISTWIDTH] IN BLOOD BY AUTOMATED COUNT: 14.9 % (ref 12.3–15.4)
GLOBULIN UR ELPH-MCNC: 2.3 GM/DL
GLUCOSE SERPL-MCNC: 98 MG/DL (ref 65–99)
HCT VFR BLD AUTO: 33.7 % (ref 37.5–51)
HGB BLD-MCNC: 11.4 G/DL (ref 13–17.7)
IMM GRANULOCYTES # BLD AUTO: 0.01 10*3/MM3 (ref 0–0.05)
IMM GRANULOCYTES NFR BLD AUTO: 0.2 % (ref 0–0.5)
LYMPHOCYTES # BLD AUTO: 0.87 10*3/MM3 (ref 0.7–3.1)
LYMPHOCYTES NFR BLD AUTO: 21.2 % (ref 19.6–45.3)
MCH RBC QN AUTO: 35 PG (ref 26.6–33)
MCHC RBC AUTO-ENTMCNC: 33.8 G/DL (ref 31.5–35.7)
MCV RBC AUTO: 103.4 FL (ref 79–97)
MONOCYTES # BLD AUTO: 0.44 10*3/MM3 (ref 0.1–0.9)
MONOCYTES NFR BLD AUTO: 10.7 % (ref 5–12)
NEUTROPHILS NFR BLD AUTO: 2.73 10*3/MM3 (ref 1.7–7)
NEUTROPHILS NFR BLD AUTO: 66.4 % (ref 42.7–76)
NRBC BLD AUTO-RTO: 0 /100 WBC (ref 0–0.2)
PLATELET # BLD AUTO: 142 10*3/MM3 (ref 140–450)
PMV BLD AUTO: 10.5 FL (ref 6–12)
POTASSIUM SERPL-SCNC: 4.8 MMOL/L (ref 3.5–5.2)
PROT SERPL-MCNC: 6.4 G/DL (ref 6–8.5)
RBC # BLD AUTO: 3.26 10*6/MM3 (ref 4.14–5.8)
SODIUM SERPL-SCNC: 136 MMOL/L (ref 136–145)
WBC NRBC COR # BLD: 4.11 10*3/MM3 (ref 3.4–10.8)

## 2023-04-21 PROCEDURE — 80053 COMPREHEN METABOLIC PANEL: CPT | Performed by: NURSE PRACTITIONER

## 2023-04-21 PROCEDURE — 1126F AMNT PAIN NOTED NONE PRSNT: CPT | Performed by: NURSE PRACTITIONER

## 2023-04-21 PROCEDURE — 3074F SYST BP LT 130 MM HG: CPT | Performed by: NURSE PRACTITIONER

## 2023-04-21 PROCEDURE — 1160F RVW MEDS BY RX/DR IN RCRD: CPT | Performed by: NURSE PRACTITIONER

## 2023-04-21 PROCEDURE — 36591 DRAW BLOOD OFF VENOUS DEVICE: CPT

## 2023-04-21 PROCEDURE — 25010000002 HEPARIN LOCK FLUSH PER 10 UNITS: Performed by: NURSE PRACTITIONER

## 2023-04-21 PROCEDURE — 82378 CARCINOEMBRYONIC ANTIGEN: CPT | Performed by: NURSE PRACTITIONER

## 2023-04-21 PROCEDURE — 1159F MED LIST DOCD IN RCRD: CPT | Performed by: NURSE PRACTITIONER

## 2023-04-21 PROCEDURE — 3078F DIAST BP <80 MM HG: CPT | Performed by: NURSE PRACTITIONER

## 2023-04-21 PROCEDURE — 85025 COMPLETE CBC W/AUTO DIFF WBC: CPT | Performed by: NURSE PRACTITIONER

## 2023-04-21 PROCEDURE — 99214 OFFICE O/P EST MOD 30 MIN: CPT | Performed by: NURSE PRACTITIONER

## 2023-04-21 RX ORDER — SODIUM CHLORIDE 0.9 % (FLUSH) 0.9 %
10 SYRINGE (ML) INJECTION AS NEEDED
Status: DISCONTINUED | OUTPATIENT
Start: 2023-04-21 | End: 2023-04-21 | Stop reason: HOSPADM

## 2023-04-21 RX ORDER — ESCITALOPRAM OXALATE 10 MG/1
TABLET ORAL
COMMUNITY
Start: 2023-04-08 | End: 2023-04-21

## 2023-04-21 RX ORDER — HEPARIN SODIUM (PORCINE) LOCK FLUSH IV SOLN 100 UNIT/ML 100 UNIT/ML
500 SOLUTION INTRAVENOUS AS NEEDED
OUTPATIENT
Start: 2023-04-21

## 2023-04-21 RX ORDER — CAPECITABINE 500 MG/1
TABLET, FILM COATED ORAL
COMMUNITY
Start: 2023-03-09

## 2023-04-21 RX ORDER — HEPARIN SODIUM (PORCINE) LOCK FLUSH IV SOLN 100 UNIT/ML 100 UNIT/ML
500 SOLUTION INTRAVENOUS AS NEEDED
Status: DISCONTINUED | OUTPATIENT
Start: 2023-04-21 | End: 2023-04-21 | Stop reason: HOSPADM

## 2023-04-21 RX ORDER — LISINOPRIL 20 MG/1
TABLET ORAL
COMMUNITY
Start: 2023-01-08 | End: 2023-04-21

## 2023-04-21 RX ORDER — SODIUM CHLORIDE 0.9 % (FLUSH) 0.9 %
10 SYRINGE (ML) INJECTION AS NEEDED
OUTPATIENT
Start: 2023-04-21

## 2023-04-21 RX ADMIN — Medication 10 ML: at 10:22

## 2023-04-21 RX ADMIN — Medication 500 UNITS: at 10:22

## 2023-04-21 NOTE — PROGRESS NOTES
CHIEF COMPLAINT: 1.  Peripheral neuropathy of the hands and feet, worse in the feet                                       2.  Follow-up for colon cancer    Problem List:  Oncology/Hematology History Overview Note   1. Stage CARLOS, [id number] colon cancer with 2 out of 14 pericolonic lymph nodes  involved and a left lobe liver biopsy positive for metastasis, presenting with  a 25 pound weight loss and diarrhea with some perirectal soreness and had  colonoscopy with Dr. Mcclain in January that found this lesion that was  circumferential high-grade invading into the pericolonic fat, status post  sigmoid colectomy of a poorly differentiated adenocarcinoma.   a) Baseline CEA postop of 0.8 with alkaline phosphatase 111, with normal liver  enzymes and creatinine of 0.8. Baseline white count 9820 with a hemoglobin  13.8, platelets 339,000, and CT with contrast showing a right lobe liver dome  lesion as well as an anastomotic change in the bowel.   b) Began CapeOx 03/15/2016.  c) Added in Avastin to CapeOx 04/05/2016, second cycle. (This was 8 weeks out  from surgery).  d) KRAS mutation is negative.  E.) CAT scan in August 2016 shows resolution of disease in the liver and colon and a stable lung nodule.  Hence Avastin, Xeloda, and oxaliplatin continued.  F) oxaliplatin discontinued October 2016 due to worsening peripheral neuropathy.  G.) CTs of February 2017 showed no evidence of metastasis and stable bibasilar nodular scar.  Persistent neuropathy not worsening.  CEA 1.4.  Continuing Avastin and Xeloda without oxaliplatin.  Having some problems with irritation of his eyes on Xeloda.  2.  Peripheral neuropathy, chemotherapy induced     Malignant neoplasm of sigmoid colon   5/20/2016 Initial Diagnosis    Malignant neoplasm of sigmoid colon     5/2/2017 Imaging    CT chest, abdomen, pelvis IMPRESSION:  1. No disease recurrence.  2. Minimal areas of nodular thickening in the right lower lobe, stable.     8/2/2017 Imaging    CT  chest/abdomen/pelvis IMPRESSION:  Stable examination with no evidence of acute intrathoracic,  intra-abdominal or pelvic abnormality. There is no evidence of  progression of disease. No metastatic disease.      11/13/2017 Imaging    CT chest/abdomen/pelvis IMPRESSION:  Stable examination without evidence of acute intrathoracic  intra-abdominal or intrapelvic abnormality. No evidence of progression  of disease.   CEA 1.3.     2/6/2020 -  Chemotherapy    OP COLON Capecitabine 1,250 mg/m2 BID (8 cycles)         HISTORY OF PRESENT ILLNESS:  The patient is a 72 y.o. male, here for follow up on management of Stage IV a colon cancer.  He is currently on Xeloda alone.  He diarrhea has resolved.  He stopped his blood pressure medicine due to a low blood pressure.  He said he went about 2 days with his blood pressure being 70/50 and stopped his blood pressure medicine and his sleeping medication and this has improved his blood pressure significant currently.  He says it is back to normal.  He says he is feeling great.   He is on 1500 mg 7 days off and 7 days on.    Past Medical History:   Diagnosis Date   • H/O exercise stress test     Patient states it was normal   • Hypertension    • Liver disease     mets from colon cancer   • Malignant neoplasm of colon    • Seizure 2019   • Wears dentures     bridge   • Wears glasses      Past Surgical History:   Procedure Laterality Date   • COLON SURGERY      Sigmoid   • COLONOSCOPY N/A 10/26/2020    Procedure: COLONOSCOPY;  Surgeon: Rodrigo Lambert MD;  Location: [facility] ENDOSCOPY;  Service: Gastroenterology;  Laterality: N/A;   • LIVER SURGERY     • PORTACATH PLACEMENT         No Known Allergies    Family History and Social History reviewed and changed as necessary      REVIEW OF SYSTEM:     Review of Systems   Constitutional: Positive for fatigue.   Musculoskeletal: Positive for arthralgias.   All other systems reviewed and are negative.         PHYSICAL EXAM    There were no vitals  filed for this visit.  ECOG: (1) Restricted in physically strenuous activity, ambulatory and able to do work of light nature  General: well appearing male in no acute distress  Neuro: alert and oriented  HEENT: sclera anicteric, oropharynx clear  Lymphatics: no cervical, supraclavicular, or axillary adenopathy  Cardiovascular: regular rate and rhythm, no murmurs  Lungs: clear to auscultation bilaterally  Abdomen: soft, nontender, nondistended.  No palpable organomegaly  Extremeties: no lower extremity edema  Skin: no rashes, lesions, bruising, or petechiae  Psych: mood and affect appropriate    Vitals reviewed.  Laboratory data reviewed along with CT chest abdomen and pelvis and images thereof as outlined above in the oncology history were reviewed at time of visit.    Lab Results   Component Value Date    WBC 5.47 01/13/2023    HGB 12.4 (L) 01/13/2023    HCT 36.2 (L) 01/13/2023    .8 (H) 01/13/2023     01/13/2023       Lab Results   Component Value Date    GLUCOSE 95 01/13/2023    BUN 14 01/13/2023    CREATININE 0.94 01/13/2023    EGFRIFNONA 89 01/21/2022    EGFRIFAFRI 81 08/04/2020    BCR 14.9 01/13/2023    K 4.2 01/13/2023    CO2 26.4 01/13/2023    CALCIUM 9.4 01/13/2023    PROTENTOTREF 6.9 08/04/2020    ALBUMIN 4.3 01/13/2023    LABIL2 2.6 08/04/2020    AST 18 01/13/2023    ALT 11 01/13/2023       Lab Results   Component Value Date    CEA 2.90 01/13/2023    CEA 2.37 09/30/2022    CEA 1.67 07/15/2022    CEA 1.33 04/15/2022     CT Chest Without Contrast Diagnostic    Result Date: 7/13/2021  Interval resolution of the nodular opacity in the right lower lobe which was likely infectious or inflammatory in nature.  318.12 mGy.cm   This study was performed with techniques to keep radiation doses as low as reasonably achievable (ALARA). Individualized dose reduction techniques using automated exposure control or adjustment of mA and/or kV according to the patient size were employed.  This report was  finalized on 7/13/2021 3:44 PM by David Ingram M.D..    CT Chest With Contrast Diagnostic    Result Date: 10/27/2021  No evidence of metastatic disease involving the chest, abdomen or pelvis.  This study was performed with techniques to keep radiation doses as low as reasonably achievable (ALARA). Individualized dose reduction techniques using automated exposure control or adjustment of mA and/or kV according to the patient size were employed.  This report was finalized on 10/27/2021 9:11 AM by David Ingram M.D..    CT Abdomen Pelvis With Contrast    Result Date: 10/27/2021  No evidence of metastatic disease involving the chest, abdomen or pelvis.  This study was performed with techniques to keep radiation doses as low as reasonably achievable (ALARA). Individualized dose reduction techniques using automated exposure control or adjustment of mA and/or kV according to the patient size were employed.  This report was finalized on 10/27/2021 9:11 AM by David Ingram M.D..            Assessment/Plan     1. 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  We discussed CT scan from 4/14/2023 showed no significant change.  There is no evidence of metastatic disease in his chest.  No convincing metastatic or recurrent disease in the abdomen or pelvis.  He does have some wall thickening of the proximal colon that could be related to mild colitis.   We will plan to repeat imaging in 4 to 6 months.  He is scheduled for colonoscopy 5/26/2023.    2. Peripheral neuropathy secondary to chemotherapy.  Stable.  We will continue gabapentin 1 capsule by mouth 3 times a day.    3. Port. We will continue port care every 2 months with clinic appointments. We will check CBC, CMP, CEA with each port flush.     4.  Dental abscess.  Resolved.             Adeola Sunshine, APRN    04/21/23

## 2023-04-24 DIAGNOSIS — R71.8 ELEVATED MCV: Primary | ICD-10-CM

## 2023-05-08 DIAGNOSIS — T45.1X5A PERIPHERAL NEUROPATHY DUE TO CHEMOTHERAPY: ICD-10-CM

## 2023-05-08 DIAGNOSIS — R42 DIZZINESS: ICD-10-CM

## 2023-05-08 DIAGNOSIS — C18.7 MALIGNANT NEOPLASM OF SIGMOID COLON: ICD-10-CM

## 2023-05-08 DIAGNOSIS — G62.0 PERIPHERAL NEUROPATHY DUE TO CHEMOTHERAPY: ICD-10-CM

## 2023-05-08 DIAGNOSIS — R55 SYNCOPE, UNSPECIFIED SYNCOPE TYPE: ICD-10-CM

## 2023-05-09 RX ORDER — GABAPENTIN 300 MG/1
CAPSULE ORAL
Qty: 90 CAPSULE | Refills: 0 | Status: SHIPPED | OUTPATIENT
Start: 2023-05-09

## 2023-05-23 ENCOUNTER — TELEPHONE (OUTPATIENT)
Dept: SURGERY | Facility: CLINIC | Age: 73
End: 2023-05-23
Payer: MEDICARE

## 2023-05-23 NOTE — TELEPHONE ENCOUNTER
CONFIRMED SURGERY FOR 05/26/2023/ NO F/U FOR ENDO. PT IS ASKING FOR A CALL BACK, THAT HE DIDN'T RECEIVE PREP INSTRUCTIONS.

## 2023-05-24 NOTE — PRE-PROCEDURE INSTRUCTIONS
PAT phone history completed with pt for upcoming procedure on 5/26/23.  Pt instructed to contact Dr. Lambert bowel prep instructions.    PAT PASS GIVEN/REVIEWED WITH PT.  VERBALIZED UNDERSTANDING OF THE FOLLOWING:  DO NOT EAT, DRINK, SMOKE, USE SMOKELESS TOBACCO OR CHEW GUM AFTER MIDNIGHT THE NIGHT BEFORE SURGERY.  THIS ALSO INCLUDES HARD CANDIES AND MINTS.    DO NOT SHAVE THE AREA TO BE OPERATED ON AT LEAST 48 HOURS PRIOR TO THE PROCEDURE.  DO NOT WEAR MAKE UP OR NAIL POLISH.  DO NOT LEAVE IN ANY PIERCING OR WEAR JEWELRY THE DAY OF SURGERY.      DO NOT USE ADHESIVES IF YOU WEAR DENTURES.    DO NOT WEAR EYE CONTACTS; BRING IN YOUR GLASSES.    ONLY TAKE MEDICATION THE MORNING OF YOUR PROCEDURE IF INSTRUCTED BY YOUR SURGEON WITH ENOUGH WATER TO SWALLOW THE MEDICATION.  IF YOUR SURGEON DID NOT SPECIFY WHICH MEDICATIONS TO TAKE, YOU WILL NEED TO CALL THEIR OFFICE FOR FURTHER INSTRUCTIONS AND DO AS THEY INSTRUCT.    LEAVE ANYTHING YOU CONSIDER VALUABLE AT HOME.    YOU WILL NEED TO ARRANGE FOR SOMEONE TO DRIVE YOU HOME AFTER SURGERY.  IT IS RECOMMENDED THAT YOU DO NOT DRIVE, WORK, DRINK ALCOHOL OR MAKE MAJOR DECISIONS FOR AT LEAST 24 HOURS AFTER YOUR PROCEDURE IS COMPLETE.      THE DAY OF YOUR PROCEDURE, BRING IN THE FOLLOWING IF APPLICABLE:   PICTURE ID AND INSURANCE/MEDICARE OR MEDICAID CARDS/ANY CO-PAY THAT MAY BE DUE   COPY OF ADVANCED DIRECTIVE/LIVING WILL/POWER OR    CPAP/BIPAP/INHALERS   SKIN PREP SHEET   YOUR PREADMISSION TESTING PASS (IF NOT A PHONE HISTORY)

## 2023-05-25 RX ORDER — POLYETHYLENE GLYCOL 3350 17 G/17G
POWDER, FOR SOLUTION ORAL
Qty: 238 G | Refills: 0 | Status: SHIPPED | OUTPATIENT
Start: 2023-05-25

## 2023-05-25 RX ORDER — BISACODYL 5 MG/1
TABLET, DELAYED RELEASE ORAL
Qty: 4 TABLET | Refills: 0 | Status: SHIPPED | OUTPATIENT
Start: 2023-05-25

## 2023-05-26 ENCOUNTER — HOSPITAL ENCOUNTER (OUTPATIENT)
Facility: HOSPITAL | Age: 73
Setting detail: HOSPITAL OUTPATIENT SURGERY
Discharge: HOME | End: 2023-05-26
Attending: SURGERY | Admitting: SURGERY
Payer: MEDICARE

## 2023-05-26 ENCOUNTER — ANESTHESIA (OUTPATIENT)
Dept: GASTROENTEROLOGY | Facility: HOSPITAL | Age: 73
End: 2023-05-26
Payer: MEDICARE

## 2023-05-26 ENCOUNTER — ANESTHESIA EVENT (OUTPATIENT)
Dept: GASTROENTEROLOGY | Facility: HOSPITAL | Age: 73
End: 2023-05-26
Payer: MEDICARE

## 2023-05-26 VITALS
TEMPERATURE: 97.6 F | SYSTOLIC BLOOD PRESSURE: 131 MMHG | WEIGHT: 155 LBS | DIASTOLIC BLOOD PRESSURE: 81 MMHG | OXYGEN SATURATION: 98 % | BODY MASS INDEX: 20.54 KG/M2 | RESPIRATION RATE: 16 BRPM | HEIGHT: 73 IN | HEART RATE: 62 BPM

## 2023-05-26 PROCEDURE — 25010000002 PROPOFOL 10 MG/ML EMULSION: Performed by: NURSE ANESTHETIST, CERTIFIED REGISTERED

## 2023-05-26 PROCEDURE — S0260 H&P FOR SURGERY: HCPCS | Performed by: SURGERY

## 2023-05-26 PROCEDURE — G0105 COLORECTAL SCRN; HI RISK IND: HCPCS | Performed by: SURGERY

## 2023-05-26 RX ORDER — PROPOFOL 10 MG/ML
VIAL (ML) INTRAVENOUS AS NEEDED
Status: DISCONTINUED | OUTPATIENT
Start: 2023-05-26 | End: 2023-05-26 | Stop reason: SURG

## 2023-05-26 RX ORDER — LIDOCAINE HYDROCHLORIDE 20 MG/ML
INJECTION, SOLUTION INTRAVENOUS AS NEEDED
Status: DISCONTINUED | OUTPATIENT
Start: 2023-05-26 | End: 2023-05-26 | Stop reason: SURG

## 2023-05-26 RX ORDER — ONDANSETRON 2 MG/ML
4 INJECTION INTRAMUSCULAR; INTRAVENOUS ONCE AS NEEDED
Status: DISCONTINUED | OUTPATIENT
Start: 2023-05-26 | End: 2023-05-26 | Stop reason: HOSPADM

## 2023-05-26 RX ORDER — SODIUM CHLORIDE, SODIUM LACTATE, POTASSIUM CHLORIDE, CALCIUM CHLORIDE 600; 310; 30; 20 MG/100ML; MG/100ML; MG/100ML; MG/100ML
1000 INJECTION, SOLUTION INTRAVENOUS CONTINUOUS
Status: DISCONTINUED | OUTPATIENT
Start: 2023-05-26 | End: 2023-05-26 | Stop reason: HOSPADM

## 2023-05-26 RX ADMIN — SODIUM CHLORIDE, POTASSIUM CHLORIDE, SODIUM LACTATE AND CALCIUM CHLORIDE 1000 ML: 600; 310; 30; 20 INJECTION, SOLUTION INTRAVENOUS at 06:38

## 2023-05-26 RX ADMIN — GLYCOPYRROLATE 0.2 MCG: 0.2 INJECTION, SOLUTION INTRAMUSCULAR; INTRAVITREAL at 07:30

## 2023-05-26 RX ADMIN — PROPOFOL 200 MG: 10 INJECTION, EMULSION INTRAVENOUS at 07:30

## 2023-05-26 RX ADMIN — LIDOCAINE HYDROCHLORIDE 100 MG: 20 INJECTION, SOLUTION INTRAVENOUS at 07:30

## 2023-05-26 NOTE — ANESTHESIA PREPROCEDURE EVALUATION
Anesthesia Evaluation     Patient summary reviewed and Nursing notes reviewed   no history of anesthetic complications:  NPO Solid Status: > 8 hours  NPO Liquid Status: > 8 hours           Airway   Mallampati: II  TM distance: >3 FB  Neck ROM: full  no difficulty expected and Possible difficult intubation  Dental - normal exam     Pulmonary - negative pulmonary ROS and normal exam   Cardiovascular - normal exam    Rhythm: regular  Rate: normal    (+) hypertension less than 2 medications,       Neuro/Psych  (+) seizures, numbness, psychiatric history Anxiety and Depression,    GI/Hepatic/Renal/Endo    (+)   liver disease fatty liver disease,     Musculoskeletal (-) negative ROS    Abdominal    Substance History - negative use     OB/GYN negative ob/gyn ROS         Other      history of cancer    ROS/Med Hx Other: Hx colon cancer                  Anesthesia Plan    ASA 3     MAC     (Pt told that intravenous sedation will be used as the primary anesthetic along with local anesthesia if necessary. Every effort will be made to make sure the patient is comfortable.     The patient was told they may or may not have recall for the procedure. It was further explained that if the MAC was not adequate that a general anesthetic with either an LMA or endotracheal tube would be required.     Will proceed with the plan of care.)  intravenous induction     Anesthetic plan, risks, benefits, and alternatives have been provided, discussed and informed consent has been obtained with: patient.        CODE STATUS:

## 2023-05-26 NOTE — ANESTHESIA POSTPROCEDURE EVALUATION
Patient: Gordon Avila    Procedure Summary     Date: 05/26/23 Room / Location: [facility] ENDOSCOPY 3 / [facility] ENDOSCOPY    Anesthesia Start: 0726 Anesthesia Stop: 0751    Procedure: COLONOSCOPY Diagnosis:       History of colon polyps      (History of colon polyps [Z86.010])    Surgeons: Rodrigo Lambert MD Provider: Portillo Long CRNA    Anesthesia Type: MAC ASA Status: 3          Anesthesia Type: MAC    Vitals  Vitals Value Taken Time   /81 05/26/23 0822   Temp 97.6 °F (36.4 °C) 05/26/23 0752   Pulse 62 05/26/23 0822   Resp 16 05/26/23 0822   SpO2 98 % 05/26/23 0822           Post Anesthesia Care and Evaluation    Patient location during evaluation: PHASE II  Patient participation: complete - patient participated  Level of consciousness: awake  Pain score: 1  Pain management: adequate    Airway patency: patent  Anesthetic complications: No anesthetic complications  PONV Status: controlled  Cardiovascular status: acceptable and stable  Respiratory status: acceptable  Hydration status: acceptable    Comments: See Nursing record for post procedural Vital Signs as per protocol.

## 2023-05-26 NOTE — H&P
Reason for Consultation:  Screening colonoscopy / hx of colon polyps    Chief complaint :  Screening colonoscopy \  hx of colon polyps    SUBJECTIVE:    History of present illness:  I did see the patient today as a consultation for evaluation and treatment of a need for screening colonoscopy.  There are no other complaints of other than a previous history of colon polyps.  History of stage IV cancer of the colon 4 years ago.  Underwent chemotherapy, colectomy and liver resection.  Has been asymptomatic still on chemotherapy.    Review of Systems:    Review of Systems - General ROS: negative for - chills, fatigue, fever, hot flashes, malaise or night sweats  Psychological ROS: negative for - behavioral disorder, disorientation, hallucinations, hostility or mood swings  ENT ROS: negative for - nasal polyps, oral lesions, sinus pain, sneezing or sore throat  Breast ROS: negative for - galactorrhea or new or changing breast lumps  Respiratory ROS: negative for - hemoptysis, orthopnea, pleuritic pain, sputum changes or stridor  Cardiovascular ROS: negative for - dyspnea on exertion, edema, irregular heartbeat, murmur, orthopnea, palpitations or rapid heart rate  Gastrointestinal ROS: negative for - change in stools, gas/bloating, hematemesis, melena or stool incontinence.  Genito-Urinary ROS: negative for - dysuria, genital ulcers, nocturia or pelvic pain  Musculoskeletal ROS: negative for - gait disturbance or muscle pain  Neurological ROS: negative for - dizziness, gait disturbance, memory loss, numbness/tingling or seizures      Allergies:  No Known Allergies    Medications:    Current Facility-Administered Medications:   •  lactated ringers infusion 1,000 mL, 1,000 mL, Intravenous, Continuous, Rodrigo Lambert MD, Last Rate: 25 mL/hr at 05/26/23 0638, 1,000 mL at 05/26/23 0638    History:  Past Medical History:   Diagnosis Date   • Dental bridge present    • H/O exercise stress test     Patient states it was  normal   • Hypertension    • Liver disease     mets from colon cancer   • Malignant neoplasm of colon    • Seizure 2019   • Wears glasses        Past Surgical History:   Procedure Laterality Date   • COLON SURGERY  2014    Sigmoid   • COLONOSCOPY N/A 10/26/2020    Procedure: COLONOSCOPY;  Surgeon: Rodrigo Lambert MD;  Location: [facility] ENDOSCOPY;  Service: Gastroenterology;  Laterality: N/A;   • LIVER SURGERY  2014   • PORTACATH PLACEMENT      still in place on left chest       Family History   Problem Relation Age of Onset   • COPD Mother    • Heart disease Father    • COPD Other        Social History     Tobacco Use   • Smoking status: Never   • Smokeless tobacco: Former     Types: Chew     Quit date: 2009   Vaping Use   • Vaping Use: Never used   Substance Use Topics   • Alcohol use: Not Currently   • Drug use: No        OBJECTIVE:    Vital Signs   Temp:  [97.2 °F (36.2 °C)] 97.2 °F (36.2 °C)  Heart Rate:  [52] 52  Resp:  [18] 18  BP: (141)/(74) 141/74    Physical Exam:     General Appearance:    Alert, cooperative, in no acute distress   Head:    Normocephalic, without obvious abnormality, atraumatic   Eyes:            Lids and lashes normal, conjunctivae and sclerae normal, no   icterus, no pallor, corneas clear, PERRLA   Ears:    Ears appear intact with no abnormalities noted   Throat:   No oral lesions, no thrush, oral mucosa moist   Neck:   No adenopathy, supple, trachea midline, no thyromegaly, no   carotid bruit, no JVD   Back:     No kyphosis present, no scoliosis present, no skin lesions,      erythema or scars, no tenderness to percussion or                   palpation,   range of motion normal   Lungs:     Clear to auscultation,respirations regular, even and                  unlabored    Heart:    Regular rhythm and normal rate, normal S1 and S2, no            murmur, no gallop, no rub, no click   Chest Wall:    No abnormalities observed   Abdomen:     Normal bowel sounds, no masses, no organomegaly,  soft        non-tender, non-distended, no guarding, there is no evidence of tenderness   Extremities:   Moves all extremities well, no edema, no cyanosis, no             redness   Pulses:   Pulses palpable and equal bilaterally   Skin:   No bleeding, bruising or rash   Lymph nodes:   No palpable adenopathy   Neurologic:   Cranial nerves 2 - 12 grossly intact, sensation intact, DTR       present and equal bilaterally           ASSESSMENT/PLAN:    Screening colonoscopy  History of colon polyps  History of colon cancer    I did have a detailed and extensive discussion with the patient in the office today.  The full risks and benefits of operative versus nonoperative intervention were discussed with the paient, they understand, agree, and wish to proceed with the surgical treatment plan of colonoscopy.      Rodrigo Lambert MD

## 2023-09-29 ENCOUNTER — INFUSION (OUTPATIENT)
Dept: ONCOLOGY | Facility: HOSPITAL | Age: 73
End: 2023-09-29
Payer: MEDICARE

## 2023-09-29 ENCOUNTER — OFFICE VISIT (OUTPATIENT)
Dept: ONCOLOGY | Facility: CLINIC | Age: 73
End: 2023-09-29
Payer: MEDICARE

## 2023-09-29 ENCOUNTER — SPECIALTY PHARMACY (OUTPATIENT)
Dept: ONCOLOGY | Facility: HOSPITAL | Age: 73
End: 2023-09-29
Payer: MEDICARE

## 2023-09-29 VITALS
DIASTOLIC BLOOD PRESSURE: 73 MMHG | RESPIRATION RATE: 16 BRPM | TEMPERATURE: 98.5 F | BODY MASS INDEX: 19.75 KG/M2 | OXYGEN SATURATION: 98 % | HEIGHT: 73 IN | WEIGHT: 149 LBS | HEART RATE: 60 BPM | SYSTOLIC BLOOD PRESSURE: 123 MMHG

## 2023-09-29 DIAGNOSIS — C18.7 MALIGNANT NEOPLASM OF SIGMOID COLON: Primary | ICD-10-CM

## 2023-09-29 DIAGNOSIS — C78.7 SECONDARY MALIGNANT NEOPLASM OF LIVER AND INTRAHEPATIC BILE DUCT: ICD-10-CM

## 2023-09-29 LAB
ALBUMIN SERPL-MCNC: 4.3 G/DL (ref 3.5–5.2)
ALBUMIN/GLOB SERPL: 1.9 G/DL
ALP SERPL-CCNC: 65 U/L (ref 39–117)
ALT SERPL W P-5'-P-CCNC: 11 U/L (ref 1–41)
ANION GAP SERPL CALCULATED.3IONS-SCNC: 10 MMOL/L (ref 5–15)
AST SERPL-CCNC: 19 U/L (ref 1–40)
BASOPHILS # BLD AUTO: 0.02 10*3/MM3 (ref 0–0.2)
BASOPHILS NFR BLD AUTO: 0.5 % (ref 0–1.5)
BILIRUB SERPL-MCNC: 0.9 MG/DL (ref 0–1.2)
BUN SERPL-MCNC: 14 MG/DL (ref 8–23)
BUN/CREAT SERPL: 14.4 (ref 7–25)
CALCIUM SPEC-SCNC: 8.9 MG/DL (ref 8.6–10.5)
CEA SERPL-MCNC: 2.51 NG/ML
CHLORIDE SERPL-SCNC: 101 MMOL/L (ref 98–107)
CO2 SERPL-SCNC: 26 MMOL/L (ref 22–29)
CREAT SERPL-MCNC: 0.97 MG/DL (ref 0.76–1.27)
DEPRECATED RDW RBC AUTO: 55 FL (ref 37–54)
EGFRCR SERPLBLD CKD-EPI 2021: 83.5 ML/MIN/1.73
EOSINOPHIL # BLD AUTO: 0.02 10*3/MM3 (ref 0–0.4)
EOSINOPHIL NFR BLD AUTO: 0.5 % (ref 0.3–6.2)
ERYTHROCYTE [DISTWIDTH] IN BLOOD BY AUTOMATED COUNT: 14.6 % (ref 12.3–15.4)
GLOBULIN UR ELPH-MCNC: 2.3 GM/DL
GLUCOSE SERPL-MCNC: 103 MG/DL (ref 65–99)
HCT VFR BLD AUTO: 34.3 % (ref 37.5–51)
HGB BLD-MCNC: 11.9 G/DL (ref 13–17.7)
IMM GRANULOCYTES # BLD AUTO: 0.04 10*3/MM3 (ref 0–0.05)
IMM GRANULOCYTES NFR BLD AUTO: 1 % (ref 0–0.5)
LYMPHOCYTES # BLD AUTO: 1 10*3/MM3 (ref 0.7–3.1)
LYMPHOCYTES NFR BLD AUTO: 25.8 % (ref 19.6–45.3)
MCH RBC QN AUTO: 35.6 PG (ref 26.6–33)
MCHC RBC AUTO-ENTMCNC: 34.7 G/DL (ref 31.5–35.7)
MCV RBC AUTO: 102.7 FL (ref 79–97)
MONOCYTES # BLD AUTO: 0.43 10*3/MM3 (ref 0.1–0.9)
MONOCYTES NFR BLD AUTO: 11.1 % (ref 5–12)
NEUTROPHILS NFR BLD AUTO: 2.36 10*3/MM3 (ref 1.7–7)
NEUTROPHILS NFR BLD AUTO: 61.1 % (ref 42.7–76)
NRBC BLD AUTO-RTO: 0 /100 WBC (ref 0–0.2)
PLATELET # BLD AUTO: 137 10*3/MM3 (ref 140–450)
PMV BLD AUTO: 10 FL (ref 6–12)
POTASSIUM SERPL-SCNC: 4.5 MMOL/L (ref 3.5–5.2)
PROT SERPL-MCNC: 6.6 G/DL (ref 6–8.5)
RBC # BLD AUTO: 3.34 10*6/MM3 (ref 4.14–5.8)
SODIUM SERPL-SCNC: 137 MMOL/L (ref 136–145)
WBC NRBC COR # BLD: 3.87 10*3/MM3 (ref 3.4–10.8)

## 2023-09-29 PROCEDURE — 1159F MED LIST DOCD IN RCRD: CPT | Performed by: NURSE PRACTITIONER

## 2023-09-29 PROCEDURE — 25010000002 HEPARIN LOCK FLUSH PER 10 UNITS: Performed by: NURSE PRACTITIONER

## 2023-09-29 PROCEDURE — 80053 COMPREHEN METABOLIC PANEL: CPT | Performed by: NURSE PRACTITIONER

## 2023-09-29 PROCEDURE — 3074F SYST BP LT 130 MM HG: CPT | Performed by: NURSE PRACTITIONER

## 2023-09-29 PROCEDURE — 1160F RVW MEDS BY RX/DR IN RCRD: CPT | Performed by: NURSE PRACTITIONER

## 2023-09-29 PROCEDURE — 3078F DIAST BP <80 MM HG: CPT | Performed by: NURSE PRACTITIONER

## 2023-09-29 PROCEDURE — 1126F AMNT PAIN NOTED NONE PRSNT: CPT | Performed by: NURSE PRACTITIONER

## 2023-09-29 PROCEDURE — 99214 OFFICE O/P EST MOD 30 MIN: CPT | Performed by: NURSE PRACTITIONER

## 2023-09-29 PROCEDURE — 36416 COLLJ CAPILLARY BLOOD SPEC: CPT

## 2023-09-29 PROCEDURE — 36591 DRAW BLOOD OFF VENOUS DEVICE: CPT

## 2023-09-29 PROCEDURE — 82378 CARCINOEMBRYONIC ANTIGEN: CPT | Performed by: NURSE PRACTITIONER

## 2023-09-29 PROCEDURE — 85025 COMPLETE CBC W/AUTO DIFF WBC: CPT | Performed by: NURSE PRACTITIONER

## 2023-09-29 RX ORDER — SODIUM CHLORIDE 0.9 % (FLUSH) 0.9 %
10 SYRINGE (ML) INJECTION AS NEEDED
OUTPATIENT
Start: 2023-09-29

## 2023-09-29 RX ORDER — SODIUM CHLORIDE 0.9 % (FLUSH) 0.9 %
10 SYRINGE (ML) INJECTION AS NEEDED
Status: DISCONTINUED | OUTPATIENT
Start: 2023-09-29 | End: 2023-09-29 | Stop reason: HOSPADM

## 2023-09-29 RX ORDER — HEPARIN SODIUM (PORCINE) LOCK FLUSH IV SOLN 100 UNIT/ML 100 UNIT/ML
500 SOLUTION INTRAVENOUS AS NEEDED
OUTPATIENT
Start: 2023-09-29

## 2023-09-29 RX ORDER — HEPARIN SODIUM (PORCINE) LOCK FLUSH IV SOLN 100 UNIT/ML 100 UNIT/ML
500 SOLUTION INTRAVENOUS AS NEEDED
Status: DISCONTINUED | OUTPATIENT
Start: 2023-09-29 | End: 2023-09-29 | Stop reason: HOSPADM

## 2023-09-29 RX ADMIN — Medication 10 ML: at 11:45

## 2023-09-29 RX ADMIN — HEPARIN 500 UNITS: 100 SYRINGE at 11:45

## 2023-09-29 NOTE — PROGRESS NOTES
CHIEF COMPLAINT: 1.  Peripheral neuropathy of the hands and feet, worse in the feet                                       2.  Follow-up for colon cancer    Problem List:  Oncology/Hematology History Overview Note   1. Stage CARLOS, [id number] colon cancer with 2 out of 14 pericolonic lymph nodes  involved and a left lobe liver biopsy positive for metastasis, presenting with  a 25 pound weight loss and diarrhea with some perirectal soreness and had  colonoscopy with Dr. Mcclain in January that found this lesion that was  circumferential high-grade invading into the pericolonic fat, status post  sigmoid colectomy of a poorly differentiated adenocarcinoma.   a) Baseline CEA postop of 0.8 with alkaline phosphatase 111, with normal liver  enzymes and creatinine of 0.8. Baseline white count 9820 with a hemoglobin  13.8, platelets 339,000, and CT with contrast showing a right lobe liver dome  lesion as well as an anastomotic change in the bowel.   b) Began CapeOx 03/15/2016.  c) Added in Avastin to CapeOx 04/05/2016, second cycle. (This was 8 weeks out  from surgery).  d) KRAS mutation is negative.  E.) CAT scan in August 2016 shows resolution of disease in the liver and colon and a stable lung nodule.  Hence Avastin, Xeloda, and oxaliplatin continued.  F) oxaliplatin discontinued October 2016 due to worsening peripheral neuropathy.  G.) CTs of February 2017 showed no evidence of metastasis and stable bibasilar nodular scar.  Persistent neuropathy not worsening.  CEA 1.4.  Continuing Avastin and Xeloda without oxaliplatin.  Having some problems with irritation of his eyes on Xeloda.  2.  Peripheral neuropathy, chemotherapy induced     Malignant neoplasm of sigmoid colon   5/20/2016 Initial Diagnosis    Malignant neoplasm of sigmoid colon     5/2/2017 Imaging    CT chest, abdomen, pelvis IMPRESSION:  1. No disease recurrence.  2. Minimal areas of nodular thickening in the right lower lobe, stable.     8/2/2017 Imaging    CT  chest/abdomen/pelvis IMPRESSION:  Stable examination with no evidence of acute intrathoracic,  intra-abdominal or pelvic abnormality. There is no evidence of  progression of disease. No metastatic disease.      11/13/2017 Imaging    CT chest/abdomen/pelvis IMPRESSION:  Stable examination without evidence of acute intrathoracic  intra-abdominal or intrapelvic abnormality. No evidence of progression  of disease.   CEA 1.3.     2/6/2020 -  Chemotherapy    OP COLON Capecitabine 1,250 mg/m2 BID (8 cycles)         HISTORY OF PRESENT ILLNESS:  The patient is a 72 y.o. male, here for follow up on management of Stage IV a colon cancer.  He is currently on Xeloda alone.  No diarrhea.  He has restarted his blood pressure medicine and that is helping.  He is currently on sleeping medicine as well and that has helped.  He is feeling great.  Continues on 1500 mg 7 days on 7 days off.         Past Medical History:   Diagnosis Date    Dental bridge present     H/O exercise stress test     Patient states it was normal    Hypertension     Liver disease     mets from colon cancer    Malignant neoplasm of colon     Seizure 2019    Wears glasses      Past Surgical History:   Procedure Laterality Date    COLON SURGERY  2014    Sigmoid    COLONOSCOPY N/A 10/26/2020    Procedure: COLONOSCOPY;  Surgeon: Rodrigo Lambert MD;  Location: [facility] ENDOSCOPY;  Service: Gastroenterology;  Laterality: N/A;    COLONOSCOPY N/A 5/26/2023    Procedure: COLONOSCOPY;  Surgeon: Rodrigo Lambert MD;  Location: [facility] ENDOSCOPY;  Service: Gastroenterology;  Laterality: N/A;    LIVER SURGERY  2014    PORTACATH PLACEMENT      still in place on left chest       No Known Allergies    Family History and Social History reviewed and changed as necessary      REVIEW OF SYSTEM:     Review of Systems   Constitutional:  Positive for fatigue.   Musculoskeletal:  Positive for arthralgias.   All other systems reviewed and are negative.       PHYSICAL EXAM    Vitals:     "09/29/23 1104   BP: 123/73   Pulse: 60   Resp: 16   Temp: 98.5 °F (36.9 °C)   TempSrc: Temporal   SpO2: 98%   Weight: 67.6 kg (149 lb)   Height: 185.4 cm (73\")     ECOG: (1) Restricted in physically strenuous activity, ambulatory and able to do work of light nature  General: well appearing male in no acute distress  Neuro: alert and oriented  HEENT: sclera anicteric, oropharynx clear  Lymphatics: no cervical, supraclavicular, or axillary adenopathy  Cardiovascular: regular rate and rhythm, no murmurs  Lungs: clear to auscultation bilaterally  Abdomen: soft, nontender, nondistended.  No palpable organomegaly  Extremeties: no lower extremity edema  Skin: no rashes, lesions, bruising, or petechiae  Psych: mood and affect appropriate      Vitals reviewed.  Laboratory data reviewed     Lab Results   Component Value Date    WBC 4.11 04/21/2023    HGB 11.4 (L) 04/21/2023    HCT 33.7 (L) 04/21/2023    .4 (H) 04/21/2023     04/21/2023       Lab Results   Component Value Date    GLUCOSE 98 04/21/2023    BUN 13 04/21/2023    CREATININE 1.00 04/21/2023    EGFRIFNONA 89 01/21/2022    EGFRIFAFRI 81 08/04/2020    BCR 13.0 04/21/2023    K 4.8 04/21/2023    CO2 25.9 04/21/2023    CALCIUM 8.8 04/21/2023    PROTENTOTREF 6.9 08/04/2020    ALBUMIN 4.1 04/21/2023    LABIL2 2.6 08/04/2020    AST 16 04/21/2023    ALT 12 04/21/2023       Lab Results   Component Value Date    CEA 3.09 04/21/2023    CEA 2.90 01/13/2023    CEA 2.37 09/30/2022    CEA 1.67 07/15/2022     CT Chest Without Contrast Diagnostic    Result Date: 7/13/2021  Interval resolution of the nodular opacity in the right lower lobe which was likely infectious or inflammatory in nature.  318.12 mGy.cm   This study was performed with techniques to keep radiation doses as low as reasonably achievable (ALARA). Individualized dose reduction techniques using automated exposure control or adjustment of mA and/or kV according to the patient size were employed.  This " report was finalized on 7/13/2021 3:44 PM by David Ingram M.D..    CT Chest With Contrast Diagnostic    Result Date: 10/27/2021  No evidence of metastatic disease involving the chest, abdomen or pelvis.  This study was performed with techniques to keep radiation doses as low as reasonably achievable (ALARA). Individualized dose reduction techniques using automated exposure control or adjustment of mA and/or kV according to the patient size were employed.  This report was finalized on 10/27/2021 9:11 AM by David Ingram M.D..    CT Abdomen Pelvis With Contrast    Result Date: 10/27/2021  No evidence of metastatic disease involving the chest, abdomen or pelvis.  This study was performed with techniques to keep radiation doses as low as reasonably achievable (ALARA). Individualized dose reduction techniques using automated exposure control or adjustment of mA and/or kV according to the patient size were employed.  This report was finalized on 10/27/2021 9:11 AM by David Ingram M.D..            Assessment/Plan     1. 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  I will order scans prior to return.  We will check CBC, CMP, and CEA.  We will plan to repeat imaging in 1 month.  He had colonoscopy on 5/26/2023 that went well with no evidence of cancer he says.    2. Peripheral neuropathy secondary to chemotherapy.  Stable.  We will continue gabapentin 1 capsule by mouth 3 times a day.    3. Port.  We will plan for port flush in 1 month with follow-up for scans.  After that we will continue port care every 2 months with clinic appointments. We will check CBC, CMP, CEA with each port flush.     4.  Dental abscess.  Resolved.             Adeola Sunshine, APRN    09/29/23

## 2023-10-05 RX ORDER — ESCITALOPRAM OXALATE 10 MG/1
10 TABLET ORAL DAILY
Qty: 90 TABLET | Refills: 3 | Status: SHIPPED | OUTPATIENT
Start: 2023-10-05

## 2023-10-31 ENCOUNTER — SPECIALTY PHARMACY (OUTPATIENT)
Dept: ONCOLOGY | Facility: HOSPITAL | Age: 73
End: 2023-10-31
Payer: MEDICARE

## 2023-10-31 DIAGNOSIS — C18.7 MALIGNANT NEOPLASM OF SIGMOID COLON: ICD-10-CM

## 2023-10-31 RX ORDER — CAPECITABINE 500 MG/1
1500 TABLET, FILM COATED ORAL 2 TIMES DAILY
Qty: 84 TABLET | Refills: 11 | Status: SHIPPED | OUTPATIENT
Start: 2023-10-31

## 2023-10-31 NOTE — PROGRESS NOTES
Re: Refills of Oral Specialty Medication - Xeloda (capecitabine)    Drug-Drug Interactions: The current medication list was reviewed and there are some relevant drug-drug interactions with the oral specialty medication, including:  Escitalopram and capecitabine can prolong the QTc interval. He has not had a recent EKG, but he's been stable on both of these medications for quite some time now, so no changes are needed at this time and he denies having any heart palpitations.  Medication Allergies: The patient has NKDA  Review of Labs/Dose Adjustments: NO DOSE CHANGE - I reviewed the most recent note and labs and the patient will continue without any dose changes.  I sent refills as described below.    Drug: Xeloda (capecitabine)  Strength: 500 mg  Directions: Take 3 tablets by mouth twice a day on for 7 days, then off for 7 days, then on for 7 days, then off for 7 days in a 28-day cycle.  Quantity: 84  Refills: 11  Pharmacy prescription sent to: [facility]    Patient was filling with [facility] in Feb 2023 but d/t a national drug shortage of capecitabine the Rx was sent to [facility].  We have the medication now and we also have a copay philipp that covers the copay cost, so I am bringing the Rx back to [facility].    Stephanie Gloria, PharmD, [facility]  Oncology Clinical Pharmacist   Phone: [phone]    10/31/2023  11:24 EDT      Specialty Pharmacy Patient Management Program  Oncology 6-Month Clinical Assessment       Gordon Avila is a 72 y.o. male with metastatic colon cancer seen today to assess adherence and side effects.    Reason for Outreach: Routine medication check-in .    Regimen: Capecitabine 500 mg tablets - 3 tablets by mouth twice daily ON 7 days, OFF 7 days, ON 7 days, OFF 7 day sin a 28-day period    Specialty Pharmacy Goal - On Track   Goals Addressed Today        Specialty Pharmacy General Goal      Clinical goal/therapeutic target: stable or decreasing CEA and disease control                Problem List   Problem list reviewed by Waleska Gloria [facility] on 10/31/2023 at 11:42 AM  Patient Active Problem List   Diagnosis Code    Malignant neoplasm of sigmoid colon C18.7    Peripheral neuropathy due to chemotherapy G62.0, T45.1X5A    History of known metastasis to liver Z85.89    HTN (hypertension), benign I10    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   Patient can self administer medications: Yes  Medication Follow-Up Plan: Refill coordination  Lab Review: The labs listed below have been reviewed. No dose adjustments are needed for the oral specialty medication(s) based on the labs.    Lab Results   Component Value Date    GLUCOSE 103 (H) 09/29/2023    CALCIUM 8.9 09/29/2023     09/29/2023    K 4.5 09/29/2023    CO2 26.0 09/29/2023     09/29/2023    BUN 14 09/29/2023    CREATININE 0.97 09/29/2023    EGFRIFAFRI 81 08/04/2020    EGFRIFNONA 89 01/21/2022    BCR 14.4 09/29/2023    ANIONGAP 10.0 09/29/2023     Lab Results   Component Value Date    WBC 3.87 09/29/2023    RBC 3.34 (L) 09/29/2023    HGB 11.9 (L) 09/29/2023    HCT 34.3 (L) 09/29/2023    .7 (H) 09/29/2023    MCH 35.6 (H) 09/29/2023    MCHC 34.7 09/29/2023    RDW 14.6 09/29/2023    RDWSD 55.0 (H) 09/29/2023    MPV 10.0 09/29/2023     (L) 09/29/2023    NEUTRORELPCT 61.1 09/29/2023    LYMPHORELPCT 25.8 09/29/2023     MONORELPCT 11.1 09/29/2023    EOSRELPCT 0.5 09/29/2023    BASORELPCT 0.5 09/29/2023    AUTOIGPER 1.0 (H) 09/29/2023    NEUTROABS 2.36 09/29/2023    LYMPHSABS 1.00 09/29/2023    MONOSABS 0.43 09/29/2023    EOSABS 0.02 09/29/2023    BASOSABS 0.02 09/29/2023    AUTOIGNUM 0.04 09/29/2023    NRBC 0.0 09/29/2023     Drug-drug interactions  Completed medication reconciliation today to assess for drug interactions. Patient denies starting or stopping any medications.    Assessed medication list for interactions, escitalopram and capecitabine can prolong the QTc interval. He has not had a recent EKG, but he's been stable on both of these medications for quite some time now, so no changes are needed at this time and he denies having any heart palpitations.  Advised patient to call the clinic if any new medications are started so we can assess for drug-drug interactions.  Drug-food interactions discussed: None  Vaccines are coordinated by the patient's oncologist and primary care provider.    Medications   Medicines reviewed by Waleska Gloria [facility] on 10/31/2023 at 11:42 AM  Prior to Admission medications    Medication Sig Start Date End Date Taking? Authorizing Provider   capecitabine (XELODA) 500 MG chemo tablet Take 3 tablets by mouth 2 (Two) Times a Day. On for 7 days, then off for 7 days, then on for 7 days, then off for 7 days in a 28-day cycle. 3/13/23   Santi Abad MD   escitalopram (LEXAPRO) 10 MG tablet TAKE 1 TABLET BY MOUTH DAILY 10/5/23   Ed Abraham MD   gabapentin (NEURONTIN) 300 MG capsule TAKE 1 CAPSULE(300 MG) BY MOUTH THREE TIMES DAILY AS NEEDED 7/18/23   Adeola Sunshine APRN   lisinopril (PRINIVIL,ZESTRIL) 20 MG tablet TAKE 1 TABLET BY MOUTH DAILY 6/23/23   Ed Abraham MD   traZODone (DESYREL) 100 MG tablet Take 1 tablet by mouth Every Night. 12/19/22   Ed Abraham MD       Allergies  Known allergies and reactions were discussed with the patient. The patient's chart has been  reviewed for allergy information and updated as necessary.   No Known Allergies      Allergies reviewed by Waleska Gloria [facility] on 10/31/2023 at 11:42 AM    Hospitalizations and Urgent Care Visits Since Last Assessment:  Unplanned hospitalizations or inpatient admissions: 0  ED Visits: 0  Urgent Office Visits: 0    Adherence Assessment:  Adherence Questions  Linked Medication(s) Assessed: Capecitabine  On average, how many doses/injections does the patient miss per month?: 0  What are the identified reasons for non-adherence or missed doses? : no problems identfied  What is the estimated medication adherence level?: %  Based on the patient/caregiver response and refill history, does this patient require an MTP to track adherence improvements?: no    Quality of Life Assessment:  Quality of Life Assessment  Quality of Life Improvement Scale: Significantly better  -- Quality of Life: 10/10    Financial Assessment:  Medication availability/affordability: Patient has had no issues obtaining medication from pharmacy.    Attestation:  I attest the patient was actively involved in and has agreed to the above plan of care. If the prescribed therapy is at any point deemed not appropriate based on the current or future assessments, a consultation will be initiated with the patient's specialty care provider to determine the best course of action. The revised plan of therapy will be documented along with any required assessments and/or additional patient education provided.       All questions addressed and patient had no additional concerns. Patient has pharmacy contact information.    Stephanie Gloria, PharmD, [facility]  Oncology Clinical Pharmacist   Phone: [phone]      10/31/2023  11:44 EDT

## 2023-11-13 ENCOUNTER — SPECIALTY PHARMACY (OUTPATIENT)
Dept: ONCOLOGY | Facility: HOSPITAL | Age: 73
End: 2023-11-13
Payer: MEDICARE

## 2023-11-21 ENCOUNTER — PREP FOR SURGERY (OUTPATIENT)
Dept: OTHER | Facility: HOSPITAL | Age: 73
End: 2023-11-21
Payer: MEDICARE

## 2023-11-21 DIAGNOSIS — H25.11 NUCLEAR SCLEROTIC CATARACT OF RIGHT EYE: Primary | ICD-10-CM

## 2023-11-21 RX ORDER — CYCLOPENT/TROPIC/PHEN/KETR/WAT 1%-1%-2.5%
1 DROPS (EA) OPHTHALMIC (EYE)
Status: CANCELLED | OUTPATIENT
Start: 2023-11-21 | End: 2023-11-21

## 2023-11-21 RX ORDER — SODIUM CHLORIDE 0.9 % (FLUSH) 0.9 %
10 SYRINGE (ML) INJECTION EVERY 12 HOURS SCHEDULED
Status: CANCELLED | OUTPATIENT
Start: 2023-11-21

## 2023-11-21 RX ORDER — SODIUM CHLORIDE 9 MG/ML
40 INJECTION, SOLUTION INTRAVENOUS AS NEEDED
Status: CANCELLED | OUTPATIENT
Start: 2023-11-21

## 2023-11-21 RX ORDER — PREDNISOLONE ACETATE 10 MG/ML
1 SUSPENSION/ DROPS OPHTHALMIC SEE ADMIN INSTRUCTIONS
Status: CANCELLED | OUTPATIENT
Start: 2023-11-21

## 2023-11-21 RX ORDER — SODIUM CHLORIDE 0.9 % (FLUSH) 0.9 %
1-10 SYRINGE (ML) INJECTION AS NEEDED
Status: CANCELLED | OUTPATIENT
Start: 2023-11-21

## 2023-11-21 RX ORDER — TETRACAINE HYDROCHLORIDE 5 MG/ML
1 SOLUTION OPHTHALMIC SEE ADMIN INSTRUCTIONS
Status: CANCELLED | OUTPATIENT
Start: 2023-11-21

## 2023-11-22 PROBLEM — H25.11 NUCLEAR SCLEROTIC CATARACT OF RIGHT EYE: Status: ACTIVE | Noted: 2023-11-21

## 2023-11-27 ENCOUNTER — SPECIALTY PHARMACY (OUTPATIENT)
Dept: ONCOLOGY | Facility: HOSPITAL | Age: 73
End: 2023-11-27
Payer: MEDICARE

## 2023-11-27 NOTE — PRE-PROCEDURE INSTRUCTIONS
PAT phone history completed with pt for upcoming procedure     PAT PASS GIVEN/REVIEWED WITH PT.  VERBALIZED UNDERSTANDING OF THE FOLLOWING:  DO NOT EAT, DRINK, SMOKE, USE SMOKELESS TOBACCO OR CHEW GUM AFTER MIDNIGHT THE NIGHT BEFORE SURGERY.  THIS ALSO INCLUDES HARD CANDIES AND MINTS.    DO NOT SHAVE THE AREA TO BE OPERATED ON AT LEAST 48 HOURS PRIOR TO THE PROCEDURE.  DO NOT WEAR MAKE UP OR NAIL POLISH.  DO NOT LEAVE IN ANY PIERCING OR WEAR JEWELRY THE DAY OF SURGERY.      DO NOT USE ADHESIVES IF YOU WEAR DENTURES.    DO NOT WEAR EYE CONTACTS; BRING IN YOUR GLASSES.    ONLY TAKE MEDICATION THE MORNING OF YOUR PROCEDURE IF INSTRUCTED BY YOUR SURGEON WITH ENOUGH WATER TO SWALLOW THE MEDICATION.  IF YOUR SURGEON DID NOT SPECIFY WHICH MEDICATIONS TO TAKE, YOU WILL NEED TO CALL THEIR OFFICE FOR FURTHER INSTRUCTIONS AND DO AS THEY INSTRUCT.    LEAVE ANYTHING YOU CONSIDER VALUABLE AT HOME.    YOU WILL NEED TO ARRANGE FOR SOMEONE TO DRIVE YOU HOME AFTER SURGERY.  IT IS RECOMMENDED THAT YOU DO NOT DRIVE, WORK, DRINK ALCOHOL OR MAKE MAJOR DECISIONS FOR AT LEAST 24 HOURS AFTER YOUR PROCEDURE IS COMPLETE.      THE DAY OF YOUR PROCEDURE, BRING IN THE FOLLOWING IF APPLICABLE:   PICTURE ID AND INSURANCE/MEDICARE OR MEDICAID CARDS/ANY CO-PAY THAT MAY BE DUE   COPY OF ADVANCED DIRECTIVE/LIVING WILL/POWER OR    CPAP/BIPAP/INHALERS   SKIN PREP SHEET   YOUR PREADMISSION TESTING PASS (IF NOT A PHONE HISTORY)

## 2023-11-28 NOTE — PROGRESS NOTES
Opened in error    Niharika Austin [facility]  Pharmacy Care Coordinator  Hematology and Oncology  [phone]  11/28/2023  10:30 EST

## 2023-11-30 ENCOUNTER — HOSPITAL ENCOUNTER (OUTPATIENT)
Facility: HOSPITAL | Age: 73
Setting detail: HOSPITAL OUTPATIENT SURGERY
Discharge: HOME | End: 2023-11-30
Attending: OPHTHALMOLOGY | Admitting: OPHTHALMOLOGY
Payer: MEDICARE

## 2023-11-30 ENCOUNTER — ANESTHESIA (OUTPATIENT)
Dept: PERIOP | Facility: HOSPITAL | Age: 73
End: 2023-11-30
Payer: MEDICARE

## 2023-11-30 ENCOUNTER — APPOINTMENT (OUTPATIENT)
Dept: CT IMAGING | Facility: HOSPITAL | Age: 73
End: 2023-11-30
Payer: MEDICARE

## 2023-11-30 ENCOUNTER — ANESTHESIA EVENT (OUTPATIENT)
Dept: PERIOP | Facility: HOSPITAL | Age: 73
End: 2023-11-30
Payer: MEDICARE

## 2023-11-30 VITALS
WEIGHT: 160 LBS | HEART RATE: 56 BPM | BODY MASS INDEX: 21.2 KG/M2 | OXYGEN SATURATION: 97 % | SYSTOLIC BLOOD PRESSURE: 120 MMHG | HEIGHT: 73 IN | TEMPERATURE: 97.4 F | DIASTOLIC BLOOD PRESSURE: 68 MMHG | RESPIRATION RATE: 16 BRPM

## 2023-11-30 DIAGNOSIS — H25.11 NUCLEAR SCLEROTIC CATARACT OF RIGHT EYE: ICD-10-CM

## 2023-11-30 PROCEDURE — 25810000003 LACTATED RINGERS PER 1000 ML: Performed by: OPHTHALMOLOGY

## 2023-11-30 PROCEDURE — 25010000002 PROPOFOL 200 MG/20ML EMULSION: Performed by: NURSE ANESTHETIST, CERTIFIED REGISTERED

## 2023-11-30 PROCEDURE — V2632 POST CHMBR INTRAOCULAR LENS: HCPCS | Performed by: OPHTHALMOLOGY

## 2023-11-30 DEVICE — LENS MONOFOCAL IQ CC60WF200: Type: IMPLANTABLE DEVICE | Site: POSTERIOR CHAMBER | Status: FUNCTIONAL

## 2023-11-30 RX ORDER — LIDOCAINE HCL/PF 100 MG/5ML
SYRINGE (ML) INJECTION AS NEEDED
Status: DISCONTINUED | OUTPATIENT
Start: 2023-11-30 | End: 2023-11-30 | Stop reason: SURG

## 2023-11-30 RX ORDER — BALANCED SALT SOLUTION 6.4; .75; .48; .3; 3.9; 1.7 MG/ML; MG/ML; MG/ML; MG/ML; MG/ML; MG/ML
SOLUTION OPHTHALMIC AS NEEDED
Status: DISCONTINUED | OUTPATIENT
Start: 2023-11-30 | End: 2023-11-30 | Stop reason: HOSPADM

## 2023-11-30 RX ORDER — PREDNISOLONE ACETATE 10 MG/ML
1 SUSPENSION/ DROPS OPHTHALMIC 4 TIMES DAILY
Start: 2023-11-30

## 2023-11-30 RX ORDER — SODIUM CHLORIDE 9 MG/ML
40 INJECTION, SOLUTION INTRAVENOUS AS NEEDED
Status: DISCONTINUED | OUTPATIENT
Start: 2023-11-30 | End: 2023-11-30 | Stop reason: HOSPADM

## 2023-11-30 RX ORDER — PREDNISOLONE ACETATE 10 MG/ML
1 SUSPENSION/ DROPS OPHTHALMIC SEE ADMIN INSTRUCTIONS
Status: DISCONTINUED | OUTPATIENT
Start: 2023-11-30 | End: 2023-11-30 | Stop reason: HOSPADM

## 2023-11-30 RX ORDER — SODIUM CHLORIDE 0.9 % (FLUSH) 0.9 %
1-10 SYRINGE (ML) INJECTION AS NEEDED
Status: DISCONTINUED | OUTPATIENT
Start: 2023-11-30 | End: 2023-11-30 | Stop reason: HOSPADM

## 2023-11-30 RX ORDER — CYCLOPENT/TROPIC/PHEN/KETR/WAT 1%-1%-2.5%
1 DROPS (EA) OPHTHALMIC (EYE)
Status: COMPLETED | OUTPATIENT
Start: 2023-11-30 | End: 2023-11-30

## 2023-11-30 RX ORDER — TETRACAINE HYDROCHLORIDE 5 MG/ML
SOLUTION OPHTHALMIC AS NEEDED
Status: DISCONTINUED | OUTPATIENT
Start: 2023-11-30 | End: 2023-11-30 | Stop reason: HOSPADM

## 2023-11-30 RX ORDER — SODIUM CHLORIDE 0.9 % (FLUSH) 0.9 %
10 SYRINGE (ML) INJECTION EVERY 12 HOURS SCHEDULED
Status: DISCONTINUED | OUTPATIENT
Start: 2023-11-30 | End: 2023-11-30 | Stop reason: HOSPADM

## 2023-11-30 RX ORDER — PREDNISOLONE ACETATE 10 MG/ML
SUSPENSION/ DROPS OPHTHALMIC AS NEEDED
Status: DISCONTINUED | OUTPATIENT
Start: 2023-11-30 | End: 2023-11-30 | Stop reason: HOSPADM

## 2023-11-30 RX ORDER — ACETAZOLAMIDE 500 MG/1
500 CAPSULE, EXTENDED RELEASE ORAL ONCE
Status: COMPLETED | OUTPATIENT
Start: 2023-11-30 | End: 2023-11-30

## 2023-11-30 RX ORDER — PROPOFOL 10 MG/ML
INJECTION, EMULSION INTRAVENOUS CONTINUOUS PRN
Status: DISCONTINUED | OUTPATIENT
Start: 2023-11-30 | End: 2023-11-30 | Stop reason: SURG

## 2023-11-30 RX ORDER — TETRACAINE HYDROCHLORIDE 5 MG/ML
1 SOLUTION OPHTHALMIC SEE ADMIN INSTRUCTIONS
Status: COMPLETED | OUTPATIENT
Start: 2023-11-30 | End: 2023-11-30

## 2023-11-30 RX ORDER — LIDOCAINE HYDROCHLORIDE 40 MG/ML
INJECTION, SOLUTION RETROBULBAR; TOPICAL AS NEEDED
Status: DISCONTINUED | OUTPATIENT
Start: 2023-11-30 | End: 2023-11-30 | Stop reason: HOSPADM

## 2023-11-30 RX ORDER — NEOMYCIN SULFATE, POLYMYXIN B SULFATE, AND DEXAMETHASONE 3.5; 10000; 1 MG/G; [USP'U]/G; MG/G
OINTMENT OPHTHALMIC AS NEEDED
Status: DISCONTINUED | OUTPATIENT
Start: 2023-11-30 | End: 2023-11-30 | Stop reason: HOSPADM

## 2023-11-30 RX ORDER — SODIUM CHLORIDE, SODIUM LACTATE, POTASSIUM CHLORIDE, CALCIUM CHLORIDE 600; 310; 30; 20 MG/100ML; MG/100ML; MG/100ML; MG/100ML
1000 INJECTION, SOLUTION INTRAVENOUS CONTINUOUS
Status: DISCONTINUED | OUTPATIENT
Start: 2023-11-30 | End: 2023-11-30 | Stop reason: HOSPADM

## 2023-11-30 RX ADMIN — Medication 40 MG: at 11:56

## 2023-11-30 RX ADMIN — Medication 1 DROP: at 10:36

## 2023-11-30 RX ADMIN — TETRACAINE HYDROCHLORIDE 1 DROP: 5 SOLUTION OPHTHALMIC at 10:35

## 2023-11-30 RX ADMIN — PROPOFOL 100 MCG/KG/MIN: 10 INJECTION, EMULSION INTRAVENOUS at 11:56

## 2023-11-30 RX ADMIN — Medication 1 DROP: at 10:41

## 2023-11-30 RX ADMIN — SODIUM CHLORIDE, POTASSIUM CHLORIDE, SODIUM LACTATE AND CALCIUM CHLORIDE 1000 ML: 600; 310; 30; 20 INJECTION, SOLUTION INTRAVENOUS at 10:41

## 2023-11-30 RX ADMIN — TETRACAINE HYDROCHLORIDE 1 DROP: 5 SOLUTION OPHTHALMIC at 10:34

## 2023-11-30 RX ADMIN — Medication 1 DROP: at 10:46

## 2023-11-30 RX ADMIN — ACETAZOLAMIDE 500 MG: 500 CAPSULE, EXTENDED RELEASE ORAL at 12:14

## 2023-11-30 NOTE — H&P
[facility]         History and Physical    Patient Name: Gordon Avila  MRN: [id number]  : 1950  Gender: male     HPI: Patient complaint of PPLOV Right eye diagnosed with cataract. Patient requests PHACO PCIOL for Increase of VA/ADL.    History:    Past Medical History:   Diagnosis Date    Dental bridge present     H/O exercise stress test     Patient states it was normal    Hypertension     Liver disease     mets from colon cancer    Malignant neoplasm of colon     Seizure 2019    Wears glasses        Past Surgical History:   Procedure Laterality Date    COLON SURGERY      Sigmoid    COLONOSCOPY N/A 10/26/2020    Procedure: COLONOSCOPY;  Surgeon: Rodrigo Lambert MD;  Location: [facility] ENDOSCOPY;  Service: Gastroenterology;  Laterality: N/A;    COLONOSCOPY N/A 2023    Procedure: COLONOSCOPY;  Surgeon: Rodrigo Lambert MD;  Location: [facility] ENDOSCOPY;  Service: Gastroenterology;  Laterality: N/A;    LIVER SURGERY      PORTACATH PLACEMENT      still in place on left chest       Social History     Socioeconomic History    Marital status:    Tobacco Use    Smoking status: Never    Smokeless tobacco: Former     Types: Chew     Quit date:    Vaping Use    Vaping Use: Never used   Substance and Sexual Activity    Alcohol use: Not Currently    Drug use: No    Sexual activity: Defer       Family History   Problem Relation Age of Onset    COPD Mother     Heart disease Father     COPD Other        Prior to Admission Medications:  Medications Prior to Admission   Medication Sig Dispense Refill Last Dose    capecitabine (XELODA) 500 MG chemo tablet Take 3 tablets by mouth 2 (Two) Times a Day for 7 days, then off for 7 days, then on for 7 days, then off for 7 days in a 28-day cycle. 84 tablet 11 2023    escitalopram (LEXAPRO) 10 MG tablet TAKE 1 TABLET BY MOUTH DAILY 90 tablet 3 2023 at 0800    gabapentin (NEURONTIN) 300 MG capsule TAKE 1 CAPSULE(300  MG) BY MOUTH THREE TIMES DAILY AS NEEDED 90 capsule 3 11/29/2023 at 2100    lisinopril (PRINIVIL,ZESTRIL) 20 MG tablet TAKE 1 TABLET BY MOUTH DAILY 90 tablet 3 11/29/2023 at 1900    traZODone (DESYREL) 100 MG tablet Take 1 tablet by mouth Every Night. (Patient taking differently: Take 0.5 tablets by mouth Every Night.) 90 tablet 3 11/29/2023 at 1900       Allergies:  No Known Allergies     Vitals: Temp:  [98.6 °F (37 °C)] 98.6 °F (37 °C)  Heart Rate:  [54] 54  Resp:  [16] 16  BP: (127)/(67) 127/67    Review of Systems:   Within Normal Limits Abnormal   HEENT [x]    []     Cardiovascular [x]   []     Gastrointestinal [x]   []     Genitourinary [x]   []     Neurologic [x]   []     Pulmonary [x]   []       Physical Exam:   Within Normal Limits Abnormal   HEENT [x]    []     Heart [x]   []     Lungs [x]   []     Abdomen [x]   []     Extremities [x]   []       Impression: Right nuclear sclerotic cataract.     Plan: CATARACT PHACO EXTRACTION WITH INTRAOCULAR LENS IMPLANT (Right)     Portillo Bailey MD  11/30/2023

## 2023-11-30 NOTE — ANESTHESIA POSTPROCEDURE EVALUATION
Patient: Gordon Avila    Procedure Summary       Date: 11/30/23 Room / Location: [facility] OR 3 /  [facility] OR    Anesthesia Start: 1155 Anesthesia Stop: 1204    Procedure: CATARACT PHACO EXTRACTION WITH INTRAOCULAR LENS IMPLANT (Right: Eye) Diagnosis:       Nuclear sclerotic cataract of right eye      (Nuclear sclerotic cataract of right eye [H25.11])    Surgeons: Portillo Bailey MD Provider: Ravi Gardner CRNA    Anesthesia Type: MAC ASA Status: 3            Anesthesia Type: MAC    Vitals  No vitals data found for the desired time range.          Post Anesthesia Care and Evaluation    Patient location during evaluation: bedside  Patient participation: complete - patient participated  Level of consciousness: awake and alert  Pain score: 0  Pain management: adequate    Airway patency: patent  Anesthetic complications: No anesthetic complications  PONV Status: none  Cardiovascular status: acceptable  Respiratory status: acceptable  Hydration status: acceptable

## 2023-11-30 NOTE — DISCHARGE INSTRUCTIONS
No pushing, pulling, tugging,  heavy lifting, or strenuous activity.  No major decision making, driving, or drinking alcoholic beverages for 24 hours. ( due to the medications you have  received)  Always use good hand hygiene/washing techniques.  NO driving while taking pain medications.    * if you have an incision:  Check your incision area every day for signs of infection.   Check for:  * more redness, swelling, or pain  *more fluid or blood  *warmth  *pus or bad smellPost Operative Cataract Instructions     Right Eye  POST OPERATIVE INSTRUCTIONS  You have received anesthesia today. DO NOT drive, drink alcohol, sign legal documents.   After surgery, your eye will not hurt. It may feel scratchy, sticky or uncomfortable. Your eye will be sensitive to light.  Most people see better 1-3 days after the procedure, but it could take 3 weeks to get the full benefits and reach your visual potential. If your vision is blurry for a few days it is normal, and means you may have swelling outside or inside the eye. For some patients, a bubble is placed and there will be blurriness.   You should receive a post-op kit with tape and an eye shield. Wear the shield for the first 3 nights after surgery to keep you from rubbing the eye.  You may wear glasses or sunglasses during the day.  You must keep eye protected at all times.  Most people are able to return to their normal routine 1-3 days after surgery, however, due to the brain adjusting to your new vision you may have trouble judging distances and want to be careful when driving and going up and downstairs.   You can shower and wash your hair the day after surgery. Keep water, shampoo, hair spray and shaving lotion out of the eye, especially for the first week.   If eyes become stuck together after surgery you may soak with warm cloth.  During the first week, you should AVOID:   Rubbing or putting pressure on your eye.  Eye make-up, face cream or lotion, hair coloring or  perms  Strenuous activities, such as running or lifting weights, as to avoid sweat from getting in the eye. Avoid swimming, hot tubs, fumes or anh conditions.   Sleeping on operative side.  Excessive sneezing or coughing.  Hanging the head down for periods of time. Keep your head above your heart.    Some discomfort and blurred vision after surgery are normal, but if you have any unusual pain, swelling, bleeding or sudden decrease in vision, contact our office immediately.     Emergency assistance is available at any time by calling:    Dr.Mark Leo Bailey  [phone]    If unable to reach call [facility] @ [phone]    You have been prescribed an eye drop to use after surgery, please follow these instructions and bring eye drops and instructions with you to post op appointment:    Prednisolone Acetate 1% (Pred Forte')  Shake well before use.    USE 1 DROP 4 (FOUR) TIMES A DAY FOR 1 WEEK THEN STARTING WEEK 2, ONLY USE 2 (TWO) TIMES A DAY UNTIL YOU RUN OUT OF DROPS.     PLACE A ARCHIE IN THE DAY COLUMN EACH TIME YOU USE TO KEEP ON SCHEDULE    WEEK 1-USE 4  (FOUR) TIMES A DAY DAY 1  []   []    []   []     DAY 2  []   []    []   []  DAY 3  []   []    []   []  DAY 4  []   []    []   []  DAY 5  []   []    []   []  DAY 6  []   []    []   []  DAY 7  []   []    []   []      WEEK 2-USE 2 (TWO)  TIMES A DAY DAY 1  []   []  DAY 2  []   []  DAY 3  []   []  DAY 4  []   []  DAY 5  []   []  DAY 6  []   []  DAY 7  []   []      WEEK 3-USE 2 (TWO) TIMES A DAY DAY 1  []   []  DAY 2  []   []  DAY 3  []   []  DAY 4  []   []  DAY 5  []   []  DAY 6  []   []  DAY 7  []   []      WEEK 4-USE 2 (TWO)TIMES A DAY DAY 1  []   []  DAY 2  []   []  DAY 3  []   []  DAY 4  []   []  DAY 5  []   []  DAY 6  []   []  DAY 7  []   []

## 2023-11-30 NOTE — ANESTHESIA PREPROCEDURE EVALUATION
Anesthesia Evaluation     Patient summary reviewed and Nursing notes reviewed   no history of anesthetic complications:   NPO Solid Status: > 8 hours  NPO Liquid Status: > 8 hours           Airway   Mallampati: II  TM distance: >3 FB  Neck ROM: full  no difficulty expected and Possible difficult intubation  Dental - normal exam     Pulmonary - negative pulmonary ROS and normal exam   Cardiovascular - normal exam    Rhythm: regular  Rate: normal    (+) hypertension less than 2 medications      Neuro/Psych  (+) seizures, numbness, psychiatric history Anxiety and Depression  GI/Hepatic/Renal/Endo    (+) liver disease fatty liver disease    Musculoskeletal (-) negative ROS    Abdominal    Substance History - negative use     OB/GYN negative ob/gyn ROS         Other      history of cancer    ROS/Med Hx Other: Hx colon cancer                    Anesthesia Plan    ASA 3     MAC     (Pt told that intravenous sedation will be used as the primary anesthetic along with local anesthesia if necessary. Every effort will be made to make sure the patient is comfortable.     The patient was told they may or may not have recall for the procedure. It was further explained that if the MAC was not adequate that a general anesthetic with either an LMA or endotracheal tube would be required.     Will proceed with the plan of care.)  intravenous induction     Anesthetic plan, risks, benefits, and alternatives have been provided, discussed and informed consent has been obtained with: patient.        CODE STATUS:

## 2023-11-30 NOTE — OP NOTE
OPERATIVE NOTE    Date of Procedure: 11/30/2023  Patient Name: Gordon Avila  Patient MRN: [id number]  YOB: 1950     Preoperative Diagnosis: Right nuclear sclerotic cataract.     Postoperative Diagnosis: Right nuclear sclerotic cataract.     Procedure Performed: Phacoemulsification with implantation of a  foldable posterior chamber intraocular lens, Right eye.     Surgeon: Portillo Bailey MD    Anesthesia:  Monitored Anesthesia Care (MAC)      Brief History and Indication: The patient presents with a history of past progressive loss of vision.  Patient was diagnosed with cataract and requests removal for increased ability to read and see.     Operation Description: The patient was taken to the OR and prepped and draped in the usual sterile ophthalmic fashion. A lid speculum was placed in the eye.  A #75 blade was then used to make a stab incision two o’clock hours from the intended temporal clear cornea groove. The anterior chamber was then inflated with a Viscoelastic. A metal microkeratome blade was then used to enter the anterior chamber at the temporal clear cornea site. A three level tunnel incision was made. A curvilinear capsulorrhexis was then performed with a bent cystotome needle and capsulorrhexis forceps.  BSS on a 30 gauge bent cannula was used to hydro-dissect, and hydro-delineate the lens. Good fluid waves and hydro-delineation were noted. Phacoemulsification was then used to remove nuclear material without complications. The residual cortical and lenticular material was then removed with irrigation and aspiration. Viscoelastics were then used to inflate the bag in a soft shell technique. A PCIOL was injected into the bag. Post-implantation, there were no rents or tears in the bag and the lens was noted to be stable and centered. The residual Viscoelastic was then removed with irrigation and aspiration.  The wound was checked and found to be without leaks. Therefore a Polydex ointment  and one drop of Durezol eye drop was placed in the eye.     Implant Information:   Implant Name Type Inv. Item Serial No.  Lot No. LRB No. Used Action   LENS MONOFOCAL IQ [id number] - [id number] Implant LENS MONOFOCAL IQ [id number]  JEFFERY  Right 1 Implanted       Complications: None    Estimated Blood Loss:  Less than 1 cc.       []   Changed to complex procedure due to: []  hypermature, white cataract. Blue dye was used. []   small iris. A Malyugin Ring was used.    Discharge and Condition  The patient was transported to same day surgery in excellent condition and scheduled for follow-up tomorrow morning. The patient was given instructions on use of eye drops for the operative eye and was specifically instructed to call Dr. Bailey at his office or home for any nausea, vomiting, headache, decreased visual acuity, or pain, or if the patient had any general concerns.    Portillo Bailey MD  11/30/2023

## 2023-12-06 ENCOUNTER — PREP FOR SURGERY (OUTPATIENT)
Dept: OTHER | Facility: HOSPITAL | Age: 73
End: 2023-12-06
Payer: MEDICARE

## 2023-12-06 DIAGNOSIS — H25.12 NUCLEAR SCLEROTIC CATARACT OF LEFT EYE: Primary | ICD-10-CM

## 2023-12-06 RX ORDER — SODIUM CHLORIDE 9 MG/ML
40 INJECTION, SOLUTION INTRAVENOUS AS NEEDED
Status: CANCELLED | OUTPATIENT
Start: 2023-12-06

## 2023-12-06 RX ORDER — PREDNISOLONE ACETATE 10 MG/ML
1 SUSPENSION/ DROPS OPHTHALMIC SEE ADMIN INSTRUCTIONS
Status: CANCELLED | OUTPATIENT
Start: 2023-12-06

## 2023-12-06 RX ORDER — SODIUM CHLORIDE 0.9 % (FLUSH) 0.9 %
10 SYRINGE (ML) INJECTION EVERY 12 HOURS SCHEDULED
Status: CANCELLED | OUTPATIENT
Start: 2023-12-06

## 2023-12-06 RX ORDER — TETRACAINE HYDROCHLORIDE 5 MG/ML
1 SOLUTION OPHTHALMIC SEE ADMIN INSTRUCTIONS
Status: CANCELLED | OUTPATIENT
Start: 2023-12-06

## 2023-12-06 RX ORDER — CYCLOPENT/TROPIC/PHEN/KETR/WAT 1%-1%-2.5%
1 DROPS (EA) OPHTHALMIC (EYE)
Status: CANCELLED | OUTPATIENT
Start: 2023-12-06 | End: 2023-12-06

## 2023-12-06 RX ORDER — SODIUM CHLORIDE 0.9 % (FLUSH) 0.9 %
1-10 SYRINGE (ML) INJECTION AS NEEDED
Status: CANCELLED | OUTPATIENT
Start: 2023-12-06

## 2023-12-07 PROBLEM — H25.12 NUCLEAR SCLEROTIC CATARACT OF LEFT EYE: Status: ACTIVE | Noted: 2023-12-06

## 2023-12-11 ENCOUNTER — SPECIALTY PHARMACY (OUTPATIENT)
Dept: ONCOLOGY | Facility: HOSPITAL | Age: 73
End: 2023-12-11
Payer: MEDICARE

## 2023-12-11 RX ORDER — CAPECITABINE 500 MG/1
1500 TABLET, FILM COATED ORAL 2 TIMES DAILY
COMMUNITY

## 2023-12-12 NOTE — PRE-PROCEDURE INSTRUCTIONS
PAT phone call completed with pt for upcoming procedure on 12/14/23. Pt verbalized understanding of instructions given on recent PAT call. Pt denies any changes to medical hx or medications since procedure 11/30/23.  Medication instructions given to pt by RN per anesthesia protocol.  Pt referred back to surgeon for further instructions if he/she is on any blood thinners.

## 2023-12-14 ENCOUNTER — ANESTHESIA (OUTPATIENT)
Dept: PERIOP | Facility: HOSPITAL | Age: 73
End: 2023-12-14
Payer: MEDICARE

## 2023-12-14 ENCOUNTER — ANESTHESIA EVENT (OUTPATIENT)
Dept: PERIOP | Facility: HOSPITAL | Age: 73
End: 2023-12-14
Payer: MEDICARE

## 2023-12-14 ENCOUNTER — HOSPITAL ENCOUNTER (OUTPATIENT)
Facility: HOSPITAL | Age: 73
Setting detail: HOSPITAL OUTPATIENT SURGERY
Discharge: HOME | End: 2023-12-14
Attending: OPHTHALMOLOGY | Admitting: OPHTHALMOLOGY
Payer: MEDICARE

## 2023-12-14 VITALS
TEMPERATURE: 98.2 F | RESPIRATION RATE: 18 BRPM | OXYGEN SATURATION: 98 % | HEART RATE: 48 BPM | SYSTOLIC BLOOD PRESSURE: 106 MMHG | DIASTOLIC BLOOD PRESSURE: 50 MMHG

## 2023-12-14 DIAGNOSIS — H25.12 NUCLEAR SCLEROTIC CATARACT OF LEFT EYE: ICD-10-CM

## 2023-12-14 PROCEDURE — 25010000002 FENTANYL CITRATE PF 50 MCG/ML SOLUTION PREFILLED SYRINGE: Performed by: NURSE ANESTHETIST, CERTIFIED REGISTERED

## 2023-12-14 PROCEDURE — V2632 POST CHMBR INTRAOCULAR LENS: HCPCS | Performed by: OPHTHALMOLOGY

## 2023-12-14 PROCEDURE — 25010000002 PROPOFOL 200 MG/20ML EMULSION: Performed by: NURSE ANESTHETIST, CERTIFIED REGISTERED

## 2023-12-14 PROCEDURE — 25810000003 LACTATED RINGERS PER 1000 ML: Performed by: OPHTHALMOLOGY

## 2023-12-14 PROCEDURE — 25010000002 MIDAZOLAM PER 1MG: Performed by: NURSE ANESTHETIST, CERTIFIED REGISTERED

## 2023-12-14 DEVICE — LENS MONOFOCAL IQ CC60WF200: Type: IMPLANTABLE DEVICE | Site: POSTERIOR CHAMBER | Status: FUNCTIONAL

## 2023-12-14 RX ORDER — PROPOFOL 10 MG/ML
INJECTION, EMULSION INTRAVENOUS AS NEEDED
Status: DISCONTINUED | OUTPATIENT
Start: 2023-12-14 | End: 2023-12-14 | Stop reason: SURG

## 2023-12-14 RX ORDER — PREDNISOLONE ACETATE 10 MG/ML
1 SUSPENSION/ DROPS OPHTHALMIC 4 TIMES DAILY
Start: 2023-12-14

## 2023-12-14 RX ORDER — LIDOCAINE HYDROCHLORIDE 40 MG/ML
INJECTION, SOLUTION RETROBULBAR; TOPICAL AS NEEDED
Status: DISCONTINUED | OUTPATIENT
Start: 2023-12-14 | End: 2023-12-14 | Stop reason: HOSPADM

## 2023-12-14 RX ORDER — BALANCED SALT SOLUTION 6.4; .75; .48; .3; 3.9; 1.7 MG/ML; MG/ML; MG/ML; MG/ML; MG/ML; MG/ML
SOLUTION OPHTHALMIC AS NEEDED
Status: DISCONTINUED | OUTPATIENT
Start: 2023-12-14 | End: 2023-12-14 | Stop reason: HOSPADM

## 2023-12-14 RX ORDER — SODIUM CHLORIDE 0.9 % (FLUSH) 0.9 %
1-10 SYRINGE (ML) INJECTION AS NEEDED
Status: DISCONTINUED | OUTPATIENT
Start: 2023-12-14 | End: 2023-12-14 | Stop reason: HOSPADM

## 2023-12-14 RX ORDER — TETRACAINE HYDROCHLORIDE 5 MG/ML
1 SOLUTION OPHTHALMIC SEE ADMIN INSTRUCTIONS
Status: COMPLETED | OUTPATIENT
Start: 2023-12-14 | End: 2023-12-14

## 2023-12-14 RX ORDER — CYCLOPENT/TROPIC/PHEN/KETR/WAT 1%-1%-2.5%
1 DROPS (EA) OPHTHALMIC (EYE)
Status: COMPLETED | OUTPATIENT
Start: 2023-12-14 | End: 2023-12-14

## 2023-12-14 RX ORDER — SODIUM CHLORIDE 0.9 % (FLUSH) 0.9 %
10 SYRINGE (ML) INJECTION EVERY 12 HOURS SCHEDULED
Status: DISCONTINUED | OUTPATIENT
Start: 2023-12-14 | End: 2023-12-14 | Stop reason: HOSPADM

## 2023-12-14 RX ORDER — PREDNISOLONE ACETATE 10 MG/ML
SUSPENSION/ DROPS OPHTHALMIC AS NEEDED
Status: DISCONTINUED | OUTPATIENT
Start: 2023-12-14 | End: 2023-12-14 | Stop reason: HOSPADM

## 2023-12-14 RX ORDER — SODIUM CHLORIDE, SODIUM LACTATE, POTASSIUM CHLORIDE, CALCIUM CHLORIDE 600; 310; 30; 20 MG/100ML; MG/100ML; MG/100ML; MG/100ML
1000 INJECTION, SOLUTION INTRAVENOUS CONTINUOUS
Status: DISCONTINUED | OUTPATIENT
Start: 2023-12-14 | End: 2023-12-14 | Stop reason: HOSPADM

## 2023-12-14 RX ORDER — TETRACAINE HYDROCHLORIDE 5 MG/ML
SOLUTION OPHTHALMIC AS NEEDED
Status: DISCONTINUED | OUTPATIENT
Start: 2023-12-14 | End: 2023-12-14 | Stop reason: HOSPADM

## 2023-12-14 RX ORDER — ACETAZOLAMIDE 500 MG/1
500 CAPSULE, EXTENDED RELEASE ORAL ONCE
Status: COMPLETED | OUTPATIENT
Start: 2023-12-14 | End: 2023-12-14

## 2023-12-14 RX ORDER — SODIUM CHLORIDE 9 MG/ML
40 INJECTION, SOLUTION INTRAVENOUS AS NEEDED
Status: DISCONTINUED | OUTPATIENT
Start: 2023-12-14 | End: 2023-12-14 | Stop reason: HOSPADM

## 2023-12-14 RX ORDER — FENTANYL CITRATE 50 UG/ML
INJECTION, SOLUTION INTRAMUSCULAR; INTRAVENOUS AS NEEDED
Status: DISCONTINUED | OUTPATIENT
Start: 2023-12-14 | End: 2023-12-14 | Stop reason: SURG

## 2023-12-14 RX ORDER — NEOMYCIN SULFATE, POLYMYXIN B SULFATE, AND DEXAMETHASONE 3.5; 10000; 1 MG/G; [USP'U]/G; MG/G
OINTMENT OPHTHALMIC AS NEEDED
Status: DISCONTINUED | OUTPATIENT
Start: 2023-12-14 | End: 2023-12-14 | Stop reason: HOSPADM

## 2023-12-14 RX ORDER — LIDOCAINE HCL/PF 100 MG/5ML
SYRINGE (ML) INJECTION AS NEEDED
Status: DISCONTINUED | OUTPATIENT
Start: 2023-12-14 | End: 2023-12-14 | Stop reason: SURG

## 2023-12-14 RX ORDER — MIDAZOLAM HYDROCHLORIDE 2 MG/2ML
INJECTION, SOLUTION INTRAMUSCULAR; INTRAVENOUS AS NEEDED
Status: DISCONTINUED | OUTPATIENT
Start: 2023-12-14 | End: 2023-12-14 | Stop reason: SURG

## 2023-12-14 RX ORDER — PREDNISOLONE ACETATE 10 MG/ML
1 SUSPENSION/ DROPS OPHTHALMIC SEE ADMIN INSTRUCTIONS
Status: DISCONTINUED | OUTPATIENT
Start: 2023-12-14 | End: 2023-12-14 | Stop reason: HOSPADM

## 2023-12-14 RX ADMIN — Medication 50 MG: at 12:02

## 2023-12-14 RX ADMIN — MIDAZOLAM HYDROCHLORIDE 2 MG: 1 INJECTION, SOLUTION INTRAMUSCULAR; INTRAVENOUS at 11:54

## 2023-12-14 RX ADMIN — TETRACAINE HYDROCHLORIDE 1 DROP: 5 SOLUTION OPHTHALMIC at 10:58

## 2023-12-14 RX ADMIN — Medication 1 DROP: at 11:00

## 2023-12-14 RX ADMIN — FENTANYL CITRATE 50 MCG: 50 INJECTION, SOLUTION INTRAMUSCULAR; INTRAVENOUS at 11:54

## 2023-12-14 RX ADMIN — TETRACAINE HYDROCHLORIDE 1 DROP: 5 SOLUTION OPHTHALMIC at 10:57

## 2023-12-14 RX ADMIN — SODIUM CHLORIDE, POTASSIUM CHLORIDE, SODIUM LACTATE AND CALCIUM CHLORIDE 1000 ML: 600; 310; 30; 20 INJECTION, SOLUTION INTRAVENOUS at 11:01

## 2023-12-14 RX ADMIN — Medication 1 DROP: at 11:05

## 2023-12-14 RX ADMIN — ACETAZOLAMIDE 500 MG: 500 CAPSULE, EXTENDED RELEASE ORAL at 12:19

## 2023-12-14 RX ADMIN — Medication 1 DROP: at 11:10

## 2023-12-14 RX ADMIN — PROPOFOL 50 MG: 10 INJECTION, EMULSION INTRAVENOUS at 12:02

## 2023-12-14 NOTE — ANESTHESIA PREPROCEDURE EVALUATION
Anesthesia Evaluation     Patient summary reviewed and Nursing notes reviewed   no history of anesthetic complications:   NPO Solid Status: > 8 hours  NPO Liquid Status: > 8 hours           Airway   Mallampati: II  TM distance: >3 FB  Neck ROM: full  no difficulty expected and Possible difficult intubation  Dental - normal exam     Pulmonary - negative pulmonary ROS and normal exam   Cardiovascular - normal exam    Rhythm: regular  Rate: normal    (+) hypertension less than 2 medications      Neuro/Psych  (+) seizures, numbness, psychiatric history Anxiety and Depression  GI/Hepatic/Renal/Endo    (+) liver disease fatty liver disease    Musculoskeletal (-) negative ROS    Abdominal    Substance History - negative use     OB/GYN negative ob/gyn ROS         Other      history of cancer remission    ROS/Med Hx Other: Hx colon cancer                Anesthesia Plan    ASA 3     MAC     (Pt told that intravenous sedation will be used as the primary anesthetic along with local anesthesia if necessary. Every effort will be made to make sure the patient is comfortable.     The patient was told they may or may not have recall for the procedure. It was further explained that if the MAC was not adequate that a general anesthetic with either an LMA or endotracheal tube would be required.     Will proceed with the plan of care.)  intravenous induction     Anesthetic plan, risks, benefits, and alternatives have been provided, discussed and informed consent has been obtained with: patient.      CODE STATUS:

## 2023-12-14 NOTE — H&P
[facility] [place]         History and Physical    Patient Name: Gordon Avila  MRN: [id number]  : 1950  Gender: male     HPI: Patient complaint of PPLOV Left eye diagnosed with cataract. Patient requests PHACO PCIOL for Increase of VA/ADL.    History:    Past Medical History:   Diagnosis Date    Dental bridge present     H/O exercise stress test     Patient states it was normal    Hypertension     Liver disease     mets from colon cancer    Malignant neoplasm of colon     Seizure 2019    Wears glasses        Past Surgical History:   Procedure Laterality Date    CATARACT EXTRACTION W/ INTRAOCULAR LENS IMPLANT Right 2023    Procedure: CATARACT PHACO EXTRACTION WITH INTRAOCULAR LENS IMPLANT;  Surgeon: Portillo Bailey MD;  Location: [facility] OR;  Service: Ophthalmology;  Laterality: Right;    COLON SURGERY      Sigmoid    COLONOSCOPY N/A 10/26/2020    Procedure: COLONOSCOPY;  Surgeon: Rodrigo Lambert MD;  Location: [facility] ENDOSCOPY;  Service: Gastroenterology;  Laterality: N/A;    COLONOSCOPY N/A 2023    Procedure: COLONOSCOPY;  Surgeon: Rodrigo Lambert MD;  Location: [facility] ENDOSCOPY;  Service: Gastroenterology;  Laterality: N/A;    LIVER SURGERY      PORTACATH PLACEMENT      still in place on left chest       Social History     Socioeconomic History    Marital status:    Tobacco Use    Smoking status: Never    Smokeless tobacco: Former     Types: Chew     Quit date:    Vaping Use    Vaping Use: Never used   Substance and Sexual Activity    Alcohol use: Not Currently    Drug use: No    Sexual activity: Defer       Family History   Problem Relation Age of Onset    COPD Mother     Heart disease Father     COPD Other        Prior to Admission Medications:  Medications Prior to Admission   Medication Sig Dispense Refill Last Dose    capecitabine (XELODA) 500 MG chemo tablet Take 3 tablets by mouth 2 (Two) Times a Day. ON 7 days, then OFF 7 days, then ON  7 days, then OFF 7 days in a 28-day period. Fills with [id number].   12/13/2023 at 0800    escitalopram (LEXAPRO) 10 MG tablet TAKE 1 TABLET BY MOUTH DAILY 90 tablet 3 12/13/2023 at 0800    gabapentin (NEURONTIN) 300 MG capsule TAKE 1 CAPSULE(300 MG) BY MOUTH THREE TIMES DAILY AS NEEDED 90 capsule 3 12/13/2023 at 2100    lisinopril (PRINIVIL,ZESTRIL) 20 MG tablet TAKE 1 TABLET BY MOUTH DAILY 90 tablet 3 12/13/2023 at 0800    prednisoLONE acetate (Pred Forte) 1 % ophthalmic suspension Administer 1 drop to the right eye 4 (Four) Times a Day.   12/13/2023 at 0800    traZODone (DESYREL) 100 MG tablet Take 1 tablet by mouth Every Night. (Patient taking differently: Take 0.5 tablets by mouth Every Night.) 90 tablet 3 12/13/2023 at 2100    Influenza Vac High-Dose Quad (FLUZONE HIGH DOSE) 0.7 ML suspension prefilled syringe injection Inject 0.7 mL into the appropriate muscle as directed by prescriber. 0.7 mL 0 Unknown       Allergies:  No Known Allergies     Vitals: Temp:  [97.4 °F (36.3 °C)] 97.4 °F (36.3 °C)  Heart Rate:  [52] 52  Resp:  [16] 16  BP: (126)/(62) 126/62    Review of Systems:   Within Normal Limits Abnormal   HEENT [x]    []     Cardiovascular [x]   []     Gastrointestinal [x]   []     Genitourinary [x]   []     Neurologic [x]   []     Pulmonary [x]   []       Physical Exam:   Within Normal Limits Abnormal   HEENT [x]    []     Heart [x]   []     Lungs [x]   []     Abdomen [x]   []     Extremities [x]   []       Impression: Left nuclear sclerotic cataract.     Plan: CATARACT PHACO EXTRACTION WITH INTRAOCULAR LENS IMPLANT LEFT (Left)     Portillo Bailey MD  12/14/2023

## 2023-12-14 NOTE — ANESTHESIA POSTPROCEDURE EVALUATION
Patient: Gordon Avila    Procedure Summary       Date: 12/14/23 Room / Location: [facility] OR 3 /  [facility] OR    Anesthesia Start: 1152 Anesthesia Stop: 1210    Procedure: CATARACT PHACO EXTRACTION WITH INTRAOCULAR LENS IMPLANT LEFT (Left: Eye) Diagnosis:       Nuclear sclerotic cataract of left eye      (Nuclear sclerotic cataract of left eye [H25.12])    Surgeons: Portillo Bailey MD Provider: Salena Hodges CRNA    Anesthesia Type: MAC ASA Status: 3            Anesthesia Type: MAC    Vitals  Vitals Value Taken Time   /58 12/14/23 1214   Temp 98.2 °F (36.8 °C) 12/14/23 1214   Pulse 50 12/14/23 1214   Resp 16 12/14/23 1214   SpO2 97 % 12/14/23 1214           Post Anesthesia Care and Evaluation    Patient location during evaluation: PHASE II  Patient participation: complete - patient participated  Level of consciousness: awake and alert  Pain score: 0  Pain management: satisfactory to patient    Airway patency: patent  Anesthetic complications: No anesthetic complications  PONV Status: none  Cardiovascular status: acceptable and stable  Respiratory status: acceptable  Hydration status: acceptable    Comments: Vitals signs as noted in nursing documentation as per protocol.

## 2023-12-14 NOTE — DISCHARGE INSTRUCTIONS
Post Operative Cataract Instructions     Left Eye  POST OPERATIVE INSTRUCTIONS  You have received anesthesia today. DO NOT drive, drink alcohol, sign legal documents.   After surgery, your eye will not hurt. It may feel scratchy, sticky or uncomfortable. Your eye will be sensitive to light.  Most people see better 1-3 days after the procedure, but it could take 3 weeks to get the full benefits and reach your visual potential. If your vision is blurry for a few days it is normal, and means you may have swelling outside or inside the eye. For some patients, a bubble is placed and there will be blurriness.   You should receive a post-op kit with tape and an eye shield. Wear the shield for the first 3 nights after surgery to keep you from rubbing the eye.  You may wear glasses or sunglasses during the day.  You must keep eye protected at all times.  Most people are able to return to their normal routine 1-3 days after surgery, however, due to the brain adjusting to your new vision you may have trouble judging distances and want to be careful when driving and going up and downstairs.   You can shower and wash your hair the day after surgery. Keep water, shampoo, hair spray and shaving lotion out of the eye, especially for the first week.   If eyes become stuck together after surgery you may soak with warm cloth.  During the first week, you should AVOID:   Rubbing or putting pressure on your eye.  Eye make-up, face cream or lotion, hair coloring or perms  Strenuous activities, such as running or lifting weights, as to avoid sweat from getting in the eye. Avoid swimming, hot tubs, fumes or anh conditions.   Sleeping on operative side.  Excessive sneezing or coughing.  Hanging the head down for periods of time. Keep your head above your heart.    Some discomfort and blurred vision after surgery are normal, but if you have any unusual pain, swelling, bleeding or sudden decrease in vision, contact our office immediately.      Emergency assistance is available at any time by calling:    Dr.Mark Leo Bailey  [phone]    If unable to reach call [facility] @ [phone]    You have been prescribed an eye drop to use after surgery, please follow these instructions and bring eye drops and instructions with you to post op appointment:    Prednisolone Acetate 1% (Pred Forte')  Shake well before use.    USE 1 DROP 4 (FOUR) TIMES A DAY FOR 1 WEEK THEN STARTING WEEK 2, ONLY USE 2 (TWO) TIMES A DAY UNTIL YOU RUN OUT OF DROPS.     PLACE A ARCHIE IN THE DAY COLUMN EACH TIME YOU USE TO KEEP ON SCHEDULE    WEEK 1-USE 4  (FOUR) TIMES A DAY DAY 1  []   []    []   []     DAY 2  []   []    []   []  DAY 3  []   []    []   []  DAY 4  []   []    []   []  DAY 5  []   []    []   []  DAY 6  []   []    []   []  DAY 7  []   []    []   []      WEEK 2-USE 2 (TWO)  TIMES A DAY DAY 1  []   []  DAY 2  []   []  DAY 3  []   []  DAY 4  []   []  DAY 5  []   []  DAY 6  []   []  DAY 7  []   []      WEEK 3-USE 2 (TWO) TIMES A DAY DAY 1  []   []  DAY 2  []   []  DAY 3  []   []  DAY 4  []   []  DAY 5  []   []  DAY 6  []   []  DAY 7  []   []      WEEK 4-USE 2 (TWO)TIMES A DAY DAY 1  []   []  DAY 2  []   []  DAY 3  []   []  DAY 4  []   []  DAY 5  []   []  DAY 6  []   []  DAY 7  []   []

## 2023-12-14 NOTE — OP NOTE
OPERATIVE NOTE    Date of Procedure: 12/14/2023  Patient Name: Gordon Avila  Patient MRN: [id number]  YOB: 1950     Preoperative Diagnosis: Left nuclear sclerotic cataract.     Postoperative Diagnosis: Left nuclear sclerotic cataract.     Procedure Performed: Phacoemulsification with implantation of a  foldable posterior chamber intraocular lens, Left eye.     Surgeon: Portillo Bailey MD    Anesthesia:  Monitored Anesthesia Care (MAC)      Brief History and Indication: The patient presents with a history of past progressive loss of vision.  Patient was diagnosed with cataract and requests removal for increased ability to read and see.     Operation Description: The patient was taken to the OR and prepped and draped in the usual sterile ophthalmic fashion. A lid speculum was placed in the eye.  A #75 blade was then used to make a stab incision two o’clock hours from the intended temporal clear cornea groove. The anterior chamber was then inflated with a Viscoelastic. A metal microkeratome blade was then used to enter the anterior chamber at the temporal clear cornea site. A three level tunnel incision was made. A curvilinear capsulorrhexis was then performed with a bent cystotome needle and capsulorrhexis forceps.  BSS on a 30 gauge bent cannula was used to hydro-dissect, and hydro-delineate the lens. Good fluid waves and hydro-delineation were noted. Phacoemulsification was then used to remove nuclear material without complications. The residual cortical and lenticular material was then removed with irrigation and aspiration. Viscoelastics were then used to inflate the bag in a soft shell technique. A PCIOL was injected into the bag. Post-implantation, there were no rents or tears in the bag and the lens was noted to be stable and centered. The residual Viscoelastic was then removed with irrigation and aspiration.  The wound was checked and found to be without leaks. Therefore a Polydex ointment and  one drop of Durezol eye drop was placed in the eye.     Implant Information:   Implant Name Type Inv. Item Serial No.  Lot No. LRB No. Used Action   LENS MONOFOCAL IQ [id number] - [id number] - [id number] Implant LENS MONOFOCAL IQ [id number]  Left 1 Implanted       Complications: None    Estimated Blood Loss:  Less than 1 cc.       [x]   Changed to complex procedure due to: []  hypermature, white cataract. Blue dye was used. [x]   small iris. A Malyugin Ring was used.    Discharge and Condition  The patient was transported to same day surgery in excellent condition and scheduled for follow-up tomorrow morning. The patient was given instructions on use of eye drops for the operative eye and was specifically instructed to call Dr. Bailey at his office or home for any nausea, vomiting, headache, decreased visual acuity, or pain, or if the patient had any general concerns.    Portillo Bailey MD  12/14/2023

## 2024-01-08 DIAGNOSIS — R42 DIZZINESS: ICD-10-CM

## 2024-01-08 DIAGNOSIS — G62.0 PERIPHERAL NEUROPATHY DUE TO CHEMOTHERAPY: ICD-10-CM

## 2024-01-08 DIAGNOSIS — T45.1X5A PERIPHERAL NEUROPATHY DUE TO CHEMOTHERAPY: ICD-10-CM

## 2024-01-08 DIAGNOSIS — C18.7 MALIGNANT NEOPLASM OF SIGMOID COLON: ICD-10-CM

## 2024-01-08 DIAGNOSIS — R55 SYNCOPE, UNSPECIFIED SYNCOPE TYPE: ICD-10-CM

## 2024-01-08 RX ORDER — GABAPENTIN 300 MG/1
CAPSULE ORAL
Qty: 90 CAPSULE | Refills: 3 | Status: SHIPPED | OUTPATIENT
Start: 2024-01-08

## 2024-01-08 NOTE — TELEPHONE ENCOUNTER
Caller: Gordon Avila    Relationship: Self    Best call back number: [phone]     Requested Prescriptions:   Requested Prescriptions     Pending Prescriptions Disp Refills    gabapentin (NEURONTIN) 300 MG capsule 90 capsule 3     Sig: TAKE 1 CAPSULE(300 MG) BY MOUTH THREE TIMES DAILY AS NEEDED        Pharmacy where request should be sent: [id number] [place] [address] [address] AT [facility] [address] - [phone] PH - [phone] FX     Last office visit with prescribing clinician: 9/30/2022   Last telemedicine visit with prescribing clinician: Visit date not found   Next office visit with prescribing clinician: 1/19/2024     Additional details provided by patient: PT HAS BEEN OUT FOR A COUPLE OF WEEKS.    Does the patient have less than a 3 day supply:  [x] Yes  [] No    Would you like a call back once the refill request has been completed: [] Yes [x] No    If the office needs to give you a call back, can they leave a voicemail: [] Yes [x] No    Lalo Castillo Rep   01/08/24 09:55 EST

## 2024-03-06 ENCOUNTER — HOSPITAL ENCOUNTER (OUTPATIENT)
Dept: CT IMAGING | Facility: HOSPITAL | Age: 74
Discharge: HOME | End: 2024-03-06
Payer: MEDICARE

## 2024-03-06 ENCOUNTER — INFUSION (OUTPATIENT)
Dept: ONCOLOGY | Facility: HOSPITAL | Age: 74
End: 2024-03-06
Payer: MEDICARE

## 2024-03-06 DIAGNOSIS — C18.7 MALIGNANT NEOPLASM OF SIGMOID COLON: Primary | ICD-10-CM

## 2024-03-06 DIAGNOSIS — C18.7 MALIGNANT NEOPLASM OF SIGMOID COLON: ICD-10-CM

## 2024-03-06 DIAGNOSIS — C78.7 SECONDARY MALIGNANT NEOPLASM OF LIVER AND INTRAHEPATIC BILE DUCT: ICD-10-CM

## 2024-03-06 LAB
ALBUMIN SERPL-MCNC: 4.4 G/DL (ref 3.5–5.2)
ALBUMIN/GLOB SERPL: 1.9 G/DL
ALP SERPL-CCNC: 69 U/L (ref 39–117)
ALT SERPL W P-5'-P-CCNC: 12 U/L (ref 1–41)
ANION GAP SERPL CALCULATED.3IONS-SCNC: 10.8 MMOL/L (ref 5–15)
AST SERPL-CCNC: 19 U/L (ref 1–40)
BASOPHILS # BLD AUTO: 0.01 10*3/MM3 (ref 0–0.2)
BASOPHILS NFR BLD AUTO: 0.3 % (ref 0–1.5)
BILIRUB SERPL-MCNC: 1 MG/DL (ref 0–1.2)
BUN SERPL-MCNC: 14 MG/DL (ref 8–23)
BUN/CREAT SERPL: 13.9 (ref 7–25)
CALCIUM SPEC-SCNC: 9.3 MG/DL (ref 8.6–10.5)
CEA SERPL-MCNC: 2.53 NG/ML
CHLORIDE SERPL-SCNC: 103 MMOL/L (ref 98–107)
CO2 SERPL-SCNC: 24.2 MMOL/L (ref 22–29)
CREAT SERPL-MCNC: 1.01 MG/DL (ref 0.76–1.27)
DEPRECATED RDW RBC AUTO: 54.8 FL (ref 37–54)
EGFRCR SERPLBLD CKD-EPI 2021: 79 ML/MIN/1.73
EOSINOPHIL # BLD AUTO: 0.01 10*3/MM3 (ref 0–0.4)
EOSINOPHIL NFR BLD AUTO: 0.3 % (ref 0.3–6.2)
ERYTHROCYTE [DISTWIDTH] IN BLOOD BY AUTOMATED COUNT: 14.4 % (ref 12.3–15.4)
GLOBULIN UR ELPH-MCNC: 2.3 GM/DL
GLUCOSE SERPL-MCNC: 98 MG/DL (ref 65–99)
HCT VFR BLD AUTO: 36.8 % (ref 37.5–51)
HGB BLD-MCNC: 12.6 G/DL (ref 13–17.7)
IMM GRANULOCYTES # BLD AUTO: 0.01 10*3/MM3 (ref 0–0.05)
IMM GRANULOCYTES NFR BLD AUTO: 0.3 % (ref 0–0.5)
LYMPHOCYTES # BLD AUTO: 1.08 10*3/MM3 (ref 0.7–3.1)
LYMPHOCYTES NFR BLD AUTO: 36 % (ref 19.6–45.3)
MCH RBC QN AUTO: 35.7 PG (ref 26.6–33)
MCHC RBC AUTO-ENTMCNC: 34.2 G/DL (ref 31.5–35.7)
MCV RBC AUTO: 104.2 FL (ref 79–97)
MONOCYTES # BLD AUTO: 0.36 10*3/MM3 (ref 0.1–0.9)
MONOCYTES NFR BLD AUTO: 12 % (ref 5–12)
NEUTROPHILS NFR BLD AUTO: 1.53 10*3/MM3 (ref 1.7–7)
NEUTROPHILS NFR BLD AUTO: 51.1 % (ref 42.7–76)
NRBC BLD AUTO-RTO: 0 /100 WBC (ref 0–0.2)
PLATELET # BLD AUTO: 141 10*3/MM3 (ref 140–450)
PMV BLD AUTO: 10.1 FL (ref 6–12)
POTASSIUM SERPL-SCNC: 4.6 MMOL/L (ref 3.5–5.2)
PROT SERPL-MCNC: 6.7 G/DL (ref 6–8.5)
RBC # BLD AUTO: 3.53 10*6/MM3 (ref 4.14–5.8)
SODIUM SERPL-SCNC: 138 MMOL/L (ref 136–145)
WBC NRBC COR # BLD AUTO: 3 10*3/MM3 (ref 3.4–10.8)

## 2024-03-06 PROCEDURE — 96523 IRRIG DRUG DELIVERY DEVICE: CPT

## 2024-03-06 PROCEDURE — 74177 CT ABD & PELVIS W/CONTRAST: CPT

## 2024-03-06 PROCEDURE — 25510000001 IOPAMIDOL 61 % SOLUTION: Performed by: NURSE PRACTITIONER

## 2024-03-06 PROCEDURE — 80053 COMPREHEN METABOLIC PANEL: CPT | Performed by: NURSE PRACTITIONER

## 2024-03-06 PROCEDURE — 82378 CARCINOEMBRYONIC ANTIGEN: CPT | Performed by: NURSE PRACTITIONER

## 2024-03-06 PROCEDURE — 71260 CT THORAX DX C+: CPT

## 2024-03-06 PROCEDURE — 25010000002 HEPARIN LOCK FLUSH PER 10 UNITS: Performed by: NURSE PRACTITIONER

## 2024-03-06 PROCEDURE — 85025 COMPLETE CBC W/AUTO DIFF WBC: CPT | Performed by: NURSE PRACTITIONER

## 2024-03-06 PROCEDURE — 36591 DRAW BLOOD OFF VENOUS DEVICE: CPT

## 2024-03-06 RX ORDER — HEPARIN SODIUM (PORCINE) LOCK FLUSH IV SOLN 100 UNIT/ML 100 UNIT/ML
500 SOLUTION INTRAVENOUS AS NEEDED
OUTPATIENT
Start: 2024-03-06

## 2024-03-06 RX ORDER — SODIUM CHLORIDE 0.9 % (FLUSH) 0.9 %
10 SYRINGE (ML) INJECTION AS NEEDED
Status: DISCONTINUED | OUTPATIENT
Start: 2024-03-06 | End: 2024-03-06 | Stop reason: HOSPADM

## 2024-03-06 RX ORDER — HEPARIN SODIUM (PORCINE) LOCK FLUSH IV SOLN 100 UNIT/ML 100 UNIT/ML
500 SOLUTION INTRAVENOUS AS NEEDED
Status: DISCONTINUED | OUTPATIENT
Start: 2024-03-06 | End: 2024-03-06 | Stop reason: HOSPADM

## 2024-03-06 RX ORDER — SODIUM CHLORIDE 0.9 % (FLUSH) 0.9 %
10 SYRINGE (ML) INJECTION AS NEEDED
OUTPATIENT
Start: 2024-03-06

## 2024-03-06 RX ADMIN — HEPARIN 500 UNITS: 100 SYRINGE at 10:39

## 2024-03-06 RX ADMIN — Medication 10 ML: at 10:39

## 2024-03-06 RX ADMIN — IOPAMIDOL 100 ML: 612 INJECTION, SOLUTION INTRAVENOUS at 10:33

## 2024-03-11 ENCOUNTER — OFFICE VISIT (OUTPATIENT)
Dept: ONCOLOGY | Facility: CLINIC | Age: 74
End: 2024-03-11
Payer: MEDICARE

## 2024-03-11 VITALS
RESPIRATION RATE: 12 BRPM | WEIGHT: 153 LBS | TEMPERATURE: 98.6 F | OXYGEN SATURATION: 99 % | HEART RATE: 59 BPM | SYSTOLIC BLOOD PRESSURE: 130 MMHG | DIASTOLIC BLOOD PRESSURE: 65 MMHG | HEIGHT: 73 IN | BODY MASS INDEX: 20.28 KG/M2

## 2024-03-11 DIAGNOSIS — R42 DIZZINESS: ICD-10-CM

## 2024-03-11 DIAGNOSIS — R55 SYNCOPE, UNSPECIFIED SYNCOPE TYPE: ICD-10-CM

## 2024-03-11 DIAGNOSIS — G62.0 PERIPHERAL NEUROPATHY DUE TO CHEMOTHERAPY: ICD-10-CM

## 2024-03-11 DIAGNOSIS — C18.7 MALIGNANT NEOPLASM OF SIGMOID COLON: ICD-10-CM

## 2024-03-11 DIAGNOSIS — T45.1X5A PERIPHERAL NEUROPATHY DUE TO CHEMOTHERAPY: ICD-10-CM

## 2024-03-11 PROCEDURE — 3078F DIAST BP <80 MM HG: CPT | Performed by: NURSE PRACTITIONER

## 2024-03-11 PROCEDURE — 1160F RVW MEDS BY RX/DR IN RCRD: CPT | Performed by: NURSE PRACTITIONER

## 2024-03-11 PROCEDURE — 99214 OFFICE O/P EST MOD 30 MIN: CPT | Performed by: NURSE PRACTITIONER

## 2024-03-11 PROCEDURE — 3075F SYST BP GE 130 - 139MM HG: CPT | Performed by: NURSE PRACTITIONER

## 2024-03-11 PROCEDURE — 1159F MED LIST DOCD IN RCRD: CPT | Performed by: NURSE PRACTITIONER

## 2024-03-11 PROCEDURE — 1126F AMNT PAIN NOTED NONE PRSNT: CPT | Performed by: NURSE PRACTITIONER

## 2024-03-11 RX ORDER — GABAPENTIN 300 MG/1
CAPSULE ORAL
Qty: 90 CAPSULE | Refills: 3 | Status: SHIPPED | OUTPATIENT
Start: 2024-03-11

## 2024-03-11 NOTE — PROGRESS NOTES
CHIEF COMPLAINT: 1.  Peripheral neuropathy of the hands and feet, worse in the feet                                       2.  Follow-up for colon cancer    Problem List:  Oncology/Hematology History Overview Note   1. Stage CARLOS, [id number] colon cancer with 2 out of 14 pericolonic lymph nodes  involved and a left lobe liver biopsy positive for metastasis, presenting with  a 25 pound weight loss and diarrhea with some perirectal soreness and had  colonoscopy with Dr. Mcclain in January that found this lesion that was  circumferential high-grade invading into the pericolonic fat, status post  sigmoid colectomy of a poorly differentiated adenocarcinoma.   a) Baseline CEA postop of 0.8 with alkaline phosphatase 111, with normal liver  enzymes and creatinine of 0.8. Baseline white count 9820 with a hemoglobin  13.8, platelets 339,000, and CT with contrast showing a right lobe liver dome  lesion as well as an anastomotic change in the bowel.   b) Began CapeOx 03/15/2016.  c) Added in Avastin to CapeOx 04/05/2016, second cycle. (This was 8 weeks out  from surgery).  d) KRAS mutation is negative.  E.) CAT scan in August 2016 shows resolution of disease in the liver and colon and a stable lung nodule.  Hence Avastin, Xeloda, and oxaliplatin continued.  F) oxaliplatin discontinued October 2016 due to worsening peripheral neuropathy.  G.) CTs of February 2017 showed no evidence of metastasis and stable bibasilar nodular scar.  Persistent neuropathy not worsening.  CEA 1.4.  Continuing Avastin and Xeloda without oxaliplatin.  Having some problems with irritation of his eyes on Xeloda.  2.  Peripheral neuropathy, chemotherapy induced     Malignant neoplasm of sigmoid colon   5/20/2016 Initial Diagnosis    Malignant neoplasm of sigmoid colon     5/2/2017 Imaging    CT chest, abdomen, pelvis IMPRESSION:  1. No disease recurrence.  2. Minimal areas of nodular thickening in the right lower lobe, stable.     8/2/2017 Imaging    CT  chest/abdomen/pelvis IMPRESSION:  Stable examination with no evidence of acute intrathoracic,  intra-abdominal or pelvic abnormality. There is no evidence of  progression of disease. No metastatic disease.      10/31/2017 -  Chemotherapy    OP CENTRAL VENOUS ACCESS DEVICE CARE AND MAINTENANCE     11/13/2017 Imaging    CT chest/abdomen/pelvis IMPRESSION:  Stable examination without evidence of acute intrathoracic  intra-abdominal or intrapelvic abnormality. No evidence of progression  of disease.   CEA 1.3.     2/6/2020 -  Chemotherapy    OP COLON Capecitabine 1,250 mg/m2 BID (8 cycles)         HISTORY OF PRESENT ILLNESS:  The patient is a 73 y.o. male, here for follow up on management of Stage IV a colon cancer.  He continues on Xeloda alone.  He continues on 1500 mg 7 days on 7 days off.  Denies any diarrhea.  He has restarted his blood pressure medicine and that is keeping his blood pressure within normal limits he says.  He continues on sleeping medication that has helped.  Neuropathy is stable overall.  He says he is feeling great.  He says he realized that he is almost 10 years at this point and is happy with how his care has went.         Past Medical History:   Diagnosis Date    Dental bridge present     H/O exercise stress test     Patient states it was normal    Hypertension     Liver disease     mets from colon cancer    Malignant neoplasm of colon     Seizure 2019    Wears glasses      Past Surgical History:   Procedure Laterality Date    CATARACT EXTRACTION W/ INTRAOCULAR LENS IMPLANT Right 11/30/2023    Procedure: CATARACT PHACO EXTRACTION WITH INTRAOCULAR LENS IMPLANT;  Surgeon: Portillo Bailey MD;  Location: [facility] OR;  Service: Ophthalmology;  Laterality: Right;    CATARACT EXTRACTION W/ INTRAOCULAR LENS IMPLANT Left 12/14/2023    Procedure: CATARACT PHACO EXTRACTION WITH INTRAOCULAR LENS IMPLANT LEFT;  Surgeon: Portillo Bailey MD;  Location: [facility] OR;  Service: Ophthalmology;  Laterality:  "Left;    COLON SURGERY  2014    Sigmoid    COLONOSCOPY N/A 10/26/2020    Procedure: COLONOSCOPY;  Surgeon: Rodrigo Lambert MD;  Location: [facility] ENDOSCOPY;  Service: Gastroenterology;  Laterality: N/A;    COLONOSCOPY N/A 5/26/2023    Procedure: COLONOSCOPY;  Surgeon: Rodrigo Lambert MD;  Location: [facility] ENDOSCOPY;  Service: Gastroenterology;  Laterality: N/A;    LIVER SURGERY  2014    PORTACATH PLACEMENT      still in place on left chest       No Known Allergies    Family History and Social History reviewed and changed as necessary      REVIEW OF SYSTEM:     Review of Systems   Constitutional:  Positive for fatigue.   Musculoskeletal:  Positive for arthralgias.   All other systems reviewed and are negative.         PHYSICAL EXAM    Vitals:    03/11/24 0853   BP: 130/65   Pulse: 59   Resp: 12   Temp: 98.6 °F (37 °C)   SpO2: 99%   Weight: 69.4 kg (153 lb)   Height: 185.4 cm (73\")       ECOG: (1) Restricted in physically strenuous activity, ambulatory and able to do work of light nature  General: well appearing male in no acute distress  Neuro: alert and oriented  HEENT: sclera anicteric, oropharynx clear  Lymphatics: no cervical, supraclavicular, or axillary adenopathy  Cardiovascular: regular rate and rhythm, no murmurs  Lungs: clear to auscultation bilaterally  Abdomen: soft, nontender, nondistended.  No palpable organomegaly  Extremeties: no lower extremity edema  Skin: no rashes, lesions, bruising, or petechiae  Psych: mood and affect appropriate        Vitals reviewed.  Laboratory data reviewed     Lab Results   Component Value Date    WBC 3.00 (L) 03/06/2024    HGB 12.6 (L) 03/06/2024    HCT 36.8 (L) 03/06/2024    .2 (H) 03/06/2024     03/06/2024       Lab Results   Component Value Date    GLUCOSE 98 03/06/2024    BUN 14 03/06/2024    CREATININE 1.01 03/06/2024    EGFRIFNONA 89 01/21/2022    EGFRIFAFRI 81 08/04/2020    BCR 13.9 03/06/2024    K 4.6 03/06/2024    CO2 24.2 03/06/2024    CALCIUM 9.3 " 03/06/2024    PROTENTOTREF 6.9 08/04/2020    ALBUMIN 4.4 03/06/2024    LABIL2 2.6 08/04/2020    AST 19 03/06/2024    ALT 12 03/06/2024       Lab Results   Component Value Date    CEA 2.53 03/06/2024    CEA 2.51 09/29/2023    CEA 3.09 04/21/2023    CEA 2.90 01/13/2023     CT Chest Without Contrast Diagnostic    Result Date: 7/13/2021  Interval resolution of the nodular opacity in the right lower lobe which was likely infectious or inflammatory in nature.  318.12 mGy.cm   This study was performed with techniques to keep radiation doses as low as reasonably achievable (ALARA). Individualized dose reduction techniques using automated exposure control or adjustment of mA and/or kV according to the patient size were employed.  This report was finalized on 7/13/2021 3:44 PM by David Ingram M.D..    CT Chest With Contrast Diagnostic    Result Date: 10/27/2021  No evidence of metastatic disease involving the chest, abdomen or pelvis.  This study was performed with techniques to keep radiation doses as low as reasonably achievable (ALARA). Individualized dose reduction techniques using automated exposure control or adjustment of mA and/or kV according to the patient size were employed.  This report was finalized on 10/27/2021 9:11 AM by David Ingram M.D..    CT Abdomen Pelvis With Contrast    Result Date: 10/27/2021  No evidence of metastatic disease involving the chest, abdomen or pelvis.  This study was performed with techniques to keep radiation doses as low as reasonably achievable (ALARA). Individualized dose reduction techniques using automated exposure control or adjustment of mA and/or kV according to the patient size were employed.  This report was finalized on 10/27/2021 9:11 AM by David Ingram M.D..            Assessment/Plan     1. Metastatic colon cancer with Solitary Liver metastasis initially diagnosed 2-.  We will continue single agent Xeloda 1500 mg 7 days on and 7 days off.      I  discussed with the patient that CT scan showed no evidence of disease recurrence or progression.  We will plan to repeat CT scan in 6 months.      We discussed CBC and CMP from 3/6/2024 was stable.  CEA stable at 2.53.  He had colonoscopy on 5/26/2023 that showed no evidence of cancer.    2. Peripheral neuropathy secondary to chemotherapy.  Stable.  We will continue gabapentin 1 capsule by mouth 3 times a day. I will refill today.     3. Port.  We will continue port care once every 2 months with clinic appointments. We will check CBC, CMP, CEA with each port flush.       Follow-up in 2 months with port flush and labs        Adeola Sunshine, APRN    03/11/24

## 2024-03-20 ENCOUNTER — SPECIALTY PHARMACY (OUTPATIENT)
Dept: ONCOLOGY | Facility: HOSPITAL | Age: 74
End: 2024-03-20
Payer: MEDICARE

## 2024-03-20 RX ORDER — CAPECITABINE 500 MG/1
1500 TABLET, FILM COATED ORAL 2 TIMES DAILY
Qty: 84 TABLET | Refills: 11 | Status: SHIPPED | OUTPATIENT
Start: 2024-03-20

## 2024-03-20 RX ORDER — TRAZODONE HYDROCHLORIDE 100 MG/1
100 TABLET ORAL NIGHTLY
Qty: 90 TABLET | Refills: 3 | Status: SHIPPED | OUTPATIENT
Start: 2024-03-20

## 2024-03-20 RX ORDER — CAPECITABINE 500 MG/1
TABLET, FILM COATED ORAL
OUTPATIENT
Start: 2024-03-20

## 2024-03-20 NOTE — PROGRESS NOTES
Re: Refills of Oral Specialty Medication - Xeloda (capecitabine)    Drug-Drug Interactions: The current medication list was reviewed and there are some relevant drug-drug interactions with the oral specialty medication, including:  Concomitant use of Xeloda and Lexapro increases the risk of QTc prolongation. Patient has been tolerating both medications well for years.  Medication Allergies: The patient has NKDA.  Review of Labs/Dose Adjustments: NO DOSE CHANGE - I reviewed the most recent note and labs and the patient will continue without any dose changes.  I sent refills as described below.    Drug: Xeloda (capecitabine)  Strength: 500 mg  Directions: Take 3 tablets by mouth twice a day for 7 days on, 7 days off, then another 7 days on, 7 days off in each 28-day cycle. Take within 30 minutes of a meal.  Quantity: 84  Refills: 11  Pharmacy prescription sent to: [id number]    Ann Cowden Mayer, PharmD, [facility]  Oncology Clinical Pharmacist  Phone [phone]    3/20/2024  15:21 EDT

## 2024-04-02 NOTE — TELEPHONE ENCOUNTER
Rx Refill Note  Requested Prescriptions     Pending Prescriptions Disp Refills   • clonazePAM (KlonoPIN) 1 MG tablet [Pharmacy Med Name: CLONAZEPAM 1MG TABLETS] 25 tablet      Sig: TAKE 1 TABLET BY MOUTH AT NIGHT AS NEEDED FOR ANXIETY      Last office visit with prescribing clinician: 10/4/2021      Next office visit with prescribing clinician: 10/6/2022            SAGE HILL MA  09/13/22, 11:04 EDT  
1+ B/L/right normal/left normal

## 2024-04-18 ENCOUNTER — SPECIALTY PHARMACY (OUTPATIENT)
Dept: ONCOLOGY | Facility: HOSPITAL | Age: 74
End: 2024-04-18
Payer: MEDICARE

## 2024-04-18 NOTE — PROGRESS NOTES
Attempted a clinical assessment.  Patient did answer but it wasn't a good time to speak.  He asked me to call back tomorrow 4/19/24.    Stephanie Gloria, PharmD, [facility]  Oncology Clinical Pharmacist   Phone: [phone]    4/18/2024  13:03 EDT

## 2024-04-19 ENCOUNTER — SPECIALTY PHARMACY (OUTPATIENT)
Dept: ONCOLOGY | Facility: HOSPITAL | Age: 74
End: 2024-04-19
Payer: MEDICARE

## 2024-04-19 NOTE — PROGRESS NOTES
Specialty Pharmacy Patient Management Program  Oncology 6-Month Clinical Assessment       Gordon Avila is a 73 y.o. male with metastatic colon cancer seen today to assess adherence and side effects.    Reason for Outreach: Routine medication check-in .    Regimen: Capecitabine 500 mg tablets - Take 3 tablets by mouth twice daily with food ON 7 days then OFF 7 days and repeat this in a 28-day cycle.    He started his most recent ON week 4/15/24.    Specialty Pharmacy Goal - ON TRACK   Goals Addressed Today        Specialty Pharmacy General Goal      Clinical goal/therapeutic target: stable or decreasing CEA and disease control, per the recent oncology clinic notes and labs. {  CEA   Date Value Ref Range Status   03/06/2024 2.53 ng/mL Final   09/29/2023 2.51 ng/mL Final   04/21/2023 3.09 ng/mL Final   01/13/2023 2.90 ng/mL Final   09/30/2022 2.37 ng/mL Final   07/15/2022 1.67 ng/mL Final   04/15/2022 1.33 ng/mL Final   01/21/2022 1.51 ng/mL Final   10/29/2021 1.60 ng/mL Final   08/23/2021 2.23 ng/mL Final   ]              Problem List   Problem list reviewed by Waleska Gloria [facility] on 4/19/2024 at 12:12 PM  Patient Active Problem List   Diagnosis Code    Malignant neoplasm of sigmoid colon C18.7    Peripheral neuropathy due to chemotherapy G62.0, T45.1X5A    History of known metastasis to liver Z85.89    HTN (hypertension), benign I10    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    Antineoplastic chemotherapy induced pancytopenia (CODE) D61.810    Mood disorder F39    Tooth abscess K04.7    History of colon polyps Z86.010    Nuclear sclerotic cataract of right eye H25.11    Nuclear sclerotic cataract of left eye H25.12       Medication Assessment for Specialty Medication(s):  Medication Assessment  Follow Up Clinical Assessment  Linked Medication(s) Assessed: Capecitabine  Therapeutic appropriateness: Appropriate  Medication tolerability: Tolerating with no to minimal  ADRs  Medication plan: Continue therapy with normal follow-up  Quality of Life Improvement Scale: 10-Significantly better  Administration: Patient is taking every day at the same time  and with food  on the assigned ON days.  Patient can self administer medications: yes  Medication Follow-Up Plan: Next clinical assessment  Lab Review: The labs listed below have been reviewed. No dose adjustments are needed for the oral specialty medication(s) based on the labs.    Lab Results   Component Value Date    GLUCOSE 98 03/06/2024    CALCIUM 9.3 03/06/2024     03/06/2024    K 4.6 03/06/2024    CO2 24.2 03/06/2024     03/06/2024    BUN 14 03/06/2024    CREATININE 1.01 03/06/2024    EGFRIFAFRI 81 08/04/2020    EGFRIFNONA 89 01/21/2022    BCR 13.9 03/06/2024    ANIONGAP 10.8 03/06/2024     Lab Results   Component Value Date    WBC 3.00 (L) 03/06/2024    RBC 3.53 (L) 03/06/2024    HGB 12.6 (L) 03/06/2024    HCT 36.8 (L) 03/06/2024    .2 (H) 03/06/2024    MCH 35.7 (H) 03/06/2024    MCHC 34.2 03/06/2024    RDW 14.4 03/06/2024    RDWSD 54.8 (H) 03/06/2024    MPV 10.1 03/06/2024     03/06/2024    NEUTRORELPCT 51.1 03/06/2024    LYMPHORELPCT 36.0 03/06/2024    MONORELPCT 12.0 03/06/2024    EOSRELPCT 0.3 03/06/2024    BASORELPCT 0.3 03/06/2024    AUTOIGPER 0.3 03/06/2024    NEUTROABS 1.53 (L) 03/06/2024    LYMPHSABS 1.08 03/06/2024    MONOSABS 0.36 03/06/2024    EOSABS 0.01 03/06/2024    BASOSABS 0.01 03/06/2024    AUTOIGNUM 0.01 03/06/2024    NRBC 0.0 03/06/2024     ANC 1530 is a bit low.  Based on his ON/OFF days he would have started the ON week 3/4/24 so the ANC was drawn 2 days after starting Xeloda.  It's not unusual that the ANC would decrease during his ON weeks.  No adjustments are needed in the dose.    Drug-drug interactions  Completed medication reconciliation today to assess for drug interactions. Patient denies starting or stopping any medications.    Assessed medication list for interactions,  capecitabine and escitalopram can prolong the QTc interval. He's been stable on both medications and denied heart palpitations or dizziness.  No changes needed at this time.  Advised patient to call the clinic if any new medications are started so we can assess for drug-drug interactions.  Drug-food interactions discussed: None  Vaccines are coordinated by the patient's oncologist and primary care provider.    Medications   Medicines reviewed by Waleska Gloria RP on 4/19/2024 at 12:11 PM  Prior to Admission medications    Medication Sig Start Date End Date Taking? Authorizing Provider   capecitabine (XELODA) 500 MG chemo tablet Take 3 tablets by mouth 2 (Two) Times a Day. for 7 days on, 7 days off, then another 7 days on, 7 days off in each 28-day cycle. Take within 30 minutes of a meal. 3/20/24   Santi Abad MD   escitalopram (LEXAPRO) 10 MG tablet TAKE 1 TABLET BY MOUTH DAILY 10/5/23   Ed Abraham MD   gabapentin (NEURONTIN) 300 MG capsule TAKE 1 CAPSULE(300 MG) BY MOUTH THREE TIMES DAILY AS NEEDED 3/11/24   Adeola Sunshine APRN   lisinopril (PRINIVIL,ZESTRIL) 20 MG tablet TAKE 1 TABLET BY MOUTH DAILY 6/23/23   Ed Abraham MD   traZODone (DESYREL) 100 MG tablet TAKE 1 TABLET BY MOUTH EVERY NIGHT 3/20/24   Ed Abraham MD       Allergies  Known allergies and reactions were discussed with the patient. The patient's chart has been reviewed for allergy information and updated as necessary.   No Known Allergies      Allergies reviewed by Waleska Gloria RPH on 4/19/2024 at 12:11 PM    Hospitalizations and Urgent Care Visits Since Last Assessment:  Unplanned hospitalizations or inpatient admissions: 0  ED Visits: 0  Urgent Office Visits: 0  He had cataract extraction and lens implant in the right eye 11/30/23 and in the left eye 12/14/23    Adherence Assessment:  Adherence Questions  Linked Medication(s) Assessed: Capecitabine  On average, how many doses/injections does the patient  miss per month?: 0  What are the identified reasons for non-adherence or missed doses? : no problems identified  What is the estimated medication adherence level?: % (PDC 92%)  Based on the patient/caregiver response and refill history, does this patient require an MTP to track adherence improvements?: no    Quality of Life Assessment:  Quality of Life Assessment  Quality of Life Improvement Scale: 10-Significantly better  -- Quality of Life: 10/10    Financial Assessment:  Medication availability/affordability: Patient has had no issues obtaining medication from pharmacy.    Attestation:  I attest the patient was actively involved in and has agreed to the above plan of care. If the prescribed therapy is at any point deemed not appropriate based on the current or future assessments, a consultation will be initiated with the patient's specialty care provider to determine the best course of action. The revised plan of therapy will be documented along with any required assessments and/or additional patient education provided.       All questions addressed and patient had no additional concerns. Patient has pharmacy contact information.    Stephanie Gloria PharmD, [facility]  Oncology Clinical Pharmacist   Phone: [phone]    4/19/2024  12:22 EDT

## 2024-05-17 ENCOUNTER — OFFICE VISIT (OUTPATIENT)
Dept: ONCOLOGY | Facility: CLINIC | Age: 74
End: 2024-05-17
Payer: MEDICARE

## 2024-05-17 ENCOUNTER — INFUSION (OUTPATIENT)
Dept: ONCOLOGY | Facility: HOSPITAL | Age: 74
End: 2024-05-17
Payer: MEDICARE

## 2024-05-17 ENCOUNTER — SPECIALTY PHARMACY (OUTPATIENT)
Dept: ONCOLOGY | Facility: HOSPITAL | Age: 74
End: 2024-05-17
Payer: MEDICARE

## 2024-05-17 VITALS
OXYGEN SATURATION: 99 % | HEART RATE: 57 BPM | DIASTOLIC BLOOD PRESSURE: 75 MMHG | WEIGHT: 152 LBS | HEIGHT: 73 IN | RESPIRATION RATE: 16 BRPM | BODY MASS INDEX: 20.15 KG/M2 | TEMPERATURE: 99.1 F | SYSTOLIC BLOOD PRESSURE: 134 MMHG

## 2024-05-17 DIAGNOSIS — C18.7 MALIGNANT NEOPLASM OF SIGMOID COLON: Primary | ICD-10-CM

## 2024-05-17 DIAGNOSIS — C78.7 SECONDARY MALIGNANT NEOPLASM OF LIVER AND INTRAHEPATIC BILE DUCT: ICD-10-CM

## 2024-05-17 LAB
ALBUMIN SERPL-MCNC: 4.4 G/DL (ref 3.5–5.2)
ALBUMIN/GLOB SERPL: 2 G/DL
ALP SERPL-CCNC: 75 U/L (ref 39–117)
ALT SERPL W P-5'-P-CCNC: 11 U/L (ref 1–41)
ANION GAP SERPL CALCULATED.3IONS-SCNC: 8.7 MMOL/L (ref 5–15)
AST SERPL-CCNC: 19 U/L (ref 1–40)
BASOPHILS # BLD AUTO: 0.02 10*3/MM3 (ref 0–0.2)
BASOPHILS NFR BLD AUTO: 0.7 % (ref 0–1.5)
BILIRUB SERPL-MCNC: 1.1 MG/DL (ref 0–1.2)
BUN SERPL-MCNC: 13 MG/DL (ref 8–23)
BUN/CREAT SERPL: 13 (ref 7–25)
CALCIUM SPEC-SCNC: 9.5 MG/DL (ref 8.6–10.5)
CEA SERPL-MCNC: 2.41 NG/ML
CHLORIDE SERPL-SCNC: 103 MMOL/L (ref 98–107)
CO2 SERPL-SCNC: 25.3 MMOL/L (ref 22–29)
CREAT SERPL-MCNC: 1 MG/DL (ref 0.76–1.27)
DEPRECATED RDW RBC AUTO: 57.8 FL (ref 37–54)
EGFRCR SERPLBLD CKD-EPI 2021: 80 ML/MIN/1.73
EOSINOPHIL # BLD AUTO: 0.02 10*3/MM3 (ref 0–0.4)
EOSINOPHIL NFR BLD AUTO: 0.7 % (ref 0.3–6.2)
ERYTHROCYTE [DISTWIDTH] IN BLOOD BY AUTOMATED COUNT: 14.9 % (ref 12.3–15.4)
GLOBULIN UR ELPH-MCNC: 2.2 GM/DL
GLUCOSE SERPL-MCNC: 97 MG/DL (ref 65–99)
HCT VFR BLD AUTO: 37 % (ref 37.5–51)
HGB BLD-MCNC: 12.7 G/DL (ref 13–17.7)
IMM GRANULOCYTES # BLD AUTO: 0.01 10*3/MM3 (ref 0–0.05)
IMM GRANULOCYTES NFR BLD AUTO: 0.3 % (ref 0–0.5)
LYMPHOCYTES # BLD AUTO: 0.88 10*3/MM3 (ref 0.7–3.1)
LYMPHOCYTES NFR BLD AUTO: 29.3 % (ref 19.6–45.3)
MCH RBC QN AUTO: 35.8 PG (ref 26.6–33)
MCHC RBC AUTO-ENTMCNC: 34.3 G/DL (ref 31.5–35.7)
MCV RBC AUTO: 104.2 FL (ref 79–97)
MONOCYTES # BLD AUTO: 0.33 10*3/MM3 (ref 0.1–0.9)
MONOCYTES NFR BLD AUTO: 11 % (ref 5–12)
NEUTROPHILS NFR BLD AUTO: 1.74 10*3/MM3 (ref 1.7–7)
NEUTROPHILS NFR BLD AUTO: 58 % (ref 42.7–76)
NRBC BLD AUTO-RTO: 0 /100 WBC (ref 0–0.2)
PLATELET # BLD AUTO: 131 10*3/MM3 (ref 140–450)
PMV BLD AUTO: 10.2 FL (ref 6–12)
POTASSIUM SERPL-SCNC: 4.6 MMOL/L (ref 3.5–5.2)
PROT SERPL-MCNC: 6.6 G/DL (ref 6–8.5)
RBC # BLD AUTO: 3.55 10*6/MM3 (ref 4.14–5.8)
SODIUM SERPL-SCNC: 137 MMOL/L (ref 136–145)
WBC NRBC COR # BLD AUTO: 3 10*3/MM3 (ref 3.4–10.8)

## 2024-05-17 PROCEDURE — 99214 OFFICE O/P EST MOD 30 MIN: CPT | Performed by: NURSE PRACTITIONER

## 2024-05-17 PROCEDURE — 82378 CARCINOEMBRYONIC ANTIGEN: CPT | Performed by: NURSE PRACTITIONER

## 2024-05-17 PROCEDURE — 85025 COMPLETE CBC W/AUTO DIFF WBC: CPT | Performed by: NURSE PRACTITIONER

## 2024-05-17 PROCEDURE — 25010000002 HEPARIN LOCK FLUSH PER 10 UNITS

## 2024-05-17 PROCEDURE — 1159F MED LIST DOCD IN RCRD: CPT | Performed by: NURSE PRACTITIONER

## 2024-05-17 PROCEDURE — 1126F AMNT PAIN NOTED NONE PRSNT: CPT | Performed by: NURSE PRACTITIONER

## 2024-05-17 PROCEDURE — 36591 DRAW BLOOD OFF VENOUS DEVICE: CPT

## 2024-05-17 PROCEDURE — 1160F RVW MEDS BY RX/DR IN RCRD: CPT | Performed by: NURSE PRACTITIONER

## 2024-05-17 PROCEDURE — 3078F DIAST BP <80 MM HG: CPT | Performed by: NURSE PRACTITIONER

## 2024-05-17 PROCEDURE — 80053 COMPREHEN METABOLIC PANEL: CPT | Performed by: NURSE PRACTITIONER

## 2024-05-17 PROCEDURE — 3075F SYST BP GE 130 - 139MM HG: CPT | Performed by: NURSE PRACTITIONER

## 2024-05-17 RX ORDER — SODIUM CHLORIDE 0.9 % (FLUSH) 0.9 %
10 SYRINGE (ML) INJECTION AS NEEDED
Status: DISCONTINUED | OUTPATIENT
Start: 2024-05-17 | End: 2024-05-17 | Stop reason: HOSPADM

## 2024-05-17 RX ORDER — SODIUM CHLORIDE 0.9 % (FLUSH) 0.9 %
10 SYRINGE (ML) INJECTION AS NEEDED
OUTPATIENT
Start: 2024-05-17

## 2024-05-17 RX ORDER — HEPARIN SODIUM (PORCINE) LOCK FLUSH IV SOLN 100 UNIT/ML 100 UNIT/ML
SOLUTION INTRAVENOUS
Status: COMPLETED
Start: 2024-05-17 | End: 2024-05-17

## 2024-05-17 RX ORDER — HEPARIN SODIUM (PORCINE) LOCK FLUSH IV SOLN 100 UNIT/ML 100 UNIT/ML
500 SOLUTION INTRAVENOUS AS NEEDED
OUTPATIENT
Start: 2024-05-17

## 2024-05-17 RX ORDER — HEPARIN SODIUM (PORCINE) LOCK FLUSH IV SOLN 100 UNIT/ML 100 UNIT/ML
500 SOLUTION INTRAVENOUS AS NEEDED
Status: DISCONTINUED | OUTPATIENT
Start: 2024-05-17 | End: 2024-05-17 | Stop reason: HOSPADM

## 2024-05-17 RX ADMIN — Medication 10 ML: at 09:09

## 2024-05-17 RX ADMIN — HEPARIN SODIUM (PORCINE) LOCK FLUSH IV SOLN 100 UNIT/ML 500 UNITS: 100 SOLUTION at 09:09

## 2024-05-17 RX ADMIN — HEPARIN 500 UNITS: 100 SYRINGE at 09:09

## 2024-05-17 NOTE — PROGRESS NOTES
CHIEF COMPLAINT: 1.  Peripheral neuropathy of the hands and feet, worse in the feet                                       2.  Follow-up for colon cancer    Problem List:  Oncology/Hematology History Overview Note   1. Stage CARLOS, [id number] colon cancer with 2 out of 14 pericolonic lymph nodes  involved and a left lobe liver biopsy positive for metastasis, presenting with  a 25 pound weight loss and diarrhea with some perirectal soreness and had  colonoscopy with Dr. Mcclain in January that found this lesion that was  circumferential high-grade invading into the pericolonic fat, status post  sigmoid colectomy of a poorly differentiated adenocarcinoma.   a) Baseline CEA postop of 0.8 with alkaline phosphatase 111, with normal liver  enzymes and creatinine of 0.8. Baseline white count 9820 with a hemoglobin  13.8, platelets 339,000, and CT with contrast showing a right lobe liver dome  lesion as well as an anastomotic change in the bowel.   b) Began CapeOx 03/15/2016.  c) Added in Avastin to CapeOx 04/05/2016, second cycle. (This was 8 weeks out  from surgery).  d) KRAS mutation is negative.  E.) CAT scan in August 2016 shows resolution of disease in the liver and colon and a stable lung nodule.  Hence Avastin, Xeloda, and oxaliplatin continued.  F) oxaliplatin discontinued October 2016 due to worsening peripheral neuropathy.  G.) CTs of February 2017 showed no evidence of metastasis and stable bibasilar nodular scar.  Persistent neuropathy not worsening.  CEA 1.4.  Continuing Avastin and Xeloda without oxaliplatin.  Having some problems with irritation of his eyes on Xeloda.  2.  Peripheral neuropathy, chemotherapy induced     Malignant neoplasm of sigmoid colon   5/20/2016 Initial Diagnosis    Malignant neoplasm of sigmoid colon     5/2/2017 Imaging    CT chest, abdomen, pelvis IMPRESSION:  1. No disease recurrence.  2. Minimal areas of nodular thickening in the right lower lobe, stable.     8/2/2017 Imaging    CT  chest/abdomen/pelvis IMPRESSION:  Stable examination with no evidence of acute intrathoracic,  intra-abdominal or pelvic abnormality. There is no evidence of  progression of disease. No metastatic disease.      10/31/2017 -  Chemotherapy    OP CENTRAL VENOUS ACCESS DEVICE CARE AND MAINTENANCE     11/13/2017 Imaging    CT chest/abdomen/pelvis IMPRESSION:  Stable examination without evidence of acute intrathoracic  intra-abdominal or intrapelvic abnormality. No evidence of progression  of disease.   CEA 1.3.     2/6/2020 -  Chemotherapy    OP COLON Capecitabine 1,250 mg/m2 BID (8 cycles)         HISTORY OF PRESENT ILLNESS:  The patient is a 73 y.o. male, here for follow up on management of Stage IV a colon cancer.  He continues on Xeloda alone.  1500 mg 7 days on 7 days off.  Denies any diarrhea.  Blood pressure has improved.  Continues on sleeping medicine that greatly helps.  Neuropathy stable with gabapentin.  Overall he says he feels great.  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  He is planning surgery on May 24.           Past Medical History:   Diagnosis Date    Dental bridge present     H/O exercise stress test     Patient states it was normal    Hypertension     Liver disease     mets from colon cancer    Malignant neoplasm of colon     Seizure 2019    Wears glasses      Past Surgical History:   Procedure Laterality Date    CATARACT EXTRACTION W/ INTRAOCULAR LENS IMPLANT Right 11/30/2023    Procedure: CATARACT PHACO EXTRACTION WITH INTRAOCULAR LENS IMPLANT;  Surgeon: Portillo Bailey MD;  Location: [facility] OR;  Service: Ophthalmology;  Laterality: Right;    CATARACT EXTRACTION W/ INTRAOCULAR LENS IMPLANT Left 12/14/2023    Procedure: CATARACT PHACO EXTRACTION WITH INTRAOCULAR LENS IMPLANT LEFT;  Surgeon: Portillo Bailey MD;  Location: [facility];  Service: Ophthalmology;  Laterality: Left;    COLON SURGERY  2014    Sigmoid    COLONOSCOPY N/A 10/26/2020    Procedure:  "COLONOSCOPY;  Surgeon: Rodrigo Lambert MD;  Location: [facility] ENDOSCOPY;  Service: Gastroenterology;  Laterality: N/A;    COLONOSCOPY N/A 5/26/2023    Procedure: COLONOSCOPY;  Surgeon: Rodrigo Lambert MD;  Location: [facility] ENDOSCOPY;  Service: Gastroenterology;  Laterality: N/A;    LIVER SURGERY  2014    PORTACATH PLACEMENT      still in place on left chest       No Known Allergies    Family History and Social History reviewed and changed as necessary      REVIEW OF SYSTEM:     Review of Systems   Constitutional:  Positive for fatigue.   Musculoskeletal:  Positive for arthralgias.   Skin:         Right eye skin cancer   All other systems reviewed and are negative.         PHYSICAL EXAM    Vitals:    05/17/24 0827   BP: 134/75   Pulse: 57   Resp: 16   Temp: 99.1 °F (37.3 °C)   SpO2: 99%   Weight: 68.9 kg (152 lb)   Height: 185.4 cm (73\")       ECOG: (1) Restricted in physically strenuous activity, ambulatory and able to do work of light nature  General: well appearing male in no acute distress  Neuro: alert and oriented  HEENT: sclera anicteric, oropharynx clear  Lymphatics: no cervical, supraclavicular, or axillary adenopathy  Cardiovascular: regular rate and rhythm, no murmurs  Lungs: clear to auscultation bilaterally  Abdomen: soft, nontender, nondistended.  No palpable organomegaly  Extremeties: no lower extremity edema  Skin: no rashes, lesions, bruising, or petechiae  Psych: mood and affect appropriate          Vitals reviewed.  Laboratory data reviewed     Lab Results   Component Value Date    WBC 3.00 (L) 03/06/2024    HGB 12.6 (L) 03/06/2024    HCT 36.8 (L) 03/06/2024    .2 (H) 03/06/2024     03/06/2024       Lab Results   Component Value Date    GLUCOSE 98 03/06/2024    BUN 14 03/06/2024    CREATININE 1.01 03/06/2024    EGFRIFNONA 89 01/21/2022    EGFRIFAFRI 81 08/04/2020    BCR 13.9 03/06/2024    K 4.6 03/06/2024    CO2 24.2 03/06/2024    CALCIUM 9.3 03/06/2024    PROTENTOTREF 6.9 08/04/2020 "    ALBUMIN 4.4 03/06/2024    LABIL2 2.6 08/04/2020    AST 19 03/06/2024    ALT 12 03/06/2024       Lab Results   Component Value Date    CEA 2.53 03/06/2024    CEA 2.51 09/29/2023    CEA 3.09 04/21/2023    CEA 2.90 01/13/2023     CT Chest Without Contrast Diagnostic    Result Date: 7/13/2021  Interval resolution of the nodular opacity in the right lower lobe which was likely infectious or inflammatory in nature.  318.12 mGy.cm   This study was performed with techniques to keep radiation doses as low as reasonably achievable (ALARA). Individualized dose reduction techniques using automated exposure control or adjustment of mA and/or kV according to the patient size were employed.  This report was finalized on 7/13/2021 3:44 PM by David Ingram M.D..    CT Chest With Contrast Diagnostic    Result Date: 10/27/2021  No evidence of metastatic disease involving the chest, abdomen or pelvis.  This study was performed with techniques to keep radiation doses as low as reasonably achievable (ALARA). Individualized dose reduction techniques using automated exposure control or adjustment of mA and/or kV according to the patient size were employed.  This report was finalized on 10/27/2021 9:11 AM by David Ingram M.D..    CT Abdomen Pelvis With Contrast    Result Date: 10/27/2021  No evidence of metastatic disease involving the chest, abdomen or pelvis.  This study was performed with techniques to keep radiation doses as low as reasonably achievable (ALARA). Individualized dose reduction techniques using automated exposure control or adjustment of mA and/or kV according to the patient size were employed.  This report was finalized on 10/27/2021 9:11 AM by David Ingram M.D..            Assessment/Plan     1. Metastatic colon cancer with Solitary Liver metastasis initially diagnosed 2-.  We will continue single agent Xeloda 1500 mg 7 days on and 7 days off. We will plan to repeat Ct scan in 6 months. This  would be due in September 2024.    I discussed the case with Dr. Abad.  We will plan to hold Xeloda 1 week after surgery and restart June 2.    We reviewed CBC and CMP from 3/6/2024 was stable.  CEA stable at 2.53.  He had colonoscopy on 5/26/2023 that showed no evidence of cancer.  We will repeat CBC and CMP with CEA today.    2. Peripheral neuropathy secondary to chemotherapy.  Stable.  We will continue gabapentin 1 capsule by mouth 3 times a day.    3. Port.  We will continue port care once every 2 months with clinic appointments. We will check CBC, CMP, CEA with each port flush.       Follow-up in 2 months with port flush and labs    Total time of patient care including time prior to, face to face with patient, and following visit spent in reviewing records, lab results, imaging studies, discussion with patient, and documentation/charting was > 31 minutes          Adeola Sunshine, APRN    05/17/24

## 2024-07-01 RX ORDER — LISINOPRIL 20 MG/1
20 TABLET ORAL DAILY
Qty: 90 TABLET | Refills: 3 | Status: SHIPPED | OUTPATIENT
Start: 2024-07-01

## 2024-08-16 ENCOUNTER — INFUSION (OUTPATIENT)
Dept: ONCOLOGY | Facility: HOSPITAL | Age: 74
End: 2024-08-16
Payer: MEDICARE

## 2024-08-16 ENCOUNTER — OFFICE VISIT (OUTPATIENT)
Dept: ONCOLOGY | Facility: CLINIC | Age: 74
End: 2024-08-16
Payer: MEDICARE

## 2024-08-16 VITALS
RESPIRATION RATE: 16 BRPM | TEMPERATURE: 99.1 F | SYSTOLIC BLOOD PRESSURE: 148 MMHG | DIASTOLIC BLOOD PRESSURE: 70 MMHG | BODY MASS INDEX: 20.82 KG/M2 | HEIGHT: 73 IN | WEIGHT: 157.1 LBS | HEART RATE: 54 BPM | OXYGEN SATURATION: 98 %

## 2024-08-16 DIAGNOSIS — C18.7 MALIGNANT NEOPLASM OF SIGMOID COLON: Primary | ICD-10-CM

## 2024-08-16 LAB
ALBUMIN SERPL-MCNC: 4.4 G/DL (ref 3.5–5.2)
ALBUMIN/GLOB SERPL: 1.9 G/DL
ALP SERPL-CCNC: 64 U/L (ref 39–117)
ALT SERPL W P-5'-P-CCNC: 10 U/L (ref 1–41)
ANION GAP SERPL CALCULATED.3IONS-SCNC: 7.4 MMOL/L (ref 5–15)
AST SERPL-CCNC: 18 U/L (ref 1–40)
BASOPHILS # BLD AUTO: 0.01 10*3/MM3 (ref 0–0.2)
BASOPHILS NFR BLD AUTO: 0.3 % (ref 0–1.5)
BILIRUB SERPL-MCNC: 0.7 MG/DL (ref 0–1.2)
BUN SERPL-MCNC: 14 MG/DL (ref 8–23)
BUN/CREAT SERPL: 15.2 (ref 7–25)
CALCIUM SPEC-SCNC: 9.2 MG/DL (ref 8.6–10.5)
CEA SERPL-MCNC: 2.76 NG/ML
CHLORIDE SERPL-SCNC: 106 MMOL/L (ref 98–107)
CO2 SERPL-SCNC: 24.6 MMOL/L (ref 22–29)
CREAT SERPL-MCNC: 0.92 MG/DL (ref 0.76–1.27)
DEPRECATED RDW RBC AUTO: 57.7 FL (ref 37–54)
EGFRCR SERPLBLD CKD-EPI 2021: 88.4 ML/MIN/1.73
EOSINOPHIL # BLD AUTO: 0.03 10*3/MM3 (ref 0–0.4)
EOSINOPHIL NFR BLD AUTO: 0.8 % (ref 0.3–6.2)
ERYTHROCYTE [DISTWIDTH] IN BLOOD BY AUTOMATED COUNT: 15.1 % (ref 12.3–15.4)
GLOBULIN UR ELPH-MCNC: 2.3 GM/DL
GLUCOSE SERPL-MCNC: 105 MG/DL (ref 65–99)
HCT VFR BLD AUTO: 36.5 % (ref 37.5–51)
HGB BLD-MCNC: 12.4 G/DL (ref 13–17.7)
IMM GRANULOCYTES # BLD AUTO: 0.01 10*3/MM3 (ref 0–0.05)
IMM GRANULOCYTES NFR BLD AUTO: 0.3 % (ref 0–0.5)
LYMPHOCYTES # BLD AUTO: 0.97 10*3/MM3 (ref 0.7–3.1)
LYMPHOCYTES NFR BLD AUTO: 24.6 % (ref 19.6–45.3)
MACROCYTES BLD QL SMEAR: NORMAL
MCH RBC QN AUTO: 36 PG (ref 26.6–33)
MCHC RBC AUTO-ENTMCNC: 34 G/DL (ref 31.5–35.7)
MCV RBC AUTO: 106.1 FL (ref 79–97)
MONOCYTES # BLD AUTO: 0.34 10*3/MM3 (ref 0.1–0.9)
MONOCYTES NFR BLD AUTO: 8.6 % (ref 5–12)
NEUTROPHILS NFR BLD AUTO: 2.58 10*3/MM3 (ref 1.7–7)
NEUTROPHILS NFR BLD AUTO: 65.4 % (ref 42.7–76)
NRBC BLD AUTO-RTO: 0 /100 WBC (ref 0–0.2)
PLAT MORPH BLD: NORMAL
PLATELET # BLD AUTO: 156 10*3/MM3 (ref 140–450)
PMV BLD AUTO: 9.9 FL (ref 6–12)
POTASSIUM SERPL-SCNC: 4.3 MMOL/L (ref 3.5–5.2)
PROT SERPL-MCNC: 6.7 G/DL (ref 6–8.5)
RBC # BLD AUTO: 3.44 10*6/MM3 (ref 4.14–5.8)
SODIUM SERPL-SCNC: 138 MMOL/L (ref 136–145)
WBC MORPH BLD: NORMAL
WBC NRBC COR # BLD AUTO: 3.94 10*3/MM3 (ref 3.4–10.8)

## 2024-08-16 PROCEDURE — 85007 BL SMEAR W/DIFF WBC COUNT: CPT | Performed by: NURSE PRACTITIONER

## 2024-08-16 PROCEDURE — 1159F MED LIST DOCD IN RCRD: CPT | Performed by: NURSE PRACTITIONER

## 2024-08-16 PROCEDURE — 82378 CARCINOEMBRYONIC ANTIGEN: CPT | Performed by: NURSE PRACTITIONER

## 2024-08-16 PROCEDURE — 3077F SYST BP >= 140 MM HG: CPT | Performed by: NURSE PRACTITIONER

## 2024-08-16 PROCEDURE — 1126F AMNT PAIN NOTED NONE PRSNT: CPT | Performed by: NURSE PRACTITIONER

## 2024-08-16 PROCEDURE — 1160F RVW MEDS BY RX/DR IN RCRD: CPT | Performed by: NURSE PRACTITIONER

## 2024-08-16 PROCEDURE — 99214 OFFICE O/P EST MOD 30 MIN: CPT | Performed by: NURSE PRACTITIONER

## 2024-08-16 PROCEDURE — 85025 COMPLETE CBC W/AUTO DIFF WBC: CPT | Performed by: NURSE PRACTITIONER

## 2024-08-16 PROCEDURE — 80053 COMPREHEN METABOLIC PANEL: CPT | Performed by: NURSE PRACTITIONER

## 2024-08-16 PROCEDURE — 3078F DIAST BP <80 MM HG: CPT | Performed by: NURSE PRACTITIONER

## 2024-08-16 PROCEDURE — 25010000002 HEPARIN LOCK FLUSH PER 10 UNITS

## 2024-08-16 PROCEDURE — 36591 DRAW BLOOD OFF VENOUS DEVICE: CPT

## 2024-08-16 RX ORDER — HEPARIN SODIUM (PORCINE) LOCK FLUSH IV SOLN 100 UNIT/ML 100 UNIT/ML
500 SOLUTION INTRAVENOUS AS NEEDED
OUTPATIENT
Start: 2024-08-16

## 2024-08-16 RX ORDER — HEPARIN SODIUM (PORCINE) LOCK FLUSH IV SOLN 100 UNIT/ML 100 UNIT/ML
SOLUTION INTRAVENOUS
Status: COMPLETED
Start: 2024-08-16 | End: 2024-08-16

## 2024-08-16 RX ORDER — SODIUM CHLORIDE 0.9 % (FLUSH) 0.9 %
10 SYRINGE (ML) INJECTION AS NEEDED
Status: DISCONTINUED | OUTPATIENT
Start: 2024-08-16 | End: 2024-08-16 | Stop reason: HOSPADM

## 2024-08-16 RX ORDER — HEPARIN SODIUM (PORCINE) LOCK FLUSH IV SOLN 100 UNIT/ML 100 UNIT/ML
500 SOLUTION INTRAVENOUS AS NEEDED
Status: DISCONTINUED | OUTPATIENT
Start: 2024-08-16 | End: 2024-08-16 | Stop reason: HOSPADM

## 2024-08-16 RX ORDER — SODIUM CHLORIDE 0.9 % (FLUSH) 0.9 %
10 SYRINGE (ML) INJECTION AS NEEDED
OUTPATIENT
Start: 2024-08-16

## 2024-08-16 RX ADMIN — HEPARIN 500 UNITS: 100 SYRINGE at 09:00
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
## 2024-08-19 ENCOUNTER — SPECIALTY PHARMACY (OUTPATIENT)
Dept: ONCOLOGY | Facility: HOSPITAL | Age: 74
End: 2024-08-19
Payer: MEDICARE

## 2024-09-30 RX ORDER — ESCITALOPRAM OXALATE 10 MG/1
10 TABLET ORAL DAILY
Qty: 90 TABLET | Refills: 3 | Status: SHIPPED | OUTPATIENT
Start: 2024-09-30

## 2024-10-04 ENCOUNTER — OFFICE VISIT (OUTPATIENT)
Dept: ONCOLOGY | Facility: CLINIC | Age: 74
End: 2024-10-04
Payer: MEDICARE

## 2024-10-04 ENCOUNTER — SPECIALTY PHARMACY (OUTPATIENT)
Dept: ONCOLOGY | Facility: HOSPITAL | Age: 74
End: 2024-10-04
Payer: MEDICARE

## 2024-10-04 ENCOUNTER — HOSPITAL ENCOUNTER (OUTPATIENT)
Facility: HOSPITAL | Age: 74
Discharge: HOME | End: 2024-10-04
Payer: MEDICARE

## 2024-10-04 VITALS
HEIGHT: 73 IN | DIASTOLIC BLOOD PRESSURE: 68 MMHG | OXYGEN SATURATION: 99 % | WEIGHT: 153.4 LBS | HEART RATE: 55 BPM | TEMPERATURE: 97.7 F | SYSTOLIC BLOOD PRESSURE: 132 MMHG | RESPIRATION RATE: 16 BRPM | BODY MASS INDEX: 20.33 KG/M2

## 2024-10-04 DIAGNOSIS — C18.7 MALIGNANT NEOPLASM OF SIGMOID COLON: Primary | ICD-10-CM

## 2024-10-04 DIAGNOSIS — C78.7 SECONDARY MALIGNANT NEOPLASM OF LIVER AND INTRAHEPATIC BILE DUCT: ICD-10-CM

## 2024-10-04 LAB
ALBUMIN SERPL-MCNC: 4.5 G/DL (ref 3.5–5.2)
ALBUMIN/GLOB SERPL: 2 G/DL
ALP SERPL-CCNC: 71 U/L (ref 39–117)
ALT SERPL W P-5'-P-CCNC: 13 U/L (ref 1–41)
ANION GAP SERPL CALCULATED.3IONS-SCNC: 10.7 MMOL/L (ref 5–15)
AST SERPL-CCNC: 22 U/L (ref 1–40)
BASOPHILS # BLD AUTO: 0.01 10*3/MM3 (ref 0–0.2)
BASOPHILS NFR BLD AUTO: 0.3 % (ref 0–1.5)
BILIRUB SERPL-MCNC: 1 MG/DL (ref 0–1.2)
BUN SERPL-MCNC: 17 MG/DL (ref 8–23)
BUN/CREAT SERPL: 14.7 (ref 7–25)
CALCIUM SPEC-SCNC: 9.3 MG/DL (ref 8.6–10.5)
CEA SERPL-MCNC: 2.71 NG/ML
CHLORIDE SERPL-SCNC: 101 MMOL/L (ref 98–107)
CO2 SERPL-SCNC: 24.3 MMOL/L (ref 22–29)
CREAT SERPL-MCNC: 1.16 MG/DL (ref 0.76–1.27)
DEPRECATED RDW RBC AUTO: 56.8 FL (ref 37–54)
EGFRCR SERPLBLD CKD-EPI 2021: 66.9 ML/MIN/1.73
EOSINOPHIL # BLD AUTO: 0.01 10*3/MM3 (ref 0–0.4)
EOSINOPHIL NFR BLD AUTO: 0.3 % (ref 0.3–6.2)
ERYTHROCYTE [DISTWIDTH] IN BLOOD BY AUTOMATED COUNT: 14.6 % (ref 12.3–15.4)
GLOBULIN UR ELPH-MCNC: 2.3 GM/DL
GLUCOSE SERPL-MCNC: 95 MG/DL (ref 65–99)
HCT VFR BLD AUTO: 36.9 % (ref 37.5–51)
HGB BLD-MCNC: 12.8 G/DL (ref 13–17.7)
IMM GRANULOCYTES # BLD AUTO: 0.01 10*3/MM3 (ref 0–0.05)
IMM GRANULOCYTES NFR BLD AUTO: 0.3 % (ref 0–0.5)
LYMPHOCYTES # BLD AUTO: 0.95 10*3/MM3 (ref 0.7–3.1)
LYMPHOCYTES NFR BLD AUTO: 25.4 % (ref 19.6–45.3)
MCH RBC QN AUTO: 36 PG (ref 26.6–33)
MCHC RBC AUTO-ENTMCNC: 34.7 G/DL (ref 31.5–35.7)
MCV RBC AUTO: 103.7 FL (ref 79–97)
MONOCYTES # BLD AUTO: 0.45 10*3/MM3 (ref 0.1–0.9)
MONOCYTES NFR BLD AUTO: 12 % (ref 5–12)
NEUTROPHILS NFR BLD AUTO: 2.31 10*3/MM3 (ref 1.7–7)
NEUTROPHILS NFR BLD AUTO: 61.7 % (ref 42.7–76)
NRBC BLD AUTO-RTO: 0 /100 WBC (ref 0–0.2)
PLATELET # BLD AUTO: 151 10*3/MM3 (ref 140–450)
PMV BLD AUTO: 10.6 FL (ref 6–12)
POTASSIUM SERPL-SCNC: 4.4 MMOL/L (ref 3.5–5.2)
PROT SERPL-MCNC: 6.8 G/DL (ref 6–8.5)
RBC # BLD AUTO: 3.56 10*6/MM3 (ref 4.14–5.8)
SODIUM SERPL-SCNC: 136 MMOL/L (ref 136–145)
WBC NRBC COR # BLD AUTO: 3.74 10*3/MM3 (ref 3.4–10.8)

## 2024-10-04 PROCEDURE — 1126F AMNT PAIN NOTED NONE PRSNT: CPT | Performed by: NURSE PRACTITIONER

## 2024-10-04 PROCEDURE — 3078F DIAST BP <80 MM HG: CPT | Performed by: NURSE PRACTITIONER

## 2024-10-04 PROCEDURE — 80053 COMPREHEN METABOLIC PANEL: CPT | Performed by: NURSE PRACTITIONER

## 2024-10-04 PROCEDURE — 82378 CARCINOEMBRYONIC ANTIGEN: CPT | Performed by: NURSE PRACTITIONER

## 2024-10-04 PROCEDURE — 36591 DRAW BLOOD OFF VENOUS DEVICE: CPT

## 2024-10-04 PROCEDURE — 85025 COMPLETE CBC W/AUTO DIFF WBC: CPT | Performed by: NURSE PRACTITIONER

## 2024-10-04 PROCEDURE — 3075F SYST BP GE 130 - 139MM HG: CPT | Performed by: NURSE PRACTITIONER

## 2024-10-04 PROCEDURE — 99213 OFFICE O/P EST LOW 20 MIN: CPT | Performed by: NURSE PRACTITIONER

## 2024-10-04 PROCEDURE — 25010000002 HEPARIN LOCK FLUSH PER 10 UNITS: Performed by: NURSE PRACTITIONER

## 2024-10-04 RX ORDER — SODIUM CHLORIDE 0.9 % (FLUSH) 0.9 %
10 SYRINGE (ML) INJECTION AS NEEDED
Status: DISCONTINUED | OUTPATIENT
Start: 2024-10-04 | End: 2024-10-05 | Stop reason: HOSPADM

## 2024-10-04 RX ORDER — SODIUM CHLORIDE 0.9 % (FLUSH) 0.9 %
10 SYRINGE (ML) INJECTION AS NEEDED
OUTPATIENT
Start: 2024-10-04

## 2024-10-04 RX ORDER — HEPARIN SODIUM (PORCINE) LOCK FLUSH IV SOLN 100 UNIT/ML 100 UNIT/ML
500 SOLUTION INTRAVENOUS AS NEEDED
Status: DISCONTINUED | OUTPATIENT
Start: 2024-10-04 | End: 2024-10-05 | Stop reason: HOSPADM

## 2024-10-04 RX ORDER — HEPARIN SODIUM (PORCINE) LOCK FLUSH IV SOLN 100 UNIT/ML 100 UNIT/ML
500 SOLUTION INTRAVENOUS AS NEEDED
OUTPATIENT
Start: 2024-10-04

## 2024-10-04 RX ADMIN — Medication 10 ML: at 08:42

## 2024-10-04 RX ADMIN — HEPARIN 500 UNITS: 100 SYRINGE at 08:42

## 2024-10-04 NOTE — PROGRESS NOTES
CHIEF COMPLAINT: 1.  Peripheral neuropathy of the hands and feet, worse in the feet                                       2.  Follow-up for colon cancer    Problem List:  Oncology/Hematology History Overview Note   1. Stage CARLOS, P3O2pC7d colon cancer with 2 out of 14 pericolonic lymph nodes  involved and a left lobe liver biopsy positive for metastasis, presenting with  a 25 pound weight loss and diarrhea with some perirectal soreness and had  colonoscopy with Dr. Mcclain in January that found this lesion that was  circumferential high-grade invading into the pericolonic fat, status post  sigmoid colectomy of a poorly differentiated adenocarcinoma.   a) Baseline CEA postop of 0.8 with alkaline phosphatase 111, with normal liver  enzymes and creatinine of 0.8. Baseline white count 9820 with a hemoglobin  13.8, platelets 339,000, and CT with contrast showing a right lobe liver dome  lesion as well as an anastomotic change in the bowel.   b) Began CapeOx 03/15/2016.  c) Added in Avastin to CapeOx 04/05/2016, second cycle. (This was 8 weeks out  from surgery).  d) KRAS mutation is negative.  E.) CAT scan in August 2016 shows resolution of disease in the liver and colon and a stable lung nodule.  Hence Avastin, Xeloda, and oxaliplatin continued.  F) oxaliplatin discontinued October 2016 due to worsening peripheral neuropathy.  G.) CTs of February 2017 showed no evidence of metastasis and stable bibasilar nodular scar.  Persistent neuropathy not worsening.  CEA 1.4.  Continuing Avastin and Xeloda without oxaliplatin.  Having some problems with irritation of his eyes on Xeloda.  2.  Peripheral neuropathy, chemotherapy induced     Malignant neoplasm of sigmoid colon   5/20/2016 Initial Diagnosis    Malignant neoplasm of sigmoid colon     5/2/2017 Imaging    CT chest, abdomen, pelvis IMPRESSION:  1. No disease recurrence.  2. Minimal areas of nodular thickening in the right lower lobe, stable.     8/2/2017 Imaging    CT  chest/abdomen/pelvis IMPRESSION:  Stable examination with no evidence of acute intrathoracic,  intra-abdominal or pelvic abnormality. There is no evidence of  progression of disease. No metastatic disease.      10/31/2017 -  Chemotherapy    OP CENTRAL VENOUS ACCESS DEVICE CARE AND MAINTENANCE     11/13/2017 Imaging    CT chest/abdomen/pelvis IMPRESSION:  Stable examination without evidence of acute intrathoracic  intra-abdominal or intrapelvic abnormality. No evidence of progression  of disease.   CEA 1.3.     2/6/2020 -  Chemotherapy    OP COLON Capecitabine 1,250 mg/m2 BID (8 cycles)         HISTORY OF PRESENT ILLNESS:  The patient is a 73 y.o. male, here for follow up on management of Stage IV a colon cancer.  He continues on Xeloda alone.  He is on 1500 mg 7 days on 7 days off.  Denies any diarrhea.  Blood pressure stable.  Continues on his sleep medicine he says.  Neuropathy is stable with gabapentin.  He is playing golf a lot.  He was diagnosed with skin cancer of his right eye and is still working through that.             Past Medical History:   Diagnosis Date    Dental bridge present     H/O exercise stress test     Patient states it was normal    Hypertension     Liver disease     mets from colon cancer    Malignant neoplasm of colon     Seizure 2019    Wears glasses      Past Surgical History:   Procedure Laterality Date    CATARACT EXTRACTION W/ INTRAOCULAR LENS IMPLANT Right 11/30/2023    Procedure: CATARACT PHACO EXTRACTION WITH INTRAOCULAR LENS IMPLANT;  Surgeon: Portillo Bailey MD;  Location: [facility] OR;  Service: Ophthalmology;  Laterality: Right;    CATARACT EXTRACTION W/ INTRAOCULAR LENS IMPLANT Left 12/14/2023    Procedure: CATARACT PHACO EXTRACTION WITH INTRAOCULAR LENS IMPLANT LEFT;  Surgeon: Portillo Bailey MD;  Location: [facility] OR;  Service: Ophthalmology;  Laterality: Left;    COLON SURGERY  2014    Sigmoid    COLONOSCOPY N/A 10/26/2020    Procedure: COLONOSCOPY;  Surgeon: Fausto  "Rodrigo POSADA MD;  Location: [facility] ENDOSCOPY;  Service: Gastroenterology;  Laterality: N/A;    COLONOSCOPY N/A 5/26/2023    Procedure: COLONOSCOPY;  Surgeon: Rodrigo Lambert MD;  Location: [facility] ENDOSCOPY;  Service: Gastroenterology;  Laterality: N/A;    LIVER SURGERY  2014    PORTACATH PLACEMENT      still in place on left chest       No Known Allergies    Family History and Social History reviewed and changed as necessary      REVIEW OF SYSTEM:     Review of Systems   Constitutional:  Positive for fatigue.   Musculoskeletal:  Positive for arthralgias.   Skin:         Right eye skin cancer   All other systems reviewed and are negative.         PHYSICAL EXAM    Vitals:    10/04/24 0816   BP: 132/68   Pulse: 55   Resp: 16   Temp: 97.7 °F (36.5 °C)   SpO2: 99%   Weight: 69.6 kg (153 lb 6.4 oz)   Height: 185.4 cm (72.99\")       ECOG: (1) Restricted in physically strenuous activity, ambulatory and able to do work of light nature  General: well appearing male in no acute distress  Neuro: alert and oriented  HEENT: sclera anicteric, oropharynx clear  Lymphatics: no cervical, supraclavicular, or axillary adenopathy  Cardiovascular: regular rate and rhythm, no murmurs  Lungs: clear to auscultation bilaterally  Abdomen: soft, nontender, nondistended.  No palpable organomegaly  Extremeties: no lower extremity edema  Skin: no rashes, lesions, bruising, or petechiae  Psych: mood and affect appropriate          Vitals reviewed.  Laboratory data reviewed     Lab Results   Component Value Date    WBC 3.94 08/16/2024    HGB 12.4 (L) 08/16/2024    HCT 36.5 (L) 08/16/2024    .1 (H) 08/16/2024     08/16/2024       Lab Results   Component Value Date    GLUCOSE 105 (H) 08/16/2024    BUN 14 08/16/2024    CREATININE 0.92 08/16/2024    EGFRIFNONA 89 01/21/2022    EGFRIFAFRI 81 08/04/2020    BCR 15.2 08/16/2024    K 4.3 08/16/2024    CO2 24.6 08/16/2024    CALCIUM 9.2 08/16/2024    PROTENTOTREF 6.9 08/04/2020    ALBUMIN 4.4 " 08/16/2024    LABIL2 2.6 08/04/2020    AST 18 08/16/2024    ALT 10 08/16/2024       Lab Results   Component Value Date    CEA 2.76 08/16/2024    CEA 2.41 05/17/2024    CEA 2.53 03/06/2024    CEA 2.51 09/29/2023     CT Chest Without Contrast Diagnostic    Result Date: 7/13/2021  Interval resolution of the nodular opacity in the right lower lobe which was likely infectious or inflammatory in nature.  318.12 mGy.cm   This study was performed with techniques to keep radiation doses as low as reasonably achievable (ALARA). Individualized dose reduction techniques using automated exposure control or adjustment of mA and/or kV according to the patient size were employed.  This report was finalized on 7/13/2021 3:44 PM by David Ingram M.D..    CT Chest With Contrast Diagnostic    Result Date: 10/27/2021  No evidence of metastatic disease involving the chest, abdomen or pelvis.  This study was performed with techniques to keep radiation doses as low as reasonably achievable (ALARA). Individualized dose reduction techniques using automated exposure control or adjustment of mA and/or kV according to the patient size were employed.  This report was finalized on 10/27/2021 9:11 AM by David Ingram M.D..    CT Abdomen Pelvis With Contrast    Result Date: 10/27/2021  No evidence of metastatic disease involving the chest, abdomen or pelvis.  This study was performed with techniques to keep radiation doses as low as reasonably achievable (ALARA). Individualized dose reduction techniques using automated exposure control or adjustment of mA and/or kV according to the patient size were employed.  This report was finalized on 10/27/2021 9:11 AM by David Ingram M.D..            Assessment/Plan     1. Metastatic colon cancer with Solitary Liver metastasis initially diagnosed 2-.  Continue with single agent Xeloda 1500 mg 7 days on 7 days off.  I order scans prior to return.  Unfortunately these were not done.  He  was having trouble with his phone.  I asked the office to schedule before he left today.  Will have him follow-up in 2 to 4 weeks depending on when scans are scheduled.    We reviewed CBC and CMP from 8/16/2024 was stable.  CEA stable at 2.76.  He had colonoscopy on 5/26/2023 that showed no evidence of cancer.  We will repeat CBC and CMP with CEA today.    2. Peripheral neuropathy secondary to chemotherapy.  Stable.  We will continue gabapentin 1 capsule by mouth 3 times a day.    3. Port.  We will continue port care once every 2 months with clinic appointments. We will check CBC, CMP, CEA with each port flush.     4.  Skin cancer of right eye.  He will keep scheduled follow-up with optometrist and plastic surgeon.  We will plan to hold      Follow-up in 2 to 4 weeks with port flush as well as CT scans with Dr. Abad.        Adeola Sunshine, APRN    10/04/24

## 2024-10-21 DIAGNOSIS — T45.1X5A PERIPHERAL NEUROPATHY DUE TO CHEMOTHERAPY: ICD-10-CM

## 2024-10-21 DIAGNOSIS — R42 DIZZINESS: ICD-10-CM

## 2024-10-21 DIAGNOSIS — G62.0 PERIPHERAL NEUROPATHY DUE TO CHEMOTHERAPY: ICD-10-CM

## 2024-10-21 DIAGNOSIS — R55 SYNCOPE, UNSPECIFIED SYNCOPE TYPE: ICD-10-CM

## 2024-10-21 DIAGNOSIS — C18.7 MALIGNANT NEOPLASM OF SIGMOID COLON: ICD-10-CM

## 2024-10-21 RX ORDER — GABAPENTIN 300 MG/1
CAPSULE ORAL
Qty: 90 CAPSULE | Refills: 3 | Status: SHIPPED | OUTPATIENT
Start: 2024-10-21

## 2024-10-23 ENCOUNTER — TELEPHONE (OUTPATIENT)
Dept: ONCOLOGY | Facility: CLINIC | Age: 74
End: 2024-10-23
Payer: MEDICARE

## 2024-10-23 NOTE — TELEPHONE ENCOUNTER
----- Message from Sanket ROBERTO sent at 10/23/2024  8:35 AM EDT -----  Patient was bit by a dog yesterday and the dog has not had any vaccines. Patient wants to know if he should get the rabies shot where he's on oral chemo.     [phone]

## 2024-10-23 NOTE — TELEPHONE ENCOUNTER
Returned call to patient after discussing with Dr. Mercado. Discussing with patient, that if dog was neighbors or if the dog was a stray that determined if he needed vaccine. Patient stating dog was stray. Informing him that he will need to get patient set up for vaccines then to call PCP for them to help. Patient stating he will make contact.

## 2024-11-06 NOTE — TELEPHONE ENCOUNTER
Caller: Gordon Avila    Relationship: Self    Best call back number: [phone]     Requested Prescriptions:   Requested Prescriptions     Pending Prescriptions Disp Refills    escitalopram (LEXAPRO) 10 MG tablet 90 tablet 3     Sig: Take 1 tablet by mouth Daily.        Pharmacy where request should be sent: [facility] #[id number] [place] [address] [address] AT [facility] [address] - [phone]  - [phone] FX     Last office visit with prescribing clinician: 4/3/2023   Last telemedicine visit with prescribing clinician: Visit date not found   Next office visit with prescribing clinician: 11/8/2024     Additional details provided by patient: PATIENT OUT OF MEDICATION

## 2024-11-07 RX ORDER — ESCITALOPRAM OXALATE 10 MG/1
10 TABLET ORAL DAILY
Qty: 90 TABLET | Refills: 3 | Status: SHIPPED | OUTPATIENT
Start: 2024-11-07

## 2024-12-16 RX ORDER — LISINOPRIL 20 MG/1
20 TABLET ORAL DAILY
Qty: 90 TABLET | Refills: 3 | OUTPATIENT
Start: 2024-12-16

## 2024-12-16 RX ORDER — TRAZODONE HYDROCHLORIDE 100 MG/1
100 TABLET ORAL NIGHTLY
Qty: 90 TABLET | Refills: 3 | OUTPATIENT
Start: 2024-12-16

## 2024-12-16 NOTE — TELEPHONE ENCOUNTER
Caller: AvilaGordon    Relationship: Self    Best call back number: [phone]     Requested Prescriptions:   Requested Prescriptions     Pending Prescriptions Disp Refills    traZODone (DESYREL) 100 MG tablet 90 tablet 3     Sig: Take 1 tablet by mouth Every Night.    lisinopril (PRINIVIL,ZESTRIL) 20 MG tablet 90 tablet 3     Sig: Take 1 tablet by mouth Daily.        Pharmacy where request should be sent: [id number] [place] [address] [address] AT [facility] [address] - [phone]  - [phone] FX     Last office visit with prescribing clinician: 4/3/2023   Last telemedicine visit with prescribing clinician: Visit date not found   Next office visit with prescribing clinician: Visit date not found     Does the patient have less than a 3 day supply:  [x] Yes  [] No    Would you like a call back once the refill request has been completed: [] Yes [x] No    If the office needs to give you a call back, can they leave a voicemail: [] Yes [x] No    Lalo Dey Rep   12/16/24 08:47 EST

## 2024-12-16 NOTE — TELEPHONE ENCOUNTER
"Relay     \"Attempted to call, voicemail full. Patient should have refills at pharmacy, and needs to make an appointment before more requests can be filled. Patient not been seen in over a year and a half.\"                  "

## 2024-12-16 NOTE — TELEPHONE ENCOUNTER
Attempted to call, voicemail full. Patient should have refills at pharmacy, and needs to make an appointment before more requests can be filled. Patient not been seen in over a year and a half.

## 2024-12-19 ENCOUNTER — OFFICE VISIT (OUTPATIENT)
Dept: INTERNAL MEDICINE | Facility: CLINIC | Age: 74
End: 2024-12-19
Payer: MEDICARE

## 2024-12-19 VITALS
WEIGHT: 156 LBS | OXYGEN SATURATION: 100 % | TEMPERATURE: 97.5 F | SYSTOLIC BLOOD PRESSURE: 118 MMHG | DIASTOLIC BLOOD PRESSURE: 77 MMHG | HEIGHT: 72 IN | HEART RATE: 56 BPM | BODY MASS INDEX: 21.13 KG/M2 | RESPIRATION RATE: 16 BRPM

## 2024-12-19 DIAGNOSIS — G62.0 PERIPHERAL NEUROPATHY DUE TO CHEMOTHERAPY: ICD-10-CM

## 2024-12-19 DIAGNOSIS — I10 HTN (HYPERTENSION), BENIGN: Primary | ICD-10-CM

## 2024-12-19 DIAGNOSIS — T45.1X5A PERIPHERAL NEUROPATHY DUE TO CHEMOTHERAPY: ICD-10-CM

## 2024-12-19 DIAGNOSIS — H02.122 MECHANICAL ECTROPION OF RIGHT LOWER EYELID: ICD-10-CM

## 2024-12-19 PROBLEM — F32.4 MAJOR DEPRESSIVE DISORDER IN PARTIAL REMISSION: Status: ACTIVE | Noted: 2021-04-29

## 2024-12-19 RX ORDER — LISINOPRIL 20 MG/1
20 TABLET ORAL DAILY
Qty: 90 TABLET | Refills: 3 | Status: SHIPPED | OUTPATIENT
Start: 2024-12-19 | End: 2024-12-19 | Stop reason: SDUPTHER

## 2024-12-19 RX ORDER — LISINOPRIL 20 MG/1
20 TABLET ORAL DAILY
Qty: 90 TABLET | Refills: 3 | Status: SHIPPED | OUTPATIENT
Start: 2024-12-19 | End: 2024-12-20 | Stop reason: SDUPTHER

## 2024-12-19 NOTE — PROGRESS NOTES
The ABCs of the Annual Wellness Visit  Subsequent Medicare Wellness Visit    Subjective      Gordon Avila is a 74 y.o. male who presents for a Subsequent Medicare Wellness Visit.    Review of Systems   Constitutional: Negative.  Negative for activity change, appetite change, fatigue and fever.   HENT:  Negative for congestion, ear discharge, ear pain and trouble swallowing.    Eyes:  Negative for photophobia and visual disturbance.   Respiratory:  Negative for cough and shortness of breath.    Cardiovascular:  Negative for chest pain and palpitations.   Gastrointestinal:  Negative for abdominal distention, abdominal pain, constipation, diarrhea, nausea and vomiting.   Endocrine: Negative.    Genitourinary:  Negative for dysuria, hematuria and urgency.   Musculoskeletal:  Positive for arthralgias and gait problem. Negative for back pain, joint swelling and myalgias.   Skin:  Negative for color change and rash.   Allergic/Immunologic: Negative.    Neurological:  Negative for dizziness, weakness, light-headedness and headaches.   Hematological:  Negative for adenopathy. Does not bruise/bleed easily.   Psychiatric/Behavioral:  Positive for sleep disturbance. Negative for agitation, confusion and dysphoric mood. The patient is not nervous/anxious.         The following portions of the patient's history were reviewed and   updated as appropriate: allergies, current medications, past family history, past medical history, past social history, past surgical history, and problem list.    Compared to one year ago, the patient feels his physical   health is the same.    Compared to one year ago, the patient feels his mental   health is the same.    Recent Hospitalizations:  He was not admitted to the hospital during the last year.       Current Medical Providers:  Patient Care Team:  Ed Abraham MD as PCP - General (Internal Medicine)  Santi Abad MD as Consulting Physician (Hematology)    Outpatient  "Medications Prior to Visit   Medication Sig Dispense Refill    capecitabine (XELODA) 500 MG chemo tablet Take 3 tablets by mouth 2 (Two) Times a Day. for 7 days on, 7 days off, then another 7 days on, 7 days off in each 28-day cycle. Take within 30 minutes of a meal. 84 tablet 11    escitalopram (LEXAPRO) 10 MG tablet Take 1 tablet by mouth Daily. 90 tablet 3    gabapentin (NEURONTIN) 300 MG capsule TAKE 1 CAPSULE(300 MG) BY MOUTH THREE TIMES DAILY AS NEEDED 90 capsule 3    traZODone (DESYREL) 100 MG tablet TAKE 1 TABLET BY MOUTH EVERY NIGHT 90 tablet 3    lisinopril (PRINIVIL,ZESTRIL) 20 MG tablet TAKE 1 TABLET BY MOUTH DAILY 90 tablet 3     No facility-administered medications prior to visit.       No opioid medication identified on active medication list. I have reviewed chart for other potential  high risk medication/s and harmful drug interactions in the elderly.        Aspirin is not on active medication list.  Aspirin use is not indicated based on review of current medical condition/s. Risk of harm outweighs potential benefits.  .    Patient Active Problem List   Diagnosis    Malignant neoplasm of sigmoid colon    Peripheral neuropathy due to chemotherapy    History of known metastasis to liver    HTN (hypertension), benign    Secondary malignant neoplasm of liver and intrahepatic bile duct    Antineoplastic chemotherapy induced pancytopenia (CODE)    Mood disorder    Tooth abscess    History of colon polyps    Nuclear sclerotic cataract of right eye    Nuclear sclerotic cataract of left eye     Advance Care Planning  Advance Directive is not on file.  ACP discussion was held with the patient during this visit. Patient has an advance directive (not in EMR), copy requested.     Objective    Vitals:    12/19/24 0833   BP: 118/77   Pulse: 56   Resp: 16   Temp: 97.5 °F (36.4 °C)   SpO2: 100%   Weight: 70.8 kg (156 lb)   Height: 182.9 cm (72\")   PainSc: 0-No pain     Estimated body mass index is 21.16 kg/m² as " "calculated from the following:    Height as of this encounter: 182.9 cm (72\").    Weight as of this encounter: 70.8 kg (156 lb).    Physical Exam  Constitutional:       General: He is not in acute distress.     Appearance: He is well-developed.   HENT:      Nose: Nose normal.   Eyes:      General: No scleral icterus.     Conjunctiva/sclera: Conjunctivae normal.   Neck:      Thyroid: No thyromegaly.      Trachea: No tracheal deviation.   Cardiovascular:      Rate and Rhythm: Normal rate and regular rhythm.      Heart sounds: No murmur heard.     No friction rub.   Pulmonary:      Effort: No respiratory distress.      Breath sounds: No wheezing or rales.   Abdominal:      General: There is no distension.      Palpations: Abdomen is soft. There is no mass.      Tenderness: There is no abdominal tenderness. There is no guarding.   Musculoskeletal:         General: Deformity present. Normal range of motion.   Lymphadenopathy:      Cervical: No cervical adenopathy.   Skin:     General: Skin is warm and dry.      Findings: No erythema or rash.   Neurological:      Mental Status: He is alert and oriented to person, place, and time.      Cranial Nerves: No cranial nerve deficit.      Coordination: Coordination normal.      Deep Tendon Reflexes: Reflexes are normal and symmetric.   Psychiatric:         Behavior: Behavior normal.         Thought Content: Thought content normal.         Judgment: Judgment normal.       BMI is within normal parameters. No other follow-up for BMI required.      Does the patient have evidence of cognitive impairment?   No            HEALTH RISK ASSESSMENT    Smoking Status:  Social History     Tobacco Use   Smoking Status Never   Smokeless Tobacco Former    Types: Chew    Quit date: 2009     Alcohol Consumption:  Social History     Substance and Sexual Activity   Alcohol Use Not Currently     Fall Risk Screen:    STEADI Fall Risk Assessment was completed, and patient is at LOW risk for " falls.Assessment completed on:2024    Depression Screenin/19/2024     8:30 AM   PHQ-2/PHQ-9 Depression Screening   Little interest or pleasure in doing things Not at all   Feeling down, depressed, or hopeless Not at all   How difficult have these problems made it for you to do your work, take care of things at home, or get along with other people? Not difficult at all       Health Habits and Functional and Cognitive Screenin/19/2024     8:30 AM   Functional & Cognitive Status   Do you have difficulty preparing food and eating? No   Do you have difficulty bathing yourself, getting dressed or grooming yourself? No   Do you have difficulty using the toilet? No   Do you have difficulty moving around from place to place? No   Do you have trouble with steps or getting out of a bed or a chair? No   Current Diet Well Balanced Diet   Dental Exam Up to date   Eye Exam Up to date   Exercise (times per week) 6 times per week   Current Exercises Include Yard Work;Walking   Do you need help using the phone?  No   Are you deaf or do you have serious difficulty hearing?  Yes   Do you need help to go to places out of walking distance? No   Do you need help shopping? No   Do you need help preparing meals?  No   Do you need help with housework?  No   Do you need help with laundry? No   Do you need help taking your medications? No   Do you need help managing money? No   Do you ever drive or ride in a car without wearing a seat belt? No   Have you felt unusual stress, anger or loneliness in the last month? No   Who do you live with? Alone   If you need help, do you have trouble finding someone available to you? No   Have you been bothered in the last four weeks by sexual problems? No   Do you have difficulty concentrating, remembering or making decisions? Yes       Age-appropriate Screening Schedule:  Refer to the list below for future screening recommendations based on patient's age, sex and/or medical  conditions. Orders for these recommended tests are listed in the plan section. The patient has been provided with a written plan.    Health Maintenance   Topic Date Due    TDAP/TD VACCINES (1 - Tdap) 10/29/2022    ANNUAL WELLNESS VISIT  10/28/2023    LIPID PANEL  04/03/2024    COVID-19 Vaccine (6 - 2024-25 season) 11/07/2025 (Originally 9/1/2024)    ZOSTER VACCINE (2 of 2) 11/07/2025 (Originally 3/15/2023)    HEPATITIS C SCREENING  Completed    INFLUENZA VACCINE  Completed    Pneumococcal Vaccine 65+  Completed    AAA SCREEN (ONE-TIME)  Completed    COLONOSCOPY  Discontinued                CMS Preventative Services Quick Reference  Risk Factors Identified During Encounter:    Chronic Pain: Natural history and expected course discussed. Questions answered.  Home exercise plan outlined.  Polypharmacy: Medication List reviewed and Medications are appropriate for patient    The above risks/problems have been discussed with the patient.  Pertinent information has been shared with the patient in the After Visit Summary.    Diagnoses and all orders for this visit:    1. HTN (hypertension), benign (Primary) stable on lisinopril discussed low-sodium diet    2. Peripheral neuropathy due to chemotherapy on gabapentin    3. Mechanical ectropion of right lower eyelid awaiting surgery with oculoplastic surgeon    Other orders  -     lisinopril (PRINIVIL,ZESTRIL) 20 MG tablet; Take 1 tablet by mouth Daily.  Dispense: 90 tablet; Refill: 3        Follow Up:   Next Medicare Wellness visit to be scheduled in 1 year.      An After Visit Summary and PPPS were made available to the patient.

## 2024-12-20 RX ORDER — LISINOPRIL 20 MG/1
20 TABLET ORAL DAILY
Qty: 90 TABLET | Refills: 3 | Status: SHIPPED | OUTPATIENT
Start: 2024-12-20

## 2024-12-20 RX ORDER — TRAZODONE HYDROCHLORIDE 100 MG/1
100 TABLET ORAL NIGHTLY
Qty: 90 TABLET | Refills: 3 | Status: SHIPPED | OUTPATIENT
Start: 2024-12-20

## 2024-12-20 NOTE — TELEPHONE ENCOUNTER
Caller: Austin Gordon E    Relationship: Self    Best call back number: [phone]     Requested Prescriptions:   Requested Prescriptions     Pending Prescriptions Disp Refills    lisinopril (PRINIVIL,ZESTRIL) 20 MG tablet 90 tablet 3     Sig: Take 1 tablet by mouth Daily.    traZODone (DESYREL) 100 MG tablet 90 tablet 3     Sig: Take 1 tablet by mouth Every Night.        Pharmacy where request should be sent: [id number] [place] [id number] [address] AT [facility] [place] [address] - [phone]  - [phone] FX     Last office visit with prescribing clinician: 12/19/2024   Last telemedicine visit with prescribing clinician: Visit date not found   Next office visit with prescribing clinician: Visit date not found     Additional details provided by patient: PATIENT NEEDS REFILLS.  HE HAS NO PILLS LEFT.    Does the patient have less than a 3 day supply:  [x] Yes  [] No    Would you like a call back once the refill request has been completed: [] Yes [] No    If the office needs to give you a call back, can they leave a voicemail: [] Yes [] No    Lalo Payton Rep   12/20/24 09:52 EST

## 2025-01-14 ENCOUNTER — HOSPITAL ENCOUNTER (OUTPATIENT)
Dept: CT IMAGING | Facility: HOSPITAL | Age: 75
Discharge: HOME | End: 2025-01-14
Admitting: NURSE PRACTITIONER
Payer: MEDICARE

## 2025-01-14 DIAGNOSIS — C18.7 MALIGNANT NEOPLASM OF SIGMOID COLON: ICD-10-CM

## 2025-01-14 PROCEDURE — 74177 CT ABD & PELVIS W/CONTRAST: CPT

## 2025-01-14 PROCEDURE — 71260 CT THORAX DX C+: CPT

## 2025-01-14 PROCEDURE — 25510000001 IOPAMIDOL 61 % SOLUTION: Performed by: NURSE PRACTITIONER

## 2025-01-14 RX ORDER — IOPAMIDOL 612 MG/ML
100 INJECTION, SOLUTION INTRAVASCULAR
Status: COMPLETED | OUTPATIENT
Start: 2025-01-14 | End: 2025-01-14

## 2025-01-14 RX ADMIN — IOPAMIDOL 100 ML: 612 INJECTION, SOLUTION INTRAVENOUS at 13:36

## 2025-01-17 ENCOUNTER — HOSPITAL ENCOUNTER (OUTPATIENT)
Facility: HOSPITAL | Age: 75
Discharge: HOME | End: 2025-01-17
Payer: MEDICARE

## 2025-01-17 ENCOUNTER — OFFICE VISIT (OUTPATIENT)
Dept: ONCOLOGY | Facility: CLINIC | Age: 75
End: 2025-01-17
Payer: MEDICARE

## 2025-01-17 VITALS
WEIGHT: 156.7 LBS | TEMPERATURE: 99.6 F | BODY MASS INDEX: 21.22 KG/M2 | OXYGEN SATURATION: 98 % | HEIGHT: 72 IN | SYSTOLIC BLOOD PRESSURE: 129 MMHG | DIASTOLIC BLOOD PRESSURE: 63 MMHG | HEART RATE: 60 BPM | RESPIRATION RATE: 16 BRPM

## 2025-01-17 DIAGNOSIS — C78.7 SECONDARY MALIGNANT NEOPLASM OF LIVER AND INTRAHEPATIC BILE DUCT: ICD-10-CM

## 2025-01-17 DIAGNOSIS — C18.7 MALIGNANT NEOPLASM OF SIGMOID COLON: Primary | ICD-10-CM

## 2025-01-17 LAB
ALBUMIN SERPL-MCNC: 4.5 G/DL (ref 3.5–5.2)
ALBUMIN/GLOB SERPL: 1.9 G/DL
ALP SERPL-CCNC: 69 U/L (ref 39–117)
ALT SERPL W P-5'-P-CCNC: 10 U/L (ref 1–41)
ANION GAP SERPL CALCULATED.3IONS-SCNC: 9.8 MMOL/L (ref 5–15)
AST SERPL-CCNC: 19 U/L (ref 1–40)
BASOPHILS # BLD AUTO: 0.01 10*3/MM3 (ref 0–0.2)
BASOPHILS NFR BLD AUTO: 0.2 % (ref 0–1.5)
BILIRUB SERPL-MCNC: 0.8 MG/DL (ref 0–1.2)
BUN SERPL-MCNC: 17 MG/DL (ref 8–23)
BUN/CREAT SERPL: 16.3 (ref 7–25)
CALCIUM SPEC-SCNC: 9.2 MG/DL (ref 8.6–10.5)
CEA SERPL-MCNC: 2.46 NG/ML
CHLORIDE SERPL-SCNC: 103 MMOL/L (ref 98–107)
CO2 SERPL-SCNC: 23.2 MMOL/L (ref 22–29)
CREAT SERPL-MCNC: 1.04 MG/DL (ref 0.76–1.27)
DEPRECATED RDW RBC AUTO: 54.5 FL (ref 37–54)
EGFRCR SERPLBLD CKD-EPI 2021: 75.8 ML/MIN/1.73
EOSINOPHIL # BLD AUTO: 0.04 10*3/MM3 (ref 0–0.4)
EOSINOPHIL NFR BLD AUTO: 0.9 % (ref 0.3–6.2)
ERYTHROCYTE [DISTWIDTH] IN BLOOD BY AUTOMATED COUNT: 14.6 % (ref 12.3–15.4)
GLOBULIN UR ELPH-MCNC: 2.4 GM/DL
GLUCOSE SERPL-MCNC: 100 MG/DL (ref 65–99)
HCT VFR BLD AUTO: 37.1 % (ref 37.5–51)
HGB BLD-MCNC: 12.9 G/DL (ref 13–17.7)
IMM GRANULOCYTES # BLD AUTO: 0.01 10*3/MM3 (ref 0–0.05)
IMM GRANULOCYTES NFR BLD AUTO: 0.2 % (ref 0–0.5)
LYMPHOCYTES # BLD AUTO: 1.02 10*3/MM3 (ref 0.7–3.1)
LYMPHOCYTES NFR BLD AUTO: 23 % (ref 19.6–45.3)
MCH RBC QN AUTO: 35.7 PG (ref 26.6–33)
MCHC RBC AUTO-ENTMCNC: 34.8 G/DL (ref 31.5–35.7)
MCV RBC AUTO: 102.8 FL (ref 79–97)
MONOCYTES # BLD AUTO: 0.37 10*3/MM3 (ref 0.1–0.9)
MONOCYTES NFR BLD AUTO: 8.3 % (ref 5–12)
NEUTROPHILS NFR BLD AUTO: 2.99 10*3/MM3 (ref 1.7–7)
NEUTROPHILS NFR BLD AUTO: 67.4 % (ref 42.7–76)
NRBC BLD AUTO-RTO: 0 /100 WBC (ref 0–0.2)
PLATELET # BLD AUTO: 147 10*3/MM3 (ref 140–450)
PMV BLD AUTO: 10.2 FL (ref 6–12)
POTASSIUM SERPL-SCNC: 4.2 MMOL/L (ref 3.5–5.2)
PROT SERPL-MCNC: 6.9 G/DL (ref 6–8.5)
RBC # BLD AUTO: 3.61 10*6/MM3 (ref 4.14–5.8)
SODIUM SERPL-SCNC: 136 MMOL/L (ref 136–145)
WBC NRBC COR # BLD AUTO: 4.44 10*3/MM3 (ref 3.4–10.8)

## 2025-01-17 PROCEDURE — 80053 COMPREHEN METABOLIC PANEL: CPT | Performed by: NURSE PRACTITIONER

## 2025-01-17 PROCEDURE — 82378 CARCINOEMBRYONIC ANTIGEN: CPT | Performed by: NURSE PRACTITIONER

## 2025-01-17 PROCEDURE — 36591 DRAW BLOOD OFF VENOUS DEVICE: CPT

## 2025-01-17 PROCEDURE — 25010000002 HEPARIN LOCK FLUSH PER 10 UNITS: Performed by: NURSE PRACTITIONER

## 2025-01-17 PROCEDURE — 85025 COMPLETE CBC W/AUTO DIFF WBC: CPT | Performed by: NURSE PRACTITIONER

## 2025-01-17 RX ORDER — HEPARIN SODIUM (PORCINE) LOCK FLUSH IV SOLN 100 UNIT/ML 100 UNIT/ML
500 SOLUTION INTRAVENOUS AS NEEDED
OUTPATIENT
Start: 2025-01-17

## 2025-01-17 RX ORDER — HEPARIN SODIUM (PORCINE) LOCK FLUSH IV SOLN 100 UNIT/ML 100 UNIT/ML
500 SOLUTION INTRAVENOUS AS NEEDED
Status: DISCONTINUED | OUTPATIENT
Start: 2025-01-17 | End: 2025-01-18 | Stop reason: HOSPADM

## 2025-01-17 RX ORDER — SODIUM CHLORIDE 0.9 % (FLUSH) 0.9 %
10 SYRINGE (ML) INJECTION AS NEEDED
OUTPATIENT
Start: 2025-01-17

## 2025-01-17 RX ORDER — SODIUM CHLORIDE 0.9 % (FLUSH) 0.9 %
10 SYRINGE (ML) INJECTION AS NEEDED
Status: DISCONTINUED | OUTPATIENT
Start: 2025-01-17 | End: 2025-01-18 | Stop reason: HOSPADM

## 2025-01-17 RX ADMIN — HEPARIN 500 UNITS: 100 SYRINGE at 09:00

## 2025-01-17 RX ADMIN — Medication 10 ML: at 09:01

## 2025-01-17 NOTE — PROGRESS NOTES
CHIEF COMPLAINT: 1.  Peripheral neuropathy of the hands and feet, worse in the feet                                       2.  Follow-up for colon cancer    Problem List:  Oncology/Hematology History Overview Note   1. Stage CARLOS, [id number] colon cancer with 2 out of 14 pericolonic lymph nodes  involved and a left lobe liver biopsy positive for metastasis, presenting with  a 25 pound weight loss and diarrhea with some perirectal soreness and had  colonoscopy with Dr. Mcclain in January that found this lesion that was  circumferential high-grade invading into the pericolonic fat, status post  sigmoid colectomy of a poorly differentiated adenocarcinoma.   a) Baseline CEA postop of 0.8 with alkaline phosphatase 111, with normal liver  enzymes and creatinine of 0.8. Baseline white count 9820 with a hemoglobin  13.8, platelets 339,000, and CT with contrast showing a right lobe liver dome  lesion as well as an anastomotic change in the bowel.   b) Began CapeOx 03/15/2016.  c) Added in Avastin to CapeOx 04/05/2016, second cycle. (This was 8 weeks out  from surgery).  d) KRAS mutation is negative.  E.) CAT scan in August 2016 shows resolution of disease in the liver and colon and a stable lung nodule.  Hence Avastin, Xeloda, and oxaliplatin continued.  F) oxaliplatin discontinued October 2016 due to worsening peripheral neuropathy.  G.) CTs of February 2017 showed no evidence of metastasis and stable bibasilar nodular scar.  Persistent neuropathy not worsening.  CEA 1.4.  Continuing Avastin and Xeloda without oxaliplatin.  Having some problems with irritation of his eyes on Xeloda.  2.  Peripheral neuropathy, chemotherapy induced     Malignant neoplasm of sigmoid colon   5/20/2016 Initial Diagnosis    Malignant neoplasm of sigmoid colon     5/2/2017 Imaging    CT chest, abdomen, pelvis IMPRESSION:  1. No disease recurrence.  2. Minimal areas of nodular thickening in the right lower lobe, stable.     8/2/2017 Imaging    CT  chest/abdomen/pelvis IMPRESSION:  Stable examination with no evidence of acute intrathoracic,  intra-abdominal or pelvic abnormality. There is no evidence of  progression of disease. No metastatic disease.      10/31/2017 -  Chemotherapy    OP CENTRAL VENOUS ACCESS DEVICE CARE AND MAINTENANCE     11/13/2017 Imaging    CT chest/abdomen/pelvis IMPRESSION:  Stable examination without evidence of acute intrathoracic  intra-abdominal or intrapelvic abnormality. No evidence of progression  of disease.   CEA 1.3.     2/6/2020 -  Chemotherapy    OP COLON Capecitabine 1,250 mg/m2 BID (8 cycles)         HISTORY OF PRESENT ILLNESS:  The patient is a 74 y.o. male, here for follow up on management of Stage IV a colon cancer.  He continues on Xeloda alone.  He is on 1500 mg 7 days on 7 days off.  Denies any diarrhea.  Blood pressure remains stable.  Insomnia stable with his sleep medicine he says.  Neuropathy stable with gabapentin.  Currently not playing golf too much due to the weather he says.  But he is staying active.  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  This is scheduled for next week.             Past Medical History:   Diagnosis Date    Dental bridge present     H/O exercise stress test     Patient states it was normal    Hypertension     Liver disease     mets from colon cancer    Malignant neoplasm of colon     Seizure 2019    Wears glasses      Past Surgical History:   Procedure Laterality Date    CATARACT EXTRACTION W/ INTRAOCULAR LENS IMPLANT Right 11/30/2023    Procedure: CATARACT PHACO EXTRACTION WITH INTRAOCULAR LENS IMPLANT;  Surgeon: Portillo Bailey MD;  Location: [facility] OR;  Service: Ophthalmology;  Laterality: Right;    CATARACT EXTRACTION W/ INTRAOCULAR LENS IMPLANT Left 12/14/2023    Procedure: CATARACT PHACO EXTRACTION WITH INTRAOCULAR LENS IMPLANT LEFT;  Surgeon: Portillo Bailey MD;  Location: [facility] OR;  Service: Ophthalmology;   "Laterality: Left;    COLON SURGERY  2014    Sigmoid    COLONOSCOPY N/A 10/26/2020    Procedure: COLONOSCOPY;  Surgeon: Rodrigo Lambert MD;  Location: [facility] ENDOSCOPY;  Service: Gastroenterology;  Laterality: N/A;    COLONOSCOPY N/A 5/26/2023    Procedure: COLONOSCOPY;  Surgeon: Rodrigo Lambert MD;  Location: [facility] ENDOSCOPY;  Service: Gastroenterology;  Laterality: N/A;    LIVER SURGERY  2014    PORTACATH PLACEMENT      still in place on left chest       No Known Allergies    Family History and Social History reviewed and changed as necessary      REVIEW OF SYSTEM:     Review of Systems   Constitutional:  Positive for fatigue.   Musculoskeletal:  Positive for arthralgias.   Skin:         Right eye skin cancer   All other systems reviewed and are negative.         PHYSICAL EXAM    Vitals:    01/17/25 0827   BP: 129/63   Pulse: 60   Resp: 16   Temp: 99.6 °F (37.6 °C)   SpO2: 98%   Weight: 71.1 kg (156 lb 11.2 oz)   Height: 182.9 cm (72.01\")         ECOG: (1) Restricted in physically strenuous activity, ambulatory and able to do work of light nature  General: well appearing male in no acute distress  Neuro: alert and oriented  HEENT: sclera anicteric, oropharynx clear  Lymphatics: no cervical, supraclavicular, or axillary adenopathy  Cardiovascular: regular rate and rhythm, no murmurs  Lungs: clear to auscultation bilaterally  Abdomen: soft, nontender, nondistended.   Extremeties: no lower extremity edema  Skin: no rashes, lesions, bruising, or petechiae  Psych: mood and affect appropriate            Vitals reviewed.  Laboratory data reviewed     Lab Results   Component Value Date    WBC 3.74 10/04/2024    HGB 12.8 (L) 10/04/2024    HCT 36.9 (L) 10/04/2024    .7 (H) 10/04/2024     10/04/2024       Lab Results   Component Value Date    GLUCOSE 95 10/04/2024    BUN 17 10/04/2024    CREATININE 1.16 10/04/2024    EGFRIFNONA 89 01/21/2022    EGFRIFAFRI 81 08/04/2020    BCR 14.7 10/04/2024    K 4.4 " 10/04/2024    CO2 24.3 10/04/2024    CALCIUM 9.3 10/04/2024    PROTENTOTREF 6.9 08/04/2020    ALBUMIN 4.5 10/04/2024    LABIL2 2.6 08/04/2020    AST 22 10/04/2024    ALT 13 10/04/2024       Lab Results   Component Value Date    CEA 2.71 10/04/2024    CEA 2.76 08/16/2024    CEA 2.41 05/17/2024    CEA 2.53 03/06/2024     CT Chest Without Contrast Diagnostic    Result Date: 7/13/2021  Interval resolution of the nodular opacity in the right lower lobe which was likely infectious or inflammatory in nature.  318.12 mGy.cm   This study was performed with techniques to keep radiation doses as low as reasonably achievable (ALARA). Individualized dose reduction techniques using automated exposure control or adjustment of mA and/or kV according to the patient size were employed.  This report was finalized on 7/13/2021 3:44 PM by David Ingram M.D..    CT Chest With Contrast Diagnostic    Result Date: 10/27/2021  No evidence of metastatic disease involving the chest, abdomen or pelvis.  This study was performed with techniques to keep radiation doses as low as reasonably achievable (ALARA). Individualized dose reduction techniques using automated exposure control or adjustment of mA and/or kV according to the patient size were employed.  This report was finalized on 10/27/2021 9:11 AM by David Ingram M.D..    CT Abdomen Pelvis With Contrast    Result Date: 10/27/2021  No evidence of metastatic disease involving the chest, abdomen or pelvis.  This study was performed with techniques to keep radiation doses as low as reasonably achievable (ALARA). Individualized dose reduction techniques using automated exposure control or adjustment of mA and/or kV according to the patient size were employed.  This report was finalized on 10/27/2021 9:11 AM by David Ingram M.D..            Assessment/Plan     1. Metastatic colon cancer with Solitary Liver metastasis initially diagnosed 2-.  I discussed the case with   Pollo.  There is a new bilateral lower lobe nodules measuring 6 mm or less.  This could represent metastasis versus infectious/inflammatory process.  They are too small for PET/CT.  We will order CT scan prior to return in 3 months rather than 6.    For now we will continue with single agent Xeloda 1500 mg 7 days on 7 days off.      CBC and CMP from 8/16/2024 was stable.  CEA stable at 2.76.  Colonoscopy on 5/26/2023 showed no evidence of cancer.  We will plan to repeat CBC, CMP, and CEA today and I will call him with results.      2. Peripheral neuropathy secondary to chemotherapy.  Stable.  We will continue gabapentin 1 capsule by mouth 3 times a day.    3. Port.  We will continue port care once every 2 months with clinic appointments. We will check CBC, CMP, CEA with each port flush.  We will flush port today with labs.    4.  Skin cancer of right eye.  He will keep scheduled follow-up with optometrist and plastic surgeon.  We will plan to hold capecitabine for the week of surgery.      Follow-up in 3 months with port flush and CT scan      Adeola Sunshine, APRN    01/17/25

## 2025-03-25 RX ORDER — TRAZODONE HYDROCHLORIDE 100 MG/1
100 TABLET ORAL NIGHTLY
Qty: 90 TABLET | Refills: 3 | Status: SHIPPED | OUTPATIENT
Start: 2025-03-25

## 2025-03-31 ENCOUNTER — SPECIALTY PHARMACY (OUTPATIENT)
Facility: HOSPITAL | Age: 75
End: 2025-03-31
Payer: MEDICARE

## 2025-03-31 RX ORDER — CAPECITABINE 500 MG/1
1500 TABLET, FILM COATED ORAL 2 TIMES DAILY
Qty: 84 TABLET | Refills: 11 | Status: SHIPPED | OUTPATIENT
Start: 2025-03-31

## 2025-04-17 DIAGNOSIS — C18.7 MALIGNANT NEOPLASM OF SIGMOID COLON: ICD-10-CM

## 2025-04-17 DIAGNOSIS — G62.0 PERIPHERAL NEUROPATHY DUE TO CHEMOTHERAPY: ICD-10-CM

## 2025-04-17 DIAGNOSIS — R42 DIZZINESS: ICD-10-CM

## 2025-04-17 DIAGNOSIS — R55 SYNCOPE, UNSPECIFIED SYNCOPE TYPE: ICD-10-CM

## 2025-04-17 DIAGNOSIS — T45.1X5A PERIPHERAL NEUROPATHY DUE TO CHEMOTHERAPY: ICD-10-CM

## 2025-04-17 RX ORDER — SODIUM CHLORIDE 0.9 % (FLUSH) 0.9 %
10 SYRINGE (ML) INJECTION AS NEEDED
OUTPATIENT
Start: 2025-04-17

## 2025-04-17 RX ORDER — GABAPENTIN 300 MG/1
300 CAPSULE ORAL 3 TIMES DAILY
Qty: 90 CAPSULE | Refills: 3 | Status: SHIPPED | OUTPATIENT
Start: 2025-04-17

## 2025-04-17 RX ORDER — HEPARIN SODIUM (PORCINE) LOCK FLUSH IV SOLN 100 UNIT/ML 100 UNIT/ML
500 SOLUTION INTRAVENOUS AS NEEDED
OUTPATIENT
Start: 2025-04-17

## 2025-04-21 ENCOUNTER — APPOINTMENT (OUTPATIENT)
Dept: CT IMAGING | Facility: HOSPITAL | Age: 75
End: 2025-04-21
Payer: MEDICARE

## 2025-04-21 ENCOUNTER — TELEPHONE (OUTPATIENT)
Dept: INTERNAL MEDICINE | Facility: CLINIC | Age: 75
End: 2025-04-21

## 2025-04-21 ENCOUNTER — APPOINTMENT (OUTPATIENT)
Dept: GENERAL RADIOLOGY | Facility: HOSPITAL | Age: 75
End: 2025-04-21
Payer: MEDICARE

## 2025-04-21 ENCOUNTER — TELEPHONE (OUTPATIENT)
Dept: INTERNAL MEDICINE | Facility: CLINIC | Age: 75
End: 2025-04-21
Payer: MEDICARE

## 2025-04-21 ENCOUNTER — HOSPITAL ENCOUNTER (EMERGENCY)
Facility: HOSPITAL | Age: 75
Discharge: HOME | End: 2025-04-21
Attending: STUDENT IN AN ORGANIZED HEALTH CARE EDUCATION/TRAINING PROGRAM | Admitting: STUDENT IN AN ORGANIZED HEALTH CARE EDUCATION/TRAINING PROGRAM
Payer: MEDICARE

## 2025-04-21 VITALS
WEIGHT: 156.2 LBS | TEMPERATURE: 98.4 F | OXYGEN SATURATION: 97 % | SYSTOLIC BLOOD PRESSURE: 128 MMHG | HEART RATE: 61 BPM | RESPIRATION RATE: 18 BRPM | DIASTOLIC BLOOD PRESSURE: 74 MMHG | HEIGHT: 72 IN | BODY MASS INDEX: 21.16 KG/M2

## 2025-04-21 DIAGNOSIS — K52.9 ENTERITIS: Primary | ICD-10-CM

## 2025-04-21 LAB
ALBUMIN SERPL-MCNC: 4.6 G/DL (ref 3.5–5.2)
ALBUMIN/GLOB SERPL: 2 G/DL
ALP SERPL-CCNC: 66 U/L (ref 39–117)
ALT SERPL W P-5'-P-CCNC: 12 U/L (ref 1–41)
ANION GAP SERPL CALCULATED.3IONS-SCNC: 11.4 MMOL/L (ref 5–15)
AST SERPL-CCNC: 22 U/L (ref 1–40)
BASOPHILS # BLD AUTO: 0.02 10*3/MM3 (ref 0–0.2)
BASOPHILS NFR BLD AUTO: 0.5 % (ref 0–1.5)
BILIRUB SERPL-MCNC: 1.3 MG/DL (ref 0–1.2)
BUN SERPL-MCNC: 13 MG/DL (ref 8–23)
BUN/CREAT SERPL: 12.4 (ref 7–25)
CALCIUM SPEC-SCNC: 9.3 MG/DL (ref 8.6–10.5)
CHLORIDE SERPL-SCNC: 100 MMOL/L (ref 98–107)
CO2 SERPL-SCNC: 24.6 MMOL/L (ref 22–29)
CREAT SERPL-MCNC: 1.05 MG/DL (ref 0.76–1.27)
DEPRECATED RDW RBC AUTO: 56.9 FL (ref 37–54)
EGFRCR SERPLBLD CKD-EPI 2021: 75 ML/MIN/1.73
EOSINOPHIL # BLD AUTO: 0.01 10*3/MM3 (ref 0–0.4)
EOSINOPHIL NFR BLD AUTO: 0.2 % (ref 0.3–6.2)
ERYTHROCYTE [DISTWIDTH] IN BLOOD BY AUTOMATED COUNT: 15 % (ref 12.3–15.4)
FLUAV SUBTYP SPEC NAA+PROBE: NOT DETECTED
FLUBV RNA ISLT QL NAA+PROBE: NOT DETECTED
GEN 5 1HR TROPONIN T REFLEX: 8 NG/L
GLOBULIN UR ELPH-MCNC: 2.3 GM/DL
GLUCOSE SERPL-MCNC: 103 MG/DL (ref 65–99)
HCT VFR BLD AUTO: 38.3 % (ref 37.5–51)
HGB BLD-MCNC: 13.1 G/DL (ref 13–17.7)
HOLD SPECIMEN: NORMAL
HOLD SPECIMEN: NORMAL
IMM GRANULOCYTES # BLD AUTO: 0 10*3/MM3 (ref 0–0.05)
IMM GRANULOCYTES NFR BLD AUTO: 0 % (ref 0–0.5)
LYMPHOCYTES # BLD AUTO: 1.15 10*3/MM3 (ref 0.7–3.1)
LYMPHOCYTES NFR BLD AUTO: 26.8 % (ref 19.6–45.3)
MCH RBC QN AUTO: 35.5 PG (ref 26.6–33)
MCHC RBC AUTO-ENTMCNC: 34.2 G/DL (ref 31.5–35.7)
MCV RBC AUTO: 103.8 FL (ref 79–97)
MONOCYTES # BLD AUTO: 0.46 10*3/MM3 (ref 0.1–0.9)
MONOCYTES NFR BLD AUTO: 10.7 % (ref 5–12)
NEUTROPHILS NFR BLD AUTO: 2.65 10*3/MM3 (ref 1.7–7)
NEUTROPHILS NFR BLD AUTO: 61.8 % (ref 42.7–76)
NRBC BLD AUTO-RTO: 0 /100 WBC (ref 0–0.2)
PLATELET # BLD AUTO: 160 10*3/MM3 (ref 140–450)
PMV BLD AUTO: 9.8 FL (ref 6–12)
POTASSIUM SERPL-SCNC: 4.8 MMOL/L (ref 3.5–5.2)
PROT SERPL-MCNC: 6.9 G/DL (ref 6–8.5)
RBC # BLD AUTO: 3.69 10*6/MM3 (ref 4.14–5.8)
SARS-COV-2 RNA RESP QL NAA+PROBE: NOT DETECTED
SODIUM SERPL-SCNC: 136 MMOL/L (ref 136–145)
TROPONIN T NUMERIC DELTA: 0 NG/L
TROPONIN T SERPL HS-MCNC: 8 NG/L
WBC NRBC COR # BLD AUTO: 4.29 10*3/MM3 (ref 3.4–10.8)
WHOLE BLOOD HOLD COAG: NORMAL
WHOLE BLOOD HOLD SPECIMEN: NORMAL

## 2025-04-21 PROCEDURE — 84484 ASSAY OF TROPONIN QUANT: CPT | Performed by: STUDENT IN AN ORGANIZED HEALTH CARE EDUCATION/TRAINING PROGRAM

## 2025-04-21 PROCEDURE — 87636 SARSCOV2 & INF A&B AMP PRB: CPT | Performed by: NURSE PRACTITIONER

## 2025-04-21 PROCEDURE — 93005 ELECTROCARDIOGRAM TRACING: CPT | Performed by: STUDENT IN AN ORGANIZED HEALTH CARE EDUCATION/TRAINING PROGRAM

## 2025-04-21 PROCEDURE — 25510000001 IOPAMIDOL 61 % SOLUTION: Performed by: STUDENT IN AN ORGANIZED HEALTH CARE EDUCATION/TRAINING PROGRAM

## 2025-04-21 PROCEDURE — 93005 ELECTROCARDIOGRAM TRACING: CPT

## 2025-04-21 PROCEDURE — 36415 COLL VENOUS BLD VENIPUNCTURE: CPT

## 2025-04-21 PROCEDURE — 99285 EMERGENCY DEPT VISIT HI MDM: CPT | Performed by: STUDENT IN AN ORGANIZED HEALTH CARE EDUCATION/TRAINING PROGRAM

## 2025-04-21 PROCEDURE — 74177 CT ABD & PELVIS W/CONTRAST: CPT

## 2025-04-21 PROCEDURE — 71045 X-RAY EXAM CHEST 1 VIEW: CPT

## 2025-04-21 PROCEDURE — 85025 COMPLETE CBC W/AUTO DIFF WBC: CPT

## 2025-04-21 PROCEDURE — 80053 COMPREHEN METABOLIC PANEL: CPT | Performed by: STUDENT IN AN ORGANIZED HEALTH CARE EDUCATION/TRAINING PROGRAM

## 2025-04-21 RX ORDER — SODIUM CHLORIDE 0.9 % (FLUSH) 0.9 %
10 SYRINGE (ML) INJECTION AS NEEDED
Status: DISCONTINUED | OUTPATIENT
Start: 2025-04-21 | End: 2025-04-21 | Stop reason: HOSPADM

## 2025-04-21 RX ORDER — IOPAMIDOL 612 MG/ML
100 INJECTION, SOLUTION INTRAVASCULAR
Status: COMPLETED | OUTPATIENT
Start: 2025-04-21 | End: 2025-04-21

## 2025-04-21 RX ORDER — ASPIRIN 325 MG
325 TABLET ORAL ONCE
Status: COMPLETED | OUTPATIENT
Start: 2025-04-21 | End: 2025-04-21

## 2025-04-21 RX ADMIN — IOPAMIDOL 100 ML: 612 INJECTION, SOLUTION INTRAVENOUS at 13:34

## 2025-04-21 RX ADMIN — ASPIRIN 325 MG: 325 TABLET ORAL at 11:59

## 2025-04-21 NOTE — TELEPHONE ENCOUNTER
Caller: Gordon Avila    Relationship to patient: Self    Best call back number: [phone]     Chief complaint: PATIENT STATES THAT OVER THE WEEKEND HE HAD CHEST PAINS, SHORTNESS OF BREATH AND HE PASSED OUT.    Patient directed to call 911 or go to their nearest emergency room.     Patient verbalized understanding: [x] Yes  [] No  If no, why?    Additional notes:

## 2025-04-21 NOTE — ED PROVIDER NOTES
Pt Name: Gordon Avila  MRN: [id number]  : 1950  Date of Encounter: 2025    PCP: Ed Abraham MD      Subjective    History of Present Illness:    Chief Complaint: Abdominal pain, vomiting, chest pain    History of Present Illness: Gordon Avila is a 74 y.o. male who presents to the ER complaining of abdominal pain, chest pain, vomiting that started yesterday.  Patient states he went to a concert Saturday night had a syncopal episode where he passed out.  Patient states he had 1 episode of vomiting and now complaining of chest pain..  Pain is described as Dull, Constant, and does not radiate  Patient rates pain as a 6 on a ten scale.    Triage Vitals:    ED Triage Vitals [25 1058]   Temp Heart Rate Resp BP SpO2   98.4 °F (36.9 °C) 62 18 121/78 97 %      Temp src Heart Rate Source Patient Position BP Location FiO2 (%)   Oral Monitor Sitting Left arm --       Nurses Notes reviewed and agree, including vitals, allergies, social history and prior medical history.     Patient has no known allergies.    Past Medical History:   Diagnosis Date    Dental bridge present     H/O exercise stress test     Patient states it was normal    Hypertension     Liver disease     mets from colon cancer    Malignant neoplasm of colon     Seizure 2019    Wears glasses        Past Surgical History:   Procedure Laterality Date    CATARACT EXTRACTION W/ INTRAOCULAR LENS IMPLANT Right 2023    Procedure: CATARACT PHACO EXTRACTION WITH INTRAOCULAR LENS IMPLANT;  Surgeon: Portillo Bailey MD;  Location: [facility] OR;  Service: Ophthalmology;  Laterality: Right;    CATARACT EXTRACTION W/ INTRAOCULAR LENS IMPLANT Left 2023    Procedure: CATARACT PHACO EXTRACTION WITH INTRAOCULAR LENS IMPLANT LEFT;  Surgeon: Portillo Bailey MD;  Location: [facility] OR;  Service: Ophthalmology;  Laterality: Left;    COLON SURGERY      Sigmoid    COLONOSCOPY N/A 10/26/2020    Procedure: COLONOSCOPY;  Surgeon: Fausto  Rodrigo POSADA MD;  Location: [facility] ENDOSCOPY;  Service: Gastroenterology;  Laterality: N/A;    COLONOSCOPY N/A 5/26/2023    Procedure: COLONOSCOPY;  Surgeon: Rodrigo Lambert MD;  Location: [facility] ENDOSCOPY;  Service: Gastroenterology;  Laterality: N/A;    LIVER SURGERY  2014    PORTACATH PLACEMENT      still in place on left chest       Social History     Socioeconomic History    Marital status:    Tobacco Use    Smoking status: Never    Smokeless tobacco: Former     Types: Chew     Quit date: 2009   Vaping Use    Vaping status: Never Used   Substance and Sexual Activity    Alcohol use: Not Currently    Drug use: No    Sexual activity: Defer       Family History   Problem Relation Age of Onset    COPD Mother     Heart disease Father     COPD Other        REVIEW OF SYSTEMS:     All systems reviewed and not pertinent unless noted.    Review of Systems   Respiratory:  Positive for chest tightness.    Cardiovascular:  Positive for chest pain.   Gastrointestinal:  Positive for nausea and vomiting.   Neurological:  Positive for syncope.       Objective    Physical Exam  Vitals and nursing note reviewed.   Constitutional:       Appearance: Normal appearance.   HENT:      Head: Normocephalic and atraumatic.   Eyes:      Extraocular Movements: Extraocular movements intact.      Pupils: Pupils are equal, round, and reactive to light.   Cardiovascular:      Rate and Rhythm: Normal rate and regular rhythm.      Pulses: Normal pulses.           Radial pulses are 1+ on the right side and 1+ on the left side.        Posterior tibial pulses are 1+ on the right side and 1+ on the left side.      Heart sounds: Normal heart sounds.   Pulmonary:      Effort: Pulmonary effort is normal.      Breath sounds: Normal breath sounds.   Abdominal:      General: Bowel sounds are normal.      Palpations: Abdomen is soft.   Musculoskeletal:         General: Normal range of motion.      Cervical back: Normal range of motion and neck supple.    Skin:     General: Skin is warm and dry.      Capillary Refill: Capillary refill takes less than 2 seconds.   Neurological:      Mental Status: He is alert.      GCS: GCS eye subscore is 4. GCS verbal subscore is 5. GCS motor subscore is 6.      Sensory: Sensation is intact.      Motor: Motor function is intact.   Psychiatric:         Attention and Perception: Attention and perception normal.         Mood and Affect: Mood and affect normal.         Speech: Speech normal.                           Procedures    ED Course:    ECG 12 Lead ED Triage Standing Order; Chest Pain   Final Result               Orders placed during this visit:    Orders Placed This Encounter   Procedures    COVID PRE-OP / PRE-PROCEDURE SCREENING ORDER (NO ISOLATION) - Swab, Nasopharynx    COVID-19 and FLU A/B PCR, 1 HR TAT - Swab, Nasopharynx    XR Chest 1 View    CT Abdomen Pelvis With Contrast    [place] Draw    Comprehensive Metabolic Panel    High Sensitivity Troponin T    CBC Auto Differential    High Sensitivity Troponin T 1Hr    NPO Diet NPO Type: Strict NPO    Undress & Gown    Continuous Pulse Oximetry    Oxygen Therapy- Nasal Cannula; Titrate 1-6 LPM Per SpO2; 90 - 95%    ECG 12 Lead ED Triage Standing Order; Chest Pain    ECG 12 Lead ED Triage Standing Order; Chest Pain    Insert Peripheral IV    CBC & Differential    Green Top (Gel)    Lavender Top    Gold Top - SST    Light Blue Top       LAB Results:    Lab Results (last 24 hours)       Procedure Component Value Units Date/Time    CBC & Differential [[id number]]  (Abnormal) Collected: 04/21/25 1109    Specimen: Blood Updated: 04/21/25 1115    Narrative:      The following orders were created for panel order CBC & Differential.  Procedure                               Abnormality         Status                     ---------                               -----------         ------                     CBC Auto Differential[[id number]]        Abnormal            Final result                  Please view results for these tests on the individual orders.    Comprehensive Metabolic Panel [[id number]]  (Abnormal) Collected: 04/21/25 1109    Specimen: Blood Updated: 04/21/25 1141     Glucose 103 mg/dL      BUN 13 mg/dL      Creatinine 1.05 mg/dL      Sodium 136 mmol/L      Potassium 4.8 mmol/L      Chloride 100 mmol/L      CO2 24.6 mmol/L      Calcium 9.3 mg/dL      Total Protein 6.9 g/dL      Albumin 4.6 g/dL      ALT (SGPT) 12 U/L      AST (SGOT) 22 U/L      Alkaline Phosphatase 66 U/L      Total Bilirubin 1.3 mg/dL      Globulin 2.3 gm/dL      A/G Ratio 2.0 g/dL      BUN/Creatinine Ratio 12.4     Anion Gap 11.4 mmol/L      eGFR 75.0 mL/min/1.73     Narrative:      GFR Categories in Chronic Kidney Disease (CKD)      GFR Category          GFR (mL/min/1.73)    Interpretation  G1                     90 or greater         Normal or high (1)  G2                      60-89                Mild decrease (1)  G3a                   45-59                Mild to moderate decrease  G3b                   30-44                Moderate to severe decrease  G4                    15-29                Severe decrease  G5                    14 or less           Kidney failure          (1)In the absence of evidence of kidney disease, neither GFR category G1 or G2 fulfill the criteria for CKD.    eGFR calculation 2021 CKD-EPI creatinine equation, which does not include race as a factor    High Sensitivity Troponin T [[id number]]  (Normal) Collected: 04/21/25 1109    Specimen: Blood Updated: 04/21/25 1141     HS Troponin T 8 ng/L     Narrative:      High Sensitive Troponin T Reference Range:  <14.0 ng/L- Negative Female for AMI  <22.0 ng/L- Negative Male for AMI  >=14 - Abnormal Female indicating possible myocardial injury.  >=22 - Abnormal Male indicating possible myocardial injury.   Clinicians would have to utilize clinical acumen, EKG, Troponin, and serial changes to determine if it is an Acute Myocardial Infarction or  myocardial injury due to an underlying chronic condition.         CBC Auto Differential [[id number]]  (Abnormal) Collected: 04/21/25 1109    Specimen: Blood Updated: 04/21/25 1115     WBC 4.29 10*3/mm3      RBC 3.69 10*6/mm3      Hemoglobin 13.1 g/dL      Hematocrit 38.3 %      .8 fL      MCH 35.5 pg      MCHC 34.2 g/dL      RDW 15.0 %      RDW-SD 56.9 fl      MPV 9.8 fL      Platelets 160 10*3/mm3      Neutrophil % 61.8 %      Lymphocyte % 26.8 %      Monocyte % 10.7 %      Eosinophil % 0.2 %      Basophil % 0.5 %      Immature Grans % 0.0 %      Neutrophils, Absolute 2.65 10*3/mm3      Lymphocytes, Absolute 1.15 10*3/mm3      Monocytes, Absolute 0.46 10*3/mm3      Eosinophils, Absolute 0.01 10*3/mm3      Basophils, Absolute 0.02 10*3/mm3      Immature Grans, Absolute 0.00 10*3/mm3      nRBC 0.0 /100 WBC     COVID PRE-OP / PRE-PROCEDURE SCREENING ORDER (NO ISOLATION) - Swab, Nasopharynx [[id number]]  (Normal) Collected: 04/21/25 1123    Specimen: Swab from Nasopharynx Updated: 04/21/25 1148    Narrative:      The following orders were created for panel order COVID PRE-OP / PRE-PROCEDURE SCREENING ORDER (NO ISOLATION) - Swab, Nasopharynx.  Procedure                               Abnormality         Status                     ---------                               -----------         ------                     COVID-19 and FLU A/B PCR...[[id number]]  Normal              Final result                 Please view results for these tests on the individual orders.    COVID-19 and FLU A/B PCR, 1 HR TAT - Swab, Nasopharynx [[id number]]  (Normal) Collected: 04/21/25 1123    Specimen: Swab from Nasopharynx Updated: 04/21/25 1148     COVID19 Not Detected     Influenza A PCR Not Detected     Influenza B PCR Not Detected    Narrative:      Fact sheet for providers: https://www.fda.gov/media/925122/download    Fact sheet for patients: https://www.fda.gov/media/221793/download    Test performed by PCR.    High Sensitivity  Troponin T 1Hr [[id number]]  (Normal) Collected: 04/21/25 1309    Specimen: Blood Updated: 04/21/25 1337     HS Troponin T 8 ng/L      Troponin T Numeric Delta 0 ng/L     Narrative:      High Sensitive Troponin T Reference Range:  <14.0 ng/L- Negative Female for AMI  <22.0 ng/L- Negative Male for AMI  >=14 - Abnormal Female indicating possible myocardial injury.  >=22 - Abnormal Male indicating possible myocardial injury.   Clinicians would have to utilize clinical acumen, EKG, Troponin, and serial changes to determine if it is an Acute Myocardial Infarction or myocardial injury due to an underlying chronic condition.                  If labs were ordered, I have independently reviewed the results and considered them in the diagnosis and treatment plan for the patient    RADIOLOGY    CT Abdomen Pelvis With Contrast  Result Date: 4/21/2025  PROCEDURE: CT ABDOMEN PELVIS W CONTRAST-  HISTORY:  Nausea, vomiting, chest pain  COMPARISON: January 2025.  TECHNIQUE: Multiple axial CT images were obtained from the lung bases through the pubic symphysis following the administration of Isovue 300 contrast.  FINDINGS:  ABDOMEN: The lung bases are clear. The heart is normal in size. There is a lobulated cyst in the inferior pole of the right lobe of the liver. The liver is otherwise normal. The spleen is unremarkable. No adrenal mass is present.  The pancreas is normal. The kidneys are normal. The aorta is normal in caliber. There are vascular calcifications. There is no free fluid or adenopathy. There are air-fluid levels in nondistended small and large bowel, consider ileus versus enteritis. There is no free air.  PELVIS: The appendix is not identified.  The urinary bladder is unremarkable. Prostate is normal in size. There is no significant free fluid or adenopathy.      Impression: Air-fluid levels in nondistended small large bowel, consider ileus versus enteritis.  Stable hepatic cyst.   CTDI: 5.52 mGy DLP: 272.16 mGy.cm    This study was performed with techniques to keep radiation doses as low as reasonably achievable (ALARA). Individualized dose reduction techniques using automated exposure control or adjustment of mA and/or kV according to the patient size were employed.      Images were reviewed, interpreted, and dictated by Dr. Blaire Gerber MD Transcribed by Dunia Will PA-C.  This report was signed and finalized on 4/21/2025 2:17 PM by Blaire Gerber MD.      XR Chest 1 View  Result Date: 4/21/2025  PROCEDURE: XR CHEST 1 VW-  HISTORY: Chest Pain Triage Protocol blacked out THOMAS Anguiano is [place] last Saturday. Central anterior chest pain that started yesterday.  COMPARISON: August 24, 2019..  FINDINGS: The heart is normal in size.. Left internal jugular port is in stable position from prior exam. The lungs are clear. The mediastinum is unremarkable. There is no pneumothorax.  There are no acute osseous abnormalities.      Impression: Stable chest..      This report was signed and finalized on 4/21/2025 12:16 PM by Blaire Gerber MD.         If I have ordered, I have independently reviewed the above noted radiographic studies.  Please see the radiologist dictation for the official interpretation    Medications given to patient in the ER    Medications   sodium chloride 0.9 % flush 10 mL (has no administration in time range)   aspirin tablet 325 mg (325 mg Oral Given 4/21/25 1159)   iopamidol (ISOVUE-300) 61 % injection 100 mL (100 mL Intravenous Given 4/21/25 1334)       AS OF 16:55 EDT VITALS:    BP - 128/74  HR - 61  TEMP - 98.4 °F (36.9 °C) (Oral)  O2 SATS - 97%         Shared Decision Making: After my consideration of the clinical presentation and laboratory/radiology studies obtained, I have discussed the findings with the patient/patient representative who is in agreement with the treatment plan and final disposition. Risks and benefits of discharge and/or observation admission were discussed.  Final disposition of  the patient will be discharged home.  Patient is requested to follow-up with primary care provider and specialist in 1 week following final discharge.      Medical Decision Making   Gordon Avila is a 74 y.o. male who presents to the ER complaining of abdominal pain, chest pain, vomiting that started yesterday.  Patient states he went to a concert Saturday night had a syncopal episode where he passed out.  Patient states he had 1 episode of vomiting and now complaining of chest pain..  Pain is described as Dull, Constant, and does not radiate  Patient rates pain as a 6 on a ten scale.    Physical examination within normal limits.  Patient has some mild tenderness to palpation in the epigastric area.  Patient's labs were obtained which had stable troponin over 2 drawls, EKG is normal, chest x-ray is normal, white blood cell count normal.  Patient had CT scan of the abdomen which did show enteritis.  Patient monitored closely in the emergency room for greater than 2 hours without any issues of vomiting.    Patient hemodynamically stable, comfortable, with stable re-examination. I have reviewed results with Patient and or family if available.  I have answered all questions, patient voiced understanding and agreeable with treatment plan.  Patient requested to follow-up with primary care provider within 72 hours for reevaluation.  Return precautions given, with specific instructions to immediately return to ED if they develop any new, or persistent symptoms. Patient discharged from the emergency department.    DDX: includes but is not limited to: NSTEMI, STEMI, chest pain unspecified, enteritis, gastroenteritis, colitis, diverticulitis, diverticulosis, other    Problems Addressed:  Enteritis: complicated acute illness or injury    Amount and/or Complexity of Data Reviewed  Labs: ordered. Decision-making details documented in ED Course.     Details: I have personally reviewed and documented all results  Radiology:  ordered. Decision-making details documented in ED Course.     Details: I have personally reviewed and documented all results  ECG/medicine tests: ordered. Decision-making details documented in ED Course.     Details: I have personally reviewed and documented all results  Discussion of management or test interpretation with external provider(s): Discussed assessment, treatment and plan with ER attending    Risk  OTC drugs.  Prescription drug management.  Risk Details: I have discussed with patient the finding of the test preformed today. Patient has been diagnosed with enteritis and will be discharged home. Advised on return precautions the importance of close follow-up with primary care provider.  Strict return precautions have been given and patient verbalizes understanding        Final diagnoses:   Enteritis       Please note that portions of this document were completed using voice recognition dictation software.       Nikko Gr, APRN  04/21/25 0039

## 2025-04-21 NOTE — TELEPHONE ENCOUNTER
"Relay     \"Dr Abraham does not Prescribe his gabapentin, looks as another provider does and he will need to contact them. It does show it was sent 4.17.25 to [facility].\"                  "

## 2025-04-21 NOTE — TELEPHONE ENCOUNTER
Caller: Gordon Avila    Relationship to patient: Self    Best call back number: [phone]     PATIENT STATES THAT [facility] DID NOT GET HIS GABAPENTIN PRESCRIPTION.

## 2025-04-24 ENCOUNTER — HOSPITAL ENCOUNTER (OUTPATIENT)
Dept: CT IMAGING | Facility: HOSPITAL | Age: 75
Discharge: HOME | End: 2025-04-24
Admitting: NURSE PRACTITIONER
Payer: MEDICARE

## 2025-04-24 DIAGNOSIS — C18.7 MALIGNANT NEOPLASM OF SIGMOID COLON: ICD-10-CM

## 2025-04-24 DIAGNOSIS — C78.7 SECONDARY MALIGNANT NEOPLASM OF LIVER AND INTRAHEPATIC BILE DUCT: ICD-10-CM

## 2025-04-24 PROCEDURE — 71260 CT THORAX DX C+: CPT

## 2025-04-24 PROCEDURE — 25510000001 IOPAMIDOL 61 % SOLUTION: Performed by: NURSE PRACTITIONER

## 2025-04-24 RX ORDER — IOPAMIDOL 612 MG/ML
100 INJECTION, SOLUTION INTRAVASCULAR
Status: COMPLETED | OUTPATIENT
Start: 2025-04-24 | End: 2025-04-24

## 2025-04-24 RX ADMIN — IOPAMIDOL 100 ML: 612 INJECTION, SOLUTION INTRAVENOUS at 13:05

## 2025-05-02 ENCOUNTER — OFFICE VISIT (OUTPATIENT)
Dept: ONCOLOGY | Facility: CLINIC | Age: 75
End: 2025-05-02
Payer: MEDICARE

## 2025-05-02 ENCOUNTER — HOSPITAL ENCOUNTER (OUTPATIENT)
Facility: HOSPITAL | Age: 75
Discharge: HOME | End: 2025-05-02
Payer: MEDICARE

## 2025-05-02 VITALS
OXYGEN SATURATION: 98 % | WEIGHT: 154 LBS | DIASTOLIC BLOOD PRESSURE: 69 MMHG | BODY MASS INDEX: 20.86 KG/M2 | HEIGHT: 72 IN | RESPIRATION RATE: 16 BRPM | SYSTOLIC BLOOD PRESSURE: 138 MMHG | HEART RATE: 58 BPM | TEMPERATURE: 98.6 F

## 2025-05-02 DIAGNOSIS — C78.7 SECONDARY MALIGNANT NEOPLASM OF LIVER AND INTRAHEPATIC BILE DUCT: ICD-10-CM

## 2025-05-02 DIAGNOSIS — C18.7 MALIGNANT NEOPLASM OF SIGMOID COLON: Primary | ICD-10-CM

## 2025-05-02 PROCEDURE — 96523 IRRIG DRUG DELIVERY DEVICE: CPT

## 2025-05-02 PROCEDURE — 25010000002 HEPARIN LOCK FLUSH PER 10 UNITS: Performed by: INTERNAL MEDICINE

## 2025-05-02 RX ORDER — SODIUM CHLORIDE 0.9 % (FLUSH) 0.9 %
10 SYRINGE (ML) INJECTION AS NEEDED
Status: DISCONTINUED | OUTPATIENT
Start: 2025-05-02 | End: 2025-05-03 | Stop reason: HOSPADM

## 2025-05-02 RX ORDER — HEPARIN SODIUM (PORCINE) LOCK FLUSH IV SOLN 100 UNIT/ML 100 UNIT/ML
500 SOLUTION INTRAVENOUS AS NEEDED
OUTPATIENT
Start: 2025-05-02

## 2025-05-02 RX ORDER — SODIUM CHLORIDE 0.9 % (FLUSH) 0.9 %
10 SYRINGE (ML) INJECTION AS NEEDED
OUTPATIENT
Start: 2025-05-02

## 2025-05-02 RX ORDER — HEPARIN SODIUM (PORCINE) LOCK FLUSH IV SOLN 100 UNIT/ML 100 UNIT/ML
500 SOLUTION INTRAVENOUS AS NEEDED
Status: DISCONTINUED | OUTPATIENT
Start: 2025-05-02 | End: 2025-05-03 | Stop reason: HOSPADM

## 2025-05-02 RX ADMIN — HEPARIN 500 UNITS: 100 SYRINGE at 09:34

## 2025-05-02 RX ADMIN — Medication 10 ML: at 09:34

## 2025-05-02 NOTE — PROGRESS NOTES
CHIEF COMPLAINT: 1.  Peripheral neuropathy of the hands and feet, worse in the feet                                       2.  Follow-up for colon cancer    Problem List:  Oncology/Hematology History Overview Note   1. Stage CARLOS, [id number] colon cancer with 2 out of 14 pericolonic lymph nodes  involved and a left lobe liver biopsy positive for metastasis, presenting with  a 25 pound weight loss and diarrhea with some perirectal soreness and had  colonoscopy with Dr. Mcclain in January that found this lesion that was  circumferential high-grade invading into the pericolonic fat, status post  sigmoid colectomy of a poorly differentiated adenocarcinoma.   a) Baseline CEA postop of 0.8 with alkaline phosphatase 111, with normal liver  enzymes and creatinine of 0.8. Baseline white count 9820 with a hemoglobin  13.8, platelets 339,000, and CT with contrast showing a right lobe liver dome  lesion as well as an anastomotic change in the bowel.   b) Began CapeOx 03/15/2016.  c) Added in Avastin to CapeOx 04/05/2016, second cycle. (This was 8 weeks out  from surgery).  d) KRAS mutation is negative.  E.) CAT scan in August 2016 shows resolution of disease in the liver and colon and a stable lung nodule.  Hence Avastin, Xeloda, and oxaliplatin continued.  F) oxaliplatin discontinued October 2016 due to worsening peripheral neuropathy.  G.) CTs of February 2017 showed no evidence of metastasis and stable bibasilar nodular scar.  Persistent neuropathy not worsening.  CEA 1.4.  Continuing Avastin and Xeloda without oxaliplatin.  Having some problems with irritation of his eyes on Xeloda.  2.  Peripheral neuropathy, chemotherapy induced     Malignant neoplasm of sigmoid colon   5/20/2016 Initial Diagnosis    Malignant neoplasm of sigmoid colon     5/2/2017 Imaging    CT chest, abdomen, pelvis IMPRESSION:  1. No disease recurrence.  2. Minimal areas of nodular thickening in the right lower lobe, stable.     8/2/2017 Imaging    CT  chest/abdomen/pelvis IMPRESSION:  Stable examination with no evidence of acute intrathoracic,  intra-abdominal or pelvic abnormality. There is no evidence of  progression of disease. No metastatic disease.      10/31/2017 -  Chemotherapy    OP CENTRAL VENOUS ACCESS DEVICE CARE AND MAINTENANCE     11/13/2017 Imaging    CT chest/abdomen/pelvis IMPRESSION:  Stable examination without evidence of acute intrathoracic  intra-abdominal or intrapelvic abnormality. No evidence of progression  of disease.   CEA 1.3.     2/6/2020 -  Chemotherapy    OP COLON Capecitabine 1,250 mg/m2 BID (8 cycles)         HISTORY OF PRESENT ILLNESS:  The patient is a 74 y.o. male, here for follow up on management of Stage IV a colon cancer.  He continues on Xeloda alone.  He is on 1500 mg 7 days on 7 days off.  Denies any diarrhea.  He had a episode of syncope at a concert.  He was running a low blood pressure the next day.  After which he went to the ER where he was observed and checked for cardiac issues and was negative.  He is back feeling his old self again.         Past Medical History:   Diagnosis Date    Dental bridge present     H/O exercise stress test     Patient states it was normal    Hypertension     Liver disease     mets from colon cancer    Malignant neoplasm of colon     Seizure 2019    Wears glasses      Past Surgical History:   Procedure Laterality Date    CATARACT EXTRACTION W/ INTRAOCULAR LENS IMPLANT Right 11/30/2023    Procedure: CATARACT PHACO EXTRACTION WITH INTRAOCULAR LENS IMPLANT;  Surgeon: Portillo Bailey MD;  Location: [facility] OR;  Service: Ophthalmology;  Laterality: Right;    CATARACT EXTRACTION W/ INTRAOCULAR LENS IMPLANT Left 12/14/2023    Procedure: CATARACT PHACO EXTRACTION WITH INTRAOCULAR LENS IMPLANT LEFT;  Surgeon: Portillo Bailey MD;  Location: [facility];  Service: Ophthalmology;  Laterality: Left;    COLON SURGERY  2014    Sigmoid    COLONOSCOPY N/A 10/26/2020    Procedure: COLONOSCOPY;   "Surgeon: Rodrigo Lambert MD;  Location: [facility] ENDOSCOPY;  Service: Gastroenterology;  Laterality: N/A;    COLONOSCOPY N/A 5/26/2023    Procedure: COLONOSCOPY;  Surgeon: Rodrigo Lambert MD;  Location: [facility] ENDOSCOPY;  Service: Gastroenterology;  Laterality: N/A;    LIVER SURGERY  2014    PORTACATH PLACEMENT      still in place on left chest       No Known Allergies    Family History and Social History reviewed and changed as necessary      REVIEW OF SYSTEM:     Review of Systems   Constitutional:  Positive for fatigue.   Musculoskeletal:  Positive for arthralgias.   Skin:         Right eye skin cancer   All other systems reviewed and are negative.         PHYSICAL EXAM    Vitals:    05/02/25 0834   BP: 138/69   Pulse: 58   Resp: 16   Temp: 98.6 °F (37 °C)   TempSrc: Infrared   SpO2: 98%   Weight: 69.9 kg (154 lb)   Height: 182.9 cm (72\")         ECOG: (1) Restricted in physically strenuous activity, ambulatory and able to do work of light nature  General: well appearing male in no acute distress  Neuro: alert and oriented  HEENT: sclera anicteric, oropharynx clear  Lymphatics: no cervical, supraclavicular, or axillary adenopathy  Cardiovascular: regular rate and rhythm, no murmurs  Lungs: clear to auscultation bilaterally  Abdomen: soft, nontender, nondistended.   Extremeties: no lower extremity edema  Skin: no rashes, lesions, bruising, or petechiae  Psych: mood and affect appropriate            Vitals reviewed.  Laboratory data reviewed     Lab Results   Component Value Date    WBC 4.29 04/21/2025    HGB 13.1 04/21/2025    HCT 38.3 04/21/2025    .8 (H) 04/21/2025     04/21/2025       Lab Results   Component Value Date    GLUCOSE 103 (H) 04/21/2025    BUN 13 04/21/2025    CREATININE 1.05 04/21/2025    EGFRIFNONA 89 01/21/2022    EGFRIFAFRI 81 08/04/2020    BCR 12.4 04/21/2025    K 4.8 04/21/2025    CO2 24.6 04/21/2025    CALCIUM 9.3 04/21/2025    ALBUMIN 4.6 04/21/2025    AST 22 04/21/2025    ALT " 12 04/21/2025       Lab Results   Component Value Date    CEA 2.46 01/17/2025    CEA 2.71 10/04/2024    CEA 2.76 08/16/2024    CEA 2.41 05/17/2024     CT Chest With Contrast Diagnostic  Result Date: 4/24/2025  Stable 6 mm right lower lobe nodule from prior exam; recommend 3-month follow-up. This is too small to evaluate on PET/CT.  Previously described 2 mm left lower lobe nodule no longer seen.  CTDI: 3.19 mGy DLP:151.59 mGy.cm  This report was signed and finalized on 4/24/2025 6:25 PM by Blaire Gerber MD.      CT Abdomen Pelvis With Contrast  Result Date: 4/21/2025  Air-fluid levels in nondistended small large bowel, consider ileus versus enteritis.  Stable hepatic cyst.   CTDI: 5.52 mGy DLP: 272.16 mGy.cm   This study was performed with techniques to keep radiation doses as low as reasonably achievable (ALARA). Individualized dose reduction techniques using automated exposure control or adjustment of mA and/or kV according to the patient size were employed.      Images were reviewed, interpreted, and dictated by Dr. Blaire Gerber MD Transcribed by Dunia Will PA-C.  This report was signed and finalized on 4/21/2025 2:17 PM by Blaire Gerber MD.          CT Abdomen Pelvis With Contrast  Result Date: 1/14/2025  Stable exam. Continued follow-up is recommended.   CTDI: 4.99 mGy DLP: 389.94 mGy.cm   This study was performed with techniques to keep radiation doses as low as reasonably achievable (ALARA). Individualized dose reduction techniques using automated exposure control or adjustment of mA and/or kV according to the patient size were employed.      Images were reviewed, interpreted, and dictated by Dr. Blaire Gerber MD Transcribed by Dunia Will PA-C.  This report was signed and finalized on 1/14/2025 3:29 PM by Blaire Gerber MD.      CT Chest With Contrast Diagnostic  Result Date: 1/14/2025  New bilateral lower lobe nodules measuring 6 mm or less. These may represent metastases, but are too small for  PET/CT. 3-month follow-up exam is recommended.   This study was performed with techniques to keep radiation doses as low as reasonably achievable (ALARA). Individualized dose reduction techniques using automated exposure control or adjustment of mA and/or kV according to the patient size were employed.    CTDI: 3.25 mGy DLP:      Images were reviewed, interpreted, and dictated by Dr. Blaire Gerber MD Transcribed by Dunia Will PA-C.  This report was signed and finalized on 1/14/2025 3:20 PM by Blaire Gerber MD.            Assessment/Plan     1. Metastatic colon cancer with Solitary Liver metastasis initially diagnosed 2-.  CAT scan shows stability.  He does not have significant disease within the lung.  For now I am going to continue him on Xeloda 1500 mg twice daily 7 days on and 7 days off.  Plan to repeat CT scan in late July and see him in August.    For now we will continue with single agent Xeloda 1500 mg 7 days on 7 days off.        Colonoscopy on 5/26/2023 showed no evidence of cancer.  We will plan to repeat CBC, CMP, and CEA.     2. Peripheral neuropathy secondary to chemotherapy.  Stable.  We will continue gabapentin 1 capsule by mouth 3 times a day.    3. Port.  We will continue port care once every 2 months with clinic appointments. We will check CBC, CMP, CEA with each port flush.  We will flush port today with labs.    4.  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  Resected   Follow-up in 3 months with port flush and CT scan      Santi Abad MD    05/02/25

## 2025-05-02 NOTE — PROGRESS NOTES
CHIEF COMPLAINT: 1.  Peripheral neuropathy of the hands and feet, worse in the feet                                       2.  Follow-up for colon cancer    Problem List:  Oncology/Hematology History Overview Note   1. Stage CARLOS, [id number] colon cancer with 2 out of 14 pericolonic lymph nodes  involved and a left lobe liver biopsy positive for metastasis, presenting with  a 25 pound weight loss and diarrhea with some perirectal soreness and had  colonoscopy with Dr. Mcclain in January that found this lesion that was  circumferential high-grade invading into the pericolonic fat, status post  sigmoid colectomy of a poorly differentiated adenocarcinoma.   a) Baseline CEA postop of 0.8 with alkaline phosphatase 111, with normal liver  enzymes and creatinine of 0.8. Baseline white count 9820 with a hemoglobin  13.8, platelets 339,000, and CT with contrast showing a right lobe liver dome  lesion as well as an anastomotic change in the bowel.   b) Began CapeOx 03/15/2016.  c) Added in Avastin to CapeOx 04/05/2016, second cycle. (This was 8 weeks out  from surgery).  d) KRAS mutation is negative.  E.) CAT scan in August 2016 shows resolution of disease in the liver and colon and a stable lung nodule.  Hence Avastin, Xeloda, and oxaliplatin continued.  F) oxaliplatin discontinued October 2016 due to worsening peripheral neuropathy.  G.) CTs of February 2017 showed no evidence of metastasis and stable bibasilar nodular scar.  Persistent neuropathy not worsening.  CEA 1.4.  Continuing Avastin and Xeloda without oxaliplatin.  Having some problems with irritation of his eyes on Xeloda.  2.  Peripheral neuropathy, chemotherapy induced     Malignant neoplasm of sigmoid colon   5/20/2016 Initial Diagnosis    Malignant neoplasm of sigmoid colon     5/2/2017 Imaging    CT chest, abdomen, pelvis IMPRESSION:  1. No disease recurrence.  2. Minimal areas of nodular thickening in the right lower lobe, stable.     8/2/2017 Imaging    CT  chest/abdomen/pelvis IMPRESSION:  Stable examination with no evidence of acute intrathoracic,  intra-abdominal or pelvic abnormality. There is no evidence of  progression of disease. No metastatic disease.      10/31/2017 -  Chemotherapy    OP CENTRAL VENOUS ACCESS DEVICE CARE AND MAINTENANCE     11/13/2017 Imaging    CT chest/abdomen/pelvis IMPRESSION:  Stable examination without evidence of acute intrathoracic  intra-abdominal or intrapelvic abnormality. No evidence of progression  of disease.   CEA 1.3.     2/6/2020 -  Chemotherapy    OP COLON Capecitabine 1,250 mg/m2 BID (8 cycles)         HISTORY OF PRESENT ILLNESS:  The patient is a 74 y.o. male, here for follow up on management of Stage IV a colon cancer.  He continues on Xeloda alone.  He is on 1500 mg 7 days on 7 days off.  Denies any diarrhea.  Blood pressure remains stable.  Insomnia stable with his sleep medicine he says.  Neuropathy stable with gabapentin.  Currently not playing golf too much due to the weather he says.  But he is staying active.  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  This is scheduled for next week.             Past Medical History:   Diagnosis Date    Dental bridge present     H/O exercise stress test     Patient states it was normal    Hypertension     Liver disease     mets from colon cancer    Malignant neoplasm of colon     Seizure 2019    Wears glasses      Past Surgical History:   Procedure Laterality Date    CATARACT EXTRACTION W/ INTRAOCULAR LENS IMPLANT Right 11/30/2023    Procedure: CATARACT PHACO EXTRACTION WITH INTRAOCULAR LENS IMPLANT;  Surgeon: Portillo Bailey MD;  Location: [facility] OR;  Service: Ophthalmology;  Laterality: Right;    CATARACT EXTRACTION W/ INTRAOCULAR LENS IMPLANT Left 12/14/2023    Procedure: CATARACT PHACO EXTRACTION WITH INTRAOCULAR LENS IMPLANT LEFT;  Surgeon: Portillo Bailey MD;  Location: [facility] OR;  Service: Ophthalmology;   "Laterality: Left;    COLON SURGERY  2014    Sigmoid    COLONOSCOPY N/A 10/26/2020    Procedure: COLONOSCOPY;  Surgeon: Rodrigo Lambert MD;  Location: [facility] ENDOSCOPY;  Service: Gastroenterology;  Laterality: N/A;    COLONOSCOPY N/A 5/26/2023    Procedure: COLONOSCOPY;  Surgeon: Rodrigo Lambert MD;  Location: [facility] ENDOSCOPY;  Service: Gastroenterology;  Laterality: N/A;    LIVER SURGERY  2014    PORTACATH PLACEMENT      still in place on left chest       No Known Allergies    Family History and Social History reviewed and changed as necessary      REVIEW OF SYSTEM:     Review of Systems   Constitutional:  Positive for fatigue.   Musculoskeletal:  Positive for arthralgias.   Skin:         Right eye skin cancer   All other systems reviewed and are negative.         PHYSICAL EXAM    Vitals:    05/02/25 0834   BP: 138/69   Pulse: 58   Resp: 16   Temp: 98.6 °F (37 °C)   TempSrc: Infrared   SpO2: 98%   Weight: 69.9 kg (154 lb)   Height: 182.9 cm (72\")         ECOG: (1) Restricted in physically strenuous activity, ambulatory and able to do work of light nature  General: well appearing male in no acute distress  Neuro: alert and oriented  HEENT: sclera anicteric, oropharynx clear  Lymphatics: no cervical, supraclavicular, or axillary adenopathy  Cardiovascular: regular rate and rhythm, no murmurs  Lungs: clear to auscultation bilaterally  Abdomen: soft, nontender, nondistended.   Extremeties: no lower extremity edema  Skin: no rashes, lesions, bruising, or petechiae  Psych: mood and affect appropriate            Vitals reviewed.  Laboratory data reviewed     Lab Results   Component Value Date    WBC 4.29 04/21/2025    HGB 13.1 04/21/2025    HCT 38.3 04/21/2025    .8 (H) 04/21/2025     04/21/2025       Lab Results   Component Value Date    GLUCOSE 103 (H) 04/21/2025    BUN 13 04/21/2025    CREATININE 1.05 04/21/2025    EGFRIFNONA 89 01/21/2022    EGFRIFAFRI 81 08/04/2020    BCR 12.4 04/21/2025    K 4.8 " 04/21/2025    CO2 24.6 04/21/2025    CALCIUM 9.3 04/21/2025    ALBUMIN 4.6 04/21/2025    AST 22 04/21/2025    ALT 12 04/21/2025       Lab Results   Component Value Date    CEA 2.46 01/17/2025    CEA 2.71 10/04/2024    CEA 2.76 08/16/2024    CEA 2.41 05/17/2024     CT Chest Without Contrast Diagnostic    Result Date: 7/13/2021  Interval resolution of the nodular opacity in the right lower lobe which was likely infectious or inflammatory in nature.  318.12 mGy.cm   This study was performed with techniques to keep radiation doses as low as reasonably achievable (ALARA). Individualized dose reduction techniques using automated exposure control or adjustment of mA and/or kV according to the patient size were employed.  This report was finalized on 7/13/2021 3:44 PM by David Ingram M.D..    CT Chest With Contrast Diagnostic    Result Date: 10/27/2021  No evidence of metastatic disease involving the chest, abdomen or pelvis.  This study was performed with techniques to keep radiation doses as low as reasonably achievable (ALARA). Individualized dose reduction techniques using automated exposure control or adjustment of mA and/or kV according to the patient size were employed.  This report was finalized on 10/27/2021 9:11 AM by David Ingram M.D..    CT Abdomen Pelvis With Contrast    Result Date: 10/27/2021  No evidence of metastatic disease involving the chest, abdomen or pelvis.  This study was performed with techniques to keep radiation doses as low as reasonably achievable (ALARA). Individualized dose reduction techniques using automated exposure control or adjustment of mA and/or kV according to the patient size were employed.  This report was finalized on 10/27/2021 9:11 AM by David Ingram M.D..            Assessment/Plan     1. Metastatic colon cancer with Solitary Liver metastasis initially diagnosed 2-.  I discussed the case with Dr. Abad.  There is a new bilateral lower lobe nodules  measuring 6 mm or less.  This could represent metastasis versus infectious/inflammatory process.  They are too small for PET/CT.  We will order CT scan prior to return in 3 months rather than 6.    For now we will continue with single agent Xeloda 1500 mg 7 days on 7 days off.      CBC and CMP from 8/16/2024 was stable.  CEA stable at 2.76.  Colonoscopy on 5/26/2023 showed no evidence of cancer.  We will plan to repeat CBC, CMP, and CEA today and I will call him with results.      2. Peripheral neuropathy secondary to chemotherapy.  Stable.  We will continue gabapentin 1 capsule by mouth 3 times a day.    3. Port.  We will continue port care once every 2 months with clinic appointments. We will check CBC, CMP, CEA with each port flush.  We will flush port today with labs.    4.  Skin cancer of right eye.  He will keep scheduled follow-up with optometrist and plastic surgeon.  We will plan to hold capecitabine for the week of surgery.      Follow-up in 3 months with port flush and CT scan      Santi Abad MD    05/02/25

## 2025-05-05 ENCOUNTER — SPECIALTY PHARMACY (OUTPATIENT)
Facility: HOSPITAL | Age: 75
End: 2025-05-05
Payer: MEDICARE

## 2025-06-30 ENCOUNTER — APPOINTMENT (OUTPATIENT)
Dept: GENERAL RADIOLOGY | Facility: HOSPITAL | Age: 75
End: 2025-06-30
Payer: MEDICARE

## 2025-06-30 ENCOUNTER — APPOINTMENT (OUTPATIENT)
Dept: CT IMAGING | Facility: HOSPITAL | Age: 75
End: 2025-06-30
Payer: MEDICARE

## 2025-06-30 ENCOUNTER — HOSPITAL ENCOUNTER (INPATIENT)
Facility: HOSPITAL | Age: 75
LOS: 2 days | Discharge: HOME | End: 2025-07-02
Attending: EMERGENCY MEDICINE | Admitting: FAMILY MEDICINE
Payer: MEDICARE

## 2025-06-30 DIAGNOSIS — K56.609 SMALL BOWEL OBSTRUCTION: Primary | ICD-10-CM

## 2025-06-30 DIAGNOSIS — R11.2 NAUSEA AND VOMITING, UNSPECIFIED VOMITING TYPE: ICD-10-CM

## 2025-06-30 DIAGNOSIS — C18.9 METASTATIC COLON CANCER TO LIVER: ICD-10-CM

## 2025-06-30 DIAGNOSIS — C78.7 METASTATIC COLON CANCER TO LIVER: ICD-10-CM

## 2025-06-30 LAB
ALBUMIN SERPL-MCNC: 4.4 G/DL (ref 3.5–5.2)
ALBUMIN/GLOB SERPL: 1.7 G/DL
ALP SERPL-CCNC: 67 U/L (ref 39–117)
ALT SERPL W P-5'-P-CCNC: 16 U/L (ref 1–41)
ANION GAP SERPL CALCULATED.3IONS-SCNC: 14.7 MMOL/L (ref 5–15)
AST SERPL-CCNC: 31 U/L (ref 1–40)
BASOPHILS # BLD AUTO: 0.01 10*3/MM3 (ref 0–0.2)
BASOPHILS NFR BLD AUTO: 0.1 % (ref 0–1.5)
BILIRUB SERPL-MCNC: 1.8 MG/DL (ref 0–1.2)
BILIRUB UR QL STRIP: NEGATIVE
BUN SERPL-MCNC: 20 MG/DL (ref 8–23)
BUN/CREAT SERPL: 17.2 (ref 7–25)
CALCIUM SPEC-SCNC: 10 MG/DL (ref 8.6–10.5)
CHLORIDE SERPL-SCNC: 98 MMOL/L (ref 98–107)
CLARITY UR: ABNORMAL
CO2 SERPL-SCNC: 24.3 MMOL/L (ref 22–29)
COLOR UR: YELLOW
CREAT SERPL-MCNC: 1.16 MG/DL (ref 0.76–1.27)
D-LACTATE SERPL-SCNC: 1.1 MMOL/L (ref 0.5–2)
DEPRECATED RDW RBC AUTO: 53.2 FL (ref 37–54)
EGFRCR SERPLBLD CKD-EPI 2021: 66.5 ML/MIN/1.73
EOSINOPHIL # BLD AUTO: 0 10*3/MM3 (ref 0–0.4)
EOSINOPHIL NFR BLD AUTO: 0 % (ref 0.3–6.2)
ERYTHROCYTE [DISTWIDTH] IN BLOOD BY AUTOMATED COUNT: 14.5 % (ref 12.3–15.4)
GLOBULIN UR ELPH-MCNC: 2.6 GM/DL
GLUCOSE SERPL-MCNC: 107 MG/DL (ref 65–99)
GLUCOSE UR STRIP-MCNC: NEGATIVE MG/DL
HCT VFR BLD AUTO: 39.4 % (ref 37.5–51)
HGB BLD-MCNC: 13.9 G/DL (ref 13–17.7)
HGB UR QL STRIP.AUTO: NEGATIVE
HOLD SPECIMEN: NORMAL
HOLD SPECIMEN: NORMAL
IMM GRANULOCYTES # BLD AUTO: 0.04 10*3/MM3 (ref 0–0.05)
IMM GRANULOCYTES NFR BLD AUTO: 0.4 % (ref 0–0.5)
KETONES UR QL STRIP: ABNORMAL
LEUKOCYTE ESTERASE UR QL STRIP.AUTO: NEGATIVE
LIPASE SERPL-CCNC: 23 U/L (ref 13–60)
LYMPHOCYTES # BLD AUTO: 0.81 10*3/MM3 (ref 0.7–3.1)
LYMPHOCYTES NFR BLD AUTO: 7.2 % (ref 19.6–45.3)
MCH RBC QN AUTO: 35.2 PG (ref 26.6–33)
MCHC RBC AUTO-ENTMCNC: 35.3 G/DL (ref 31.5–35.7)
MCV RBC AUTO: 99.7 FL (ref 79–97)
MONOCYTES # BLD AUTO: 0.86 10*3/MM3 (ref 0.1–0.9)
MONOCYTES NFR BLD AUTO: 7.6 % (ref 5–12)
NEUTROPHILS NFR BLD AUTO: 84.7 % (ref 42.7–76)
NEUTROPHILS NFR BLD AUTO: 9.59 10*3/MM3 (ref 1.7–7)
NITRITE UR QL STRIP: NEGATIVE
NRBC BLD AUTO-RTO: 0 /100 WBC (ref 0–0.2)
PH UR STRIP.AUTO: 5.5 [PH] (ref 5–8)
PLATELET # BLD AUTO: 157 10*3/MM3 (ref 140–450)
PMV BLD AUTO: 10.1 FL (ref 6–12)
POTASSIUM SERPL-SCNC: 4 MMOL/L (ref 3.5–5.2)
PROT SERPL-MCNC: 7 G/DL (ref 6–8.5)
PROT UR QL STRIP: ABNORMAL
RBC # BLD AUTO: 3.95 10*6/MM3 (ref 4.14–5.8)
SODIUM SERPL-SCNC: 137 MMOL/L (ref 136–145)
SP GR UR STRIP: >=1.03 (ref 1–1.03)
UROBILINOGEN UR QL STRIP: ABNORMAL
WBC NRBC COR # BLD AUTO: 11.31 10*3/MM3 (ref 3.4–10.8)
WHOLE BLOOD HOLD COAG: NORMAL
WHOLE BLOOD HOLD SPECIMEN: NORMAL

## 2025-06-30 PROCEDURE — 25810000003 SODIUM CHLORIDE 0.9 % SOLUTION: Performed by: EMERGENCY MEDICINE

## 2025-06-30 PROCEDURE — 74018 RADEX ABDOMEN 1 VIEW: CPT

## 2025-06-30 PROCEDURE — 25010000002 ONDANSETRON PER 1 MG: Performed by: EMERGENCY MEDICINE

## 2025-06-30 PROCEDURE — 25810000003 SODIUM CHLORIDE 0.9 % SOLUTION: Performed by: FAMILY MEDICINE

## 2025-06-30 PROCEDURE — 83690 ASSAY OF LIPASE: CPT

## 2025-06-30 PROCEDURE — 99285 EMERGENCY DEPT VISIT HI MDM: CPT | Performed by: EMERGENCY MEDICINE

## 2025-06-30 PROCEDURE — 99223 1ST HOSP IP/OBS HIGH 75: CPT | Performed by: FAMILY MEDICINE

## 2025-06-30 PROCEDURE — 25010000002 MORPHINE PER 10 MG: Performed by: FAMILY MEDICINE

## 2025-06-30 PROCEDURE — 83605 ASSAY OF LACTIC ACID: CPT

## 2025-06-30 PROCEDURE — 85025 COMPLETE CBC W/AUTO DIFF WBC: CPT

## 2025-06-30 PROCEDURE — 81003 URINALYSIS AUTO W/O SCOPE: CPT

## 2025-06-30 PROCEDURE — 25510000001 IOPAMIDOL 61 % SOLUTION: Performed by: EMERGENCY MEDICINE

## 2025-06-30 PROCEDURE — 80053 COMPREHEN METABOLIC PANEL: CPT

## 2025-06-30 PROCEDURE — 25010000002 MORPHINE PER 10 MG: Performed by: EMERGENCY MEDICINE

## 2025-06-30 PROCEDURE — 74177 CT ABD & PELVIS W/CONTRAST: CPT

## 2025-06-30 RX ORDER — SODIUM CHLORIDE 0.9 % (FLUSH) 0.9 %
10 SYRINGE (ML) INJECTION AS NEEDED
Status: DISCONTINUED | OUTPATIENT
Start: 2025-06-30 | End: 2025-07-02 | Stop reason: HOSPADM

## 2025-06-30 RX ORDER — ONDANSETRON 2 MG/ML
4 INJECTION INTRAMUSCULAR; INTRAVENOUS ONCE
Status: COMPLETED | OUTPATIENT
Start: 2025-06-30 | End: 2025-06-30

## 2025-06-30 RX ORDER — SODIUM CHLORIDE 9 MG/ML
40 INJECTION, SOLUTION INTRAVENOUS AS NEEDED
Status: DISCONTINUED | OUTPATIENT
Start: 2025-06-30 | End: 2025-07-02 | Stop reason: HOSPADM

## 2025-06-30 RX ORDER — GABAPENTIN 300 MG/1
300 CAPSULE ORAL 3 TIMES DAILY
Status: DISCONTINUED | OUTPATIENT
Start: 2025-06-30 | End: 2025-07-02 | Stop reason: HOSPADM

## 2025-06-30 RX ORDER — TRAZODONE HYDROCHLORIDE 50 MG/1
100 TABLET ORAL NIGHTLY
Status: DISCONTINUED | OUTPATIENT
Start: 2025-06-30 | End: 2025-07-02 | Stop reason: HOSPADM

## 2025-06-30 RX ORDER — ENOXAPARIN SODIUM 100 MG/ML
40 INJECTION SUBCUTANEOUS NIGHTLY
Status: DISCONTINUED | OUTPATIENT
Start: 2025-06-30 | End: 2025-07-02 | Stop reason: HOSPADM

## 2025-06-30 RX ORDER — LISINOPRIL 20 MG/1
20 TABLET ORAL DAILY
Status: DISCONTINUED | OUTPATIENT
Start: 2025-07-01 | End: 2025-07-02 | Stop reason: HOSPADM

## 2025-06-30 RX ORDER — IOPAMIDOL 612 MG/ML
100 INJECTION, SOLUTION INTRAVASCULAR
Status: COMPLETED | OUTPATIENT
Start: 2025-06-30 | End: 2025-06-30

## 2025-06-30 RX ORDER — ACETAMINOPHEN 160 MG/5ML
650 SOLUTION ORAL EVERY 4 HOURS PRN
Status: DISCONTINUED | OUTPATIENT
Start: 2025-06-30 | End: 2025-07-02 | Stop reason: HOSPADM

## 2025-06-30 RX ORDER — SODIUM CHLORIDE 9 MG/ML
100 INJECTION, SOLUTION INTRAVENOUS CONTINUOUS
Status: DISCONTINUED | OUTPATIENT
Start: 2025-06-30 | End: 2025-07-01

## 2025-06-30 RX ORDER — ACETAMINOPHEN 650 MG/1
650 SUPPOSITORY RECTAL EVERY 4 HOURS PRN
Status: DISCONTINUED | OUTPATIENT
Start: 2025-06-30 | End: 2025-07-02 | Stop reason: HOSPADM

## 2025-06-30 RX ORDER — ONDANSETRON 2 MG/ML
4 INJECTION INTRAMUSCULAR; INTRAVENOUS EVERY 6 HOURS PRN
Status: DISCONTINUED | OUTPATIENT
Start: 2025-06-30 | End: 2025-07-02 | Stop reason: HOSPADM

## 2025-06-30 RX ORDER — MORPHINE SULFATE 2 MG/ML
2 INJECTION, SOLUTION INTRAMUSCULAR; INTRAVENOUS EVERY 4 HOURS PRN
Status: DISCONTINUED | OUTPATIENT
Start: 2025-06-30 | End: 2025-07-01

## 2025-06-30 RX ORDER — ACETAMINOPHEN 325 MG/1
650 TABLET ORAL EVERY 4 HOURS PRN
Status: DISCONTINUED | OUTPATIENT
Start: 2025-06-30 | End: 2025-07-02 | Stop reason: HOSPADM

## 2025-06-30 RX ORDER — LORAZEPAM 2 MG/ML
0.5 INJECTION INTRAMUSCULAR EVERY 4 HOURS PRN
Status: DISCONTINUED | OUTPATIENT
Start: 2025-06-30 | End: 2025-07-02 | Stop reason: HOSPADM

## 2025-06-30 RX ORDER — SODIUM CHLORIDE 0.9 % (FLUSH) 0.9 %
10 SYRINGE (ML) INJECTION EVERY 12 HOURS SCHEDULED
Status: DISCONTINUED | OUTPATIENT
Start: 2025-06-30 | End: 2025-07-02 | Stop reason: HOSPADM

## 2025-06-30 RX ORDER — ESCITALOPRAM OXALATE 10 MG/1
10 TABLET ORAL DAILY
Status: DISCONTINUED | OUTPATIENT
Start: 2025-07-01 | End: 2025-07-02 | Stop reason: HOSPADM

## 2025-06-30 RX ORDER — NALOXONE HCL 0.4 MG/ML
0.4 VIAL (ML) INJECTION
Status: DISCONTINUED | OUTPATIENT
Start: 2025-06-30 | End: 2025-07-02 | Stop reason: HOSPADM

## 2025-06-30 RX ADMIN — ONDANSETRON 4 MG: 2 INJECTION, SOLUTION INTRAMUSCULAR; INTRAVENOUS at 18:06

## 2025-06-30 RX ADMIN — Medication 1 SPRAY: at 22:41

## 2025-06-30 RX ADMIN — SODIUM CHLORIDE 1000 ML: 9 INJECTION, SOLUTION INTRAVENOUS at 18:06

## 2025-06-30 RX ADMIN — MORPHINE SULFATE 4 MG: 4 INJECTION, SOLUTION INTRAMUSCULAR; INTRAVENOUS at 19:50

## 2025-06-30 RX ADMIN — IOPAMIDOL 100 ML: 612 INJECTION, SOLUTION INTRAVENOUS at 16:35

## 2025-06-30 RX ADMIN — MORPHINE SULFATE 2 MG: 2 INJECTION, SOLUTION INTRAMUSCULAR; INTRAVENOUS at 22:17

## 2025-06-30 RX ADMIN — Medication 10 ML: at 23:05

## 2025-06-30 RX ADMIN — SODIUM CHLORIDE 100 ML/HR: 9 INJECTION, SOLUTION INTRAVENOUS at 22:40

## 2025-06-30 RX ADMIN — ONDANSETRON 4 MG: 2 INJECTION, SOLUTION INTRAMUSCULAR; INTRAVENOUS at 19:49

## 2025-06-30 RX ADMIN — MORPHINE SULFATE 4 MG: 4 INJECTION, SOLUTION INTRAMUSCULAR; INTRAVENOUS at 18:07

## 2025-06-30 NOTE — ED PROVIDER NOTES
[facility]    EMERGENCY DEPARTMENT ENCOUNTER      Pt Name: Gordon Avila  MRN: [id number]  YOB: 1950  Date of evaluation: 6/30/2025  Provider: Roosevelt Sherman MD    CHIEF COMPLAINT       Chief Complaint   Patient presents with    Abdominal Pain         HISTORY OF PRESENT ILLNESS   Gordon Avila is a 74 y.o. male who presents to the emergency department with complaint of worsening diffuse abdominal pain and vomiting that began on Saturday.  Patient says that he has not had a bowel movement or passed any flatus for the past 2 days.  Denies any fever, chills, or urinary symptoms.  He has prior history of partial colon resection due to malignancy and is currently on chemotherapy for active malignancy.      Nursing notes were reviewed.    REVIEW OF SYSTEMS     ROS:  A chief complaint appropriate review of systems was completed and is negative except as noted in the HPI.      PAST MEDICAL HISTORY     Past Medical History:   Diagnosis Date    Dental bridge present     H/O exercise stress test     Patient states it was normal    Hypertension     Liver disease     mets from colon cancer    Malignant neoplasm of colon     Seizure 2019    Wears glasses          SURGICAL HISTORY       Past Surgical History:   Procedure Laterality Date    CATARACT EXTRACTION W/ INTRAOCULAR LENS IMPLANT Right 11/30/2023    Procedure: CATARACT PHACO EXTRACTION WITH INTRAOCULAR LENS IMPLANT;  Surgeon: Portillo Bailey MD;  Location: [facility] OR;  Service: Ophthalmology;  Laterality: Right;    CATARACT EXTRACTION W/ INTRAOCULAR LENS IMPLANT Left 12/14/2023    Procedure: CATARACT PHACO EXTRACTION WITH INTRAOCULAR LENS IMPLANT LEFT;  Surgeon: Portillo Bailey MD;  Location: [facility] OR;  Service: Ophthalmology;  Laterality: Left;    COLON SURGERY  2014    Sigmoid    COLONOSCOPY N/A 10/26/2020    Procedure: COLONOSCOPY;  Surgeon: Rodrigo Lambert MD;  Location: [facility] ENDOSCOPY;  Service: Gastroenterology;  Laterality: N/A;     COLONOSCOPY N/A 5/26/2023    Procedure: COLONOSCOPY;  Surgeon: Rodrigo [facility] MD;  Location: [facility] ENDOSCOPY;  Service: Gastroenterology;  Laterality: N/A;    LIVER SURGERY  2014    PORTACATH PLACEMENT      still in place on left chest         CURRENT MEDICATIONS       Current Facility-Administered Medications:     acetaminophen (TYLENOL) tablet 650 mg, 650 mg, Oral, Q4H PRN **OR** acetaminophen (TYLENOL) 160 MG/5ML oral solution 650 mg, 650 mg, Oral, Q4H PRN **OR** acetaminophen (TYLENOL) suppository 650 mg, 650 mg, Rectal, Q4H PRN, Sruthi Phelan MD    enoxaparin sodium (LOVENOX) syringe 40 mg, 40 mg, Subcutaneous, Nightly, Sruthi Phelan MD    escitalopram (LEXAPRO) tablet 10 mg, 10 mg, Oral, Daily, Sruthi Phelan MD    gabapentin (NEURONTIN) capsule 300 mg, 300 mg, Oral, TID, Sruthi Phelan MD    HYDROmorphone (DILAUDID) injection 1 mg, 1 mg, Intravenous, Q3H PRN, Abiodun Moore MD, 1 mg at 07/01/25 0019    lisinopril (PRINIVIL,ZESTRIL) tablet 20 mg, 20 mg, Oral, Daily, Sruthi Phelan MD    LORazepam (ATIVAN) injection 0.5 mg, 0.5 mg, Intravenous, Q4H PRN, Abiodun Moore MD    Magnesium Standard Dose Replacement - Follow Nurse / BPA Driven Protocol, , Not Applicable, PRN, Sruthi Phelan MD    melatonin tablet 5 mg, 5 mg, Oral, Nightly, Sruthi Phelan MD    [DISCONTINUED] Morphine sulfate (PF) injection 2 mg, 2 mg, Intravenous, Q4H PRN, 2 mg at 06/30/25 8527 **AND** naloxone (NARCAN) injection 0.4 mg, 0.4 mg, Intravenous, Q5 Min PRN, Sruthi Phelan MD    ondansetron (ZOFRAN) injection 4 mg, 4 mg, Intravenous, Q6H PRN, Sruthi Phelan MD    Pharmacy to Dose enoxaparin (LOVENOX), , Not Applicable, Continuous PRN, Sruthi Phelan MD    phenol (CHLORASEPTIC) 1.4 % liquid 1 spray, 1 spray, Mouth/Throat, Q2H PRN, Sruthi Phelan MD, 1 spray at 06/30/25 2241    Potassium Replacement - Follow Nurse / BPA Driven Protocol, , Not Applicable, PRN, Sruthi Phelan MD    sodium chloride 0.9 % flush 10 mL,  "10 mL, Intravenous, PRN, Sruthi Phelan MD    sodium chloride 0.9 % flush 10 mL, 10 mL, Intravenous, Q12H, Sruthi Phelan MD, 10 mL at 06/30/25 2305    sodium chloride 0.9 % flush 10 mL, 10 mL, Intravenous, PRN, Sruthi Phelan MD    sodium chloride 0.9 % infusion 40 mL, 40 mL, Intravenous, PRN, Sruthi Phelan MD    sodium chloride 0.9 % infusion, 100 mL/hr, Intravenous, Continuous, Sruthi Phelan MD, Last Rate: 100 mL/hr at 06/30/25 2240, 100 mL/hr at 06/30/25 2240    traZODone (DESYREL) tablet 100 mg, 100 mg, Oral, Nightly, Sruthi Phelan MD    ALLERGIES     Patient has no known allergies.    FAMILY HISTORY       Family History   Problem Relation Age of Onset    COPD Mother     Heart disease Father     COPD Other           SOCIAL HISTORY       Social History     Socioeconomic History    Marital status:    Tobacco Use    Smoking status: Never    Smokeless tobacco: Former     Types: Chew     Quit date: 2009   Vaping Use    Vaping status: Never Used   Substance and Sexual Activity    Alcohol use: Not Currently    Drug use: No    Sexual activity: Defer         PHYSICAL EXAM    (up to 7 for level 4, 8 or more for level 5)     Vitals:    06/30/25 2018 06/30/25 2038 06/30/25 2058 06/30/25 2140   BP: 143/81 129/70 137/72 142/72   BP Location:    Left arm   Patient Position:    Lying   Pulse:    61   Resp:    16   Temp:    98.8 °F (37.1 °C)   TempSrc:    Oral   SpO2: 95% 95% 95% 96%   Weight:    71.6 kg (157 lb 13.6 oz)   Height:    182.9 cm (72\")       General: Awake, alert, no acute distress.  HEENT: Conjunctivae normal.  Neck: Trachea midline.  Cardiac: Heart regular rate, rhythm, no murmurs, rubs, or gallops  Lungs: Lungs are clear to auscultation, there is no wheezing, rhonchi, or rales. There is no use of accessory muscles.  Chest wall: There is no tenderness to palpation over the chest wall or over ribs  Abdomen: Moderate diffuse abdominal tenderness.  There is no distention, rebound, or " guarding.  Musculoskeletal: No deformity.  Neuro: Alert and oriented x 4.  Dermatology: Skin is warm and dry  Psych: Mentation is grossly normal, cognition is grossly normal. Affect is appropriate.        DIAGNOSTIC RESULTS     EKG: All EKGs are interpreted by the Emergency Department Physician who either signs or Co-signs this chart in the absence of a cardiologist.    No orders to display         RADIOLOGY:   [x] Radiologist's Report Reviewed:  XR Abdomen 1 View   Final Result   IMPRESSION:      Distal enteric tube folded on itself, tip projecting over the gastric fundus.      Authenticated and Electronically Signed by Sanju Watson on   06/30/2025 09:23:55 PM      XR Abdomen 1 View   Final Result   Addendum (preliminary) 1 of 1   ADDENDUM REPORT      ADDENDUM:   This report was discussed with Jeni Anguiano RN on Jun 30, 2025 19:49:00    EDT.      Authenticated and Electronically Signed by Nomi Zuñiga MD on   06/30/2025 07:49:27 PM      Final   IMPRESSION:      1. Enteric tube (NG) side hole is above the diaphragm. Recommend advancement by minimally 9 cm.      Authenticated and Electronically Signed by Nomi Zuñiga MD on   06/30/2025 07:47:23 PM      CT Abdomen Pelvis With Contrast   Final Result   Addendum (preliminary) 1 of 1   ADDENDUM REPORT      ADDENDUM:   This report was discussed with Juan Iyer PA-C on Jun 30, 2025    17:23:00 EDT.      Authenticated and Electronically Signed by Nomi Zuñiga MD on   06/30/2025 05:24:17 PM      Final   IMPRESSION:      1. High-grade small-bowel obstruction. Small bowel is dilated measuring up to 3.9 cm with most distal small bowel contracted likely transition point in the left midabdomen      Authenticated and Electronically Signed by Nomi Zuñiga MD on   06/30/2025 05:20:39 PM      CT Abdomen Pelvis Without Contrast    (Results Pending)   XR Abdomen KUB    (Results Pending)       I ordered and independently reviewed the above noted radiographic studies.         LABS:    I have reviewed and interpreted all of the currently available lab results from this visit (if applicable):  Results for orders placed or performed during the hospital encounter of 06/30/25   Comprehensive Metabolic Panel    Collection Time: 06/30/25  3:51 PM    Specimen: Blood   Result Value Ref Range    Glucose 107 (H) 65 - 99 mg/dL    BUN 20.0 8.0 - 23.0 mg/dL    Creatinine 1.16 0.76 - 1.27 mg/dL    Sodium 137 136 - 145 mmol/L    Potassium 4.0 3.5 - 5.2 mmol/L    Chloride 98 98 - 107 mmol/L    CO2 24.3 22.0 - 29.0 mmol/L    Calcium 10.0 8.6 - 10.5 mg/dL    Total Protein 7.0 6.0 - 8.5 g/dL    Albumin 4.4 3.5 - 5.2 g/dL    ALT (SGPT) 16 1 - 41 U/L    AST (SGOT) 31 1 - 40 U/L    Alkaline Phosphatase 67 39 - 117 U/L    Total Bilirubin 1.8 (H) 0.0 - 1.2 mg/dL    Globulin 2.6 gm/dL    A/G Ratio 1.7 g/dL    BUN/Creatinine Ratio 17.2 7.0 - 25.0    Anion Gap 14.7 5.0 - 15.0 mmol/L    eGFR 66.5 >60.0 mL/min/1.73   Lipase    Collection Time: 06/30/25  3:51 PM    Specimen: Blood   Result Value Ref Range    Lipase 23 13 - 60 U/L   Lactic Acid, Plasma    Collection Time: 06/30/25  3:51 PM    Specimen: Blood   Result Value Ref Range    Lactate 1.1 0.5 - 2.0 mmol/L   CBC Auto Differential    Collection Time: 06/30/25  3:51 PM    Specimen: Blood   Result Value Ref Range    WBC 11.31 (H) 3.40 - 10.80 10*3/mm3    RBC 3.95 (L) 4.14 - 5.80 10*6/mm3    Hemoglobin 13.9 13.0 - 17.7 g/dL    Hematocrit 39.4 37.5 - 51.0 %    MCV 99.7 (H) 79.0 - 97.0 fL    MCH 35.2 (H) 26.6 - 33.0 pg    MCHC 35.3 31.5 - 35.7 g/dL    RDW 14.5 12.3 - 15.4 %    RDW-SD 53.2 37.0 - 54.0 fl    MPV 10.1 6.0 - 12.0 fL    Platelets 157 140 - 450 10*3/mm3    Neutrophil % 84.7 (H) 42.7 - 76.0 %    Lymphocyte % 7.2 (L) 19.6 - 45.3 %    Monocyte % 7.6 5.0 - 12.0 %    Eosinophil % 0.0 (L) 0.3 - 6.2 %    Basophil % 0.1 0.0 - 1.5 %    Immature Grans % 0.4 0.0 - 0.5 %    Neutrophils, Absolute 9.59 (H) 1.70 - 7.00 10*3/mm3    Lymphocytes, Absolute 0.81 0.70 -  3.10 10*3/mm3    Monocytes, Absolute 0.86 0.10 - 0.90 10*3/mm3    Eosinophils, Absolute 0.00 0.00 - 0.40 10*3/mm3    Basophils, Absolute 0.01 0.00 - 0.20 10*3/mm3    Immature Grans, Absolute 0.04 0.00 - 0.05 10*3/mm3    nRBC 0.0 0.0 - 0.2 /100 WBC   Green Top (Gel)    Collection Time: 06/30/25  3:51 PM   Result Value Ref Range    Extra Tube Hold for add-ons.    Lavender Top    Collection Time: 06/30/25  3:51 PM   Result Value Ref Range    Extra Tube hold for add-on    Gold Top - SST    Collection Time: 06/30/25  3:51 PM   Result Value Ref Range    Extra Tube Hold for add-ons.    Light Blue Top    Collection Time: 06/30/25  3:51 PM   Result Value Ref Range    Extra Tube Hold for add-ons.    Urinalysis With Microscopic If Indicated (No Culture) - Urine, Clean Catch    Collection Time: 06/30/25  4:33 PM    Specimen: Urine, Clean Catch   Result Value Ref Range    Color, UA Yellow Yellow, Straw    Appearance, UA Cloudy (A) Clear    pH, UA 5.5 5.0 - 8.0    Specific Gravity, UA >=1.030 1.005 - 1.030    Glucose, UA Negative Negative    Ketones, UA 80 mg/dL (3+) (A) Negative    Bilirubin, UA Negative Negative    Blood, UA Negative Negative    Protein, UA Trace (A) Negative    Leuk Esterase, UA Negative Negative    Nitrite, UA Negative Negative    Urobilinogen, UA 1.0 E.U./dL 0.2 - 1.0 E.U./dL        If labs were ordered, I independently reviewed the results and considered them in treating the patient.      EMERGENCY DEPARTMENT COURSE and DIFFERENTIAL DIAGNOSIS/MDM:   Vitals:  AS OF 01:05 EDT    BP - 142/72  HR - 61  TEMP - 98.8 °F (37.1 °C) (Oral)  O2 SATS - 96%        Discussion below represents my analysis of pertinent findings related to patient's condition, differential diagnosis, treatment plan and final disposition.      Differential diagnosis:  The differential diagnosis associated with the patient's presentation includes: Small bowel obstruction, large bowel obstruction, biliary pathology, pancreatitis, appendicitis,  diverticulitis      Independent interpretations (ECG/rhythm strip/X-ray/US/CT scan): I independently interpreted the patient's abdominal CT and cardiac monitor.  No obstructive change within the abdomen and the patient is in sinus rhythm      Patient's care impacted by:   [] Diabetes   [x] Hypertension   [] Coronary Artery Disease   [] Cancer   [] Other:     Care significantly affected by Social Determinants of Health (housing and economic circumstances, unemployment)    [] Yes     [x] No   If yes, Patient's care significantly limited by  Social Determinants of Health including:    [] Inadequate housing    [] Low income    [] Alcoholism and drug addiction in family    [] Problems related to primary support group    [] Unemployment    [] Problems related to employment    [] Other Social Determinants of Health:       Consideration of admission/observation vs discharge: Patient presents with high-grade small bowel obstruction and requires admission for further management      I considered prescription management with:    [x] Pain medication: Patient given IV morphine with improvement in pain   [] Antiviral:   [] Antibiotic:   [] Other:      ED Course:    ED Course as of 07/01/25 0105   Mon Jun 30, 2025   1795 I discussed case with general surgery Dr. Argueta.  Discussed history, presentation, workup.  Recs NG tube, admission to the hospitalist. [NS]   9541 This trena has a high-grade small bowel obstruction with transition point in the left abdomen. He has active metastatic colon cancer currently followed by [facility] hematology/oncology, prior history of colon resection in 2014. Began having symptoms on Saturday, has not had a bowel movement in 2 days and has been vomiting. Clinically he looks okay, labs look okay. Have ordered fluids, NG tube placement and spoke with Dr. Argueta who will consult. [NS]   6780 I discussed case with hospitalist Dr. Phelan.  Discussed history, presentation, workup.  Accepts patient for  admission. [NS]      ED Course User Index  [NS] Roosevelt Sherman MD       FINAL IMPRESSION      1. Small bowel obstruction    2. Nausea and vomiting, unspecified vomiting type    3. Metastatic colon cancer to liver          DISPOSITION/PLAN     ED Disposition       ED Disposition   Decision to Admit    Condition   --    Comment   Level of Care: Med/Surg [1]   Diagnosis: SBO (small bowel obstruction) [314133]   Certification: I Certify That Inpatient Hospital Services Are Medically Necessary For Greater Than 2 Midnights                   Comment: Please note this report has been produced using speech recognition software.      Roosevelt Sherman MD  Attending Emergency Physician             Roosevelt Sherman MD  07/01/25 0107       Roosevelt Sherman MD  07/01/25 0107

## 2025-07-01 ENCOUNTER — APPOINTMENT (OUTPATIENT)
Dept: CT IMAGING | Facility: HOSPITAL | Age: 75
End: 2025-07-01
Payer: MEDICARE

## 2025-07-01 LAB
ALBUMIN SERPL-MCNC: 3.6 G/DL (ref 3.5–5.2)
ALBUMIN/GLOB SERPL: 1.8 G/DL
ALP SERPL-CCNC: 53 U/L (ref 39–117)
ALT SERPL W P-5'-P-CCNC: 12 U/L (ref 1–41)
ANION GAP SERPL CALCULATED.3IONS-SCNC: 13.2 MMOL/L (ref 5–15)
AST SERPL-CCNC: 23 U/L (ref 1–40)
BILIRUB SERPL-MCNC: 1.5 MG/DL (ref 0–1.2)
BUN SERPL-MCNC: 20 MG/DL (ref 8–23)
BUN/CREAT SERPL: 20 (ref 7–25)
CALCIUM SPEC-SCNC: 8.9 MG/DL (ref 8.6–10.5)
CHLORIDE SERPL-SCNC: 101 MMOL/L (ref 98–107)
CO2 SERPL-SCNC: 23.8 MMOL/L (ref 22–29)
CREAT SERPL-MCNC: 1 MG/DL (ref 0.76–1.27)
DEPRECATED RDW RBC AUTO: 54.4 FL (ref 37–54)
EGFRCR SERPLBLD CKD-EPI 2021: 79.5 ML/MIN/1.73
ERYTHROCYTE [DISTWIDTH] IN BLOOD BY AUTOMATED COUNT: 14.5 % (ref 12.3–15.4)
GLOBULIN UR ELPH-MCNC: 2 GM/DL
GLUCOSE SERPL-MCNC: 87 MG/DL (ref 65–99)
HCT VFR BLD AUTO: 33.1 % (ref 37.5–51)
HGB BLD-MCNC: 11.3 G/DL (ref 13–17.7)
MCH RBC QN AUTO: 34.9 PG (ref 26.6–33)
MCHC RBC AUTO-ENTMCNC: 34.1 G/DL (ref 31.5–35.7)
MCV RBC AUTO: 102.2 FL (ref 79–97)
PLATELET # BLD AUTO: 129 10*3/MM3 (ref 140–450)
PMV BLD AUTO: 10.4 FL (ref 6–12)
POTASSIUM SERPL-SCNC: 3.8 MMOL/L (ref 3.5–5.2)
PROT SERPL-MCNC: 5.6 G/DL (ref 6–8.5)
RBC # BLD AUTO: 3.24 10*6/MM3 (ref 4.14–5.8)
SODIUM SERPL-SCNC: 138 MMOL/L (ref 136–145)
WBC NRBC COR # BLD AUTO: 8.58 10*3/MM3 (ref 3.4–10.8)

## 2025-07-01 PROCEDURE — 25810000003 SODIUM CHLORIDE 0.9 % SOLUTION: Performed by: FAMILY MEDICINE

## 2025-07-01 PROCEDURE — 99222 1ST HOSP IP/OBS MODERATE 55: CPT | Performed by: STUDENT IN AN ORGANIZED HEALTH CARE EDUCATION/TRAINING PROGRAM

## 2025-07-01 PROCEDURE — 74176 CT ABD & PELVIS W/O CONTRAST: CPT

## 2025-07-01 PROCEDURE — 97161 PT EVAL LOW COMPLEX 20 MIN: CPT

## 2025-07-01 PROCEDURE — 80053 COMPREHEN METABOLIC PANEL: CPT | Performed by: FAMILY MEDICINE

## 2025-07-01 PROCEDURE — 25510000002 DIATRIZOATE MEGLUMINE & SODIUM PER 1 ML: Performed by: FAMILY MEDICINE

## 2025-07-01 PROCEDURE — 25010000002 HYDROMORPHONE 1 MG/ML SOLUTION: Performed by: INTERNAL MEDICINE

## 2025-07-01 PROCEDURE — 99232 SBSQ HOSP IP/OBS MODERATE 35: CPT | Performed by: FAMILY MEDICINE

## 2025-07-01 PROCEDURE — 25010000002 LORAZEPAM PER 2 MG: Performed by: INTERNAL MEDICINE

## 2025-07-01 PROCEDURE — 85027 COMPLETE CBC AUTOMATED: CPT | Performed by: FAMILY MEDICINE

## 2025-07-01 PROCEDURE — 25010000002 ENOXAPARIN PER 10 MG: Performed by: FAMILY MEDICINE

## 2025-07-01 PROCEDURE — 97165 OT EVAL LOW COMPLEX 30 MIN: CPT

## 2025-07-01 RX ORDER — DIATRIZOATE MEGLUMINE AND DIATRIZOATE SODIUM 660; 100 MG/ML; MG/ML
30 SOLUTION ORAL; RECTAL
Status: COMPLETED | OUTPATIENT
Start: 2025-07-01 | End: 2025-07-01

## 2025-07-01 RX ORDER — MINERAL OIL, PETROLATUM 425; 573 MG/G; MG/G
OINTMENT OPHTHALMIC AS NEEDED
Status: DISCONTINUED | OUTPATIENT
Start: 2025-07-01 | End: 2025-07-02 | Stop reason: HOSPADM

## 2025-07-01 RX ADMIN — ESCITALOPRAM OXALATE 10 MG: 10 TABLET ORAL at 08:05

## 2025-07-01 RX ADMIN — GABAPENTIN 300 MG: 300 CAPSULE ORAL at 08:06

## 2025-07-01 RX ADMIN — DIATRIZOATE MEGLUMINE AND DIATRIZOATE SODIUM 30 ML: 660; 100 LIQUID ORAL; RECTAL at 10:02

## 2025-07-01 RX ADMIN — GABAPENTIN 300 MG: 300 CAPSULE ORAL at 20:59

## 2025-07-01 RX ADMIN — TRAZODONE HYDROCHLORIDE 100 MG: 50 TABLET ORAL at 20:59

## 2025-07-01 RX ADMIN — HYDROMORPHONE HYDROCHLORIDE 1 MG: 1 INJECTION, SOLUTION INTRAMUSCULAR; INTRAVENOUS; SUBCUTANEOUS at 00:19

## 2025-07-01 RX ADMIN — SODIUM CHLORIDE 100 ML/HR: 9 INJECTION, SOLUTION INTRAVENOUS at 08:35

## 2025-07-01 RX ADMIN — LISINOPRIL 20 MG: 20 TABLET ORAL at 08:05

## 2025-07-01 RX ADMIN — Medication 5 MG: at 20:59

## 2025-07-01 RX ADMIN — GABAPENTIN 300 MG: 300 CAPSULE ORAL at 16:33

## 2025-07-01 RX ADMIN — ENOXAPARIN SODIUM 40 MG: 100 INJECTION SUBCUTANEOUS at 20:59

## 2025-07-01 RX ADMIN — Medication 10 ML: at 22:04

## 2025-07-01 RX ADMIN — Medication 10 ML: at 08:06

## 2025-07-01 RX ADMIN — LORAZEPAM 0.5 MG: 2 INJECTION INTRAMUSCULAR; INTRAVENOUS at 03:21

## 2025-07-01 NOTE — PLAN OF CARE
Goal Outcome Evaluation:  Plan of Care Reviewed With: patient        Progress: no change  Outcome Evaluation: Pt seen for OT evaluation today.  Pt lives at home alone and is normally independent with self care and functional mobility tasks.  Pt received supine in bed and was independent with bed mobility, standing and walked 400' independently.  Pt is independent with self care and has no skilled OT needs at this time.  OT will sign off.    Anticipated Discharge Disposition (OT): home

## 2025-07-01 NOTE — CONSULTS
"    Referring Provider: No ref. provider found    Reason for Consultation: SBO    Patient Care Team:  Ed Abraham MD as PCP - General (Internal Medicine)  Santi Abad MD as Consulting Physician (Hematology)    Chief complaint   Chief Complaint   Patient presents with    Abdominal Pain       SUBJECTIVE:    History of present illness:  Patient is a 74 year old male with PMH metastatic colon cancer diagnosed in 2016 status post left hemicolectomy with colostomy and subsequent reversal, HTN, and seizure who presented to the ED yesterday with complaint of abdominal pain, nausea, and vomiting since Saturday. Patient states that in that time he had not had a bowel movement or passed flatus. Patient reports that the pain was generalized and severe and got progressively worse. He states that he has had \"obstructions\" in the past, but they sound more like constipation as he was treated with laxatives and did not require NG decompression. Patient does report that he is frequently constipated. His last colonoscopy was in 2023 and was normal, recommended repeat after 5 years.     In the ED, the patient was hemodynamically stable and afebrile. Labs were relatively unremarkable with the exception of a mild leukocytosis of 11.3 and bilirubin 1.8. CT abdomen and pelvis was completed and demonstrated high grade small bowel obstruction. The small bowel was dilated to 3.9 cm with the most distal small bowel contracted likely transition point in the left midabdomen.     Review of Systems:    Review of Systems - General ROS: negative for - chills, fatigue, fever, hot flashes, malaise or night sweats  Psychological ROS: negative for - Depression or anxiety  HEENT ROS: negative for -  No nasal/oral pharynx drainage or pain. No acute visual complaints.  Respiratory ROS: negative for - Shortness of breath, cough or hemoptysis.  Cardiovascular ROS: negative for - Chest pain or palpitations. 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    Malignant neoplasm of colon     Seizure 2019    Wears glasses        Past Surgical History:   Procedure Laterality Date    CATARACT EXTRACTION W/ INTRAOCULAR LENS IMPLANT Right 11/30/2023    Procedure: CATARACT PHACO EXTRACTION WITH INTRAOCULAR LENS IMPLANT;  Surgeon: Portillo Bailey MD;  Location: [facility] OR;  Service: Ophthalmology;  Laterality: Right;    CATARACT EXTRACTION W/ INTRAOCULAR LENS IMPLANT Left 12/14/2023    Procedure: CATARACT PHACO EXTRACTION WITH INTRAOCULAR LENS IMPLANT LEFT;  Surgeon: Portillo Bailey MD;  Location: [facility] OR;  Service: Ophthalmology;  Laterality: Left;    COLON SURGERY  2014    Sigmoid    COLONOSCOPY N/A 10/26/2020    Procedure: COLONOSCOPY;  Surgeon: Rodrigo Lambert MD;  Location: [facility] ENDOSCOPY;  Service: Gastroenterology;  Laterality: N/A;    COLONOSCOPY N/A 5/26/2023    Procedure: COLONOSCOPY;  Surgeon: Rodrigo Lambert MD;  Location: [facility] ENDOSCOPY;  Service: Gastroenterology;  Laterality: N/A;    LIVER SURGERY  2014    PORTACATH PLACEMENT      still in place on left chest       Family History   Problem Relation Age of Onset    COPD Mother     Heart disease Father     COPD Other        Social History     Socioeconomic History    Marital status:    Tobacco Use    Smoking status: Never    Smokeless tobacco: Former     Types: Chew     Quit date: 2009   Vaping Use    Vaping status: Never Used   Substance and Sexual Activity    Alcohol use: Not Currently     Drug use: No    Sexual activity: Defer       No Known Allergies    OBJECTIVE:    Medications  Current Facility-Administered Medications   Medication Dose Route Frequency Provider Last Rate Last Admin    acetaminophen (TYLENOL) tablet 650 mg  650 mg Oral Q4H PRN Sruthi Phelan MD        Or    acetaminophen (TYLENOL) 160 MG/5ML oral solution 650 mg  650 mg Oral Q4H PRN Sruthi Phelan MD        Or    acetaminophen (TYLENOL) suppository 650 mg  650 mg Rectal Q4H PRN Sruthi Phelan MD        enoxaparin sodium (LOVENOX) syringe 40 mg  40 mg Subcutaneous Nightly Sruthi Phelan MD        escitalopram (LEXAPRO) tablet 10 mg  10 mg Oral Daily Sruthi Phelan MD   10 mg at 07/01/25 0805    gabapentin (NEURONTIN) capsule 300 mg  300 mg Oral TID Sruthi Phelan MD   300 mg at 07/01/25 0806    HYDROmorphone (DILAUDID) injection 1 mg  1 mg Intravenous Q3H PRN Abiodun Moore MD   1 mg at 07/01/25 0019    lisinopril (PRINIVIL,ZESTRIL) tablet 20 mg  20 mg Oral Daily Sruthi Phelan MD   20 mg at 07/01/25 0805    LORazepam (ATIVAN) injection 0.5 mg  0.5 mg Intravenous Q4H PRN Abiodun Moore MD   0.5 mg at 07/01/25 0321    Magnesium Standard Dose Replacement - Follow Nurse / BPA Driven Protocol   Not Applicable PRN Sruthi Phelan MD        melatonin tablet 5 mg  5 mg Oral Nightly Sruthi Phelan MD        naloxone (NARCAN) injection 0.4 mg  0.4 mg Intravenous Q5 Min PRN Sruthi Phelan MD        ondansetron (ZOFRAN) injection 4 mg  4 mg Intravenous Q6H PRN Sruthi Phelan MD        Pharmacy to Dose enoxaparin (LOVENOX)   Not Applicable Continuous PRN Sruthi Phelan MD        phenol (CHLORASEPTIC) 1.4 % liquid 1 spray  1 spray Mouth/Throat Q2H PRN Sruthi Phelan MD   1 spray at 06/30/25 2241    Potassium Replacement - Follow Nurse / BPA Driven Protocol   Not Applicable PRN Sruthi Phelan MD        sodium chloride 0.9 % flush 10 mL  10 mL Intravenous PRN Sruthi Phelan MD        sodium chloride 0.9 % flush 10 mL  10  mL Intravenous Q12H Sruthi Phelan MD   10 mL at 07/01/25 0806    sodium chloride 0.9 % flush 10 mL  10 mL Intravenous PRN Sruthi Phelan MD        sodium chloride 0.9 % infusion 40 mL  40 mL Intravenous PRN Sruthi Phelan MD        sodium chloride 0.9 % infusion  100 mL/hr Intravenous Continuous Sruthi Phelan  mL/hr at 07/01/25 0835 100 mL/hr at 07/01/25 0835    traZODone (DESYREL) tablet 100 mg  100 mg Oral Nightly Sruthi Phelan MD             Vital Signs   Temp:  [98.1 °F (36.7 °C)-98.9 °F (37.2 °C)] 98.9 °F (37.2 °C)  Heart Rate:  [61-64] 64  Resp:  [16-18] 16  BP: (123-156)/(65-85) 123/69    Physical Exam:     General Appearance:  Alert and cooperative, not in any acute distress.   Head:  Atraumatic and normocephalic, without obvious abnormality.   Eyes:          PERRLA, conjunctivae and sclerae normal, no Icterus. No pallor. Extraocular movements are within normal limits.   Throat: No oral lesions, no thrush, oral mucosa moist.   Neck: Supple, trachea midline, no thyromegaly, no carotid bruit.   Respiratory/Lungs:   Breath sounds heard bilaterally equally.  No crackles or wheezing. No Pleural rub or bronchial breathing. Normal respiratory effort.    Cardiovascular/Heart:  Normal S1 and S2, no murmur. No edema   GI/Abdomen:   Soft, mildly tender to palpation, non-distended, active bowel sounds on auscultation.                 Musculoskeletal/ Extremities:   Moves all extremities well   Skin: No bleeding, bruising or rash, no induration   Psychiatric : Alert and oriented ×3.  No depression or anxiety    Neurologic: Cranial nerves 2 - 12 grossly intact, sensation intact, Motor power is normal and equal bilaterally.     Results Review:  Lab Results (last 24 hours)       Procedure Component Value Units Date/Time    CBC (No Diff) [[id number]]  (Abnormal) Collected: 07/01/25 0537    Specimen: Blood Updated: 07/01/25 0697     WBC 8.58 10*3/mm3      RBC 3.24 10*6/mm3      Hemoglobin 11.3 g/dL       Hematocrit 33.1 %      .2 fL      MCH 34.9 pg      MCHC 34.1 g/dL      RDW 14.5 %      RDW-SD 54.4 fl      MPV 10.4 fL      Platelets 129 10*3/mm3     Comprehensive Metabolic Panel [[id number]]  (Abnormal) Collected: 07/01/25 0537    Specimen: Blood Updated: 07/01/25 0635     Glucose 87 mg/dL      BUN 20.0 mg/dL      Creatinine 1.00 mg/dL      Sodium 138 mmol/L      Potassium 3.8 mmol/L      Chloride 101 mmol/L      CO2 23.8 mmol/L      Calcium 8.9 mg/dL      Total Protein 5.6 g/dL      Albumin 3.6 g/dL      ALT (SGPT) 12 U/L      AST (SGOT) 23 U/L      Alkaline Phosphatase 53 U/L      Total Bilirubin 1.5 mg/dL      Globulin 2.0 gm/dL      A/G Ratio 1.8 g/dL      BUN/Creatinine Ratio 20.0     Anion Gap 13.2 mmol/L      eGFR 79.5 mL/min/1.73     Narrative:      GFR Categories in Chronic Kidney Disease (CKD)              GFR Category          GFR (mL/min/1.73)    Interpretation  G1                    90 or greater        Normal or high (1)  G2                    60-89                Mild decrease (1)  G3a                   45-59                Mild to moderate decrease  G3b                   30-44                Moderate to severe decrease  G4                    15-29                Severe decrease  G5                    14 or less           Kidney failure    (1)In the absence of evidence of kidney disease, neither GFR category G1 or G2 fulfill the criteria for CKD.    eGFR calculation 2021 CKD-EPI creatinine equation, which does not include race as a factor    Urinalysis With Microscopic If Indicated (No Culture) - Urine, Clean Catch [[id number]]  (Abnormal) Collected: 06/30/25 1633    Specimen: Urine, Clean Catch Updated: 06/30/25 1651     Color, UA Yellow     Appearance, UA Cloudy     pH, UA 5.5     Specific Gravity, UA >=1.030     Glucose, UA Negative     Ketones, UA 80 mg/dL (3+)     Bilirubin, UA Negative     Blood, UA Negative     Protein, UA Trace     Leuk Esterase, UA Negative     Nitrite, UA Negative      Urobilinogen, UA 1.0 E.U./dL    Narrative:      Urine microscopic not indicated.    Comprehensive Metabolic Panel [[id number]]  (Abnormal) Collected: 06/30/25 1551    Specimen: Blood Updated: 06/30/25 1617     Glucose 107 mg/dL      BUN 20.0 mg/dL      Creatinine 1.16 mg/dL      Sodium 137 mmol/L      Potassium 4.0 mmol/L      Chloride 98 mmol/L      CO2 24.3 mmol/L      Calcium 10.0 mg/dL      Total Protein 7.0 g/dL      Albumin 4.4 g/dL      ALT (SGPT) 16 U/L      AST (SGOT) 31 U/L      Alkaline Phosphatase 67 U/L      Total Bilirubin 1.8 mg/dL      Globulin 2.6 gm/dL      A/G Ratio 1.7 g/dL      BUN/Creatinine Ratio 17.2     Anion Gap 14.7 mmol/L      eGFR 66.5 mL/min/1.73     Narrative:      GFR Categories in Chronic Kidney Disease (CKD)              GFR Category          GFR (mL/min/1.73)    Interpretation  G1                    90 or greater        Normal or high (1)  G2                    60-89                Mild decrease (1)  G3a                   45-59                Mild to moderate decrease  G3b                   30-44                Moderate to severe decrease  G4                    15-29                Severe decrease  G5                    14 or less           Kidney failure    (1)In the absence of evidence of kidney disease, neither GFR category G1 or G2 fulfill the criteria for CKD.    eGFR calculation 2021 CKD-EPI creatinine equation, which does not include race as a factor    Lipase [[id number]]  (Normal) Collected: 06/30/25 1551    Specimen: Blood Updated: 06/30/25 1617     Lipase 23 U/L     Lactic Acid, Plasma [[id number]]  (Normal) Collected: 06/30/25 1551    Specimen: Blood Updated: 06/30/25 1615     Lactate 1.1 mmol/L     [place] Draw [[id number]] Collected: 06/30/25 1551    Specimen: Blood Updated: 06/30/25 1600    Narrative:      The following orders were created for panel order [place] Draw.  Procedure                               Abnormality         Status                     ---------                                -----------         ------                     Green Top (Gel)[[id number]]                                  Final result               Lavender Top[[id number]]                                     Final result               Gold Top - SST[[id number]]                                   Final result               Light Blue Top[[id number]]                                   Final result                 Please view results for these tests on the individual orders.    Green Top (Gel) [[id number]] Collected: 06/30/25 1551    Specimen: Blood Updated: 06/30/25 1600     Extra Tube Hold for add-ons.     Comment: Auto resulted.       Lavender Top [[id number]] Collected: 06/30/25 1551    Specimen: Blood Updated: 06/30/25 1600     Extra Tube hold for add-on     Comment: Auto resulted       Gold Top - SST [[id number]] Collected: 06/30/25 1551    Specimen: Blood Updated: 06/30/25 1600     Extra Tube Hold for add-ons.     Comment: Auto resulted.       Light Blue Top [[id number]] Collected: 06/30/25 1551    Specimen: Blood Updated: 06/30/25 1600     Extra Tube Hold for add-ons.     Comment: Auto resulted       CBC & Differential [[id number]]  (Abnormal) Collected: 06/30/25 1551    Specimen: Blood Updated: 06/30/25 1559    Narrative:      The following orders were created for panel order CBC & Differential.  Procedure                               Abnormality         Status                     ---------                               -----------         ------                     CBC Auto Differential[[id number]]        Abnormal            Final result                 Please view results for these tests on the individual orders.    CBC Auto Differential [[id number]]  (Abnormal) Collected: 06/30/25 1551    Specimen: Blood Updated: 06/30/25 1559     WBC 11.31 10*3/mm3      RBC 3.95 10*6/mm3      Hemoglobin 13.9 g/dL      Hematocrit 39.4 %      MCV 99.7 fL      MCH 35.2 pg      MCHC 35.3 g/dL      RDW 14.5 %      RDW-SD 53.2  fl      MPV 10.1 fL      Platelets 157 10*3/mm3      Neutrophil % 84.7 %      Lymphocyte % 7.2 %      Monocyte % 7.6 %      Eosinophil % 0.0 %      Basophil % 0.1 %      Immature Grans % 0.4 %      Neutrophils, Absolute 9.59 10*3/mm3      Lymphocytes, Absolute 0.81 10*3/mm3      Monocytes, Absolute 0.86 10*3/mm3      Eosinophils, Absolute 0.00 10*3/mm3      Basophils, Absolute 0.01 10*3/mm3      Immature Grans, Absolute 0.04 10*3/mm3      nRBC 0.0 /100 WBC             ASSESSMENT/PLAN:      SBO (small bowel obstruction)    Patient is a 74 year old male with PMH metastatic colon cancer status post colon resection who presented to ED with complaint of abdominal pain, nausea, and vomiting for two days. CT abdomen and pelvis demonstrated high grade obstruction. Laboratory studies and vitals were largely unremarkable, with normal lactic acid. An NG tube was placed and the patient underwent decompression to LIS overnight. We did proceed with a gastrograffin challenge with repeat CT abdomen showing resolution of obstruction with contrast in the distal colon. Patient has had a large bowel movement this morning and reports improvement in his symptoms. NG may remain clamped and we will start him on clear liquids. Advance diet as tolerated. I discussed that the patient should start a daily bowel regimen with Miralax in the morning to prevent constipation. He should also drink plenty of fluid and continue a GI soft diet over the next few days. We discussed that because of the patient's previous abdominal surgeries, he is at risk for bowel obstructions related to adhesions. We discussed that if he develops these symptoms again that he should present to the hospital sooner rather than later to decrease the risk of requiring surgery. Encourage ambulation. Pending continued improvement patient may be discharged this evening or tomorrow morning if he tolerates GI soft diet. Patient is agreeable to the plan and all of his questions  were answered today.       Alem Argueta DO  07/01/25  11:25 EDT

## 2025-07-01 NOTE — CASE MANAGEMENT/SOCIAL WORK
Discharge Planning Assessment  [facility]     Patient Name: Gordon Avila  MRN: [id number]  Today's Date: 7/1/2025    Admit Date: 6/30/2025    Plan: Home with wife, pending recommendations   Discharge Needs Assessment       Row Name 07/01/25 0951       Living Environment    People in Home spouse    Current Living Arrangements home    Primary Care Provided by self    Provides Primary Care For no one    Family Caregiver if Needed spouse;friend(s)    Family Caregiver Names Wife, Laila    Quality of Family Relationships helpful;involved;supportive    Able to Return to Prior Arrangements yes       Resource/Environmental Concerns    Resource/Environmental Concerns none    Transportation Concerns none       Transition Planning    Patient/Family Anticipates Transition to home with family    Patient/Family Anticipated Services at Transition     Transportation Anticipated family or friend will provide       Discharge Needs Assessment    Readmission Within the Last 30 Days no previous admission in last 30 days    Equipment Currently Used at Home none    Concerns to be Addressed denies needs/concerns at this time;discharge planning                   Discharge Plan       Row Name 07/01/25 0952       Plan    Plan Home with wife, pending recommendations    Patient/Family in Agreement with Plan yes    Plan Comments Spoke to patient and wife at bedside to initiate discharge planning. Patient lives at home with his wife in [facility]. He is independent with ADL's, drives and does not use DME or home oxygen and is not current with home health. Verified demographics. Preferred pharmacy is [facility], but is agreeable to meds to bed. He states he has a living will at home and his  has his POA paper work. He states William Cleary is his POA. Requested documents. He denies any financial, food or resource needs. His plan is home with his wife, pending recommendations. CM will continue to follow.                   Continued Care and Services - Admitted Since 6/30/2025    No active coordination exists.       Selected Continued Care - Episodes Includes continued care and service providers with selected services from the active episodes listed below      Oncology- External Fill Episode start date: 12/11/2023   There are no active outsourced providers for this episode.                    Demographic Summary       Row Name 07/01/25 0950       General Information    Admission Type inpatient    Arrived From emergency department    Required Notices Provided Important Message from Medicare    Referral Source admission list    Reason for Consult discharge planning    Preferred Language English                   Functional Status       Row Name 07/01/25 0950       Functional Status    Usual Activity Tolerance good    Current Activity Tolerance other (see comments)  see therapy/nursing notes       Functional Status, IADL    Medications independent    Meal Preparation independent    Housekeeping independent    Laundry independent    Shopping independent                   Psychosocial    No documentation.                  Abuse/Neglect    No documentation.                  Legal    No documentation.                  Substance Abuse    No documentation.                  Patient Forms    No documentation.                     Gely Lott RN

## 2025-07-01 NOTE — PLAN OF CARE
Problem: Adult Inpatient Plan of Care  Goal: Plan of Care Review  Outcome: Progressing  Goal: Patient-Specific Goal (Individualized)  Outcome: Progressing  Goal: Absence of Hospital-Acquired Illness or Injury  Outcome: Progressing  Intervention: Identify and Manage Fall Risk  Recent Flowsheet Documentation  Taken 7/1/2025 1400 by Marta Choudhary RN  Safety Promotion/Fall Prevention:   room organization consistent   safety round/check completed   toileting scheduled   activity supervised  Taken 7/1/2025 1200 by Marta Choudhary RN  Safety Promotion/Fall Prevention:   room organization consistent   safety round/check completed   toileting scheduled   activity supervised  Taken 7/1/2025 1000 by Marta Choudhary RN  Safety Promotion/Fall Prevention:   room organization consistent   safety round/check completed   toileting scheduled   activity supervised  Taken 7/1/2025 0805 by Marta Choudhary RN  Safety Promotion/Fall Prevention:   room organization consistent   safety round/check completed   toileting scheduled  Taken 7/1/2025 0800 by Marta Choudhary RN  Safety Promotion/Fall Prevention:   room organization consistent   safety round/check completed   toileting scheduled   activity supervised  Intervention: Prevent Skin Injury  Recent Flowsheet Documentation  Taken 7/1/2025 1400 by Marta Choudhary RN  Body Position: position changed independently  Taken 7/1/2025 1200 by Marta Choudhary RN  Body Position:   position changed independently   dangle, side of bed  Taken 7/1/2025 1000 by Marta Choudhary RN  Body Position: dangle, side of bed  Taken 7/1/2025 0800 by Marta Choudhary RN  Body Position:   position changed independently   dangle, side of bed  Goal: Optimal Comfort and Wellbeing  Outcome: Progressing  Intervention: Monitor Pain and Promote Comfort  Recent Flowsheet Documentation  Taken 7/1/2025 0805 by Marta Choudhary RN  Pain Management Interventions: position adjusted  Intervention: Provide Person-Centered Care  Recent  Flowsheet Documentation  Taken 7/1/2025 0805 by Marta Choudhary, RN  Trust Relationship/Rapport:   care explained   thoughts/feelings acknowledged  Goal: Readiness for Transition of Care  Outcome: Progressing     Problem: Comorbidity Management  Goal: Blood Pressure in Desired Range  Outcome: Progressing   Goal Outcome Evaluation:                         NG clamped today-pt ambulating in halls, diet advanced, will monitor.

## 2025-07-01 NOTE — THERAPY DISCHARGE NOTE
Acute Care - Occupational Therapy Discharge  [facility]    Patient Name: Gordon Avila  : 1950    MRN: [id number]                              Today's Date: 2025       Admit Date: 2025    Visit Dx:     ICD-10-CM ICD-9-CM   1. Small bowel obstruction  K56.609 560.9   2. Nausea and vomiting, unspecified vomiting type  R11.2 787.01   3. Metastatic colon cancer to liver  C18.9 153.9    C78.7 197.7     Patient Active Problem List   Diagnosis    Malignant neoplasm of sigmoid colon    Peripheral neuropathy due to chemotherapy    History of known metastasis to liver    HTN (hypertension), benign    Secondary malignant neoplasm of liver and intrahepatic bile duct    Antineoplastic chemotherapy induced pancytopenia (CODE)    Major depressive disorder in partial remission    Tooth abscess    History of colon polyps    Nuclear sclerotic cataract of right eye    Nuclear sclerotic cataract of left eye    SBO (small bowel obstruction)     Past Medical History:   Diagnosis Date    Dental bridge present     H/O exercise stress test     Patient states it was normal    Hypertension     Liver disease     mets from colon cancer    Malignant neoplasm of colon     Seizure 2019    Wears glasses      Past Surgical History:   Procedure Laterality Date    CATARACT EXTRACTION W/ INTRAOCULAR LENS IMPLANT Right 2023    Procedure: CATARACT PHACO EXTRACTION WITH INTRAOCULAR LENS IMPLANT;  Surgeon: Portillo Bailey MD;  Location: [facility] OR;  Service: Ophthalmology;  Laterality: Right;    CATARACT EXTRACTION W/ INTRAOCULAR LENS IMPLANT Left 2023    Procedure: CATARACT PHACO EXTRACTION WITH INTRAOCULAR LENS IMPLANT LEFT;  Surgeon: Portillo Bailey MD;  Location: [facility] OR;  Service: Ophthalmology;  Laterality: Left;    COLON SURGERY      Sigmoid    COLONOSCOPY N/A 10/26/2020    Procedure: COLONOSCOPY;  Surgeon: Rodrigo Lambert MD;  Location: [facility] ENDOSCOPY;  Service: Gastroenterology;  Laterality: N/A;     COLONOSCOPY N/A 5/26/2023    Procedure: COLONOSCOPY;  Surgeon: Rodrigo Lambert MD;  Location: [facility] ENDOSCOPY;  Service: Gastroenterology;  Laterality: N/A;    LIVER SURGERY  2014    PORTACATH PLACEMENT      still in place on left chest      General Information       Row Name 07/01/25 1337          OT Time and Intention    Document Type discharge evaluation/summary  -     Mode of Treatment occupational therapy  -     Patient Effort good  -       Row Name 07/01/25 1337          General Information    Patient Profile Reviewed yes  -     Prior Level of Function independent:;ADL's;community mobility;yard work  -     Barriers to Rehab medically complex  -       Row Name 07/01/25 1337          Living Environment    Current Living Arrangements home  -     People in Home alone  -       Row Name 07/01/25 1337          Home Main Entrance    Number of Stairs, Main Entrance none  -[facility] Name 07/01/25 1337          Stairs Within Home, Primary    Stairs, Within Home, Primary 2nd story with a basement also, pt can manage stairs independently  -     Number of Stairs, Within Home, Primary twelve  -       Row Name 07/01/25 1337          Cognition    Orientation Status (Cognition) oriented x 4  -               User Key  (r) = Recorded By, (t) = Taken By, (c) = Cosigned By      Initials Name Provider Type     Crystal Fernandes Occupational Therapist                   Mobility/ADL's       Row Name 07/01/25 1339          Bed Mobility    Bed Mobility bed mobility (all) activities  -     All Activities, [place] (Bed Mobility) independent  -       Row Name 07/01/25 1339          Transfers    Transfers sit-stand transfer  -       Row Name 07/01/25 1339          Sit-Stand Transfer    Sit-Stand [place] (Transfers) independent  -[facility] Name 07/01/25 1339          Functional Mobility    Functional Mobility- Ind. Level independent  -     Functional Mobility-Distance (Feet) 400  -      Patient was able to Ambulate yes  -AH       Row Name 07/01/25 1339          Activities of Daily Living    BADL Assessment/Intervention bathing;upper body dressing;lower body dressing;grooming;feeding;toileting  -AH       Row Name 07/01/25 1339          Bathing Assessment/Intervention    [place] Level (Bathing) independent  -AH       Row Name 07/01/25 1339          Upper Body Dressing Assessment/Training    [place] Level (Upper Body Dressing) independent  -AH       Row Name 07/01/25 1339          Lower Body Dressing Assessment/Training    [place] Level (Lower Body Dressing) independent  -AH       Row Name 07/01/25 1339          Grooming Assessment/Training    [place] Level (Grooming) independent  -AH       Row Name 07/01/25 1339          Self-Feeding Assessment/Training    [place] Level (Feeding) independent  -AH       Row Name 07/01/25 1339          Toileting Assessment/Training    [place] Level (Toileting) independent  -AH               User Key  (r) = Recorded By, (t) = Taken By, (c) = Cosigned By      Initials Name Provider Type    Crystal Poole Occupational Therapist                   Obj/Interventions       Row Name 07/01/25 1340          Sensory Assessment (Somatosensory)    Sensory Assessment (Somatosensory) sensation intact  -AH       Row Name 07/01/25 1340          Vision Assessment/Intervention    Visual Impairment/Limitations WFL  -AH       Row Name 07/01/25 1340          Range of Motion Comprehensive    General Range of Motion bilateral upper extremity ROM WFL  -AH       Row Name 07/01/25 1340          Strength Comprehensive (MMT)    Comment, General Manual Muscle Testing (MMT) Assessment BUE  [facility]               User Key  (r) = Recorded By, (t) = Taken By, (c) = Cosigned By      Initials Name Provider Type    Crystal Poole Occupational Therapist                   Goals/Plan    No documentation.                  Clinical Impression       Row Name 07/01/25 134           Pain Assessment    Pretreatment Pain Rating 7/10  -     Posttreatment Pain Rating 7/10  -     Pain Location other (see comments)  ng tube  -     Pain Management Interventions exercise or physical activity utilized  -     Response to Pain Interventions activity participation with tolerable pain  -       Row Name 07/01/25 1348          Plan of Care Review    Plan of Care Reviewed With patient  -     Progress no change  -     Outcome Evaluation Pt seen for OT evaluation today.  Pt lives at home alone and is normally independent with self care and functional mobility tasks.  Pt received supine in bed and was independent with bed mobility, standing and walked 400' independently.  Pt is independent with self care and has no skilled OT needs at this time.  OT will sign off.  -[facility] Name 07/01/25 1347          Therapy Assessment/Plan (OT)    Patient/Family Therapy Goal Statement (OT) d/c home  -     Therapy Frequency (OT) evaluation only  -[facility] Name 07/01/25 1341          Therapy Plan Review/Discharge Plan (OT)    Anticipated Discharge Disposition (OT) home  -[facility] Name 07/01/25 1341          Positioning and Restraints    Pre-Treatment Position in bed  -     Post Treatment Position chair  -     In Chair sitting;call light within reach;encouraged to call for assist  -               User Key  (r) = Recorded By, (t) = Taken By, (c) = Cosigned By      Initials Name Provider Type    Crystal Poole Occupational Therapist                   Outcome Measures       Row Name 07/01/25 7347          How much help from another is currently needed...    Putting on and taking off regular lower body clothing? 4  -AH     Bathing (including washing, rinsing, and drying) 4  -AH     Toileting (which includes using toilet bed pan or urinal) 4  -AH     Putting on and taking off regular upper body clothing 4  -AH     Taking care of personal grooming (such as brushing teeth) 4  -AH     Eating  meals 4  -     AM-PAC 6 Clicks Score (OT) 24  -       Row Name 07/01/25 0805          How much help from another person do you currently need...    Turning from your back to your side while in flat bed without using bedrails? 4  -EC     Moving from lying on back to sitting on the side of a flat bed without bedrails? 4  -EC     Moving to and from a bed to a chair (including a wheelchair)? 3  -EC     Standing up from a chair using your arms (e.g., wheelchair, bedside chair)? 3  -EC     Climbing 3-5 steps with a railing? 3  -EC     To walk in hospital room? 3  -EC     AM-PAC 6 Clicks Score (PT) 20  -       Row Name 07/01/25 1347          Functional Assessment    Outcome Measure Options AM-PAC 6 Clicks Daily Activity (OT)  -               User Key  (r) = Recorded By, (t) = Taken By, (c) = Cosigned By      Initials Name Provider Type     Crystal Fernandes Occupational Therapist    Marta De Luna, RN Registered Nurse                  Occupational Therapy Education       Title: PT OT SLP Therapies (Done)       Topic: Occupational Therapy (Done)       Point: ADL training (Done)       Learning Progress Summary            Patient Acceptance, E,TB, VU by  at 7/1/2025 1347    Comment: Role of OT                                      User Key       Initials Effective Dates Name Provider Type [facility] 06/16/21 -  Crystal Fernandes Occupational Therapist OT                  OT Recommendation and Plan  Therapy Frequency (OT): evaluation only  Plan of Care Review  Plan of Care Reviewed With: patient  Progress: no change  Outcome Evaluation: Pt seen for OT evaluation today.  Pt lives at home alone and is normally independent with self care and functional mobility tasks.  Pt received supine in bed and was independent with bed mobility, standing and walked 400' independently.  Pt is independent with self care and has no skilled OT needs at this time.  OT will sign off.  Plan of Care Reviewed With: patient  Outcome  Evaluation: Pt seen for OT evaluation today.  Pt lives at home alone and is normally independent with self care and functional mobility tasks.  Pt received supine in bed and was independent with bed mobility, standing and walked 400' independently.  Pt is independent with self care and has no skilled OT needs at this time.  OT will sign off.     Time Calculation:   Evaluation Complexity (OT)  Review Occupational Profile/Medical/Therapy History Complexity: brief/low complexity  Assessment, Occupational Performance/Identification of Deficit Complexity: 1-3 performance deficits  Clinical Decision Making Complexity (OT): problem focused assessment/low complexity  Overall Complexity of Evaluation (OT): low complexity     Time Calculation- OT       Row Name 07/01/25 1347             Time Calculation- OT    OT Start Time 1026  -AH      OT Received On 07/01/25  -AH         Untimed Charges    OT Eval/Re-eval Minutes 45  -AH         Total Minutes    Untimed Charges Total Minutes 45  -AH       Total Minutes 45  -AH                User Key  (r) = Recorded By, (t) = Taken By, (c) = Cosigned By      Initials Name Provider Type    Crystal Poole Occupational Therapist                  Therapy Charges for Today       Code Description Service Date Service Provider Modifiers Qty    [id number]  OT EVAL LOW COMPLEXITY 3 7/1/2025 Crystal Fernandes GO 1               OT Discharge Summary  Anticipated Discharge Disposition (OT): home    Crystal Fernandes  7/1/2025

## 2025-07-01 NOTE — PLAN OF CARE
Goal Outcome Evaluation:  Plan of Care Reviewed With: patient        Progress: no change  Outcome Evaluation: Patient participated in PT evaluation and demonstrated safe mobiity and independent functional mobility.  He was able to perform bed mobility, transfers and gait x 400 feet independently.  He does not require the skills of PT and we will sign off at this time.    Anticipated Discharge Disposition (PT): home

## 2025-07-01 NOTE — PROGRESS NOTES
[facility] HOSPITALIST    PROGRESS NOTE    Name:  Gordon Avila   Age:  74 y.o.  Sex:  male  :  1950  MRN:  [id number]   Visit Number:  [id number]  Admission Date:  2025  Date Of Service:  25  Primary Care Physician:  Ed Abraham MD     LOS: 1 day :    Chief Complaint:      Abdominal pain, vomiting     Subjective:    Patient was seen and examined bedside today.  Patient sitting up comfortably in bed with.  Still has the NG tube but clamped.  Patient reporting feeling much better today.  He had large bowel movement earlier today.  Denies any nausea or vomiting.  He was advanced to clear liquids and tolerating p.o. well.  Reporting improvement on his abdominal pain.  Vitals are stable.  No other acute complaints today.  General surgery following.    Hospital Course:    Patient is a 74 years old male with a past medical history of colon cancer following up with heme-onc at [facility] status post colon resection in the past, hypertension, seizure, liver disease, who presented to the ER with a chief complaint of abdominal pain and vomiting.  Patient reports that his symptoms started on Saturday (2 days ago) with severe intractable nausea and vomiting and generalized abdominal pain.  Reporting last bowel movement was 2 days ago.  No fever or chills.  Symptoms got worse and persistent so he presented to the ER for evaluation.  On ER evaluation,     Vitals were stable and afebrile.  Workup in the ER with labs significant for WBC of 11.3, T. bili Lavell 1.8 otherwise on actionable on his CBC and CMP.  Urinalysis showed ketones with trace protein otherwise negative for infection.  CT abdomen pelvis showed high-grade small-bowel obstruction. Small bowel is dilated measuring up to 3.9 cm with most distal small bowel contracted likely transition point in the left midabdomen.  Case discussed by ER provider with Dr. Argueta with general surgery, recommended admission and will  consult on the patient.  NG tube was ordered in the ER, patient received morphine, Zofran and 1 L bolus of normal saline.  Hospitalist consulted for admission.    Review of Systems:     All systems were reviewed and negative except as mentioned in subjective, assessment and plan.    Vital Signs:    Temp:  [98.1 °F (36.7 °C)-98.9 °F (37.2 °C)] 98.9 °F (37.2 °C)  Heart Rate:  [61-64] 64  Resp:  [16-18] 16  BP: (123-156)/(65-85) 123/69    Intake and output:    I/O last 3 completed shifts:  In: -   Out: 150 [Emesis/NG output:150]  I/O this shift:  In: -   Out: 500 [Urine:500]    Physical Examination:    General Appearance:  Alert and cooperative.  No acute distress.   Head:  Atraumatic and normocephalic.   Eyes: Conjunctivae and sclerae normal, no icterus. No pallor.   Throat: No oral lesions, no thrush, oral mucosa moist.  NG tube in place.   Neck: Supple, trachea midline, no thyromegaly.   Lungs:   Breath sounds heard bilaterally equally.  No wheezing or crackles. No Pleural rub or bronchial breathing.   Heart:  Normal S1 and S2, no murmur, no gallop, no rub. No JVD.   Abdomen:   Normal bowel sounds, no masses, no organomegaly. Soft, nontender, nondistended, no rebound tenderness.   Extremities: Supple, no edema, no cyanosis, no clubbing.   Skin: No bleeding or rash.   Neurologic: Alert and oriented x 3. No facial asymmetry. Moves all four limbs. No tremors.      Laboratory results:    Results from last 7 days   Lab Units 07/01/25  0537 06/30/25  1551   SODIUM mmol/L 138 137   POTASSIUM mmol/L 3.8 4.0   CHLORIDE mmol/L 101 98   CO2 mmol/L 23.8 24.3   BUN mg/dL 20.0 20.0   CREATININE mg/dL 1.00 1.16   CALCIUM mg/dL 8.9 10.0   BILIRUBIN mg/dL 1.5* 1.8*   ALK PHOS U/L 53 67   ALT (SGPT) U/L 12 16   AST (SGOT) U/L 23 31   GLUCOSE mg/dL 87 107*     Results from last 7 days   Lab Units 07/01/25  0537 06/30/25  1551   WBC 10*3/mm3 8.58 11.31*   HEMOGLOBIN g/dL 11.3* 13.9   HEMATOCRIT % 33.1* 39.4   PLATELETS 10*3/mm3 129*  "157                 No results for input(s): \"PHART\", \"CKT6BLV\", \"PO2ART\", \"WYH7BLH\", \"BASEEXCESS\" in the last 8760 hours.   I have reviewed the patient's laboratory results.    Radiology results:    CT Abdomen Pelvis Without Contrast  Result Date: 7/1/2025  CT ABDOMEN AND PELVIS WITHOUT CONTRAST  TECHNIQUE: Oral contrast without intravenous contrast administration.  HISTORY: SBO; K56.609-Unspecified intestinal obstruction, unspecified as to partial versus complete obstruction; R11.2-Nausea with vomiting, unspecified; C18.9-Malignant neoplasm of colon, unspecified; C78.7-Secondary malignant neoplasm of liver and intrahepatic bile duct.  COMPARISON: 06/30/2025  FINDINGS:  Abdomen: Small hiatal hernia is present. Lobulated cyst is noted of the inferior right liver. Hypodense left renal lesion is noted compatible with a cyst. Minimal increased density in the renal collecting systems is seen, likely retained excreted contrast.  Remaining solid organs are normal.  An NG tube is noted in the proximal stomach.  Small-bowel distention has resolved from prior exam. There is some retained fluid in nondilated small-bowel loops. Contrast is noted passing throughout the small bowel filling the colon.  Pelvis: There is some fold thickening of distal small bowel in the right lower quadrant, which may reflect enteritis or edema from recent obstruction. No abscess is seen. There is trace free fluid in right lower quadrant. Appendix is not visualized. Bladder and prostate are unremarkable.      Impression: Resolved small bowel obstruction since prior exam with passage of oral contrast agent filling the entire nondilated colon. No retained contrast within small bowel.   This study was performed with techniques to keep radiation doses as low as reasonably achievable (ALARA). Individualized dose reduction techniques using automated exposure control or adjustment of vA and/or kV according to the patient size were employed.  This report " was signed and finalized on 7/1/2025 10:26 AM by Omer Hunter MD.      XR Abdomen KUB  Addendum Date: 7/1/2025  ADDENDUM REPORT ADDENDUM: This report was discussed with Amairani Senior RN on Jul 01, 2025 02:59:00 EDT. Authenticated and Electronically Signed by Ryan Hodges MD on 07/01/2025 03:00:35 AM    Result Date: 7/1/2025  FINAL REPORT TECHNIQUE: null CLINICAL HISTORY: NG placement verification COMPARISON: null FINDINGS: Abdomen X-ray, 1 View COMPARISON: CR - XR ABDOMEN 1 VW - 6/30/25 20:40 EDT FINDINGS: Enteric tube in place, with tip projecting below the diaphragm in the region of the stomach. Unchanged left-sided central venous access port projecting over the chest. Dilated small bowel in the upper abdomen measuring up to 4.5 cm in diameter. No visible free air. No acute fracture.     Impression: IMPRESSION: Dilated small bowel, which could be due to small-bowel obstruction, enteritis, or ileus. Enteric tube as above. Authenticated and Electronically Signed by Ryan Hodges MD on 07/01/2025 02:54:23 AM    XR Abdomen 1 View  Result Date: 6/30/2025  FINAL REPORT TECHNIQUE: null CLINICAL HISTORY: post NG insertion COMPARISON: null FINDINGS: 1 view abdomen Comparison: CR - XR ABDOMEN 1 VW - 6/30/25 18:59 EDT Findings: No abnormal calcifications. No acute fractures. Distal enteric tube folded on itself, tip projecting over the gastric fundus. Similar position of the left chest port. Nonspecific bowel-gas pattern in the visualized upper abdomen.     Impression: IMPRESSION: Distal enteric tube folded on itself, tip projecting over the gastric fundus. Authenticated and Electronically Signed by Sanju Watson on 06/30/2025 09:23:55 PM    XR Abdomen 1 View  Addendum Date: 6/30/2025  ADDENDUM REPORT ADDENDUM: This report was discussed with Jeni Anguiano RN on Jun 30, 2025 19:49:00 EDT. Authenticated and Electronically Signed by Nomi Zuñiga MD on 06/30/2025 07:49:27 PM    Result Date: 6/30/2025  FINAL REPORT TECHNIQUE:  null CLINICAL HISTORY: post NG tube insertion. COMPARISON: null FINDINGS: Single view of the abdomen. COMPARISON: CT abdomen and pelvis dated 6/30/25 at 16:24 EDT FINDINGS: Left-sided chest port tip overlies the mid SVC. Enteric tube side hole is above the GE junction and tip just below the diaphragm. Recommend advancement by minimally 9 cm.     Impression: IMPRESSION: 1. Enteric tube (NG) side hole is above the diaphragm. Recommend advancement by minimally 9 cm. Authenticated and Electronically Signed by Nomi Zuñiga MD on 06/30/2025 07:47:23 PM    CT Abdomen Pelvis With Contrast  Addendum Date: 6/30/2025  ADDENDUM REPORT ADDENDUM: This report was discussed with Juan Iyer PA-C on Jun 30, 2025 17:23:00 EDT. Authenticated and Electronically Signed by Nomi Zuñiga MD on 06/30/2025 05:24:17 PM    Result Date: 6/30/2025  FINAL REPORT TECHNIQUE: null CLINICAL HISTORY: abd pain, vomiting COMPARISON: null FINDINGS: CT abdomen and pelvis with IV contrast. COMPARISON: None provided. FINDINGS: Small hiatal hernia. Hepatic cyst within the right lobe measuring 4.1 cm. Cholelithiasis present within the gallbladder neck. No pericholecystic inflammatory changes. Splenic granulomas present. Normal pancreas. Normal adrenal glands. Left renal cystic lesion measuring 1.1 cm. No hydronephrosis. Well-defined hypoattenuating lesions measuring up to 0.4 cm in the right kidney, too small to further characterize but likely representing renal cysts. Dilated loops of small bowel measuring up to 3.9 cm with air-fluid levels. Most distal small bowel is contracted. Likely transition point in the left midabdomen (series 2, image 50). Appendix is not definitively identified. Small amount of free fluid present within the lower abdomen/pelvis. No mesenteric or retroperitoneal lymphadenopathy. Moderate aortoiliac atherosclerotic vascular calcifications. Urinary bladder is contracted. No inguinal lymphadenopathy. Prostate is enlarged measuring up  to 5.0 cm. Mild-to-moderate spondylosis. No acute fracture.     Impression: IMPRESSION: 1. High-grade small-bowel obstruction. Small bowel is dilated measuring up to 3.9 cm with most distal small bowel contracted likely transition point in the left midabdomen Authenticated and Electronically Signed by Nomi Zuñiga MD on 06/30/2025 05:20:39 PM    I have reviewed the patient's radiology reports.    Medication Review:     I have reviewed the patient's active and prn medications.     Problem List:      SBO (small bowel obstruction)      Assessment:    Small bowel obstruction, POA  Colon cancer status post colon resection  Hypertension  Seizure  Liver disease    Plan:    Small bowel obstruction  - Dr. Argueta general surgery consulted, pressure recommendations  - NG tube placed in the ER  - Pain control  - IV fluids  -CT abdomen pelvis with p.o. contrast showing resolved small bowel obstruction since prior exam with passage of oral contrast agent filling the entire nondilated colon.  - Patient had bowel movement today.  - PT/OT consulted, encouraged ambulation  -Advance to clear liquids  -daily bowel regimen with Miralax in the morning to prevent constipation      -Continue home meds as warranted.  -Further orders as indicated per clinical course.  - I have reviewed the copied text and it is accurate as of 7/1/2025     DVT Prophylaxis: Lovenox prophylaxis (benefit> risk)  Code Status: Full  Diet: Clear liquids, advance to GI soft as able to  Discharge Plan: Likely needs 1 to 2 days in the hospital    Sruthi Phelan MD  07/01/25  11:31 EDT    Dictated utilizing Dragon dictation.

## 2025-07-01 NOTE — THERAPY DISCHARGE NOTE
Patient Name: Gordon Avila  : 1950    MRN: [id number]                              Today's Date: 2025       Admit Date: 2025    Visit Dx:     ICD-10-CM ICD-9-CM   1. Small bowel obstruction  K56.609 560.9   2. Nausea and vomiting, unspecified vomiting type  R11.2 787.01   3. Metastatic colon cancer to liver  C18.9 153.9    C78.7 197.7     Patient Active Problem List   Diagnosis    Malignant neoplasm of sigmoid colon    Peripheral neuropathy due to chemotherapy    History of known metastasis to liver    HTN (hypertension), benign    Secondary malignant neoplasm of liver and intrahepatic bile duct    Antineoplastic chemotherapy induced pancytopenia (CODE)    Major depressive disorder in partial remission    Tooth abscess    History of colon polyps    Nuclear sclerotic cataract of right eye    Nuclear sclerotic cataract of left eye    SBO (small bowel obstruction)     Past Medical History:   Diagnosis Date    Dental bridge present     H/O exercise stress test     Patient states it was normal    Hypertension     Impaired functional mobility, balance, gait, and endurance     Liver disease     mets from colon cancer    Malignant neoplasm of colon     Seizure 2019    Wears glasses      Past Surgical History:   Procedure Laterality Date    CATARACT EXTRACTION W/ INTRAOCULAR LENS IMPLANT Right 2023    Procedure: CATARACT PHACO EXTRACTION WITH INTRAOCULAR LENS IMPLANT;  Surgeon: Portillo Bailey MD;  Location: [facility] OR;  Service: Ophthalmology;  Laterality: Right;    CATARACT EXTRACTION W/ INTRAOCULAR LENS IMPLANT Left 2023    Procedure: CATARACT PHACO EXTRACTION WITH INTRAOCULAR LENS IMPLANT LEFT;  Surgeon: Portillo Bailey MD;  Location: [facility] OR;  Service: Ophthalmology;  Laterality: Left;    COLON SURGERY      Sigmoid    COLONOSCOPY N/A 10/26/2020    Procedure: COLONOSCOPY;  Surgeon: Rodrigo Lambert MD;  Location: [facility] ENDOSCOPY;  Service: Gastroenterology;  Laterality:  N/A;    COLONOSCOPY N/A 5/26/2023    Procedure: COLONOSCOPY;  Surgeon: Rodrigo Lambert MD;  Location: [facility] ENDOSCOPY;  Service: Gastroenterology;  Laterality: N/A;    LIVER SURGERY  2014    PORTACATH PLACEMENT      still in place on left chest      General Information       Row Name 07/01/25 1027          Physical Therapy Time and Intention    Document Type discharge evaluation/summary  -JR     Mode of Treatment physical therapy  -JR       Row Name 07/01/25 1027          General Information    Patient Profile Reviewed yes  -JR     Prior Level of Function independent:;community mobility  -JR     Existing Precautions/Restrictions no known precautions/restrictions  -JR     Barriers to Rehab medically complex  -JR       Row Name 07/01/25 1027          Living Environment    Current Living Arrangements home  -JR     People in Home alone  -JR       Row Name 07/01/25 1027          Home Main Entrance    Number of Stairs, Main Entrance none  -JR       Row Name 07/01/25 1027          Stairs Within Home, Primary    Stairs, Within Home, Primary he usually has no problem with the stairs to the basement and he is is able to stay on the main level  -JR     Number of Stairs, Within Home, Primary twelve  -JR       Row Name 07/01/25 1027          Cognition    Orientation Status (Cognition) oriented x 4  -JR               User Key  (r) = Recorded By, (t) = Taken By, (c) = Cosigned By      Initials Name Provider Type    JR Barbara Cabrera PT Physical Therapist                   Mobility       Row Name 07/01/25 1051          Bed Mobility    Bed Mobility bed mobility (all) activities  -JR     All Activities, [place] (Bed Mobility) independent  -JR       Row Name 07/01/25 1051          Sit-Stand Transfer    Sit-Stand [place] (Transfers) independent  -       Row Name 07/01/25 1051          Gait/Stairs (Locomotion)    [place] Level (Gait) independent  -JR     Patient was able to Ambulate yes  -JR     Distance in Feet (Gait)  400  -JR     Comment, (Gait/Stairs) good gait pattern, no loss of balance and no path deviations  -JR               User Key  (r) = Recorded By, (t) = Taken By, (c) = Cosigned By      Initials Name Provider Type    Barbara Hernadez, PT Physical Therapist                   Obj/Interventions       Row Name 07/01/25 1027          Range of Motion Comprehensive    General Range of Motion bilateral lower extremity ROM WFL  -JR       Row Name 07/01/25 1027          Strength Comprehensive (MMT)    Comment, General Manual Muscle Testing (MMT) Assessment BLE grossly 4/5  -JR       Row Name 07/01/25 1027          Balance    Balance Assessment sitting static balance;sitting dynamic balance;standing static balance;standing dynamic balance  -JR     Static Sitting Balance independent  -JR     Dynamic Sitting Balance independent  -JR     Position, Sitting Balance unsupported;sitting edge of bed  -JR     Static Standing Balance independent  -JR     Dynamic Standing Balance independent  -JR     Position/Device Used, Standing Balance unsupported  -JR               User Key  (r) = Recorded By, (t) = Taken By, (c) = Cosigned By      Initials Name Provider Type    Barbara Hernadez PT Physical Therapist                   Goals/Plan    No documentation.                  Clinical Impression       Row Name 07/01/25 1027          Pain    Pretreatment Pain Rating 7/10  -JR     Posttreatment Pain Rating 7/10  -JR     Pain Location other (see comments)  NG tube  -JR     Pain Side/Orientation generalized  -JR     Pain Management Interventions exercise or physical activity utilized  -JR     Response to Pain Interventions activity participation with tolerable pain  -JR       Row Name 07/01/25 1027          Plan of Care Review    Plan of Care Reviewed With patient  -JR     Progress no change  -JR     Outcome Evaluation Patient participated in PT evaluation and demonstrated safe mobiity and independent functional mobility.  He was able to perform bed  mobility, transfers and gait x 400 feet independently.  He does not require the skills of PT and we will sign off at this time.  -       Row Name 07/01/25 1027          Therapy Assessment/Plan (PT)    Patient/Family Therapy Goals Statement (PT) Patient wants to go home  -     Criteria for Skilled Interventions Met (PT) no;no problems identified which require skilled intervention  -     Therapy Frequency (PT) evaluation only  -JR       Row Name 07/01/25 1027          Positioning and Restraints    Pre-Treatment Position in bed  -JR     Post Treatment Position chair  -JR     In Chair sitting;call light within reach;encouraged to call for assist;notified nsg  -               User Key  (r) = Recorded By, (t) = Taken By, (c) = Cosigned By      Initials Name Provider Type    Barbara Hernadez, PT Physical Therapist                   Outcome Measures       Row Name 07/01/25 1027 07/01/25 0805       How much help from another person do you currently need...    Turning from your back to your side while in flat bed without using bedrails? 4  -JR 4  -EC    Moving from lying on back to sitting on the side of a flat bed without bedrails? 4  -JR 4  -EC    Moving to and from a bed to a chair (including a wheelchair)? 4  -JR 3  -EC    Standing up from a chair using your arms (e.g., wheelchair, bedside chair)? 4  -JR 3  -EC    Climbing 3-5 steps with a railing? 3  -JR 3  -EC    To walk in hospital room? 4  -JR 3  -EC    AM-PAC 6 Clicks Score (PT) 23  - 20  -EC    Highest Level of Mobility Goal Walk 25 Feet or More-7  -JR Walk 10 Steps or More-6  -EC      Row Name 07/01/25 1347 07/01/25 1027       Functional Assessment    Outcome Measure Options AM-PAC 6 Clicks Daily Activity (OT)  - AM-PAC 6 Clicks Basic Mobility (PT)  -              User Key  (r) = Recorded By, (t) = Taken By, (c) = Cosigned By      Initials Name Provider Type    Crystal Poole Occupational Therapist    Barbara Hernadez, PT Physical Therapist      Marta Choudhary, RN Registered Nurse                  Physical Therapy Education       Title: PT OT SLP Therapies (Done)       Topic: Physical Therapy (Done)       Point: Mobility training (Done)       Learning Progress Summary            Patient Acceptance, E,TB, VU by  at 7/1/2025 1820    Comment: Importance of mobility to recovery                                      User Key       Initials Effective Dates Name Provider Type Discipline     08/22/23 -  Barbara Cabrera, PT Physical Therapist PT                  PT Recommendation and Plan     Progress: no change  Outcome Evaluation: Patient participated in PT evaluation and demonstrated safe mobiity and independent functional mobility.  He was able to perform bed mobility, transfers and gait x 400 feet independently.  He does not require the skills of PT and we will sign off at this time.     Time Calculation:   PT Evaluation Complexity  History, PT Evaluation Complexity: 1-2 personal factors and/or comorbidities  Examination of Body Systems (PT Eval Complexity): 1-2 elements  Clinical Presentation (PT Evaluation Complexity): evolving  Clinical Decision Making (PT Evaluation Complexity): low complexity  Overall Complexity (PT Evaluation Complexity): low complexity     PT Charges       Row Name 07/01/25 1027             Time Calculation    Start Time 1027  -JR      PT Received On 07/01/25  -JR         Untimed Charges    PT Eval/Re-eval Minutes 55  -JR         Total Minutes    Untimed Charges Total Minutes 55  -JR       Total Minutes 55  -JR                User Key  (r) = Recorded By, (t) = Taken By, (c) = Cosigned By      Initials Name Provider Type    Barbara Hernadez, PT Physical Therapist                  Therapy Charges for Today       Code Description Service Date Service Provider Modifiers Qty    19426543240 HC PT EVAL LOW COMPLEXITY 4 7/1/2025 Barbara Cabrera, PT GP 1            PT G-Codes  Outcome Measure Options: AM-PAC 6 Clicks Daily Activity (OT)  AM-PAC 6  Clicks Score (PT): 23  AM-PAC 6 Clicks Score (OT): 24    PT Discharge Summary  Anticipated Discharge Disposition (PT): home    Barbara Cabrera, PT  7/1/2025

## 2025-07-01 NOTE — H&P
[facility] HOSPITALIST   HISTORY AND PHYSICAL      Name:  Gordon Avila   Age:  74 y.o.  Sex:  male  :  1950  MRN:  [id number]   Visit Number:  [id number]  Admission Date:  2025  Date Of Service:  25  Primary Care Physician:  Ed Abraham MD    Chief Complaint:     Abdominal pain, vomiting    History Of Presenting Illness:      Patient is a 74 years old male with a past medical history of colon cancer following up with heme-onc at [facility] status post colon resection in the past, hypertension, seizure, liver disease, who presented to the ER with a chief complaint of abdominal pain and vomiting.  Patient reports that his symptoms started on Saturday (2 days ago) with severe intractable nausea and vomiting and generalized abdominal pain.  Reporting last bowel movement was 2 days ago.  No fever or chills.  Symptoms got worse and persistent so he presented to the ER for evaluation.  On ER evaluation,    Vitals were stable and afebrile.  Workup in the ER with labs significant for WBC of 11.3, T. bili Lavell 1.8 otherwise on actionable on his CBC and CMP.  Urinalysis showed ketones with trace protein otherwise negative for infection.  CT abdomen pelvis showed high-grade small-bowel obstruction. Small bowel is dilated measuring up to 3.9 cm with most distal small bowel contracted likely transition point in the left midabdomen.  Case discussed by ER provider with Dr. Argueta with general surgery, recommended admission and will consult on the patient.  NG tube was ordered in the ER, patient received morphine, Zofran and 1 L bolus of normal saline.  Hospitalist consulted for admission.    Review Of Systems:    All systems were reviewed and negative except as mentioned in history of presenting illness, assessment and plan.    Past Medical History: Patient  has a past medical history of Dental bridge present, H/O exercise stress test, Hypertension, Liver disease, Malignant  neoplasm of colon, Seizure (2019), and Wears glasses.    Past Surgical History: Patient  has a past surgical history that includes Portacath placement; Liver surgery (2014); Colon surgery (2014); Colonoscopy (N/A, 10/26/2020); Colonoscopy (N/A, 5/26/2023); Cataract extraction w/ intraocular lens implant (Right, 11/30/2023); and Cataract extraction w/ intraocular lens implant (Left, 12/14/2023).    Social History: Patient  reports that he has never smoked. He quit smokeless tobacco use about 16 years ago.  His smokeless tobacco use included chew. He reports that he does not currently use alcohol. He reports that he does not use drugs.    Family History:  Patient's family history has been reviewed and found to be noncontributory.     Allergies:      Patient has no known allergies.    Home Medications:    Prior to Admission Medications       Prescriptions Last Dose Informant Patient Reported? Taking?    capecitabine (XELODA) 500 MG chemo tablet   No No    Take 3 tablets by mouth 2 (Two) Times a Day. for 7 days on, 7 days off, then another 7 days on, 7 days off in each 28-day cycle. Take within 30 minutes of a meal.    escitalopram (LEXAPRO) 10 MG tablet   No No    Take 1 tablet by mouth Daily.    gabapentin (NEURONTIN) 300 MG capsule   No No    TAKE 1 CAPSULE(300 MG) BY MOUTH THREE TIMES DAILY AS NEEDED    lisinopril (PRINIVIL,ZESTRIL) 20 MG tablet   No No    Take 1 tablet by mouth Daily.    traZODone (DESYREL) 100 MG tablet   No No    TAKE 1 TABLET BY MOUTH EVERY NIGHT          ED Medications:    Medications   sodium chloride 0.9 % flush 10 mL (has no administration in time range)   phenol (CHLORASEPTIC) 1.4 % liquid 1 spray (has no administration in time range)   iopamidol (ISOVUE-300) 61 % injection 100 mL (100 mL Intravenous Given 6/30/25 1635)   morphine injection 4 mg (4 mg Intravenous Given 6/30/25 1807)   sodium chloride 0.9 % bolus 1,000 mL (0 mL Intravenous Stopped 6/30/25 1954)   ondansetron (ZOFRAN)  "injection 4 mg (4 mg Intravenous Given 6/30/25 1806)   morphine injection 4 mg (4 mg Intravenous Given 6/30/25 1950)   ondansetron (ZOFRAN) injection 4 mg (4 mg Intravenous Given 6/30/25 1949)     Vital Signs:  Temp:  [98.1 °F (36.7 °C)] 98.1 °F (36.7 °C)  Heart Rate:  [61] 61  Resp:  [18] 18  BP: (132-156)/(75-85) 143/81        06/30/25  1541   Weight: 68 kg (150 lb)     Body mass index is 20.34 kg/m².    Physical Exam:     Most recent vital Signs: /81   Pulse 61   Temp 98.1 °F (36.7 °C) (Oral)   Resp 18   Ht 182.9 cm (72\")   Wt 68 kg (150 lb)   SpO2 95%   BMI 20.34 kg/m²     Physical Exam  Vitals and nursing note reviewed.   Constitutional:       General: He is not in acute distress.     Appearance: He is ill-appearing.   HENT:      Head: Normocephalic and atraumatic.      Nose: Nose normal.      Comments: NG tube noted     Mouth/Throat:      Mouth: Mucous membranes are dry.   Eyes:      Extraocular Movements: Extraocular movements intact.      Conjunctiva/sclera: Conjunctivae normal.      Pupils: Pupils are equal, round, and reactive to light.   Cardiovascular:      Rate and Rhythm: Normal rate and regular rhythm.      Pulses: Normal pulses.      Heart sounds: Normal heart sounds.   Pulmonary:      Effort: Pulmonary effort is normal.      Breath sounds: Normal breath sounds. No wheezing or rhonchi.   Abdominal:      General: Bowel sounds are normal. There is distension.      Palpations: Abdomen is soft.      Tenderness: There is generalized abdominal tenderness.   Musculoskeletal:         General: Normal range of motion.      Cervical back: Normal range of motion and neck supple.      Right lower leg: No edema.      Left lower leg: No edema.   Skin:     General: Skin is warm and dry.      Findings: No rash.   Neurological:      General: No focal deficit present.      Mental Status: He is alert and oriented to person, place, and time. Mental status is at baseline.   Psychiatric:         Mood and " Affect: Mood normal.         Behavior: Behavior normal.         Thought Content: Thought content normal.         Laboratory data:    I have reviewed the labs done in the emergency room.    Results from last 7 days   Lab Units 06/30/25  1551   SODIUM mmol/L 137   POTASSIUM mmol/L 4.0   CHLORIDE mmol/L 98   CO2 mmol/L 24.3   BUN mg/dL 20.0   CREATININE mg/dL 1.16   CALCIUM mg/dL 10.0   BILIRUBIN mg/dL 1.8*   ALK PHOS U/L 67   ALT (SGPT) U/L 16   AST (SGOT) U/L 31   GLUCOSE mg/dL 107*     Results from last 7 days   Lab Units 06/30/25  1551   WBC 10*3/mm3 11.31*   HEMOGLOBIN g/dL 13.9   HEMATOCRIT % 39.4   PLATELETS 10*3/mm3 157                     Results from last 7 days   Lab Units 06/30/25  1551   LIPASE U/L 23         Results from last 7 days   Lab Units 06/30/25  1633   COLOR UA  Yellow   GLUCOSE UA  Negative   KETONES UA  80 mg/dL (3+)*   BLOOD UA  Negative   LEUKOCYTES UA  Negative   PH, URINE  5.5   BILIRUBIN UA  Negative   UROBILINOGEN UA  1.0 E.U./dL       Pain Management Panel           No data to display                  Radiology:    XR Abdomen 1 View  Addendum Date: 6/30/2025  ADDENDUM REPORT ADDENDUM: This report was discussed with Jeni Anguiano RN on Jun 30, 2025 19:49:00 EDT. Authenticated and Electronically Signed by Nomi Zuñiga MD on 06/30/2025 07:49:27 PM    Result Date: 6/30/2025  FINAL REPORT TECHNIQUE: null CLINICAL HISTORY: post NG tube insertion. COMPARISON: null FINDINGS: Single view of the abdomen. COMPARISON: CT abdomen and pelvis dated 6/30/25 at 16:24 EDT FINDINGS: Left-sided chest port tip overlies the mid SVC. Enteric tube side hole is above the GE junction and tip just below the diaphragm. Recommend advancement by minimally 9 cm.     IMPRESSION: 1. Enteric tube (NG) side hole is above the diaphragm. Recommend advancement by minimally 9 cm. Authenticated and Electronically Signed by Nomi Zuñiga MD on 06/30/2025 07:47:23 PM    CT Abdomen Pelvis With Contrast  Addendum Date:  6/30/2025  ADDENDUM REPORT ADDENDUM: This report was discussed with Juan Iyer PA-C on Jun 30, 2025 17:23:00 EDT. Authenticated and Electronically Signed by Nomi Zuñiga MD on 06/30/2025 05:24:17 PM    Result Date: 6/30/2025  FINAL REPORT TECHNIQUE: null CLINICAL HISTORY: abd pain, vomiting COMPARISON: null FINDINGS: CT abdomen and pelvis with IV contrast. COMPARISON: None provided. FINDINGS: Small hiatal hernia. Hepatic cyst within the right lobe measuring 4.1 cm. Cholelithiasis present within the gallbladder neck. No pericholecystic inflammatory changes. Splenic granulomas present. Normal pancreas. Normal adrenal glands. Left renal cystic lesion measuring 1.1 cm. No hydronephrosis. Well-defined hypoattenuating lesions measuring up to 0.4 cm in the right kidney, too small to further characterize but likely representing renal cysts. Dilated loops of small bowel measuring up to 3.9 cm with air-fluid levels. Most distal small bowel is contracted. Likely transition point in the left midabdomen (series 2, image 50). Appendix is not definitively identified. Small amount of free fluid present within the lower abdomen/pelvis. No mesenteric or retroperitoneal lymphadenopathy. Moderate aortoiliac atherosclerotic vascular calcifications. Urinary bladder is contracted. No inguinal lymphadenopathy. Prostate is enlarged measuring up to 5.0 cm. Mild-to-moderate spondylosis. No acute fracture.     IMPRESSION: 1. High-grade small-bowel obstruction. Small bowel is dilated measuring up to 3.9 cm with most distal small bowel contracted likely transition point in the left midabdomen Authenticated and Electronically Signed by Nomi Zuñiga MD on 06/30/2025 05:20:39 PM      Assessment:    Small bowel obstruction, POA  Colon cancer status post colon resection  Hypertension  Seizure  Liver disease    Plan:    Patient is admitted for further management and treatment.    Small bowel obstruction  - Dr. Argueta general surgery consulted,  pressure recommendations  - NG tube placed and on suction  - N.p.o.  - Pain control  - IV fluids  - Monitor bowel movements  - PT/OT consulted    -Continue home meds as warranted.  -Further orders as indicated per clinical course.    Risk Assessment: Moderate to high  DVT Prophylaxis: Lovenox prophylaxis (benefit> risk)  Code Status: Full  Diet: N.p.o.    Advance Care Planning   ACP discussion was held with the patient during this visit. Patient does not have an advance directive, information provided.       Sruthi Phelan MD  06/30/25  20:48 EDT    Dictated utilizing Dragon dictation.

## 2025-07-02 ENCOUNTER — READMISSION MANAGEMENT (OUTPATIENT)
Dept: CALL CENTER | Facility: HOSPITAL | Age: 75
End: 2025-07-02
Payer: MEDICARE

## 2025-07-02 VITALS
OXYGEN SATURATION: 96 % | WEIGHT: 157.85 LBS | TEMPERATURE: 98.4 F | RESPIRATION RATE: 18 BRPM | SYSTOLIC BLOOD PRESSURE: 125 MMHG | HEART RATE: 60 BPM | HEIGHT: 72 IN | DIASTOLIC BLOOD PRESSURE: 69 MMHG | BODY MASS INDEX: 21.38 KG/M2

## 2025-07-02 LAB
ANION GAP SERPL CALCULATED.3IONS-SCNC: 10.6 MMOL/L (ref 5–15)
BUN SERPL-MCNC: 12 MG/DL (ref 8–23)
BUN/CREAT SERPL: 14.5 (ref 7–25)
CALCIUM SPEC-SCNC: 8.8 MG/DL (ref 8.6–10.5)
CHLORIDE SERPL-SCNC: 103 MMOL/L (ref 98–107)
CO2 SERPL-SCNC: 24.4 MMOL/L (ref 22–29)
CREAT SERPL-MCNC: 0.83 MG/DL (ref 0.76–1.27)
DEPRECATED RDW RBC AUTO: 53.4 FL (ref 37–54)
EGFRCR SERPLBLD CKD-EPI 2021: 92.4 ML/MIN/1.73
ERYTHROCYTE [DISTWIDTH] IN BLOOD BY AUTOMATED COUNT: 14.1 % (ref 12.3–15.4)
GLUCOSE SERPL-MCNC: 88 MG/DL (ref 65–99)
HCT VFR BLD AUTO: 32.2 % (ref 37.5–51)
HGB BLD-MCNC: 11.2 G/DL (ref 13–17.7)
MCH RBC QN AUTO: 35.4 PG (ref 26.6–33)
MCHC RBC AUTO-ENTMCNC: 34.8 G/DL (ref 31.5–35.7)
MCV RBC AUTO: 101.9 FL (ref 79–97)
PLATELET # BLD AUTO: 124 10*3/MM3 (ref 140–450)
PMV BLD AUTO: 10.5 FL (ref 6–12)
POTASSIUM SERPL-SCNC: 3.8 MMOL/L (ref 3.5–5.2)
RBC # BLD AUTO: 3.16 10*6/MM3 (ref 4.14–5.8)
SODIUM SERPL-SCNC: 138 MMOL/L (ref 136–145)
WBC NRBC COR # BLD AUTO: 4.53 10*3/MM3 (ref 3.4–10.8)

## 2025-07-02 PROCEDURE — 85027 COMPLETE CBC AUTOMATED: CPT | Performed by: FAMILY MEDICINE

## 2025-07-02 PROCEDURE — 99238 HOSP IP/OBS DSCHRG MGMT 30/<: CPT | Performed by: FAMILY MEDICINE

## 2025-07-02 PROCEDURE — 80048 BASIC METABOLIC PNL TOTAL CA: CPT | Performed by: FAMILY MEDICINE

## 2025-07-02 RX ORDER — LISINOPRIL 20 MG/1
20 TABLET ORAL DAILY
Qty: 30 TABLET | Refills: 0 | Status: SHIPPED | OUTPATIENT
Start: 2025-07-02 | End: 2025-08-01

## 2025-07-02 RX ORDER — POLYETHYLENE GLYCOL 3350 17 G/17G
17 POWDER, FOR SOLUTION ORAL DAILY
Qty: 510 G | Refills: 0 | Status: SHIPPED | OUTPATIENT
Start: 2025-07-02 | End: 2025-08-01

## 2025-07-02 RX ADMIN — LISINOPRIL 20 MG: 20 TABLET ORAL at 08:42

## 2025-07-02 RX ADMIN — GABAPENTIN 300 MG: 300 CAPSULE ORAL at 08:42

## 2025-07-02 RX ADMIN — Medication 10 ML: at 08:42

## 2025-07-02 RX ADMIN — ESCITALOPRAM OXALATE 10 MG: 10 TABLET ORAL at 08:42

## 2025-07-02 NOTE — DISCHARGE SUMMARY
[facility] HOSPITALIST   DISCHARGE SUMMARY      Name:  Gordon Avila   Age:  74 y.o.  Sex:  male  :  1950  MRN:  [id number]   Visit Number:  [id number]    Admission Date:  2025  Date of Discharge:  2025  Primary Care Physician:  Ed Abraham MD    Important issues to note:    - Discharged on MiraLAX daily to prevent constipation per surgery recommendations  - Follow-up with Dr. Argueta  - Follow-up with Dr. Mercado  - Follow-up with PCP    Discharge Diagnoses:     Small bowel obstruction, POA  Colon cancer status post colon resection  Hypertension  Seizure  Liver disease       Problem List:     Active Hospital Problems    Diagnosis  POA    **SBO (small bowel obstruction) [K56.609]  Yes      Resolved Hospital Problems   No resolved problems to display.     Presenting Problem:    Chief Complaint   Patient presents with    Abdominal Pain      Consults:     Consulting Physician(s)         Provider   Role Specialty     Santi Abad MD      Consulting Physician Hematology     Alem Argueta DO      Consulting Physician General Surgery          Procedures Performed:        History of presenting illness/Hospital Course:    Patient is a 74 years old male with a past medical history of colon cancer following up with heme-onc at [facility] status post colon resection in the past, hypertension, seizure, liver disease, who presented to the ER with a chief complaint of abdominal pain and vomiting.  Patient reports that his symptoms started on Saturday (2 days ago) with severe intractable nausea and vomiting and generalized abdominal pain.  Reporting last bowel movement was 2 days ago.  No fever or chills.  Symptoms got worse and persistent so he presented to the ER for evaluation.  On ER evaluation,     Vitals were stable and afebrile.  Workup in the ER with labs significant for WBC of 11.3, T. bili Lavell 1.8 otherwise on actionable on his CBC and CMP.  Urinalysis showed  ketones with trace protein otherwise negative for infection.  CT abdomen pelvis showed high-grade small-bowel obstruction. Small bowel is dilated measuring up to 3.9 cm with most distal small bowel contracted likely transition point in the left midabdomen.  Case discussed by ER provider with Dr. Argueta with general surgery, recommended admission and will consult on the patient.  NG tube was ordered in the ER, patient received morphine, Zofran and 1 L bolus of normal saline.  Hospitalist consulted for admission.    Small bowel obstruction, resolved  - Dr. Argueta general surgery consulted, pressure recommendations  - NG tube placed in the ER then discontinued  - Pain control  - IV fluids  -CT abdomen pelvis with p.o. contrast showing resolved small bowel obstruction since prior exam with passage of oral contrast agent filling the entire nondilated colon.  - PT/OT consulted, encouraged ambulation  -Advance to GI soft and tolerated p.o. well.  No nausea or vomiting.  Abdominal pain resolved.  Patient had multiple bowel movements.  -daily bowel regimen with Miralax in the morning to prevent constipation     Patient was seen and examined on the day of discharge.  Wife at bedside.  Patient sitting up in the chair and appeared comfortable with no distress.  Patient reports doing much better today and improved and is eager to go home.  He had breakfast this morning and tolerated p.o. well.  He denies any pain or discomfort. Patient had 2 bowel movements yesterday, tolerating p.o. well and abdominal pain resolved.  Cleared for discharge by surgery.    Patient instructed on management and plan in details.  Discussed MiraLAX per surgery recommendations.  Follow-up with Dr. Argueta.  Follow-up with oncology.  Patient requesting refill on his lisinopril which was sent. Patient to follow-up with PCP in 2 to 3 days for recheck and continued care. Clear return precautions discussed.  Patient verbalized understanding and agreeable to  plan.  All questions were answered to the patient's satisfaction.   Patient has reached maximum medical improvement of inpatient hospital stay. Patient is discharged in stable condition.    Vital Signs:    Temp:  [98.4 °F (36.9 °C)-98.9 °F (37.2 °C)] 98.4 °F (36.9 °C)  Heart Rate:  [60-64] 60  Resp:  [16-18] 18  BP: (105-135)/(64-69) 125/69    Physical Exam:    General Appearance:  Alert and cooperative.  No acute distress.   Head:  Atraumatic and normocephalic.   Eyes: Conjunctivae and sclerae normal, no icterus. No pallor.   Ears:  Ears with no abnormalities noted.   Throat: No oral lesions, no thrush, oral mucosa moist.   Neck: Supple, trachea midline, no thyromegaly.   Back:   No kyphoscoliosis present. No tenderness to palpation.   Lungs:   Breath sounds heard bilaterally equally.  No crackles or wheezing. No Pleural rub or bronchial breathing.   Heart:  Normal S1 and S2, no murmur, no gallop, no rub. No JVD.   Abdomen:   Normal bowel sounds, no masses, no organomegaly. Soft, nontender, nondistended, no rebound tenderness.   Extremities: Supple, no edema, no cyanosis, no clubbing.   Pulses: Pulses palpable bilaterally.   Skin: No bleeding or rash.   Neurologic: Alert and oriented x 3. No facial asymmetry. Moves all four limbs. No tremors.     Pertinent Lab Results:     Results from last 7 days   Lab Units 07/02/25 0529 07/01/25 0537 06/30/25  1551   SODIUM mmol/L 138 138 137   POTASSIUM mmol/L 3.8 3.8 4.0   CHLORIDE mmol/L 103 101 98   CO2 mmol/L 24.4 23.8 24.3   BUN mg/dL 12.0 20.0 20.0   CREATININE mg/dL 0.83 1.00 1.16   CALCIUM mg/dL 8.8 8.9 10.0   BILIRUBIN mg/dL  --  1.5* 1.8*   ALK PHOS U/L  --  53 67   ALT (SGPT) U/L  --  12 16   AST (SGOT) U/L  --  23 31   GLUCOSE mg/dL 88 87 107*     Results from last 7 days   Lab Units 07/02/25 0529 07/01/25 0537 06/30/25  1551   WBC 10*3/mm3 4.53 8.58 11.31*   HEMOGLOBIN g/dL 11.2* 11.3* 13.9   HEMATOCRIT % 32.2* 33.1* 39.4   PLATELETS 10*3/mm3 124* 129* 157                      Results from last 7 days   Lab Units 06/30/25  1551   LIPASE U/L 23               Pertinent Radiology Results:    Imaging Results (All)       Procedure Component Value Units Date/Time    CT Abdomen Pelvis Without Contrast [[id number]] Collected: 07/01/25 1025     Updated: 07/01/25 1028    Narrative:      CT ABDOMEN AND PELVIS WITHOUT CONTRAST     TECHNIQUE: Oral contrast without intravenous contrast administration.     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     COMPARISON: 06/30/2025     FINDINGS:     Abdomen: Small hiatal hernia is present. Lobulated cyst is noted of the  inferior right liver. Hypodense left renal lesion is noted compatible  with a cyst. Minimal increased density in the renal collecting systems  is seen, likely retained excreted contrast.     Remaining solid organs are normal.     An NG tube is noted in the proximal stomach.     Small-bowel distention has resolved from prior exam. There is some  retained fluid in nondilated small-bowel loops. Contrast is noted  passing throughout the small bowel filling the colon.     Pelvis: There is some fold thickening of distal small bowel in the right  lower quadrant, which may reflect enteritis or edema from recent  obstruction. No abscess is seen. There is trace free fluid in right  lower quadrant. Appendix is not visualized. Bladder and prostate are  unremarkable.       Impression:      Resolved small bowel obstruction since prior exam with  passage of oral contrast agent filling the entire nondilated colon. No  retained contrast within small bowel.        This study was performed with techniques to keep radiation doses as low  as reasonably achievable (ALARA). Individualized dose reduction  techniques using  automated exposure control or adjustment of vA and/or  kV according to the patient size were employed.      This report was signed and finalized on 7/1/2025 10:26 AM by Omer Hunter MD.       XR Abdomen KUB [[id number]] Collected: 07/01/25 0254     Updated: 07/01/25 0301    Addenda:        ADDENDUM REPORT    ADDENDUM:  This report was discussed with Amairani Senior RN on Jul 01, 2025 02:59:00   EDT.    Authenticated and Electronically Signed by Ryan Hodges MD on  07/01/2025 03:00:35 AM  Signed: 07/01/25 0300 by Ryan Hodges MD    Narrative:      FINAL REPORT    TECHNIQUE:  null    CLINICAL HISTORY:  NG placement verification    COMPARISON:  null    FINDINGS:  Abdomen X-ray, 1 View    COMPARISON: CR - XR ABDOMEN 1  - 6/30/25 20:40 EDT    FINDINGS:    Enteric tube in place, with tip projecting below the diaphragm in the region of the stomach.    Unchanged left-sided central venous access port projecting over the chest.    Dilated small bowel in the upper abdomen measuring up to 4.5 cm in diameter.    No visible free air.    No acute fracture.      Impression:      IMPRESSION:    Dilated small bowel, which could be due to small-bowel obstruction, enteritis, or ileus.    Enteric tube as above.    Authenticated and Electronically Signed by Ryan Hodges MD on  07/01/2025 02:54:23 AM    XR Abdomen 1 View [[id number]] Collected: 06/30/25 2123     Updated: 06/30/25 2125    Narrative:      FINAL REPORT    TECHNIQUE:  null    CLINICAL HISTORY:  post NG insertion    COMPARISON:  null    FINDINGS:  1 view abdomen    Comparison: CR - XR ABDOMEN 1  - 6/30/25 18:59 EDT    Findings:    No abnormal calcifications.    No acute fractures.    Distal enteric tube folded on itself, tip projecting over the gastric fundus.    Similar position of the left chest port.    Nonspecific bowel-gas pattern in the visualized upper abdomen.      Impression:      IMPRESSION:    Distal enteric tube folded on itself, tip projecting over the  gastric fundus.    Authenticated and Electronically Signed by Sanju Watson on  06/30/2025 09:23:55 PM    XR Abdomen 1 View [[id number]] Collected: 06/30/25 1947     Updated: 06/30/25 1950    Addenda:        ADDENDUM REPORT    ADDENDUM:  This report was discussed with Jeni Anguiano RN on Jun 30, 2025 19:49:00   EDT.    Authenticated and Electronically Signed by Nomi Zuñiga MD on  06/30/2025 07:49:27 PM  Signed: 06/30/25 1949 by Nomi Zuñiga MD    Narrative:      FINAL REPORT    TECHNIQUE:  null    CLINICAL HISTORY:  post NG tube insertion.    COMPARISON:  null    FINDINGS:  Single view of the abdomen.    COMPARISON: CT abdomen and pelvis dated 6/30/25 at 16:24 EDT    FINDINGS:    Left-sided chest port tip overlies the mid SVC.    Enteric tube side hole is above the GE junction and tip just below the diaphragm. Recommend advancement by minimally 9 cm.      Impression:      IMPRESSION:    1. Enteric tube (NG) side hole is above the diaphragm. Recommend advancement by minimally 9 cm.    Authenticated and Electronically Signed by Nomi Zuñiga MD on  06/30/2025 07:47:23 PM    CT Abdomen Pelvis With Contrast [[id number]] Collected: 06/30/25 1703     Updated: 06/30/25 1725    Addenda:        ADDENDUM REPORT    ADDENDUM:  This report was discussed with Juan Iyer PA-C on Jun 30, 2025   17:23:00 EDT.    Authenticated and Electronically Signed by Nomi Zuñiga MD on  06/30/2025 05:24:17 PM  Signed: 06/30/25 1724 by Nomi Zuñiga MD    Narrative:      FINAL REPORT    TECHNIQUE:  null    CLINICAL HISTORY:  abd pain, vomiting    COMPARISON:  null    FINDINGS:  CT abdomen and pelvis with IV contrast.    COMPARISON: None provided.    FINDINGS:    Small hiatal hernia.    Hepatic cyst within the right lobe measuring 4.1 cm. Cholelithiasis present within the gallbladder neck. No pericholecystic inflammatory changes.    Splenic granulomas present. Normal pancreas. Normal adrenal glands.    Left renal cystic lesion measuring  1.1 cm. No hydronephrosis. Well-defined hypoattenuating lesions measuring up to 0.4 cm in the right kidney, too small to further characterize but likely representing renal cysts.    Dilated loops of small bowel measuring up to 3.9 cm with air-fluid levels. Most distal small bowel is contracted. Likely transition point in the left midabdomen (series 2, image 50).    Appendix is not definitively identified. Small amount of free fluid present within the lower abdomen/pelvis.    No mesenteric or retroperitoneal lymphadenopathy.    Moderate aortoiliac atherosclerotic vascular calcifications.    Urinary bladder is contracted.    No inguinal lymphadenopathy.    Prostate is enlarged measuring up to 5.0 cm. Mild-to-moderate spondylosis. No acute fracture.      Impression:      IMPRESSION:    1. High-grade small-bowel obstruction. Small bowel is dilated measuring up to 3.9 cm with most distal small bowel contracted likely transition point in the left midabdomen    Authenticated and Electronically Signed by Nomi Zuñiga MD on  06/30/2025 05:20:39 PM            Echo:      Condition on Discharge:      Stable.    Code status during the hospital stay:    Code Status and Medical Interventions: CPR (Attempt to Resuscitate); Full Support   Ordered at: 06/30/25 2051     Code Status (Patient has no pulse and is not breathing):    CPR (Attempt to Resuscitate)     Medical Interventions (Patient has pulse or is breathing):    Full Support     Discharge Disposition:    Home or Self Care    Discharge Medications:       Discharge Medications        New Medications        Instructions Start Date   polyethylene glycol 17 g packet  Commonly known as: MIRALAX   17 g, Oral, Daily             Continue These Medications        Instructions Start Date   capecitabine 500 MG chemo tablet  Commonly known as: XELODA   1,500 mg, Oral, 2 Times Daily, for 7 days on, 7 days off, then another 7 days on, 7 days off in each 28-day cycle. Take within 30 minutes  of a meal.      escitalopram 10 MG tablet  Commonly known as: LEXAPRO   10 mg, Oral, Daily      gabapentin 300 MG capsule  Commonly known as: NEURONTIN   300 mg, Oral, 3 times daily      lisinopril 20 MG tablet  Commonly known as: PRINIVIL,ZESTRIL   Take 1 tablet by mouth Daily      traZODone 100 MG tablet  Commonly known as: DESYREL   100 mg, Oral, Nightly             Discharge Diet:     Diet Instructions       Diet: Gastrointestinal Diets; Fiber-Restricted; Regular (IDDSI 7); Thin (IDDSI 0)      Discharge Diet: Gastrointestinal Diets    Gastrointestinal Diet: Fiber-Restricted    Texture: Regular (IDDSI 7)    Fluid Consistency: Thin (IDDSI 0)          Activity at Discharge:   As tolerated    Follow-up Appointments:     Follow-up Information       Ed Abraham MD Follow up on 7/7/2025.    Specialty: Internal Medicine  Why: 1:30pm  Contact information:  [address]  [place] [id number] [phone]               Alem Argueta, DO Follow up in 1 week(s).    Specialty: General Surgery  Contact information:  [address]  [place] [id number]  [phone]                           Future Appointments   Date Time Provider Department Center   7/7/2025  1:30 PM Ed Abraham MD [facility] RI MR [facility]   7/28/2025  9:00 AM PIOTR CT 1 BH [facility] CT [facility]   8/1/2025  9:00 AM Santi Abad MD [facility] ONC RICH [facility]   8/1/2025  9:30 AM TREATMENT RM 12 BH [facility] OP INFUS BH [facility] OPI [facility]     Test Results Pending at Discharge:    Pending Results       None                 Sruthi Phelan MD  07/02/25  09:09 EDT    Time: I spent 28 minutes on this discharge activity which included: face-to-face encounter with the patient, reviewing the data in the system, coordination of the care with the nursing staff as well as consultants, documentation, and entering orders.     Dictated utilizing Dragon dictation.

## 2025-07-02 NOTE — CASE MANAGEMENT/SOCIAL WORK
Case Management Discharge Note      Final Note: Patient is discharging today to home with wife. Therapy recommends home. He denies any financial, food, equipment or resource needs. Wife will transport.         Selected Continued Care - Admitted Since 6/30/2025       Destination    No services have been selected for the patient.                Durable Medical Equipment    No services have been selected for the patient.                Dialysis/Infusion    No services have been selected for the patient.                Home Medical Care    No services have been selected for the patient.                Therapy    No services have been selected for the patient.                Community Resources    No services have been selected for the patient.                Community & DME    No services have been selected for the patient.                    Selected Continued Care - Episodes Includes continued care and service providers with selected services from the active episodes listed below      Oncology- External Fill Episode start date: 12/11/2023   There are no active outsourced providers for this episode.                 Transportation Services  Transportation: Private Transportation  Private: Car    Final Discharge Disposition Code: 01 - home or self-care

## 2025-07-02 NOTE — PLAN OF CARE
Goal Outcome Evaluation:           Problem: Adult Inpatient Plan of Care  Goal: Absence of Hospital-Acquired Illness or Injury  Intervention: Identify and Manage Fall Risk  Recent Flowsheet Documentation  Taken 7/2/2025 0600 by Marybel Hendrix RN  Safety Promotion/Fall Prevention: safety round/check completed  Taken 7/2/2025 0400 by Marybel Hendrix RN  Safety Promotion/Fall Prevention: safety round/check completed  Taken 7/2/2025 0000 by Marybel Hendrix RN  Safety Promotion/Fall Prevention: safety round/check completed  Taken 7/1/2025 2200 by Marybel Hendrix RN  Safety Promotion/Fall Prevention: safety round/check completed  Taken 7/1/2025 2000 by Marybel Hendrix RN  Safety Promotion/Fall Prevention: safety round/check completed                Problem: Fall Injury Risk  Goal: Absence of Fall and Fall-Related Injury  Outcome: Progressing  Intervention: Promote Injury-Free Environment  Recent Flowsheet Documentation  Taken 7/2/2025 0600 by Marybel Hendrix RN  Safety Promotion/Fall Prevention: safety round/check completed  Taken 7/2/2025 0400 by Marybel Hendrix RN  Safety Promotion/Fall Prevention: safety round/check completed  Taken 7/2/2025 0000 by Marybel Hendrix RN  Safety Promotion/Fall Prevention: safety round/check completed  Taken 7/1/2025 2200 by Marybel Hendrix RN  Safety Promotion/Fall Prevention: safety round/check completed  Taken 7/1/2025 2000 by Marybel Hendrix RN  Safety Promotion/Fall Prevention: safety round/check completed

## 2025-07-02 NOTE — OUTREACH NOTE
Prep Survey      Flowsheet Row Responses   [facility] patient discharged from? [place]   Is LACE score < 7 ? No   Eligibility [facility]   Date of Admission 06/30/25   Date of Discharge 07/02/25   Discharge diagnosis SBO (small bowel obstruction)   Does the patient have one of the following disease processes/diagnoses(primary or secondary)? Other   Prep survey completed? Yes            Liliana GARCIA - Registered Nurse

## 2025-07-02 NOTE — DISCHARGE INSTRUCTIONS
- Take MiraLAX every day as prescribed  -Follow-up with Dr. Argueta in the office  -Follow-up with Dr. Mercado as previously  -Follow-up with PCP in 2 to 3 days for recheck and continued care.

## 2025-07-03 ENCOUNTER — TRANSITIONAL CARE MANAGEMENT TELEPHONE ENCOUNTER (OUTPATIENT)
Dept: CALL CENTER | Facility: HOSPITAL | Age: 75
End: 2025-07-03
Payer: MEDICARE

## 2025-07-03 NOTE — OUTREACH NOTE
Call Center TCM Note      Flowsheet Row Responses   [facility] patient discharged from? [place]   Does the patient have one of the following disease processes/diagnoses(primary or secondary)? Other   TCM attempt successful? Yes   Call start time 1445   Call end time 1448   Discharge diagnosis SBO (small bowel obstruction)   Person spoke with today (if not patient) and relationship pt   Meds reviewed with patient/caregiver? Yes   Is the patient having any side effects they believe may be caused by any medication additions or changes? No   Does the patient have all medications ordered at discharge? Yes   Is the patient taking all medications as directed (includes completed medication regime)? Yes   Comments General Surgery 7/9/25   Does the patient have an appointment with their PCP within 7-14 days of discharge? Yes  [7/7/2025  1:30 PM]   Psychosocial issues? No   Did the patient receive a copy of their discharge instructions? Yes   Nursing interventions Reviewed instructions with patient   Is the patient/caregiver able to teach back signs and symptoms related to disease process for when to call PCP? Yes   Is the patient/caregiver able to teach back signs and symptoms related to disease process for when to call 911? Yes   Is the patient/caregiver able to teach back the hierarchy of who to call/visit for symptoms/problems? PCP, Specialist, Home health nurse, Urgent Care, ED, 911 Yes   If the patient is a current smoker, are they able to teach back resources for cessation? Not a smoker   TCM call completed? Yes   Wrap up additional comments Spouse reports pt has not had a BM since hospital [place]. Spouse shares pt has not had much to eat and is now taking Miralax. Spouse verified PCP fu appt   Call end time 1448   Would this patient benefit from a Referral to [facility] Social Work? No   Is the patient interested in additional calls from an ambulatory ? No            Krystle PATRICIO - Registered Nurse    7/3/2025,  14:53 EDT

## 2025-07-07 ENCOUNTER — OFFICE VISIT (OUTPATIENT)
Dept: INTERNAL MEDICINE | Facility: CLINIC | Age: 75
End: 2025-07-07
Payer: MEDICARE

## 2025-07-07 VITALS
BODY MASS INDEX: 20.99 KG/M2 | TEMPERATURE: 98.7 F | OXYGEN SATURATION: 97 % | WEIGHT: 155 LBS | DIASTOLIC BLOOD PRESSURE: 72 MMHG | HEIGHT: 72 IN | SYSTOLIC BLOOD PRESSURE: 112 MMHG | HEART RATE: 58 BPM

## 2025-07-07 DIAGNOSIS — K56.609 SBO (SMALL BOWEL OBSTRUCTION): Primary | ICD-10-CM

## 2025-07-07 NOTE — PROGRESS NOTES
Transitional Care Follow Up Visit  Subjective     Gordon Avila is a 74 y.o. male who presents for a transitional care management visit.    Within 48 business hours after discharge our office contacted him via telephone to coordinate his care and needs.      I reviewed and discussed the details of that call along with the discharge summary, hospital problems, inpatient lab results, inpatient diagnostic studies, and consultation reports with Gordon.     Current outpatient and discharge medications have been reconciled for the patient.  Reviewed by: Ed Abraham MD          7/2/2025    10:36 AM   Date of TCM Phone Call   [facility]   Date of Admission 6/30/2025   Date of Discharge 7/2/2025     Risk for Readmission (LACE) Score: 12 (7/2/2025  6:00 AM)      History of Present Illness   Course During Hospital Stay: Patient admitted to the hospital through the emergency room after he presented there with 2-day history of abdominal pain with nausea vomiting.  CT scan showed evidence of small bowel obstruction.  He has had a history of colon cancer with partial colectomy in the past.  In the hospital he was kept n.p.o. and given IV fluids.  Had an NG tube placed for decompression with significant improvement noted in his symptoms.  Repeat imaging study showed relief of the obstruction.  He was placed on a diet that was gradually advanced.  Since discharge continues to do well at home he is now to take MiraLAX on a daily basis prior to this did not complain of constipation.  Denies alcohol or tobacco use     The following portions of the patient's history were reviewed and updated as appropriate: allergies, current medications, past family history, past medical history, past social history, past surgical history, and problem list.    Review of Systems   Constitutional: Negative.  Negative for activity change, appetite change, fatigue and fever.   HENT:  Negative for congestion, ear discharge, ear  "pain and trouble swallowing.    Eyes:  Negative for photophobia and visual disturbance.   Respiratory:  Negative for cough and shortness of breath.    Cardiovascular:  Negative for chest pain and palpitations.   Gastrointestinal:  Negative for abdominal distention, abdominal pain, constipation, diarrhea, nausea and vomiting.   Endocrine: Negative.    Genitourinary:  Negative for dysuria, hematuria and urgency.   Musculoskeletal:  Positive for arthralgias. Negative for back pain, joint swelling and myalgias.   Skin:  Negative for color change and rash.   Allergic/Immunologic: Negative.    Neurological:  Negative for dizziness, weakness, light-headedness and headaches.   Hematological:  Negative for adenopathy. Does not bruise/bleed easily.   Psychiatric/Behavioral:  Negative for agitation, confusion and dysphoric mood. The patient is not nervous/anxious.        Objective   /72   Pulse 58   Temp 98.7 °F (37.1 °C)   Ht 182.9 cm (72\")   Wt 70.3 kg (155 lb)   SpO2 97%   BMI 21.02 kg/m²   Physical Exam  Constitutional:       General: He is not in acute distress.     Appearance: He is well-developed.   HENT:      Nose: Nose normal.   Eyes:      General: No scleral icterus.     Conjunctiva/sclera: Conjunctivae normal.   Neck:      Thyroid: No thyromegaly.      Trachea: No tracheal deviation.   Cardiovascular:      Rate and Rhythm: Normal rate and regular rhythm.      Heart sounds: No murmur heard.     No friction rub.   Pulmonary:      Effort: No respiratory distress.      Breath sounds: No wheezing or rales.   Abdominal:      General: There is no distension.      Palpations: Abdomen is soft. There is no mass.      Tenderness: There is no abdominal tenderness. There is no guarding.   Musculoskeletal:         General: No deformity. Normal range of motion.   Lymphadenopathy:      Cervical: No cervical adenopathy.   Skin:     General: Skin is warm and dry.      Findings: No erythema or rash.   Neurological:      " Mental Status: He is alert and oriented to person, place, and time.      Cranial Nerves: No cranial nerve deficit.      Coordination: Coordination normal.      Deep Tendon Reflexes: Reflexes are normal and symmetric.   Psychiatric:         Behavior: Behavior normal.         Thought Content: Thought content normal.         Judgment: Judgment normal.       Assessment & Plan   Diagnoses and all orders for this visit:    1. SBO (small bowel obstruction) (Primary) resolved currently asymptomatic did not require surgical intervention.  Encourage good bowel habits and adequate hydration

## 2025-07-07 NOTE — PROGRESS NOTES
Patient: Gordon Avila    YOB: 1950    Date: 07/09/2025    Primary Care Provider: Ed Abraham MD    Chief Complaint   Patient presents with    Follow-up     Hosptial, small bowel obstruction.       SUBJECTIVE:    History of present illness:  The patient is in the office today for a hospital follow up. Patient was admitted on 6/30/25 for a small bowel obstruction. He was discharged on 7/2/25 after resolution with conservative measures. He states that he is doing well and has no complaints. He denies abdominal pain and has continued to have daily bowel movements without issue. He states daily Miralax has greatly helped his bowels. He is tolerating a diet and denies nausea or vomiting.     The following portions of the patient's history were reviewed and updated as appropriate: allergies, current medications, past family history, past medical history, past social history, past surgical history and problem list.      Review of Systems:  Constitutional:  Negative for chills, fever, and unexpected weight change.  HENT: Negative for trouble swallowing and voice change.  Eyes:  Negative for visual disturbance.  Respiratory:  Negative for apnea, cough, chest tightness, shortness of breath, and wheezing.  Cardiovascular:  Negative for chest pain, palpitations, and leg swelling.  Gastrointestinal:  Negative for abdominal distention, abdominal pain, anal bleeding, blood in stool, constipation, diarrhea, nausea, rectal pain, and vomiting.  Musculoskeletal:  Negative for back pain, gait problem, and joint swelling.  Skin:  Negative for color change, rash, and wound  Neurological:  Negative for dizziness, syncope, speech difficulty, weakness, numbness, and headaches.  Hematological:  Negative for adenopathy.  Does not bruise/bleed easily.  Psychiatric/Behavioral:  Negative for confusion.  The patient is not nervous/anxious.    Allergies:  No Known Allergies    Medications:    Current Outpatient Medications:      capecitabine (XELODA) 500 MG chemo tablet, Take 3 tablets by mouth 2 (Two) Times a Day. for 7 days on, 7 days off, then another 7 days on, 7 days off in each 28-day cycle. Take within 30 minutes of a meal., Disp: 84 tablet, Rfl: 11    escitalopram (LEXAPRO) 10 MG tablet, Take 1 tablet by mouth Daily., Disp: 90 tablet, Rfl: 3    gabapentin (NEURONTIN) 300 MG capsule, TAKE 1 CAPSULE(300 MG) BY MOUTH THREE TIMES DAILY AS NEEDED, Disp: 90 capsule, Rfl: 3    lisinopril (PRINIVIL,ZESTRIL) 20 MG tablet, Take 1 tablet by mouth Daily, Disp: 30 tablet, Rfl: 0    polyethylene glycol (MIRALAX) 17 GM/SCOOP powder, Dissolve 17g (one capful) in beverage of choice and drink once daily, Disp: 510 g, Rfl: 0    traZODone (DESYREL) 100 MG tablet, TAKE 1 TABLET BY MOUTH EVERY NIGHT, Disp: 90 tablet, Rfl: 3    History:  Past Medical History:   Diagnosis Date    Dental bridge present     H/O exercise stress test     Patient states it was normal    Hypertension     Impaired functional mobility, balance, gait, and endurance     Liver disease     mets from colon cancer    Malignant neoplasm of colon     Seizure 2019    Wears glasses        Past Surgical History:   Procedure Laterality Date    CATARACT EXTRACTION W/ INTRAOCULAR LENS IMPLANT Right 11/30/2023    Procedure: CATARACT PHACO EXTRACTION WITH INTRAOCULAR LENS IMPLANT;  Surgeon: Portillo Bailey MD;  Location: [facility] OR;  Service: Ophthalmology;  Laterality: Right;    CATARACT EXTRACTION W/ INTRAOCULAR LENS IMPLANT Left 12/14/2023    Procedure: CATARACT PHACO EXTRACTION WITH INTRAOCULAR LENS IMPLANT LEFT;  Surgeon: Portillo Bailey MD;  Location: [facility] OR;  Service: Ophthalmology;  Laterality: Left;    COLON SURGERY  2014    Sigmoid    COLONOSCOPY N/A 10/26/2020    Procedure: COLONOSCOPY;  Surgeon: Rodrigo Lambert MD;  Location: [facility] ENDOSCOPY;  Service: Gastroenterology;  Laterality: N/A;    COLONOSCOPY N/A 5/26/2023    Procedure: COLONOSCOPY;  Surgeon: Rodrigo Lambert  "MD SWETHA;  Location: [facility] ENDOSCOPY;  Service: Gastroenterology;  Laterality: N/A;    LIVER SURGERY  2014    PORTACATH PLACEMENT      still in place on left chest       Family History   Problem Relation Age of Onset    COPD Mother     Heart disease Father     COPD Other        Social History     Tobacco Use    Smoking status: Never    Smokeless tobacco: Former     Types: Chew     Quit date: 2009   Vaping Use    Vaping status: Never Used   Substance Use Topics    Alcohol use: Not Currently    Drug use: No        OBJECTIVE:    Vital Signs:   Vitals:    07/09/25 1053   BP: 138/68   Pulse: 60   Resp: 18   Temp: 97.8 °F (36.6 °C)   TempSrc: Temporal   SpO2: 98%   Weight: 70 kg (154 lb 6.4 oz)   Height: 182.9 cm (72\")     Physical Exam:     General Appearance:    Alert, cooperative, in no acute distress   Head:    Normocephalic, without obvious abnormality, atraumatic   Eyes:            Lids and lashes normal, conjunctivae and sclerae normal, no   icterus, no pallor, corneas clear, PERRLA   Throat:   No oral lesions, no thrush, oral mucosa moist   Lungs:     Clear to auscultation,respirations regular, even and                  unlabored    Heart:    Regular rhythm and normal rate, normal S1 and S2, no            murmur, no gallop, no rub, no click   Abdomen:     Normal bowel sounds, no masses, no organomegaly, soft        non-tender, non-distended, no guarding   Extremities:   Moves all extremities well, no edema, no cyanosis, no             redness   Pulses:   Pulses palpable and equal bilaterally   Skin:   No bleeding, bruising or rash   Neurologic:   Cranial nerves 2 - 12 grossly intact, sensation intact, DTR       present and equal bilaterally       Results Review:   None    Review of Systems was reviewed and confirmed as accurate as documented by the MA.    ASSESSMENT/PLAN:    1. History of small bowel obstruction      Patient was seen today as a follow up after hospitalization for small bowel obstruction. This was " relieved with conservative measures and he states he has been doing well since. I did recommend that he start taking a daily bowel regimen given some of the constipating medications that he is on. He has been doing this and states it has greatly improved his bowels and he is having soft daily bowel movements. We discussed signs and symptoms of recurrent obstruction and the patient knows to go to the ED sooner rather later if he develops any. We discussed that he is at increased risk for this given his history of multiple abdominal surgeries. He expresses understanding and all of his questions were answered today.       Electronically signed by Alem Argueta DO  07/09/25

## 2025-07-09 ENCOUNTER — OFFICE VISIT (OUTPATIENT)
Dept: SURGERY | Facility: CLINIC | Age: 75
End: 2025-07-09
Payer: MEDICARE

## 2025-07-09 VITALS
DIASTOLIC BLOOD PRESSURE: 68 MMHG | TEMPERATURE: 97.8 F | HEART RATE: 60 BPM | OXYGEN SATURATION: 98 % | SYSTOLIC BLOOD PRESSURE: 138 MMHG | BODY MASS INDEX: 20.91 KG/M2 | WEIGHT: 154.4 LBS | HEIGHT: 72 IN | RESPIRATION RATE: 18 BRPM

## 2025-07-09 DIAGNOSIS — Z87.19 HISTORY OF SMALL BOWEL OBSTRUCTION: Primary | ICD-10-CM

## 2025-07-09 PROCEDURE — 99213 OFFICE O/P EST LOW 20 MIN: CPT | Performed by: STUDENT IN AN ORGANIZED HEALTH CARE EDUCATION/TRAINING PROGRAM

## 2025-07-09 PROCEDURE — 3075F SYST BP GE 130 - 139MM HG: CPT | Performed by: STUDENT IN AN ORGANIZED HEALTH CARE EDUCATION/TRAINING PROGRAM

## 2025-07-09 PROCEDURE — 3078F DIAST BP <80 MM HG: CPT | Performed by: STUDENT IN AN ORGANIZED HEALTH CARE EDUCATION/TRAINING PROGRAM

## 2025-07-09 PROCEDURE — 1160F RVW MEDS BY RX/DR IN RCRD: CPT | Performed by: STUDENT IN AN ORGANIZED HEALTH CARE EDUCATION/TRAINING PROGRAM

## 2025-07-09 PROCEDURE — 1159F MED LIST DOCD IN RCRD: CPT | Performed by: STUDENT IN AN ORGANIZED HEALTH CARE EDUCATION/TRAINING PROGRAM

## 2025-07-09 RX ORDER — ESCITALOPRAM OXALATE 10 MG/1
10 TABLET ORAL DAILY
Qty: 90 TABLET | Refills: 3 | Status: SHIPPED | OUTPATIENT
Start: 2025-07-09

## 2025-07-09 NOTE — TELEPHONE ENCOUNTER
Caller: Gordon Aivla    Relationship: Self    Best call back number: [phone]     Requested Prescriptions:   Requested Prescriptions     Pending Prescriptions Disp Refills    escitalopram (LEXAPRO) 10 MG tablet 90 tablet 3     Sig: Take 1 tablet by mouth Daily.        Pharmacy where request should be sent: [id number] [place] [address] [address] [facility] [address] - [phone]  - [phone]     Last office visit with prescribing clinician: 7/7/2025   Last telemedicine visit with prescribing clinician: Visit date not found   Next office visit with prescribing clinician: 10/9/2025     Additional details provided by patient: PATIENT IS COMPLETELY OUT OF THIS MEDICATION.    Does the patient have less than a 3 day supply:  [x] Yes  [] No    Would you like a call back once the refill request has been completed: [] Yes [x] No    If the office needs to give you a call back, can they leave a voicemail: [] Yes [x] No    Lalo Joyce Rep   07/09/25 11:02 EDT

## 2025-07-14 ENCOUNTER — READMISSION MANAGEMENT (OUTPATIENT)
Dept: CALL CENTER | Facility: HOSPITAL | Age: 75
End: 2025-07-14
Payer: MEDICARE

## 2025-07-14 NOTE — OUTREACH NOTE
Medical Week 2 Survey      Flowsheet Row Responses   [facility] [place] patient discharged from? [facility]   Does the patient have one of the following disease processes/diagnoses(primary or secondary)? Other   Week 2 attempt successful? No   Unsuccessful attempts Attempt 1   oke Nancy ROMERO - Registered Nurse

## 2025-07-29 RX ORDER — LISINOPRIL 20 MG/1
20 TABLET ORAL DAILY
Qty: 90 TABLET | Refills: 3 | Status: SHIPPED | OUTPATIENT
Start: 2025-07-29 | End: 2025-08-28

## 2025-07-29 NOTE — TELEPHONE ENCOUNTER
Caller: Gordon Avila    Relationship: Self    Best call back number: [phone]     Requested Prescriptions:   Requested Prescriptions     Pending Prescriptions Disp Refills    lisinopril (PRINIVIL,ZESTRIL) 20 MG tablet 30 tablet 0     Sig: Take 1 tablet by mouth Daily        Pharmacy where request should be sent: [id number] [place] [address] [address] AT [facility] [address] - [phone]  - [phone] FX     Last office visit with prescribing clinician: 7/7/2025   Last telemedicine visit with prescribing clinician: Visit date not found   Next office visit with prescribing clinician: 10/9/2025        07/29/25 09:09 EDT

## 2025-08-01 ENCOUNTER — HOSPITAL ENCOUNTER (OUTPATIENT)
Facility: HOSPITAL | Age: 75
Discharge: HOME | End: 2025-08-01
Payer: MEDICARE

## 2025-08-01 ENCOUNTER — OFFICE VISIT (OUTPATIENT)
Dept: ONCOLOGY | Facility: CLINIC | Age: 75
End: 2025-08-01
Payer: MEDICARE

## 2025-08-01 VITALS
BODY MASS INDEX: 20.72 KG/M2 | RESPIRATION RATE: 18 BRPM | SYSTOLIC BLOOD PRESSURE: 159 MMHG | DIASTOLIC BLOOD PRESSURE: 82 MMHG | TEMPERATURE: 98.2 F | HEART RATE: 53 BPM | OXYGEN SATURATION: 98 % | HEIGHT: 72 IN | WEIGHT: 153 LBS

## 2025-08-01 DIAGNOSIS — C18.7 MALIGNANT NEOPLASM OF SIGMOID COLON: Primary | ICD-10-CM

## 2025-08-01 PROCEDURE — 25010000002 HEPARIN LOCK FLUSH PER 10 UNITS: Performed by: INTERNAL MEDICINE

## 2025-08-01 PROCEDURE — 36591 DRAW BLOOD OFF VENOUS DEVICE: CPT

## 2025-08-01 RX ORDER — HEPARIN SODIUM (PORCINE) LOCK FLUSH IV SOLN 100 UNIT/ML 100 UNIT/ML
500 SOLUTION INTRAVENOUS AS NEEDED
OUTPATIENT
Start: 2025-08-01

## 2025-08-01 RX ORDER — HEPARIN SODIUM (PORCINE) LOCK FLUSH IV SOLN 100 UNIT/ML 100 UNIT/ML
500 SOLUTION INTRAVENOUS AS NEEDED
Status: DISCONTINUED | OUTPATIENT
Start: 2025-08-01 | End: 2025-08-02 | Stop reason: HOSPADM

## 2025-08-01 RX ORDER — SODIUM CHLORIDE 0.9 % (FLUSH) 0.9 %
10 SYRINGE (ML) INJECTION AS NEEDED
OUTPATIENT
Start: 2025-08-01

## 2025-08-01 RX ORDER — SODIUM CHLORIDE 0.9 % (FLUSH) 0.9 %
10 SYRINGE (ML) INJECTION AS NEEDED
Status: DISCONTINUED | OUTPATIENT
Start: 2025-08-01 | End: 2025-08-02 | Stop reason: HOSPADM

## 2025-08-01 RX ADMIN — Medication 10 ML: at 09:36

## 2025-08-01 RX ADMIN — Medication 500 UNITS: at 09:37

## 2025-08-01 NOTE — PROGRESS NOTES
CHIEF COMPLAINT: 1.  Peripheral neuropathy of the hands and feet, worse in the feet                                       2.  Follow-up for colon cancer    Problem List:  Oncology/Hematology History Overview Note   1. Stage CARLOS, [id number] colon cancer with 2 out of 14 pericolonic lymph nodes  involved and a left lobe liver biopsy positive for metastasis, presenting with  a 25 pound weight loss and diarrhea with some perirectal soreness and had  colonoscopy with Dr. Mcclain in January that found this lesion that was  circumferential high-grade invading into the pericolonic fat, status post  sigmoid colectomy of a poorly differentiated adenocarcinoma.   a) Baseline CEA postop of 0.8 with alkaline phosphatase 111, with normal liver  enzymes and creatinine of 0.8. Baseline white count 9820 with a hemoglobin  13.8, platelets 339,000, and CT with contrast showing a right lobe liver dome  lesion as well as an anastomotic change in the bowel.   b) Began CapeOx 03/15/2016.  c) Added in Avastin to CapeOx 04/05/2016, second cycle. (This was 8 weeks out  from surgery).  d) KRAS mutation is negative.  E.) CAT scan in August 2016 shows resolution of disease in the liver and colon and a stable lung nodule.  Hence Avastin, Xeloda, and oxaliplatin continued.  F) oxaliplatin discontinued October 2016 due to worsening peripheral neuropathy.  G.) CTs of February 2017 showed no evidence of metastasis and stable bibasilar nodular scar.  Persistent neuropathy not worsening.  CEA 1.4.  Continuing Avastin and Xeloda without oxaliplatin.  Having some problems with irritation of his eyes on Xeloda.  2.  Peripheral neuropathy, chemotherapy induced     Malignant neoplasm of sigmoid colon   5/20/2016 Initial Diagnosis    Malignant neoplasm of sigmoid colon     5/2/2017 Imaging    CT chest, abdomen, pelvis IMPRESSION:  1. No disease recurrence.  2. Minimal areas of nodular thickening in the right lower lobe, stable.     8/2/2017 Imaging    CT  chest/abdomen/pelvis IMPRESSION:  Stable examination with no evidence of acute intrathoracic,  intra-abdominal or pelvic abnormality. There is no evidence of  progression of disease. No metastatic disease.      10/31/2017 -  Chemotherapy    OP CENTRAL VENOUS ACCESS DEVICE CARE AND MAINTENANCE     11/13/2017 Imaging    CT chest/abdomen/pelvis IMPRESSION:  Stable examination without evidence of acute intrathoracic  intra-abdominal or intrapelvic abnormality. No evidence of progression  of disease.   CEA 1.3.     2/6/2020 -  Chemotherapy    OP COLON Capecitabine 1,250 mg/m2 BID (8 cycles)         HISTORY OF PRESENT ILLNESS:  The patient is a 74 y.o. male, here for follow up on management of Stage IV a colon cancer.  He continues on Xeloda alone.  He is on 1500 mg 7 days on 7 days off.  Denies any diarrhea.  He had a small bowel obstruction earlier in the month.  That seems to have resolved on its own without any significant sequela.  Did not appear to be related to recurrence.  Though that is always a concern.     Past Medical History:   Diagnosis Date    Dental bridge present     H/O exercise stress test     Patient states it was normal    Hypertension     Impaired functional mobility, balance, gait, and endurance     Liver disease     mets from colon cancer    Malignant neoplasm of colon     Seizure 2019    Wears glasses      Past Surgical History:   Procedure Laterality Date    CATARACT EXTRACTION W/ INTRAOCULAR LENS IMPLANT Right 11/30/2023    Procedure: CATARACT PHACO EXTRACTION WITH INTRAOCULAR LENS IMPLANT;  Surgeon: Portillo Bailey MD;  Location: [facility] OR;  Service: Ophthalmology;  Laterality: Right;    CATARACT EXTRACTION W/ INTRAOCULAR LENS IMPLANT Left 12/14/2023    Procedure: CATARACT PHACO EXTRACTION WITH INTRAOCULAR LENS IMPLANT LEFT;  Surgeon: Portillo Bailey MD;  Location: [facility] OR;  Service: Ophthalmology;  Laterality: Left;    COLON SURGERY  2014    Sigmoid    COLONOSCOPY N/A 10/26/2020     "Procedure: COLONOSCOPY;  Surgeon: Rodrigo Lambert MD;  Location: [facility] ENDOSCOPY;  Service: Gastroenterology;  Laterality: N/A;    COLONOSCOPY N/A 5/26/2023    Procedure: COLONOSCOPY;  Surgeon: Rodrigo Lambert MD;  Location: [facility] ENDOSCOPY;  Service: Gastroenterology;  Laterality: N/A;    LIVER SURGERY  2014    PORTACATH PLACEMENT      still in place on left chest       No Known Allergies    Family History and Social History reviewed and changed as necessary      REVIEW OF SYSTEM:     Review of Systems   Constitutional:  Positive for fatigue.   Musculoskeletal:  Positive for arthralgias.   Skin:         Right eye skin cancer   All other systems reviewed and are negative.         PHYSICAL EXAM    Vitals:    08/01/25 0856   BP: 159/82   Pulse: 53   Resp: 18   Temp: 98.2 °F (36.8 °C)   SpO2: 98%   Weight: 69.4 kg (153 lb)   Height: 182.9 cm (72.01\")         ECOG: (1) Restricted in physically strenuous activity, ambulatory and able to do work of light nature  General: well appearing male in no acute distress  Neuro: alert and oriented  HEENT: sclera anicteric, oropharynx clear  Lymphatics: no cervical, supraclavicular, or axillary adenopathy  Cardiovascular: regular rate and rhythm, no murmurs  Lungs: clear to auscultation bilaterally  Abdomen: soft, nontender, nondistended.   Extremeties: no lower extremity edema  Skin: no rashes, lesions, bruising, or petechiae  Psych: mood and affect appropriate            Vitals reviewed.  Laboratory data reviewed     Lab Results   Component Value Date    WBC 4.53 07/02/2025    HGB 11.2 (L) 07/02/2025    HCT 32.2 (L) 07/02/2025    .9 (H) 07/02/2025     (L) 07/02/2025       Lab Results   Component Value Date    GLUCOSE 88 07/02/2025    BUN 12.0 07/02/2025    CREATININE 0.83 07/02/2025    EGFRIFNONA 89 01/21/2022    EGFRIFAFRI 81 08/04/2020    BCR 14.5 07/02/2025    K 3.8 07/02/2025    CO2 24.4 07/02/2025    CALCIUM 8.8 07/02/2025    ALBUMIN 3.6 07/01/2025    AST 23 " 07/01/2025    ALT 12 07/01/2025       Lab Results   Component Value Date    CEA 2.46 01/17/2025    CEA 2.71 10/04/2024    CEA 2.76 08/16/2024    CEA 2.41 05/17/2024     CT Abdomen Pelvis Without Contrast  Result Date: 7/1/2025  Resolved small bowel obstruction since prior exam with passage of oral contrast agent filling the entire nondilated colon. No retained contrast within small bowel.   This study was performed with techniques to keep radiation doses as low as reasonably achievable (ALARA). Individualized dose reduction techniques using automated exposure control or adjustment of vA and/or kV according to the patient size were employed.  This report was signed and finalized on 7/1/2025 10:26 AM by Omer Hunter MD.        CT Abdomen Pelvis With Contrast  Addendum Date: 6/30/2025  ADDENDUM REPORT ADDENDUM: This report was discussed with Juan Iyer PA-C on Jun 30, 2025 17:23:00 EDT. Authenticated and Electronically Signed by Nomi Zuñiga MD on 06/30/2025 05:24:17 PM    Result Date: 6/30/2025  IMPRESSION: 1. High-grade small-bowel obstruction. Small bowel is dilated measuring up to 3.9 cm with most distal small bowel contracted likely transition point in the left midabdomen Authenticated and Electronically Signed by Nomi Zuñiga MD on 06/30/2025 05:20:39 PM    CT Chest With Contrast Diagnostic  Result Date: 4/24/2025  Stable 6 mm right lower lobe nodule from prior exam; recommend 3-month follow-up. This is too small to evaluate on PET/CT.  Previously described 2 mm left lower lobe nodule no longer seen.  CTDI: 3.19 mGy DLP:151.59 mGy.cm  This report was signed and finalized on 4/24/2025 6:25 PM by Blaire Gerber MD.      CT Abdomen Pelvis With Contrast  Result Date: 4/21/2025  Air-fluid levels in nondistended small large bowel, consider ileus versus enteritis.  Stable hepatic cyst.   CTDI: 5.52 mGy DLP: 272.16 mGy.cm   This study was performed with techniques to keep radiation doses as low as reasonably  achievable (ALARA). Individualized dose reduction techniques using automated exposure control or adjustment of mA and/or kV according to the patient size were employed.      Images were reviewed, interpreted, and dictated by Dr. Blaire Gerber MD Transcribed by Dunia Will PA-C.  This report was signed and finalized on 4/21/2025 2:17 PM by Blaire Gerber MD.              Assessment/Plan     1. Metastatic colon cancer with Solitary Liver metastasis initially diagnosed 2-.  CAT scan shows stability.  He does not have significant disease within the lung.  For now I am going to continue him on Xeloda 1500 mg twice daily 7 days on and 7 days off.  Good I will hold off on imaging till probably the end of the year.  For now we will continue with single agent Xeloda 1500 mg 7 days on 7 days off.        Colonoscopy on 5/26/2023 showed no evidence of cancer.  We will plan to repeat CBC, CMP, and CEA.     2. Peripheral neuropathy secondary to chemotherapy.  Stable.  We will continue gabapentin 1 capsule by mouth 3 times a day.    3. Port.  We will continue port care once every 2 months with clinic appointments. We will check CBC, CMP, CEA with each port flush.  We will flush port today with labs.    4.  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  Resected     5.  Small bowel obstruction July 2025.  Seems to have resolved.    Follow-up in 3 months with port flush       Santi Abad MD    08/01/25

## 2025-08-04 ENCOUNTER — SPECIALTY PHARMACY (OUTPATIENT)
Facility: HOSPITAL | Age: 75
End: 2025-08-04
Payer: MEDICARE

## (undated) DEVICE — THE MONARCH® "D" CARTRIDGE IS A SINGLE-USE POLYPROPYLENE CARTRIDGE FOR POSTERIOR CHAMBER IOL DELIVERY: Brand: MONARCH® III

## (undated) DEVICE — GLV SURG SENSICARE PI LF PF 8 GRN STRL

## (undated) DEVICE — HP CONCL INTREPID COAX I/A CRV .3MM

## (undated) DEVICE — ENDOSCOPY PORT CONNECTOR FOR OLYMPUS® SCOPES: Brand: ERBE

## (undated) DEVICE — Device

## (undated) DEVICE — SYR LUERLOK 5CC

## (undated) DEVICE — SOL IRRIG H2O 1000ML STRL

## (undated) DEVICE — PATIENT RETURN ELECTRODE, SINGLE-USE, CONTACT QUALITY MONITORING, ADULT, WITH 9FT CORD, FOR PATIENTS WEIGING OVER 33LBS. (15KG): Brand: MEGADYNE

## (undated) DEVICE — FRCP BIOP RADLJAW4 HOT 2.2X240 BX40

## (undated) DEVICE — CLEARCUT® HP2 SLIT KNIFE INTREPID MICRO-COAXIAL SYSTEM 2.4 DB: Brand: CLEARCUT®; INTREPID

## (undated) DEVICE — EYE CARE POST OP KIT: Brand: MEDLINE INDUSTRIES, INC.

## (undated) DEVICE — LUBE JELLY PK/2.75GM STRL BX/144

## (undated) DEVICE — SYR LUER SLPTP 50ML

## (undated) DEVICE — GLV SURG SENSICARE W/ALOE PF LF 7.5 STRL

## (undated) DEVICE — MEDI-VAC NON-CONDUCTIVE SUCTION TUBING: Brand: CARDINAL HEALTH

## (undated) DEVICE — HYBRID TUBING/CAP SET FOR OLYMPUS® SCOPES: Brand: ERBE

## (undated) DEVICE — VLV SXN AIR/H2O ORCAPOD3 1P/U STRL

## (undated) DEVICE — CANN IRR/INJ AIR ANT CHAMBER 6MM BEND 27G

## (undated) DEVICE — Device: Brand: MALYUGIN RING SYSTEM 6.25MM

## (undated) DEVICE — BLD BEAVER MICROSHARP 15D 3MM BLU LF

## (undated) DEVICE — PAD GRND REM POLYHESIVE A/ DISP